# Patient Record
Sex: FEMALE | Race: WHITE | NOT HISPANIC OR LATINO | Employment: FULL TIME | URBAN - METROPOLITAN AREA
[De-identification: names, ages, dates, MRNs, and addresses within clinical notes are randomized per-mention and may not be internally consistent; named-entity substitution may affect disease eponyms.]

---

## 2017-01-06 DIAGNOSIS — E03.9 HYPOTHYROIDISM: ICD-10-CM

## 2017-01-06 RX ORDER — LEVOTHYROXINE SODIUM 0.12 MG/1
TABLET ORAL
Qty: 90 TABLET | Refills: 1 | Status: SHIPPED | OUTPATIENT
Start: 2017-01-06 | End: 2017-07-03 | Stop reason: SDUPTHER

## 2017-01-17 RX ORDER — ALPRAZOLAM 1 MG/1
TABLET ORAL
Qty: 30 TABLET | Refills: 0 | OUTPATIENT
Start: 2017-01-17 | End: 2017-02-10 | Stop reason: SDUPTHER

## 2017-02-15 RX ORDER — ALPRAZOLAM 1 MG/1
TABLET ORAL
Qty: 90 TABLET | Refills: 0
Start: 2017-02-15 | End: 2017-05-03 | Stop reason: SDUPTHER

## 2017-02-17 DIAGNOSIS — Z88.9 MULTIPLE ALLERGIES: Primary | ICD-10-CM

## 2017-02-17 RX ORDER — FLUTICASONE PROPIONATE 50 MCG
SPRAY, SUSPENSION (ML) NASAL
Qty: 16 G | Refills: 3 | Status: SHIPPED | OUTPATIENT
Start: 2017-02-17 | End: 2017-02-17 | Stop reason: SDUPTHER

## 2017-02-17 RX ORDER — FLUTICASONE PROPIONATE 50 MCG
2 SPRAY, SUSPENSION (ML) NASAL DAILY
Qty: 16 G | Refills: 3 | Status: SHIPPED | OUTPATIENT
Start: 2017-02-17 | End: 2017-11-04 | Stop reason: SDUPTHER

## 2017-02-17 NOTE — TELEPHONE ENCOUNTER
----- Message from Pooja Iniguez sent at 2/17/2017  3:13 PM EST -----  Contact: Patient  Patient called, and states she is miserable.  She is requesting refill on     fluticasone (FLONASE) 50 MCG/ACT nasal spray    Please advise.      Patient:  [phone]    Pharmacy:  [facility] [place] [address] [phone] [facility] [phone]

## 2017-03-04 DIAGNOSIS — J30.2 SEASONAL RHINITIS: ICD-10-CM

## 2017-03-06 RX ORDER — MONTELUKAST SODIUM 10 MG/1
TABLET ORAL
Qty: 30 TABLET | Refills: 3 | Status: SHIPPED | OUTPATIENT
Start: 2017-03-06 | End: 2017-07-15 | Stop reason: SDUPTHER

## 2017-04-05 ENCOUNTER — OFFICE VISIT (OUTPATIENT)
Dept: INTERNAL MEDICINE | Facility: CLINIC | Age: 57
End: 2017-04-05

## 2017-04-05 VITALS
TEMPERATURE: 97.8 F | HEIGHT: 65 IN | BODY MASS INDEX: 28.82 KG/M2 | DIASTOLIC BLOOD PRESSURE: 100 MMHG | OXYGEN SATURATION: 98 % | WEIGHT: 173 LBS | SYSTOLIC BLOOD PRESSURE: 140 MMHG | HEART RATE: 88 BPM

## 2017-04-05 DIAGNOSIS — IMO0001 ELEVATED BLOOD PRESSURE: ICD-10-CM

## 2017-04-05 DIAGNOSIS — H81.10 BPV (BENIGN POSITIONAL VERTIGO), UNSPECIFIED LATERALITY: Primary | ICD-10-CM

## 2017-04-05 PROCEDURE — 93000 ELECTROCARDIOGRAM COMPLETE: CPT | Performed by: NURSE PRACTITIONER

## 2017-04-05 PROCEDURE — 99214 OFFICE O/P EST MOD 30 MIN: CPT | Performed by: NURSE PRACTITIONER

## 2017-04-05 RX ORDER — MECLIZINE HYDROCHLORIDE 25 MG/1
25 TABLET ORAL 3 TIMES DAILY PRN
Qty: 30 TABLET | Refills: 1 | Status: SHIPPED | OUTPATIENT
Start: 2017-04-05 | End: 2018-05-16

## 2017-04-10 ENCOUNTER — TELEPHONE (OUTPATIENT)
Dept: INTERNAL MEDICINE | Facility: CLINIC | Age: 57
End: 2017-04-10

## 2017-04-10 NOTE — TELEPHONE ENCOUNTER
Per Radha called to see if pt was better with dizziness, she states she is some better, but still having some issues with the dizziness, she is going to see physical therapist tomorrow, please advise thanks.

## 2017-04-10 NOTE — PROGRESS NOTES
"Cary Robbins is a 56 y.o. female who presents due to feeling lightheaded with nausea.    HPI Comments: She c/o intermittent episodes of feeling lightheaded and off balance which began Sunday. She denies any recent head injury trauma, states sx are worse with turning head and/or change in position. She states it feels \"as though the room is spinning.\" She c/o mild nausea but denies vomiting or diarrhea.    Dizziness   This is a new problem. The current episode started in the past 7 days (sx began Sunday). The problem occurs daily. The problem has been waxing and waning. Associated symptoms include fatigue, headaches and nausea. Pertinent negatives include no abdominal pain, arthralgias, change in bowel habit, chest pain, chills, congestion, coughing, fever, joint swelling, rash, sore throat, visual change, vomiting or weakness. The symptoms are aggravated by standing (turning head, change in position (sx worsened with driving today)). She has tried rest for the symptoms. The treatment provided mild relief.   Nausea   This is a new problem. The current episode started in the past 7 days. The problem occurs intermittently. The problem has been waxing and waning. Associated symptoms include fatigue, headaches and nausea. Pertinent negatives include no abdominal pain, arthralgias, change in bowel habit, chest pain, chills, congestion, coughing, fever, joint swelling, rash, sore throat, visual change, vomiting or weakness.        The following portions of the patient's history were reviewed and updated as appropriate: allergies, current medications, past social history and problem list.    Past Medical History:   Diagnosis Date   • Hard to intubate     TROUBLE WITH INTUBATION... DOESN'T KNOW WHY   • Hypothyroidism    • Restless leg syndrome     TAKES XANAX FOR..         Current Outpatient Prescriptions:   •  ALLEGRA-D ALLERGY & CONGESTION  MG per 12 hr tablet, take 1 tablet by mouth twice a day, Disp: " 60 tablet, Rfl: 2  •  ALPRAZolam (XANAX) 1 MG tablet, take 1 tablet by mouth at bedtime, Disp: 90 tablet, Rfl: 0  •  estradiol (ESTRACE) 2 MG tablet, take 1 tablet by mouth once daily, Disp: 90 tablet, Rfl: 3  •  fluticasone (FLONASE) 50 MCG/ACT nasal spray, 2 sprays into each nostril Daily., Disp: 16 g, Rfl: 3  •  levothyroxine (SYNTHROID, LEVOTHROID) 125 MCG tablet, take 1 tablet by mouth once daily, Disp: 90 tablet, Rfl: 1  •  LYRICA 75 MG capsule, TK 1 C PO BID, Disp: , Rfl: 3  •  meloxicam (MOBIC) 15 MG tablet, take 1 tablet by mouth once daily, Disp: 30 tablet, Rfl: 12  •  montelukast (SINGULAIR) 10 MG tablet, take 1 tablet by mouth once daily, Disp: 30 tablet, Rfl: 3  •  meclizine (ANTIVERT) 25 MG tablet, Take 1 tablet by mouth 3 (Three) Times a Day As Needed for dizziness., Disp: 30 tablet, Rfl: 1    Allergies   Allergen Reactions   • Ampicillin Nausea Only       Review of Systems   Constitutional: Positive for fatigue. Negative for chills, fever and unexpected weight change.   HENT: Negative for congestion, ear pain, postnasal drip, sinus pressure, sore throat and trouble swallowing.    Eyes: Negative for visual disturbance.   Respiratory: Negative for cough, chest tightness and wheezing.    Cardiovascular: Negative for chest pain, palpitations and leg swelling.   Gastrointestinal: Positive for nausea. Negative for abdominal pain, blood in stool, change in bowel habit and vomiting.   Endocrine: Negative for cold intolerance and heat intolerance.   Genitourinary: Negative for dysuria, frequency and urgency.   Musculoskeletal: Negative for arthralgias and joint swelling.   Skin: Negative for color change and rash.   Allergic/Immunologic: Negative for immunocompromised state.   Neurological: Positive for dizziness and headaches. Negative for syncope and weakness.   Hematological: Does not bruise/bleed easily.       Objective   Vitals:    04/05/17 1427   BP: 140/100   BP Location: Left arm   Patient Position:  "Sitting   Cuff Size: Adult   Pulse: 88   Temp: 97.8 °F (36.6 °C)   TempSrc: Oral   SpO2: 98%   Weight: 173 lb (78.5 kg)   Height: 65\" (165.1 cm)     Physical Exam   Constitutional: She is oriented to person, place, and time. She appears well-developed and well-nourished. No distress.   HENT:   Head: Normocephalic and atraumatic.   Right Ear: External ear normal.   Left Ear: External ear normal.   Eyes: Conjunctivae are normal.   Neck: Normal range of motion. Neck supple.   Cardiovascular: Normal rate, regular rhythm, normal heart sounds and intact distal pulses.  Exam reveals no gallop and no friction rub.    No murmur heard.  Pulmonary/Chest: Effort normal and breath sounds normal. No respiratory distress.   Lymphadenopathy:     She has no cervical adenopathy.   Neurological: She is alert and oriented to person, place, and time.   Skin: Skin is warm and dry. No rash noted. No erythema.   Psychiatric: She has a normal mood and affect. Her behavior is normal.   Nursing note and vitals reviewed.      Assessment/Plan   Nancy was seen today for dizziness and nausea.    Diagnoses and all orders for this visit:    BPV (benign positional vertigo), unspecified laterality  Comments:  sx suggestive of BPV, discussed PT referral  Orders:  -     Ambulatory Referral to Physical Therapy Evaluate and treat, Vestibular  -     meclizine (ANTIVERT) 25 MG tablet; Take 1 tablet by mouth 3 (Three) Times a Day As Needed for dizziness.    Elevated blood pressure  Comments:  no acute changes on ECG, elevated due to sx? will monitor at home and bring BP readings to f/u appt    Other orders  -     ECG 12 Lead      ECG 12 Lead  Date/Time: 4/5/2017 2:12 PM  Performed by: NAVID FUENTES  Authorized by: ANTONIETTA PARK   Comparison: compared with previous ECG from 8/8/2011  Similar to previous ECG  Comparison to previous ECG: No acute changes  Rhythm: sinus rhythm  Rate: normal  Rate comments: 74  Conduction: conduction normal  ST Segments: " ST segments normal  T Waves: T waves normal  QRS axis: normal (QRS Duration 86)  Other: no other findings  Comments: QT Interval 392  Corrected QT Interval 418

## 2017-04-10 NOTE — TELEPHONE ENCOUNTER
----- Message from AMBER Hobbs sent at 4/10/2017  6:47 AM EDT -----  Regarding: Update on symptoms  Would you please call pt and see how she is feeling better? Is dizziness better? Thanks.

## 2017-04-19 ENCOUNTER — TELEPHONE (OUTPATIENT)
Dept: INTERNAL MEDICINE | Facility: CLINIC | Age: 57
End: 2017-04-19

## 2017-04-19 NOTE — TELEPHONE ENCOUNTER
Faxed Disability/claim form to Shon @ [facility] [employer] @ [phone], mailed to pt, and scanned into chart. Notified pt that we have faxed forms.

## 2017-04-26 ENCOUNTER — TELEPHONE (OUTPATIENT)
Dept: INTERNAL MEDICINE | Facility: CLINIC | Age: 57
End: 2017-04-26

## 2017-04-26 NOTE — TELEPHONE ENCOUNTER
----- Message from Allison Merino sent at 4/26/2017  9:03 AM EDT -----  Contact: pt  Pt returning your call. For her [facility] papers. She says she was on on April 3rd, 4th, 6th, and 7th. And returned to work on the 8th.

## 2017-04-28 ENCOUNTER — TELEPHONE (OUTPATIENT)
Dept: INTERNAL MEDICINE | Facility: CLINIC | Age: 57
End: 2017-04-28

## 2017-04-28 NOTE — TELEPHONE ENCOUNTER
Faxed [facility] to [phone], scanned into chart,Notified pt and mailed copy as well

## 2017-05-04 RX ORDER — ALPRAZOLAM 1 MG/1
1 TABLET ORAL NIGHTLY PRN
Qty: 90 TABLET | Refills: 1
Start: 2017-05-04 | End: 2017-11-04 | Stop reason: SDUPTHER

## 2017-06-21 DIAGNOSIS — J30.9 ALLERGIC RHINITIS: ICD-10-CM

## 2017-06-22 RX ORDER — FEXOFENADINE HYDROCHLORIDE AND PSEUDOEPHEDRINE HYDROCHLORIDE 60; 120 MG/1; MG/1
TABLET, FILM COATED, EXTENDED RELEASE ORAL
Qty: 60 TABLET | Refills: 2 | Status: SHIPPED | OUTPATIENT
Start: 2017-06-22 | End: 2017-12-06 | Stop reason: SDUPTHER

## 2017-07-03 DIAGNOSIS — E03.9 HYPOTHYROIDISM: ICD-10-CM

## 2017-07-03 RX ORDER — LEVOTHYROXINE SODIUM 0.12 MG/1
TABLET ORAL
Qty: 90 TABLET | Refills: 1 | Status: SHIPPED | OUTPATIENT
Start: 2017-07-03 | End: 2018-01-01 | Stop reason: SDUPTHER

## 2017-07-15 DIAGNOSIS — J30.2 SEASONAL RHINITIS: ICD-10-CM

## 2017-07-17 RX ORDER — MONTELUKAST SODIUM 10 MG/1
TABLET ORAL
Qty: 30 TABLET | Refills: 3 | Status: SHIPPED | OUTPATIENT
Start: 2017-07-17 | End: 2017-11-12 | Stop reason: SDUPTHER

## 2017-10-03 DIAGNOSIS — M54.50 LOW BACK PAIN: ICD-10-CM

## 2017-10-03 RX ORDER — MELOXICAM 15 MG/1
TABLET ORAL
Qty: 30 TABLET | Refills: 3 | Status: SHIPPED | OUTPATIENT
Start: 2017-10-03 | End: 2018-01-31 | Stop reason: SDUPTHER

## 2017-11-04 DIAGNOSIS — Z88.9 MULTIPLE ALLERGIES: ICD-10-CM

## 2017-11-06 RX ORDER — FLUTICASONE PROPIONATE 50 MCG
SPRAY, SUSPENSION (ML) NASAL
Qty: 16 ML | Refills: 3 | Status: SHIPPED | OUTPATIENT
Start: 2017-11-06 | End: 2018-03-27 | Stop reason: SDUPTHER

## 2017-11-08 RX ORDER — ALPRAZOLAM 1 MG/1
TABLET ORAL
Qty: 90 TABLET | Refills: 1
Start: 2017-11-08 | End: 2018-05-03 | Stop reason: SDUPTHER

## 2017-11-12 DIAGNOSIS — J30.2 SEASONAL RHINITIS: ICD-10-CM

## 2017-11-13 RX ORDER — MONTELUKAST SODIUM 10 MG/1
TABLET ORAL
Qty: 30 TABLET | Refills: 3 | Status: SHIPPED | OUTPATIENT
Start: 2017-11-13 | End: 2018-03-07 | Stop reason: SDUPTHER

## 2017-11-23 DIAGNOSIS — N95.1 MENOPAUSAL STATE: ICD-10-CM

## 2017-11-27 RX ORDER — ESTRADIOL 2 MG/1
TABLET ORAL
Qty: 90 TABLET | Refills: 1 | Status: SHIPPED | OUTPATIENT
Start: 2017-11-27 | End: 2018-05-29 | Stop reason: SDUPTHER

## 2017-12-06 DIAGNOSIS — J30.9 ALLERGIC RHINITIS: ICD-10-CM

## 2017-12-07 RX ORDER — FEXOFENADINE HYDROCHLORIDE AND PSEUDOEPHEDRINE HYDROCHLORIDE 60; 120 MG/1; MG/1
TABLET, FILM COATED, EXTENDED RELEASE ORAL
Qty: 60 TABLET | Refills: 2 | Status: SHIPPED | OUTPATIENT
Start: 2017-12-07 | End: 2018-08-06 | Stop reason: SDUPTHER

## 2018-01-01 DIAGNOSIS — E03.9 HYPOTHYROIDISM: ICD-10-CM

## 2018-01-02 RX ORDER — LEVOTHYROXINE SODIUM 0.12 MG/1
TABLET ORAL
Qty: 90 TABLET | Refills: 1 | Status: SHIPPED | OUTPATIENT
Start: 2018-01-02 | End: 2018-05-29 | Stop reason: SDUPTHER

## 2018-01-31 DIAGNOSIS — M54.50 LOW BACK PAIN: ICD-10-CM

## 2018-01-31 RX ORDER — MELOXICAM 15 MG/1
TABLET ORAL
Qty: 30 TABLET | Refills: 3 | Status: SHIPPED | OUTPATIENT
Start: 2018-01-31 | End: 2018-05-16

## 2018-03-07 DIAGNOSIS — J30.2 SEASONAL RHINITIS: ICD-10-CM

## 2018-03-07 RX ORDER — MONTELUKAST SODIUM 10 MG/1
TABLET ORAL
Qty: 30 TABLET | Refills: 3 | Status: SHIPPED | OUTPATIENT
Start: 2018-03-07 | End: 2018-07-08 | Stop reason: SDUPTHER

## 2018-03-27 DIAGNOSIS — Z88.9 MULTIPLE ALLERGIES: ICD-10-CM

## 2018-03-28 RX ORDER — FLUTICASONE PROPIONATE 50 MCG
SPRAY, SUSPENSION (ML) NASAL
Qty: 16 G | Refills: 3 | Status: SHIPPED | OUTPATIENT
Start: 2018-03-28 | End: 2018-08-06 | Stop reason: SDUPTHER

## 2018-04-30 NOTE — TELEPHONE ENCOUNTER
Returned faxed request for alprazolam patient has not been seen in a year, will need office visit

## 2018-05-03 RX ORDER — ALPRAZOLAM 1 MG/1
1 TABLET ORAL NIGHTLY PRN
Qty: 90 TABLET | Refills: 0 | OUTPATIENT
Start: 2018-05-03 | End: 2018-07-31 | Stop reason: SDUPTHER

## 2018-05-03 NOTE — TELEPHONE ENCOUNTER
Please advise med refill   / Pt's  recently passed away    Last OV:  4/5/17    [place] ordered    Thank you,    Taina

## 2018-05-03 NOTE — TELEPHONE ENCOUNTER
----- Message from Ashley Leung sent at 5/3/2018 11:33 AM EDT -----  Contact: Pt  Pt is calling for a refill     FOR  ALPRAZolam (XANAX) 1 MG tablet      [facility]   [facility] [phone] [facility] [phone] FX      Caller# [phone]    Please and thank you.

## 2018-05-10 ENCOUNTER — TRANSCRIBE ORDERS (OUTPATIENT)
Dept: ADMINISTRATIVE | Facility: HOSPITAL | Age: 58
End: 2018-05-10

## 2018-05-10 DIAGNOSIS — M54.5 LOW BACK PAIN, UNSPECIFIED BACK PAIN LATERALITY, UNSPECIFIED CHRONICITY, WITH SCIATICA PRESENCE UNSPECIFIED: Primary | ICD-10-CM

## 2018-05-16 ENCOUNTER — HOSPITAL ENCOUNTER (OUTPATIENT)
Dept: MRI IMAGING | Facility: HOSPITAL | Age: 58
Discharge: HOME | End: 2018-05-16
Attending: ORTHOPAEDIC SURGERY | Admitting: ORTHOPAEDIC SURGERY

## 2018-05-16 ENCOUNTER — OFFICE VISIT (OUTPATIENT)
Dept: INTERNAL MEDICINE | Facility: CLINIC | Age: 58
End: 2018-05-16

## 2018-05-16 VITALS
BODY MASS INDEX: 28.49 KG/M2 | HEIGHT: 65 IN | OXYGEN SATURATION: 97 % | SYSTOLIC BLOOD PRESSURE: 170 MMHG | DIASTOLIC BLOOD PRESSURE: 100 MMHG | HEART RATE: 98 BPM | WEIGHT: 171 LBS

## 2018-05-16 DIAGNOSIS — Z12.39 SCREENING FOR BREAST CANCER: ICD-10-CM

## 2018-05-16 DIAGNOSIS — M54.5 LOW BACK PAIN, UNSPECIFIED BACK PAIN LATERALITY, UNSPECIFIED CHRONICITY, WITH SCIATICA PRESENCE UNSPECIFIED: ICD-10-CM

## 2018-05-16 DIAGNOSIS — G89.29 CHRONIC LOW BACK PAIN, UNSPECIFIED BACK PAIN LATERALITY, WITH SCIATICA PRESENCE UNSPECIFIED: ICD-10-CM

## 2018-05-16 DIAGNOSIS — I10 ESSENTIAL HYPERTENSION: Primary | ICD-10-CM

## 2018-05-16 DIAGNOSIS — E03.9 ACQUIRED HYPOTHYROIDISM: ICD-10-CM

## 2018-05-16 DIAGNOSIS — M54.5 CHRONIC LOW BACK PAIN, UNSPECIFIED BACK PAIN LATERALITY, WITH SCIATICA PRESENCE UNSPECIFIED: ICD-10-CM

## 2018-05-16 DIAGNOSIS — S80.812A ABRASION, LEFT LOWER LEG, INITIAL ENCOUNTER: ICD-10-CM

## 2018-05-16 PROCEDURE — 99214 OFFICE O/P EST MOD 30 MIN: CPT | Performed by: NURSE PRACTITIONER

## 2018-05-16 PROCEDURE — A9577 INJ MULTIHANCE: HCPCS | Performed by: ORTHOPAEDIC SURGERY

## 2018-05-16 PROCEDURE — 0 GADOBENATE DIMEGLUMINE 529 MG/ML SOLUTION: Performed by: ORTHOPAEDIC SURGERY

## 2018-05-16 PROCEDURE — 82565 ASSAY OF CREATININE: CPT

## 2018-05-16 PROCEDURE — 72158 MRI LUMBAR SPINE W/O & W/DYE: CPT

## 2018-05-16 RX ORDER — IRBESARTAN AND HYDROCHLOROTHIAZIDE 150; 12.5 MG/1; MG/1
1 TABLET, FILM COATED ORAL DAILY
Qty: 30 TABLET | Refills: 1 | Status: SHIPPED | OUTPATIENT
Start: 2018-05-16 | End: 2018-07-08 | Stop reason: SDUPTHER

## 2018-05-16 RX ORDER — CELECOXIB 200 MG/1
200 CAPSULE ORAL DAILY
Qty: 90 CAPSULE | Refills: 0 | Status: SHIPPED | OUTPATIENT
Start: 2018-05-16 | End: 2018-11-27

## 2018-05-16 RX ORDER — DOXYCYCLINE 100 MG/1
100 TABLET ORAL 2 TIMES DAILY
Qty: 14 TABLET | Refills: 0 | Status: SHIPPED | OUTPATIENT
Start: 2018-05-16 | End: 2018-07-20 | Stop reason: SDUPTHER

## 2018-05-16 RX ORDER — MUPIROCIN CALCIUM 20 MG/G
CREAM TOPICAL 3 TIMES DAILY
Qty: 15 G | Refills: 0 | Status: SHIPPED | OUTPATIENT
Start: 2018-05-16 | End: 2018-05-22 | Stop reason: SDUPTHER

## 2018-05-16 RX ADMIN — GADOBENATE DIMEGLUMINE 16 ML: 529 INJECTION, SOLUTION INTRAVENOUS at 20:00

## 2018-05-17 PROBLEM — I10 ESSENTIAL HYPERTENSION: Status: ACTIVE | Noted: 2018-05-17

## 2018-05-17 PROBLEM — M54.50 CHRONIC LOW BACK PAIN: Status: ACTIVE | Noted: 2018-05-17

## 2018-05-17 PROBLEM — G89.29 CHRONIC LOW BACK PAIN: Status: ACTIVE | Noted: 2018-05-17

## 2018-05-17 LAB — CREAT BLDA-MCNC: 0.7 MG/DL (ref 0.6–1.3)

## 2018-05-17 NOTE — PROGRESS NOTES
Subjective   Nancy Robbins is a 57 y.o. female who presents due to elevated blood pressure. She also c/o worsening low back pain.    She has noticed increased BP (consistently >140/90) over the past few weeks. She does c/o increased low back pain, has been evaluated by ortho (Dr. Avery) and is scheduled for an MRI today.   She ran into the edge of a  approximately 2 weeks ago, continues to c/o irritation of her left lower leg. Denies fever/chills.      Hypertension   This is a new problem. The current episode started more than 1 month ago. The problem is unchanged. The problem is uncontrolled. Associated symptoms include headaches and malaise/fatigue. Pertinent negatives include no chest pain, palpitations or shortness of breath. Current antihypertension treatment includes nothing.   Hypothyroidism   Associated symptoms include arthralgias, fatigue, headaches and a rash. Pertinent negatives include no abdominal pain, chest pain, chills, congestion, coughing, fever, joint swelling, nausea, sore throat, vomiting or weakness.   Wound Infection   This is a new problem. The current episode started 1 to 4 weeks ago. The problem occurs daily. The problem has been gradually worsening. Associated symptoms include arthralgias, fatigue, headaches and a rash. Pertinent negatives include no abdominal pain, chest pain, chills, congestion, coughing, fever, joint swelling, nausea, sore throat, vomiting or weakness. Treatments tried: topical Neosporin. The treatment provided mild relief.        The following portions of the patient's history were reviewed and updated as appropriate: allergies, current medications, past social history and problem list.    Past Medical History:   Diagnosis Date   • Hard to intubate     TROUBLE WITH INTUBATION... DOESN'T KNOW WHY   • Hypothyroidism    • Restless leg syndrome     TAKES XANAX FOR..         Current Outpatient Prescriptions:   •  Acetaminophen (TYLENOL ARTHRITIS PAIN PO), Take   by mouth., Disp: , Rfl:   •  ALLEGRA-D ALLERGY & CONGESTION  MG per 12 hr tablet, take 1 tablet by mouth twice a day, Disp: 60 tablet, Rfl: 2  •  ALPRAZolam (XANAX) 1 MG tablet, Take 1 tablet by mouth At Night As Needed for Anxiety or Sleep., Disp: 90 tablet, Rfl: 0  •  estradiol (ESTRACE) 2 MG tablet, take 1 tablet by mouth once daily, Disp: 90 tablet, Rfl: 1  •  fluticasone (FLONASE) 50 MCG/ACT nasal spray, instill 2 sprays into each nostril once daily, Disp: 16 g, Rfl: 3  •  levothyroxine (SYNTHROID, LEVOTHROID) 125 MCG tablet, take 1 tablet by mouth once daily, Disp: 90 tablet, Rfl: 1  •  LYRICA 75 MG capsule, TK 1 C PO BID, Disp: , Rfl: 3  •  montelukast (SINGULAIR) 10 MG tablet, take 1 tablet by mouth once daily, Disp: 30 tablet, Rfl: 3  •  celecoxib (CeleBREX) 200 MG capsule, Take 1 capsule by mouth Daily., Disp: 90 capsule, Rfl: 0  •  doxycycline (ADOXA) 100 MG tablet, Take 1 tablet by mouth 2 (Two) Times a Day for 7 days., Disp: 14 tablet, Rfl: 0  •  irbesartan-hydrochlorothiazide (AVALIDE) 150-12.5 MG tablet, Take 1 tablet by mouth Daily., Disp: 30 tablet, Rfl: 1  •  mupirocin (BACTROBAN) 2 % cream, Apply  topically 3 (Three) Times a Day., Disp: 15 g, Rfl: 0  No current facility-administered medications for this visit.     Allergies   Allergen Reactions   • Ampicillin Nausea Only       Review of Systems   Constitutional: Positive for fatigue and malaise/fatigue. Negative for chills, fever and unexpected weight change.   HENT: Negative for congestion, ear pain, postnasal drip, sinus pressure, sore throat and trouble swallowing.    Eyes: Negative for visual disturbance.   Respiratory: Negative for cough, chest tightness, shortness of breath and wheezing.    Cardiovascular: Negative for chest pain, palpitations and leg swelling.   Gastrointestinal: Negative for abdominal pain, blood in stool, nausea and vomiting.   Genitourinary: Negative for dysuria, frequency and urgency.   Musculoskeletal: Positive  "for arthralgias and back pain. Negative for joint swelling.   Skin: Positive for rash. Negative for color change.   Neurological: Positive for headaches. Negative for syncope and weakness.   Hematological: Does not bruise/bleed easily.   Psychiatric/Behavioral:          approximately 6 weeks ago       Objective   Vitals:    18 1308   BP: 170/100   BP Location: Left arm   Patient Position: Sitting   Cuff Size: Adult   Pulse: 98   SpO2: 97%   Weight: 77.6 kg (171 lb)   Height: 165.1 cm (65\")     Physical Exam   Constitutional: She appears well-developed and well-nourished. She is cooperative. She does not have a sickly appearance. She does not appear ill.   HENT:   Head: Normocephalic.   Right Ear: Hearing, tympanic membrane and external ear normal.   Left Ear: Hearing, tympanic membrane and external ear normal.   Nose: Nose normal. No mucosal edema, rhinorrhea, sinus tenderness or nasal deformity. Right sinus exhibits no maxillary sinus tenderness and no frontal sinus tenderness. Left sinus exhibits no maxillary sinus tenderness and no frontal sinus tenderness.   Mouth/Throat: Oropharynx is clear and moist and mucous membranes are normal. Normal dentition.   Eyes: Conjunctivae and lids are normal. Right eye exhibits no discharge and no exudate. Left eye exhibits no discharge and no exudate.   Neck: Trachea normal and normal range of motion. Carotid bruit is not present. No edema present. No thyroid mass and no thyromegaly present.   Cardiovascular: Regular rhythm, normal heart sounds and normal pulses.    No murmur heard.  Pulmonary/Chest: Breath sounds normal. No respiratory distress. She has no decreased breath sounds. She has no wheezes. She has no rhonchi. She has no rales.   Lymphadenopathy:        Head (right side): No submental, no submandibular, no tonsillar, no preauricular, no posterior auricular and no occipital adenopathy present.        Head (left side): No submental, no submandibular, " no tonsillar, no preauricular, no posterior auricular and no occipital adenopathy present.   Neurological: She is alert.   Skin: Skin is warm, dry and intact. No cyanosis. Nails show no clubbing.   There is an graeme 3 cm wound on the left lower anterior leg with eschar and surrounding erythema, no active bleeding of drainage from the wound       Assessment/Plan   Nancy was seen today for hypertension, skin lesion, hypothyroidism and rash.    Diagnoses and all orders for this visit:    Essential hypertension  Comments:  start Irbesartand re-evaluate in 4 weeks  Orders:  -     irbesartan-hydrochlorothiazide (AVALIDE) 150-12.5 MG tablet; Take 1 tablet by mouth Daily.  -     CBC Auto Differential; Future  -     Comprehensive Metabolic Panel; Future  -     Lipid Panel; Future    Acquired hypothyroidism  Comments:  recheck TSH  Orders:  -     TSH; Future    Chronic low back pain, unspecified back pain laterality, with sciatica presence unspecified  Comments:  has appt for MRI of lumbar spine today  Orders:  -     celecoxib (CeleBREX) 200 MG capsule; Take 1 capsule by mouth Daily.    Abrasion, left lower leg, initial encounter  Comments:  apply Bactroban and begin Doxycycline due to surrounding erythema  Orders:  -     doxycycline (ADOXA) 100 MG tablet; Take 1 tablet by mouth 2 (Two) Times a Day for 7 days.  -     mupirocin (BACTROBAN) 2 % cream; Apply  topically 3 (Three) Times a Day.    Screening for breast cancer  -     Mammo Screening Bilateral With CAD; Future

## 2018-05-22 ENCOUNTER — TRANSCRIBE ORDERS (OUTPATIENT)
Dept: ADMINISTRATIVE | Facility: HOSPITAL | Age: 58
End: 2018-05-22

## 2018-05-22 DIAGNOSIS — G89.29 CHRONIC RADICULAR LUMBAR PAIN: Primary | ICD-10-CM

## 2018-05-22 DIAGNOSIS — S80.812A ABRASION, LEFT LOWER LEG, INITIAL ENCOUNTER: ICD-10-CM

## 2018-05-22 DIAGNOSIS — M54.16 CHRONIC RADICULAR LUMBAR PAIN: Primary | ICD-10-CM

## 2018-05-22 RX ORDER — MUPIROCIN CALCIUM 20 MG/G
CREAM TOPICAL 3 TIMES DAILY
Qty: 15 G | Refills: 0 | Status: SHIPPED | OUTPATIENT
Start: 2018-05-22 | End: 2018-11-27

## 2018-05-24 ENCOUNTER — OFFICE VISIT (OUTPATIENT)
Dept: INTERNAL MEDICINE | Facility: CLINIC | Age: 58
End: 2018-05-24

## 2018-05-24 ENCOUNTER — APPOINTMENT (OUTPATIENT)
Dept: WOMENS IMAGING | Facility: HOSPITAL | Age: 58
End: 2018-05-24

## 2018-05-24 ENCOUNTER — LAB (OUTPATIENT)
Dept: INTERNAL MEDICINE | Facility: CLINIC | Age: 58
End: 2018-05-24

## 2018-05-24 DIAGNOSIS — Z12.39 SCREENING FOR BREAST CANCER: ICD-10-CM

## 2018-05-24 DIAGNOSIS — E03.9 ACQUIRED HYPOTHYROIDISM: ICD-10-CM

## 2018-05-24 DIAGNOSIS — I10 ESSENTIAL HYPERTENSION: ICD-10-CM

## 2018-05-24 LAB
ALBUMIN SERPL-MCNC: 4.5 G/DL (ref 3.5–5.2)
ALBUMIN/GLOB SERPL: 1.7 G/DL
ALP SERPL-CCNC: 73 U/L (ref 39–117)
ALT SERPL W P-5'-P-CCNC: 31 U/L (ref 1–33)
ANION GAP SERPL CALCULATED.3IONS-SCNC: 13 MMOL/L
AST SERPL-CCNC: 27 U/L (ref 1–32)
BASOPHILS # BLD AUTO: 0.02 10*3/MM3 (ref 0–0.2)
BASOPHILS NFR BLD AUTO: 0.3 % (ref 0–2)
BILIRUB SERPL-MCNC: 0.3 MG/DL (ref 0.1–1.2)
BUN BLD-MCNC: 18 MG/DL (ref 6–20)
BUN/CREAT SERPL: 27.7 (ref 7–25)
CALCIUM SPEC-SCNC: 9.6 MG/DL (ref 8.6–10.5)
CHLORIDE SERPL-SCNC: 101 MMOL/L (ref 98–107)
CHOLEST SERPL-MCNC: 231 MG/DL (ref 0–200)
CO2 SERPL-SCNC: 27 MMOL/L (ref 22–29)
CREAT BLD-MCNC: 0.65 MG/DL (ref 0.57–1)
DEPRECATED RDW RBC AUTO: 42.2 FL (ref 37–54)
EOSINOPHIL # BLD AUTO: 0.15 10*3/MM3 (ref 0–0.7)
EOSINOPHIL NFR BLD AUTO: 2.5 % (ref 0–5)
ERYTHROCYTE [DISTWIDTH] IN BLOOD BY AUTOMATED COUNT: 12.2 % (ref 11.5–15)
GFR SERPL CREATININE-BSD FRML MDRD: 94 ML/MIN/1.73
GLOBULIN UR ELPH-MCNC: 2.6 GM/DL
GLUCOSE BLD-MCNC: 99 MG/DL (ref 65–99)
HCT VFR BLD AUTO: 42.9 % (ref 34.1–44.9)
HDLC SERPL-MCNC: 78 MG/DL (ref 40–60)
HGB BLD-MCNC: 14.2 G/DL (ref 11.2–15.7)
LDLC SERPL CALC-MCNC: 127 MG/DL (ref 0–100)
LDLC/HDLC SERPL: 1.63 {RATIO}
LYMPHOCYTES # BLD AUTO: 1.59 10*3/MM3 (ref 0.8–7)
LYMPHOCYTES NFR BLD AUTO: 26.7 % (ref 10–60)
MCH RBC QN AUTO: 32.1 PG (ref 26–34)
MCHC RBC AUTO-ENTMCNC: 33.1 G/DL (ref 31–37)
MCV RBC AUTO: 96.8 FL (ref 80–100)
MONOCYTES # BLD AUTO: 0.5 10*3/MM3 (ref 0–1)
MONOCYTES NFR BLD AUTO: 8.4 % (ref 0–13)
NEUTROPHILS # BLD AUTO: 3.7 10*3/MM3 (ref 1–11)
NEUTROPHILS NFR BLD AUTO: 62.1 % (ref 30–85)
PLATELET # BLD AUTO: 244 10*3/MM3 (ref 150–450)
PMV BLD AUTO: 11.2 FL (ref 6–12)
POTASSIUM BLD-SCNC: 4.2 MMOL/L (ref 3.5–5.2)
PROT SERPL-MCNC: 7.1 G/DL (ref 6–8.5)
RBC # BLD AUTO: 4.43 10*6/MM3 (ref 3.93–5.22)
SODIUM BLD-SCNC: 141 MMOL/L (ref 136–145)
TRIGL SERPL-MCNC: 128 MG/DL (ref 0–150)
TSH SERPL-ACNC: 5.81 MIU/ML (ref 0.27–4.2)
VLDLC SERPL-MCNC: 25.6 MG/DL (ref 5–40)
WBC NRBC COR # BLD: 5.96 10*3/MM3 (ref 5–10)

## 2018-05-24 PROCEDURE — 85025 COMPLETE CBC W/AUTO DIFF WBC: CPT | Performed by: NURSE PRACTITIONER

## 2018-05-24 PROCEDURE — 80061 LIPID PANEL: CPT | Performed by: NURSE PRACTITIONER

## 2018-05-24 PROCEDURE — 77067 SCR MAMMO BI INCL CAD: CPT | Performed by: NURSE PRACTITIONER

## 2018-05-24 PROCEDURE — 77067 SCR MAMMO BI INCL CAD: CPT | Performed by: RADIOLOGY

## 2018-05-24 PROCEDURE — 80053 COMPREHEN METABOLIC PANEL: CPT | Performed by: NURSE PRACTITIONER

## 2018-05-29 DIAGNOSIS — E03.9 ACQUIRED HYPOTHYROIDISM: Primary | ICD-10-CM

## 2018-05-29 DIAGNOSIS — N95.1 MENOPAUSAL STATE: ICD-10-CM

## 2018-05-29 RX ORDER — ESTRADIOL 2 MG/1
2 TABLET ORAL DAILY
Qty: 90 TABLET | Refills: 1 | Status: SHIPPED | OUTPATIENT
Start: 2018-05-29 | End: 2018-11-15 | Stop reason: SDUPTHER

## 2018-05-29 RX ORDER — LEVOTHYROXINE SODIUM 137 UG/1
125 TABLET ORAL DAILY
Qty: 30 TABLET | Refills: 1 | Status: SHIPPED | OUTPATIENT
Start: 2018-05-29 | End: 2018-07-08 | Stop reason: SDUPTHER

## 2018-06-08 ENCOUNTER — TELEPHONE (OUTPATIENT)
Dept: INTERNAL MEDICINE | Facility: CLINIC | Age: 58
End: 2018-06-08

## 2018-06-08 NOTE — TELEPHONE ENCOUNTER
----- Message from Silvia Bell sent at 6/8/2018  1:56 PM EDT -----  Contact: pt - Dr Lyons' pt - RE: new Rx  Pt calling and would like a Rx called to pt's pharmacy for poison ivy. Pt informs scratching it is spreading it. Could you please call Rx to pharmacy? Please advise. Thanks      [facility] [facility] [phone] [facility] [phone] FX    Pt # [phone]

## 2018-06-13 ENCOUNTER — HOSPITAL ENCOUNTER (OUTPATIENT)
Dept: PAIN MEDICINE | Facility: HOSPITAL | Age: 58
Discharge: HOME | End: 2018-06-13
Attending: ORTHOPAEDIC SURGERY | Admitting: ORTHOPAEDIC SURGERY

## 2018-06-13 ENCOUNTER — ANESTHESIA (OUTPATIENT)
Dept: PAIN MEDICINE | Facility: HOSPITAL | Age: 58
End: 2018-06-13

## 2018-06-13 ENCOUNTER — ANESTHESIA EVENT (OUTPATIENT)
Dept: PAIN MEDICINE | Facility: HOSPITAL | Age: 58
End: 2018-06-13

## 2018-06-13 ENCOUNTER — HOSPITAL ENCOUNTER (OUTPATIENT)
Dept: GENERAL RADIOLOGY | Facility: HOSPITAL | Age: 58
Discharge: HOME | End: 2018-06-13

## 2018-06-13 VITALS
SYSTOLIC BLOOD PRESSURE: 149 MMHG | DIASTOLIC BLOOD PRESSURE: 80 MMHG | RESPIRATION RATE: 16 BRPM | BODY MASS INDEX: 27.32 KG/M2 | WEIGHT: 170 LBS | HEIGHT: 66 IN | OXYGEN SATURATION: 98 % | HEART RATE: 62 BPM

## 2018-06-13 DIAGNOSIS — R52 PAIN: ICD-10-CM

## 2018-06-13 PROCEDURE — 77003 FLUOROGUIDE FOR SPINE INJECT: CPT

## 2018-06-13 PROCEDURE — 25010000002 METHYLPREDNISOLONE PER 80 MG: Performed by: ANESTHESIOLOGY

## 2018-06-13 PROCEDURE — C1755 CATHETER, INTRASPINAL: HCPCS

## 2018-06-13 PROCEDURE — 0 IOPAMIDOL 41 % SOLUTION: Performed by: ANESTHESIOLOGY

## 2018-06-13 RX ORDER — METHYLPREDNISOLONE ACETATE 80 MG/ML
80 INJECTION, SUSPENSION INTRA-ARTICULAR; INTRALESIONAL; INTRAMUSCULAR; SOFT TISSUE ONCE
Status: COMPLETED | OUTPATIENT
Start: 2018-06-13 | End: 2018-06-13

## 2018-06-13 RX ADMIN — METHYLPREDNISOLONE ACETATE 80 MG: 80 INJECTION, SUSPENSION INTRA-ARTICULAR; INTRALESIONAL; INTRAMUSCULAR; SOFT TISSUE at 13:40

## 2018-06-13 RX ADMIN — IOPAMIDOL 10 ML: 408 INJECTION, SOLUTION INTRATHECAL at 13:40

## 2018-06-13 NOTE — ANESTHESIA PROCEDURE NOTES
PAIN Epidural block    Patient location during procedure: pain clinic  Start Time: 6/13/2018 1:27 PM  Stop Time: 6/13/2018 1:42 PM  Indication:procedure for pain  Performed By  Anesthesiologist: BILLY JOYNER  Preanesthetic Checklist  Completed: patient identified, site marked, surgical consent, pre-op evaluation, timeout performed, risks and benefits discussed and monitors and equipment checked  Additional Notes  Interlaminar epidural was performed under fluoroscopic guidance.    Diagnosis     * Degeneration of lumbar intervertebral disc (M51.36)     * Spinal stenosis, lumbar region without neurogenic claudication (M48.061)     * Lumbar radiculopathy (M54.16)     * Previous decompression at L5-S1      Prep:  Pt Position:prone  Sterile Tech:cap, gloves, mask and sterile barrier  Prep:chlorhexidine gluconate and isopropyl alcohol  Monitoring:blood pressure monitoring, continuous pulse oximetry and EKG  Procedure:  Sedation: no   Approach:right paramedian  Guidance: fluoroscopy  Location:lumbar  Level:3-4  Needle Type:Tuohy  Needle Gauge:20 G  Aspiration:negative  Medications:  Depomedrol:80 mg  Preservative Free Saline:3mL  Isovue:2mL  Comments:Epidural spread of contrast.  Post Assessment:  Dressing:occlusive dressing applied  Pt Tolerance:patient tolerated the procedure well with no apparent complications  Complications:no

## 2018-06-13 NOTE — H&P
[facility]    History and Physical    Patient Name: Nancy Robbins  :  1960  MRN:  [id number]  Date of Admission: 2018    Subjective     Patient is a 57 y.o. female presents with chief complaint of chronic, constant, moderate, severe low back and bilateral lower extremity pain.  Onset of symptoms was gradual starting several years ago.  Symptoms are associated/aggravated by activity or sitting. Symptoms improve with rest.  She has undergone a previous decompression at L5-S1.  On a pain scale from 0-10, she rates her pain as a 5 on a good day and a 10 on a bad day.  She describes the pain as sharp and throbbing in nature.  She says her pain is adversely affected her ability to perform activities of daily living, her ability to exercise, her sleep habits and her emotions.    Her lumbar MRI shows degenerative changes at multiple levels with some spinal stenosis at L3 4.    The following portions of the patients history were reviewed and updated as appropriate: current medications, allergies, past medical history, past surgical history, past family history, past social history and problem list                Objective     Past Medical History:   Past Medical History:   Diagnosis Date   • Hard to intubate     TROUBLE WITH INTUBATION... DOESN'T KNOW WHY   • Hypertension    • Hypothyroidism    • Osteoarthritis    • Restless leg syndrome     TAKES XANAX FOR..     Past Surgical History:   Past Surgical History:   Procedure Laterality Date   • BACK SURGERY     • COLONOSCOPY N/A 2016    Procedure: COLONOSCOPY;  Surgeon: Jose G Toro MD;  Location: [facility] ENDOSCOPY;  Service:    • KNEE MENISCAL REPAIR Bilateral    • NECK SURGERY     • PARTIAL HYSTERECTOMY       Family History:   Family History   Problem Relation Age of Onset   • Lung cancer Father      Social History:   Social History   Substance Use Topics   • Smoking status: Former Smoker   • Smokeless tobacco: Never Used   • Alcohol use Yes       "Comment: social       Vital Signs Range for the last 24 hours  Temperature:     Temp Source:     BP: BP: (151)/(103) 151/103   Pulse: Heart Rate:  [70] 70   Respirations: Resp:  [16] 16   SPO2: SpO2:  [98 %] 98 %   O2 Amount (l/min):     O2 Devices Device (Oxygen Therapy): room air   Weight: Weight:  [77.1 kg (170 lb)] 77.1 kg (170 lb)     Flowsheet Rows      First Filed Value   Admission Height  167.6 cm (66\") Documented at 06/13/2018 1249   Admission Weight  77.1 kg (170 lb) Documented at 06/13/2018 1249          --------------------------------------------------------------------------------    Current Outpatient Prescriptions   Medication Sig Dispense Refill   • Acetaminophen (TYLENOL ARTHRITIS PAIN PO) Take  by mouth.     • ALLEGRA-D ALLERGY & CONGESTION  MG per 12 hr tablet take 1 tablet by mouth twice a day 60 tablet 2   • ALPRAZolam (XANAX) 1 MG tablet Take 1 tablet by mouth At Night As Needed for Anxiety or Sleep. 90 tablet 0   • celecoxib (CeleBREX) 200 MG capsule Take 1 capsule by mouth Daily. 90 capsule 0   • estradiol (ESTRACE) 2 MG tablet Take 1 tablet by mouth Daily. 90 tablet 1   • fluticasone (FLONASE) 50 MCG/ACT nasal spray instill 2 sprays into each nostril once daily 16 g 3   • irbesartan-hydrochlorothiazide (AVALIDE) 150-12.5 MG tablet Take 1 tablet by mouth Daily. 30 tablet 1   • levothyroxine (SYNTHROID, LEVOTHROID) 137 MCG tablet Take 1 tablet by mouth Daily. 30 tablet 1   • LYRICA 75 MG capsule TK 1 C PO BID  3   • montelukast (SINGULAIR) 10 MG tablet take 1 tablet by mouth once daily 30 tablet 3   • mupirocin (BACTROBAN) 2 % cream Apply  topically 3 (Three) Times a Day. 15 g 0     No current facility-administered medications for this encounter.        --------------------------------------------------------------------------------  Assessment/Plan      Anesthesia Evaluation     Patient summary reviewed and Nursing notes reviewed   NPO Solid Status: > 8 hours  NPO Liquid Status: > 2 " hours           Airway   Mallampati: II  TM distance: >3 FB  Neck ROM: full  Dental - normal exam     Pulmonary - negative pulmonary ROS and normal exam    breath sounds clear to auscultation  Cardiovascular - normal exam    Rhythm: regular  Rate: normal    (+) hypertension,   (-) angina, orthopnea, PND, VARELA      Neuro/Psych  (+) psychiatric history Anxiety,   left straight leg raise test,   right straight leg raise test,    GI/Hepatic/Renal/Endo    (+)   hypothyroidism,     Musculoskeletal     (+) back pain, chronic pain,   Abdominal  - normal exam    Abdomen: soft.  Bowel sounds: normal.   Substance History - negative use     OB/GYN negative ob/gyn ROS         Other - negative ROS           Phys Exam Other:     NECK:  Supple without adenopathy, JVD or bruits.    EXTREMITIES:  There is no clubbing, cyanosis or edema.  Radial pulses are 1+ and equal bilaterally.  Examination of the lumbar spine shows no marked tenderness or discoloration.    SKIN:  Warm and dry.    NEUROLOGICAL EXAM:  Alert and oriented ×3.  Extraocular muscles intact.  Strength is normal and symmetrical in the lower extremities with respect to dorsal and plantar flexion of the ankles and quadriceps.           Diagnosis and Plan    Treatment Plan  ASA 2      Procedures: Lumbar Epidural Steroid Injection(LESI), With fluoroscopy,       Anesthetic plan and risks discussed with patient.          Diagnosis     * Degeneration of lumbar intervertebral disc [M51.36]     * Spinal stenosis, lumbar region without neurogenic claudication [M48.061]     * Lumbar radiculopathy [M54.16]

## 2018-06-22 ENCOUNTER — OFFICE VISIT (OUTPATIENT)
Dept: INTERNAL MEDICINE | Facility: CLINIC | Age: 58
End: 2018-06-22

## 2018-06-22 VITALS
DIASTOLIC BLOOD PRESSURE: 84 MMHG | SYSTOLIC BLOOD PRESSURE: 142 MMHG | BODY MASS INDEX: 27.44 KG/M2 | WEIGHT: 170 LBS | HEART RATE: 81 BPM | OXYGEN SATURATION: 97 %

## 2018-06-22 DIAGNOSIS — M79.604 PAIN IN BOTH LOWER EXTREMITIES: ICD-10-CM

## 2018-06-22 DIAGNOSIS — M79.605 PAIN IN BOTH LOWER EXTREMITIES: ICD-10-CM

## 2018-06-22 DIAGNOSIS — I10 ESSENTIAL HYPERTENSION: Primary | ICD-10-CM

## 2018-06-22 DIAGNOSIS — E03.9 ACQUIRED HYPOTHYROIDISM: ICD-10-CM

## 2018-06-22 PROCEDURE — 99213 OFFICE O/P EST LOW 20 MIN: CPT | Performed by: NURSE PRACTITIONER

## 2018-06-24 NOTE — PROGRESS NOTES
Subjective   Nancy Robbins is a 57 y.o. female who presents for f/u regarding hypertension and hypothyroidism.    The wound on her left lower leg has improved, reports decreased tenderness in area.  She is tolerating Losartan without myalgias, BP is excellent today.  Synthroid was increased after last labs due to elevated TSH.  She has had an epidural in her lumbar spine which helped somewhat, still with significant bilateral leg pain.      Hypertension   This is a recurrent problem. The current episode started more than 1 month ago. The problem is controlled. Associated symptoms include malaise/fatigue. Pertinent negatives include no chest pain, headaches, palpitations or shortness of breath. Current antihypertension treatment includes angiotensin blockers. The current treatment provides significant improvement. There are no compliance problems.    Hypothyroidism   This is a chronic problem. The current episode started more than 1 year ago. The problem occurs daily. Associated symptoms include arthralgias, fatigue and myalgias. Pertinent negatives include no abdominal pain, chest pain, chills, congestion, coughing, fever, headaches, joint swelling, nausea, rash, sore throat, vomiting or weakness.        The following portions of the patient's history were reviewed and updated as appropriate: allergies, current medications, past social history and problem list.    Past Medical History:   Diagnosis Date   • Hard to intubate     TROUBLE WITH INTUBATION... DOESN'T KNOW WHY   • Hypertension    • Hypothyroidism    • Osteoarthritis    • Restless leg syndrome     TAKES XANAX FOR..         Current Outpatient Prescriptions:   •  Acetaminophen (TYLENOL ARTHRITIS PAIN PO), Take  by mouth., Disp: , Rfl:   •  ALLEGRA-D ALLERGY & CONGESTION  MG per 12 hr tablet, take 1 tablet by mouth twice a day, Disp: 60 tablet, Rfl: 2  •  ALPRAZolam (XANAX) 1 MG tablet, Take 1 tablet by mouth At Night As Needed for Anxiety or Sleep.,  Disp: 90 tablet, Rfl: 0  •  celecoxib (CeleBREX) 200 MG capsule, Take 1 capsule by mouth Daily., Disp: 90 capsule, Rfl: 0  •  estradiol (ESTRACE) 2 MG tablet, Take 1 tablet by mouth Daily., Disp: 90 tablet, Rfl: 1  •  fluticasone (FLONASE) 50 MCG/ACT nasal spray, instill 2 sprays into each nostril once daily, Disp: 16 g, Rfl: 3  •  irbesartan-hydrochlorothiazide (AVALIDE) 150-12.5 MG tablet, Take 1 tablet by mouth Daily., Disp: 30 tablet, Rfl: 1  •  levothyroxine (SYNTHROID, LEVOTHROID) 137 MCG tablet, Take 1 tablet by mouth Daily., Disp: 30 tablet, Rfl: 1  •  LYRICA 75 MG capsule, TK 1 C PO BID, Disp: , Rfl: 3  •  montelukast (SINGULAIR) 10 MG tablet, take 1 tablet by mouth once daily, Disp: 30 tablet, Rfl: 3  •  mupirocin (BACTROBAN) 2 % cream, Apply  topically 3 (Three) Times a Day., Disp: 15 g, Rfl: 0    Allergies   Allergen Reactions   • Ampicillin Nausea Only       Review of Systems   Constitutional: Positive for fatigue and malaise/fatigue. Negative for chills, fever and unexpected weight change.   HENT: Negative for congestion, ear pain, postnasal drip, sinus pressure, sore throat and trouble swallowing.    Eyes: Negative for visual disturbance.   Respiratory: Negative for cough, chest tightness, shortness of breath and wheezing.    Cardiovascular: Negative for chest pain, palpitations and leg swelling.   Gastrointestinal: Negative for abdominal pain, blood in stool, nausea and vomiting.   Genitourinary: Negative for dysuria, frequency and urgency.   Musculoskeletal: Positive for arthralgias, back pain and myalgias. Negative for joint swelling.   Skin: Negative for color change and rash.   Neurological: Negative for syncope, weakness and headaches.   Hematological: Does not bruise/bleed easily.       Objective   Vitals:    06/22/18 1304 06/22/18 1328   BP: 138/86 142/84   BP Location: Left arm Left arm   Patient Position: Sitting    Cuff Size: Adult    Pulse: 81    SpO2: 97%    Weight: 77.1 kg (170 lb)       Physical Exam   Constitutional: She appears well-developed and well-nourished. She is cooperative. She does not have a sickly appearance. She does not appear ill.   HENT:   Head: Normocephalic.   Right Ear: Hearing, tympanic membrane and external ear normal.   Left Ear: Hearing, tympanic membrane and external ear normal.   Nose: Nose normal. No mucosal edema, rhinorrhea, sinus tenderness or nasal deformity. Right sinus exhibits no maxillary sinus tenderness and no frontal sinus tenderness. Left sinus exhibits no maxillary sinus tenderness and no frontal sinus tenderness.   Mouth/Throat: Oropharynx is clear and moist and mucous membranes are normal. Normal dentition.   Eyes: Conjunctivae and lids are normal. Right eye exhibits no discharge and no exudate. Left eye exhibits no discharge and no exudate.   Neck: Trachea normal and normal range of motion. Carotid bruit is not present. No edema present. No thyroid mass and no thyromegaly present.   Cardiovascular: Regular rhythm, normal heart sounds and normal pulses.    No murmur heard.  Pulmonary/Chest: Breath sounds normal. No respiratory distress. She has no decreased breath sounds. She has no wheezes. She has no rhonchi. She has no rales.   Lymphadenopathy:        Head (right side): No submental, no submandibular, no tonsillar, no preauricular, no posterior auricular and no occipital adenopathy present.        Head (left side): No submental, no submandibular, no tonsillar, no preauricular, no posterior auricular and no occipital adenopathy present.   Neurological: She is alert.   Skin: Skin is warm, dry and intact. No cyanosis. Nails show no clubbing.   Area of erythema on anterior left lower leg without an open wound or signs of infection       Assessment/Plan   Nancy was seen today for hypertension.    Diagnoses and all orders for this visit:    Essential hypertension  Comments:  stable on current meds    Acquired hypothyroidism  Comments:  Synthroid dose  recently increased, recheck TSH in 6-8 weeks    Pain in both lower extremities  Comments:  appears more radicular in nature, will continue epidurals & continue to monitor sx

## 2018-06-29 ENCOUNTER — TRANSCRIBE ORDERS (OUTPATIENT)
Dept: PAIN MEDICINE | Facility: HOSPITAL | Age: 58
End: 2018-06-29

## 2018-06-29 DIAGNOSIS — G89.29 CHRONIC RADICULAR LUMBAR PAIN: Primary | ICD-10-CM

## 2018-06-29 DIAGNOSIS — M54.16 CHRONIC RADICULAR LUMBAR PAIN: Primary | ICD-10-CM

## 2018-07-02 ENCOUNTER — HOSPITAL ENCOUNTER (OUTPATIENT)
Dept: GENERAL RADIOLOGY | Facility: HOSPITAL | Age: 58
Discharge: HOME | End: 2018-07-02

## 2018-07-02 ENCOUNTER — ANESTHESIA (OUTPATIENT)
Dept: PAIN MEDICINE | Facility: HOSPITAL | Age: 58
End: 2018-07-02

## 2018-07-02 ENCOUNTER — ANESTHESIA EVENT (OUTPATIENT)
Dept: PAIN MEDICINE | Facility: HOSPITAL | Age: 58
End: 2018-07-02

## 2018-07-02 ENCOUNTER — HOSPITAL ENCOUNTER (OUTPATIENT)
Dept: PAIN MEDICINE | Facility: HOSPITAL | Age: 58
Discharge: HOME | End: 2018-07-02
Admitting: ORTHOPAEDIC SURGERY

## 2018-07-02 VITALS
RESPIRATION RATE: 16 BRPM | SYSTOLIC BLOOD PRESSURE: 134 MMHG | TEMPERATURE: 97.8 F | HEART RATE: 76 BPM | DIASTOLIC BLOOD PRESSURE: 83 MMHG | OXYGEN SATURATION: 97 %

## 2018-07-02 DIAGNOSIS — G89.29 CHRONIC RADICULAR LUMBAR PAIN: ICD-10-CM

## 2018-07-02 DIAGNOSIS — M54.16 CHRONIC RADICULAR LUMBAR PAIN: ICD-10-CM

## 2018-07-02 DIAGNOSIS — R52 PAIN: ICD-10-CM

## 2018-07-02 PROCEDURE — C1755 CATHETER, INTRASPINAL: HCPCS

## 2018-07-02 PROCEDURE — 77003 FLUOROGUIDE FOR SPINE INJECT: CPT

## 2018-07-02 PROCEDURE — 25010000002 METHYLPREDNISOLONE PER 80 MG: Performed by: ANESTHESIOLOGY

## 2018-07-02 RX ORDER — LIDOCAINE HYDROCHLORIDE 10 MG/ML
1 INJECTION, SOLUTION INFILTRATION; PERINEURAL ONCE AS NEEDED
Status: DISCONTINUED | OUTPATIENT
Start: 2018-07-02 | End: 2018-07-03 | Stop reason: HOSPADM

## 2018-07-02 RX ORDER — METHYLPREDNISOLONE ACETATE 80 MG/ML
80 INJECTION, SUSPENSION INTRA-ARTICULAR; INTRALESIONAL; INTRAMUSCULAR; SOFT TISSUE ONCE
Status: COMPLETED | OUTPATIENT
Start: 2018-07-02 | End: 2018-07-02

## 2018-07-02 RX ORDER — SODIUM CHLORIDE 0.9 % (FLUSH) 0.9 %
1-10 SYRINGE (ML) INJECTION AS NEEDED
Status: DISCONTINUED | OUTPATIENT
Start: 2018-07-02 | End: 2018-07-03 | Stop reason: HOSPADM

## 2018-07-02 RX ORDER — FENTANYL CITRATE 50 UG/ML
50 INJECTION, SOLUTION INTRAMUSCULAR; INTRAVENOUS AS NEEDED
Status: DISCONTINUED | OUTPATIENT
Start: 2018-07-02 | End: 2018-07-03 | Stop reason: HOSPADM

## 2018-07-02 RX ORDER — MIDAZOLAM HYDROCHLORIDE 1 MG/ML
1 INJECTION INTRAMUSCULAR; INTRAVENOUS AS NEEDED
Status: DISCONTINUED | OUTPATIENT
Start: 2018-07-02 | End: 2018-07-03 | Stop reason: HOSPADM

## 2018-07-02 RX ADMIN — METHYLPREDNISOLONE ACETATE 80 MG: 80 INJECTION, SUSPENSION INTRA-ARTICULAR; INTRALESIONAL; INTRAMUSCULAR; SOFT TISSUE at 14:34

## 2018-07-02 NOTE — INTERVAL H&P NOTE
[facility]  H&P reviewed. No changes since last visit.  Patient states   50-75% improvement since the last procedure/injection.    Diagnosis     * Lumbar degenerative disc disease [M51.36]     * Lumbar spinal stenosis [M48.061]     * Lumbar radiculopathy [M54.16]      Airway assessed since last visit. Airway class equals: 1.

## 2018-07-02 NOTE — ANESTHESIA PROCEDURE NOTES
PAIN Epidural block    Patient location during procedure: pain clinic  Start Time: 7/2/2018 2:28 PM  Stop Time: 7/2/2018 2:33 PM  Indication:procedure for pain  Performed By  Anesthesiologist: GRETA DOSS  Preanesthetic Checklist  Completed: patient identified, surgical consent, pre-op evaluation, timeout performed, IV checked, risks and benefits discussed and monitors and equipment checked  Additional Notes  Diagnosis:  Post-Op Diagnosis Codes:     * Lumbar degenerative disc disease (M51.36)     * Lumbar spinal stenosis (M48.061)     * Lumbar radiculopathy (M54.16)      Prep:  Pt Position:prone  Sterile Tech:gloves, mask and sterile barrier  Prep:chlorhexidine gluconate and isopropyl alcohol  Monitoring:blood pressure monitoring, continuous pulse oximetry and EKG  Procedure:  Sedation: no   Approach:midline  Guidance: fluoroscopy  Location:lumbar  Level:3-4  Needle Type:Tuohy  Needle Gauge:20  Aspiration:negative  Test Dose:negative  Medications:  Depomedrol:80 mg  Preservative Free Saline:3mL    Post Assessment:  Dressing:occlusive dressing applied  Pt Tolerance:patient tolerated the procedure well with no apparent complications  Complications:no

## 2018-07-02 NOTE — H&P (VIEW-ONLY)
[facility]    History and Physical    Patient Name: Nancy Robbins  :  1960  MRN:  [id number]  Date of Admission: 2018    Subjective     Patient is a 57 y.o. female presents with chief complaint of chronic, constant, moderate, severe low back and bilateral lower extremity pain.  Onset of symptoms was gradual starting several years ago.  Symptoms are associated/aggravated by activity or sitting. Symptoms improve with rest.  She has undergone a previous decompression at L5-S1.  On a pain scale from 0-10, she rates her pain as a 5 on a good day and a 10 on a bad day.  She describes the pain as sharp and throbbing in nature.  She says her pain is adversely affected her ability to perform activities of daily living, her ability to exercise, her sleep habits and her emotions.    Her lumbar MRI shows degenerative changes at multiple levels with some spinal stenosis at L3 4.    The following portions of the patients history were reviewed and updated as appropriate: current medications, allergies, past medical history, past surgical history, past family history, past social history and problem list                Objective     Past Medical History:   Past Medical History:   Diagnosis Date   • Hard to intubate     TROUBLE WITH INTUBATION... DOESN'T KNOW WHY   • Hypertension    • Hypothyroidism    • Osteoarthritis    • Restless leg syndrome     TAKES XANAX FOR..     Past Surgical History:   Past Surgical History:   Procedure Laterality Date   • BACK SURGERY     • COLONOSCOPY N/A 2016    Procedure: COLONOSCOPY;  Surgeon: Jose G Toro MD;  Location: [facility] ENDOSCOPY;  Service:    • KNEE MENISCAL REPAIR Bilateral    • NECK SURGERY     • PARTIAL HYSTERECTOMY       Family History:   Family History   Problem Relation Age of Onset   • Lung cancer Father      Social History:   Social History   Substance Use Topics   • Smoking status: Former Smoker   • Smokeless tobacco: Never Used   • Alcohol use Yes       "Comment: social       Vital Signs Range for the last 24 hours  Temperature:     Temp Source:     BP: BP: (151)/(103) 151/103   Pulse: Heart Rate:  [70] 70   Respirations: Resp:  [16] 16   SPO2: SpO2:  [98 %] 98 %   O2 Amount (l/min):     O2 Devices Device (Oxygen Therapy): room air   Weight: Weight:  [77.1 kg (170 lb)] 77.1 kg (170 lb)     Flowsheet Rows      First Filed Value   Admission Height  167.6 cm (66\") Documented at 06/13/2018 1249   Admission Weight  77.1 kg (170 lb) Documented at 06/13/2018 1249          --------------------------------------------------------------------------------    Current Outpatient Prescriptions   Medication Sig Dispense Refill   • Acetaminophen (TYLENOL ARTHRITIS PAIN PO) Take  by mouth.     • ALLEGRA-D ALLERGY & CONGESTION  MG per 12 hr tablet take 1 tablet by mouth twice a day 60 tablet 2   • ALPRAZolam (XANAX) 1 MG tablet Take 1 tablet by mouth At Night As Needed for Anxiety or Sleep. 90 tablet 0   • celecoxib (CeleBREX) 200 MG capsule Take 1 capsule by mouth Daily. 90 capsule 0   • estradiol (ESTRACE) 2 MG tablet Take 1 tablet by mouth Daily. 90 tablet 1   • fluticasone (FLONASE) 50 MCG/ACT nasal spray instill 2 sprays into each nostril once daily 16 g 3   • irbesartan-hydrochlorothiazide (AVALIDE) 150-12.5 MG tablet Take 1 tablet by mouth Daily. 30 tablet 1   • levothyroxine (SYNTHROID, LEVOTHROID) 137 MCG tablet Take 1 tablet by mouth Daily. 30 tablet 1   • LYRICA 75 MG capsule TK 1 C PO BID  3   • montelukast (SINGULAIR) 10 MG tablet take 1 tablet by mouth once daily 30 tablet 3   • mupirocin (BACTROBAN) 2 % cream Apply  topically 3 (Three) Times a Day. 15 g 0     No current facility-administered medications for this encounter.        --------------------------------------------------------------------------------  Assessment/Plan      Anesthesia Evaluation     Patient summary reviewed and Nursing notes reviewed   NPO Solid Status: > 8 hours  NPO Liquid Status: > 2 " hours           Airway   Mallampati: II  TM distance: >3 FB  Neck ROM: full  Dental - normal exam     Pulmonary - negative pulmonary ROS and normal exam    breath sounds clear to auscultation  Cardiovascular - normal exam    Rhythm: regular  Rate: normal    (+) hypertension,   (-) angina, orthopnea, PND, VARELA      Neuro/Psych  (+) psychiatric history Anxiety,   left straight leg raise test,   right straight leg raise test,    GI/Hepatic/Renal/Endo    (+)   hypothyroidism,     Musculoskeletal     (+) back pain, chronic pain,   Abdominal  - normal exam    Abdomen: soft.  Bowel sounds: normal.   Substance History - negative use     OB/GYN negative ob/gyn ROS         Other - negative ROS           Phys Exam Other:     NECK:  Supple without adenopathy, JVD or bruits.    EXTREMITIES:  There is no clubbing, cyanosis or edema.  Radial pulses are 1+ and equal bilaterally.  Examination of the lumbar spine shows no marked tenderness or discoloration.    SKIN:  Warm and dry.    NEUROLOGICAL EXAM:  Alert and oriented ×3.  Extraocular muscles intact.  Strength is normal and symmetrical in the lower extremities with respect to dorsal and plantar flexion of the ankles and quadriceps.           Diagnosis and Plan    Treatment Plan  ASA 2      Procedures: Lumbar Epidural Steroid Injection(LESI), With fluoroscopy,       Anesthetic plan and risks discussed with patient.          Diagnosis     * Degeneration of lumbar intervertebral disc [M51.36]     * Spinal stenosis, lumbar region without neurogenic claudication [M48.061]     * Lumbar radiculopathy [M54.16]

## 2018-07-08 DIAGNOSIS — E03.9 ACQUIRED HYPOTHYROIDISM: ICD-10-CM

## 2018-07-08 DIAGNOSIS — I10 ESSENTIAL HYPERTENSION: ICD-10-CM

## 2018-07-08 DIAGNOSIS — J30.2 SEASONAL RHINITIS: ICD-10-CM

## 2018-07-09 RX ORDER — IRBESARTAN AND HYDROCHLOROTHIAZIDE 150; 12.5 MG/1; MG/1
TABLET, FILM COATED ORAL
Qty: 30 TABLET | Refills: 1 | Status: SHIPPED | OUTPATIENT
Start: 2018-07-09 | End: 2018-09-02 | Stop reason: SDUPTHER

## 2018-07-09 RX ORDER — LEVOTHYROXINE SODIUM 137 UG/1
TABLET ORAL
Qty: 30 TABLET | Refills: 1 | Status: SHIPPED | OUTPATIENT
Start: 2018-07-09 | End: 2018-09-17 | Stop reason: SDUPTHER

## 2018-07-09 RX ORDER — MONTELUKAST SODIUM 10 MG/1
TABLET ORAL
Qty: 30 TABLET | Refills: 3 | Status: SHIPPED | OUTPATIENT
Start: 2018-07-09 | End: 2018-11-05 | Stop reason: SDUPTHER

## 2018-07-20 ENCOUNTER — OFFICE VISIT (OUTPATIENT)
Dept: INTERNAL MEDICINE | Facility: CLINIC | Age: 58
End: 2018-07-20

## 2018-07-20 ENCOUNTER — TELEPHONE (OUTPATIENT)
Dept: INTERNAL MEDICINE | Facility: CLINIC | Age: 58
End: 2018-07-20

## 2018-07-20 VITALS
HEIGHT: 66 IN | HEART RATE: 80 BPM | TEMPERATURE: 97.7 F | DIASTOLIC BLOOD PRESSURE: 80 MMHG | BODY MASS INDEX: 28.28 KG/M2 | SYSTOLIC BLOOD PRESSURE: 140 MMHG | OXYGEN SATURATION: 98 % | WEIGHT: 176 LBS

## 2018-07-20 DIAGNOSIS — S80.812A ABRASION, LEFT LOWER LEG, INITIAL ENCOUNTER: Primary | ICD-10-CM

## 2018-07-20 PROCEDURE — 99213 OFFICE O/P EST LOW 20 MIN: CPT | Performed by: NURSE PRACTITIONER

## 2018-07-20 PROCEDURE — 87070 CULTURE OTHR SPECIMN AEROBIC: CPT | Performed by: NURSE PRACTITIONER

## 2018-07-20 PROCEDURE — 87205 SMEAR GRAM STAIN: CPT | Performed by: NURSE PRACTITIONER

## 2018-07-20 RX ORDER — DOXYCYCLINE 100 MG/1
100 TABLET ORAL 2 TIMES DAILY
Qty: 20 TABLET | Refills: 0 | Status: SHIPPED | OUTPATIENT
Start: 2018-07-20 | End: 2018-07-30

## 2018-07-20 NOTE — PROGRESS NOTES
Subjective   Nancy Robbins is a 57 y.o. female who presents due to an abrasion on her left lower leg.    She noticed an abrasion on her left lower leg earlier this week (Monday) after working outside, believes she hit it on something while working in the woods. Since then she c/o increased redness and tenderness of wound and surrounding area with mild drainage. Denies fever/chills.      Wound Infection   This is a new problem. The current episode started in the past 7 days. The problem occurs daily. The problem has been rapidly worsening. Associated symptoms include arthralgias, fatigue and headaches. Pertinent negatives include no abdominal pain, chest pain, chills, congestion, coughing, fever or joint swelling. Treatments tried: topical Neosporin. The treatment provided mild relief.        The following portions of the patient's history were reviewed and updated as appropriate: allergies, current medications, past social history and problem list.    Past Medical History:   Diagnosis Date   • Hard to intubate     TROUBLE WITH INTUBATION... DOESN'T KNOW WHY   • Hypertension    • Hypothyroidism    • Low back pain    • Osteoarthritis    • Restless leg syndrome     TAKES XANAX FOR..         Current Outpatient Prescriptions:   •  Acetaminophen (TYLENOL ARTHRITIS PAIN PO), Take  by mouth., Disp: , Rfl:   •  ALLEGRA-D ALLERGY & CONGESTION  MG per 12 hr tablet, take 1 tablet by mouth twice a day, Disp: 60 tablet, Rfl: 2  •  ALPRAZolam (XANAX) 1 MG tablet, Take 1 tablet by mouth At Night As Needed for Anxiety or Sleep., Disp: 90 tablet, Rfl: 0  •  celecoxib (CeleBREX) 200 MG capsule, Take 1 capsule by mouth Daily., Disp: 90 capsule, Rfl: 0  •  estradiol (ESTRACE) 2 MG tablet, Take 1 tablet by mouth Daily., Disp: 90 tablet, Rfl: 1  •  fluticasone (FLONASE) 50 MCG/ACT nasal spray, instill 2 sprays into each nostril once daily, Disp: 16 g, Rfl: 3  •  irbesartan-hydrochlorothiazide (AVALIDE) 150-12.5 MG tablet, take 1  "tablet by mouth once daily, Disp: 30 tablet, Rfl: 1  •  levothyroxine (SYNTHROID, LEVOTHROID) 137 MCG tablet, take 1 tablet by mouth once daily, Disp: 30 tablet, Rfl: 1  •  LYRICA 75 MG capsule, TK 1 C PO BID, Disp: , Rfl: 3  •  montelukast (SINGULAIR) 10 MG tablet, take 1 tablet by mouth once daily, Disp: 30 tablet, Rfl: 3  •  mupirocin (BACTROBAN) 2 % cream, Apply  topically 3 (Three) Times a Day., Disp: 15 g, Rfl: 0  •  doxycycline (ADOXA) 100 MG tablet, Take 1 tablet by mouth 2 (Two) Times a Day for 10 days., Disp: 20 tablet, Rfl: 0    Allergies   Allergen Reactions   • Ampicillin Nausea Only       Review of Systems   Constitutional: Positive for fatigue and malaise/fatigue. Negative for chills, fever and unexpected weight change.   HENT: Negative for congestion.    Eyes: Negative for visual disturbance.   Respiratory: Negative for cough, chest tightness, shortness of breath and wheezing.    Cardiovascular: Negative for chest pain, palpitations and leg swelling.   Gastrointestinal: Negative for abdominal pain.   Genitourinary: Negative for dysuria, frequency and urgency.   Musculoskeletal: Positive for arthralgias. Negative for joint swelling.   Skin: Positive for wound. Negative for color change.   Neurological: Positive for headaches.       Objective   Vitals:    07/20/18 1435   BP: 140/80   BP Location: Left arm   Patient Position: Sitting   Cuff Size: Adult   Pulse: 80   Temp: 97.7 °F (36.5 °C)   TempSrc: Oral   SpO2: 98%   Weight: 79.8 kg (176 lb)   Height: 167.6 cm (66\")     Physical Exam   Constitutional: She appears well-developed and well-nourished. She is cooperative. She does not have a sickly appearance. She does not appear ill.   HENT:   Head: Normocephalic.   Right Ear: Hearing, tympanic membrane and external ear normal.   Left Ear: Hearing, tympanic membrane and external ear normal.   Nose: Nose normal. No mucosal edema, rhinorrhea, sinus tenderness or nasal deformity. Right sinus exhibits no " maxillary sinus tenderness and no frontal sinus tenderness. Left sinus exhibits no maxillary sinus tenderness and no frontal sinus tenderness.   Mouth/Throat: Oropharynx is clear and moist and mucous membranes are normal. Normal dentition.   Eyes: Conjunctivae and lids are normal. Right eye exhibits no discharge and no exudate. Left eye exhibits no discharge and no exudate.   Neck: Trachea normal. Carotid bruit is not present. No edema present. No thyroid mass present.   Cardiovascular: Regular rhythm, normal heart sounds and normal pulses.    No murmur heard.  Pulmonary/Chest: Breath sounds normal. No respiratory distress. She has no decreased breath sounds. She has no wheezes. She has no rhonchi. She has no rales.   Lymphadenopathy:        Head (right side): No submental, no submandibular, no tonsillar, no preauricular, no posterior auricular and no occipital adenopathy present.        Head (left side): No submental, no submandibular, no tonsillar, no preauricular, no posterior auricular and no occipital adenopathy present.   Neurological: She is alert.   Skin: Skin is warm, dry and intact. No cyanosis. Nails show no clubbing.   Kapil 3 cm abrasion left anterior lower leg with thick, purulent matter (culture taken) and surrounding erythema       Assessment/Plan   Nancy was seen today for skin lesion.    Diagnoses and all orders for this visit:    Abrasion, left lower leg, initial encounter  -     doxycycline (ADOXA) 100 MG tablet; Take 1 tablet by mouth 2 (Two) Times a Day for 10 days.  -     Cancel: Culture, Routine - Swab, Leg, Left; Future  -     Wound Culture - Wound, Leg, Left; Future  -     Wound Culture - Wound, Leg, Left    Keep area clean and dry (Triple antibiotic ointment and clean dressing placed on wound today). Elevate leg and start oral antibiotics. Will follow results of culture (had similar wound right below this one several weeks ago after running into the edge of a ).

## 2018-07-20 NOTE — TELEPHONE ENCOUNTER
----- Message from Nga Osman sent at 7/20/2018  1:53 PM EDT -----  Contact: Patient  Patient calling states she has cut her leg on a stick and its infected. She has been using bactrim cream and its not helping. Please advise    Patient:[phone]    Pharmacy:[id number] [facility] [phone] [facility] [phone]

## 2018-07-23 LAB
BACTERIA SPEC AEROBE CULT: NORMAL
GRAM STN SPEC: NORMAL

## 2018-07-31 RX ORDER — ALPRAZOLAM 1 MG/1
1 TABLET ORAL NIGHTLY PRN
Qty: 30 TABLET | Refills: 0 | Status: SHIPPED | OUTPATIENT
Start: 2018-07-31 | End: 2018-09-02 | Stop reason: SDUPTHER

## 2018-08-06 DIAGNOSIS — J30.9 ALLERGIC RHINITIS: ICD-10-CM

## 2018-08-06 DIAGNOSIS — Z88.9 MULTIPLE ALLERGIES: ICD-10-CM

## 2018-08-08 RX ORDER — FEXOFENADINE HYDROCHLORIDE AND PSEUDOEPHEDRINE HYDROCHLORIDE 60; 120 MG/1; MG/1
TABLET, FILM COATED, EXTENDED RELEASE ORAL
Qty: 120 TABLET | Refills: 0 | Status: SHIPPED | OUTPATIENT
Start: 2018-08-08 | End: 2018-11-18 | Stop reason: SDUPTHER

## 2018-08-08 RX ORDER — FLUTICASONE PROPIONATE 50 MCG
SPRAY, SUSPENSION (ML) NASAL
Qty: 16 G | Refills: 3 | Status: SHIPPED | OUTPATIENT
Start: 2018-08-08 | End: 2018-11-27

## 2018-09-02 DIAGNOSIS — I10 ESSENTIAL HYPERTENSION: ICD-10-CM

## 2018-09-04 RX ORDER — ALPRAZOLAM 1 MG/1
TABLET ORAL
Qty: 30 TABLET | Refills: 0 | OUTPATIENT
Start: 2018-09-04 | End: 2018-10-07 | Stop reason: SDUPTHER

## 2018-09-04 RX ORDER — IRBESARTAN AND HYDROCHLOROTHIAZIDE 150; 12.5 MG/1; MG/1
TABLET, FILM COATED ORAL
Qty: 30 TABLET | Refills: 3 | Status: SHIPPED | OUTPATIENT
Start: 2018-09-04 | End: 2018-11-27

## 2018-09-04 NOTE — TELEPHONE ENCOUNTER
ARIANNA query complete. Treatment plan to include limited course of prescribed  controlled substance. Risks including addiction, benefits, and alternatives presented to patient.

## 2018-09-17 DIAGNOSIS — E03.9 ACQUIRED HYPOTHYROIDISM: ICD-10-CM

## 2018-09-17 DIAGNOSIS — M54.50 LOW BACK PAIN: ICD-10-CM

## 2018-09-18 RX ORDER — LEVOTHYROXINE SODIUM 137 UG/1
TABLET ORAL
Qty: 30 TABLET | Refills: 3 | Status: SHIPPED | OUTPATIENT
Start: 2018-09-18 | End: 2018-11-27

## 2018-09-18 RX ORDER — MELOXICAM 15 MG/1
TABLET ORAL
Qty: 30 TABLET | Refills: 3 | Status: SHIPPED | OUTPATIENT
Start: 2018-09-18 | End: 2018-11-27

## 2018-10-08 RX ORDER — ALPRAZOLAM 1 MG/1
1 TABLET ORAL 3 TIMES DAILY PRN
Qty: 30 TABLET | Refills: 0 | Status: SHIPPED | OUTPATIENT
Start: 2018-10-08 | End: 2018-11-05 | Stop reason: SDUPTHER

## 2018-10-08 NOTE — TELEPHONE ENCOUNTER
Pt seen 6/22/18 for HTN, Hypothyroidism.  Seen 7/20/18 for abrasion of leg.  Next 10/23/18.  [place] done 9/4/18.

## 2018-11-05 DIAGNOSIS — J30.2 SEASONAL RHINITIS: ICD-10-CM

## 2018-11-06 RX ORDER — MONTELUKAST SODIUM 10 MG/1
TABLET ORAL
Qty: 30 TABLET | Refills: 3 | Status: SHIPPED | OUTPATIENT
Start: 2018-11-06 | End: 2018-11-27

## 2018-11-06 RX ORDER — ALPRAZOLAM 1 MG/1
TABLET ORAL
Qty: 30 TABLET | Refills: 0 | Status: SHIPPED | OUTPATIENT
Start: 2018-11-06 | End: 2018-11-27

## 2018-11-15 DIAGNOSIS — N95.1 MENOPAUSAL STATE: ICD-10-CM

## 2018-11-16 RX ORDER — ESTRADIOL 2 MG/1
TABLET ORAL
Qty: 90 TABLET | Refills: 1 | Status: SHIPPED | OUTPATIENT
Start: 2018-11-16 | End: 2018-11-27

## 2018-11-18 DIAGNOSIS — J30.9 ALLERGIC RHINITIS: ICD-10-CM

## 2018-11-19 RX ORDER — FEXOFENADINE HYDROCHLORIDE AND PSEUDOEPHEDRINE HYDROCHLORIDE 60; 120 MG/1; MG/1
TABLET, FILM COATED, EXTENDED RELEASE ORAL
Qty: 120 TABLET | Refills: 0 | Status: SHIPPED | OUTPATIENT
Start: 2018-11-19 | End: 2018-11-27

## 2018-11-27 ENCOUNTER — ANESTHESIA (OUTPATIENT)
Dept: PAIN MEDICINE | Facility: HOSPITAL | Age: 58
End: 2018-11-27

## 2018-11-27 ENCOUNTER — HOSPITAL ENCOUNTER (OUTPATIENT)
Dept: GENERAL RADIOLOGY | Facility: HOSPITAL | Age: 58
Discharge: HOME | End: 2018-11-27

## 2018-11-27 ENCOUNTER — HOSPITAL ENCOUNTER (OUTPATIENT)
Dept: PAIN MEDICINE | Facility: HOSPITAL | Age: 58
Discharge: HOME | End: 2018-11-27
Admitting: ANESTHESIOLOGY

## 2018-11-27 ENCOUNTER — ANESTHESIA EVENT (OUTPATIENT)
Dept: PAIN MEDICINE | Facility: HOSPITAL | Age: 58
End: 2018-11-27

## 2018-11-27 ENCOUNTER — APPOINTMENT (OUTPATIENT)
Dept: PREADMISSION TESTING | Facility: HOSPITAL | Age: 58
End: 2018-11-27

## 2018-11-27 VITALS
HEIGHT: 66 IN | DIASTOLIC BLOOD PRESSURE: 90 MMHG | WEIGHT: 175 LBS | BODY MASS INDEX: 28.12 KG/M2 | OXYGEN SATURATION: 99 % | TEMPERATURE: 98.3 F | HEART RATE: 81 BPM | RESPIRATION RATE: 20 BRPM | SYSTOLIC BLOOD PRESSURE: 143 MMHG

## 2018-11-27 VITALS
TEMPERATURE: 97.9 F | SYSTOLIC BLOOD PRESSURE: 156 MMHG | OXYGEN SATURATION: 99 % | HEART RATE: 76 BPM | DIASTOLIC BLOOD PRESSURE: 87 MMHG | RESPIRATION RATE: 16 BRPM

## 2018-11-27 DIAGNOSIS — M54.16 LUMBAR RADICULOPATHY: Primary | ICD-10-CM

## 2018-11-27 DIAGNOSIS — R52 PAIN: ICD-10-CM

## 2018-11-27 LAB
ALBUMIN SERPL-MCNC: 3.9 G/DL (ref 3.5–5.2)
ALBUMIN/GLOB SERPL: 1.3 G/DL
ALP SERPL-CCNC: 83 U/L (ref 39–117)
ALT SERPL W P-5'-P-CCNC: 37 U/L (ref 1–33)
ANION GAP SERPL CALCULATED.3IONS-SCNC: 11.7 MMOL/L
APTT PPP: 27.4 SECONDS (ref 22.7–35.4)
AST SERPL-CCNC: 29 U/L (ref 1–32)
BASOPHILS # BLD AUTO: 0.03 10*3/MM3 (ref 0–0.2)
BASOPHILS NFR BLD AUTO: 0.4 % (ref 0–1.5)
BILIRUB SERPL-MCNC: 0.4 MG/DL (ref 0.1–1.2)
BILIRUB UR QL STRIP: NEGATIVE
BUN BLD-MCNC: 15 MG/DL (ref 6–20)
BUN/CREAT SERPL: 23.1 (ref 7–25)
CALCIUM SPEC-SCNC: 9.3 MG/DL (ref 8.6–10.5)
CHLORIDE SERPL-SCNC: 100 MMOL/L (ref 98–107)
CLARITY UR: CLEAR
CO2 SERPL-SCNC: 29.3 MMOL/L (ref 22–29)
COLOR UR: YELLOW
CREAT BLD-MCNC: 0.65 MG/DL (ref 0.57–1)
DEPRECATED RDW RBC AUTO: 45.2 FL (ref 37–54)
EOSINOPHIL # BLD AUTO: 0.37 10*3/MM3 (ref 0–0.7)
EOSINOPHIL NFR BLD AUTO: 5.1 % (ref 0.3–6.2)
ERYTHROCYTE [DISTWIDTH] IN BLOOD BY AUTOMATED COUNT: 12.4 % (ref 11.7–13)
GFR SERPL CREATININE-BSD FRML MDRD: 94 ML/MIN/1.73
GLOBULIN UR ELPH-MCNC: 3.1 GM/DL
GLUCOSE BLD-MCNC: 116 MG/DL (ref 65–99)
GLUCOSE UR STRIP-MCNC: NEGATIVE MG/DL
HCT VFR BLD AUTO: 42.9 % (ref 35.6–45.5)
HGB BLD-MCNC: 13.3 G/DL (ref 11.9–15.5)
HGB UR QL STRIP.AUTO: NEGATIVE
IMM GRANULOCYTES # BLD: 0 10*3/MM3 (ref 0–0.03)
IMM GRANULOCYTES NFR BLD: 0 % (ref 0–0.5)
INR PPP: 0.96 (ref 0.9–1.1)
KETONES UR QL STRIP: NEGATIVE
LEUKOCYTE ESTERASE UR QL STRIP.AUTO: NEGATIVE
LYMPHOCYTES # BLD AUTO: 1.77 10*3/MM3 (ref 0.9–4.8)
LYMPHOCYTES NFR BLD AUTO: 24.5 % (ref 19.6–45.3)
MCH RBC QN AUTO: 31.1 PG (ref 26.9–32)
MCHC RBC AUTO-ENTMCNC: 31 G/DL (ref 32.4–36.3)
MCV RBC AUTO: 100.5 FL (ref 80.5–98.2)
MONOCYTES # BLD AUTO: 0.75 10*3/MM3 (ref 0.2–1.2)
MONOCYTES NFR BLD AUTO: 10.4 % (ref 5–12)
NEUTROPHILS # BLD AUTO: 4.31 10*3/MM3 (ref 1.9–8.1)
NEUTROPHILS NFR BLD AUTO: 59.6 % (ref 42.7–76)
NITRITE UR QL STRIP: NEGATIVE
PH UR STRIP.AUTO: 6.5 [PH] (ref 5–8)
PLATELET # BLD AUTO: 278 10*3/MM3 (ref 140–500)
PMV BLD AUTO: 10.3 FL (ref 6–12)
POTASSIUM BLD-SCNC: 3.7 MMOL/L (ref 3.5–5.2)
PROT SERPL-MCNC: 7 G/DL (ref 6–8.5)
PROT UR QL STRIP: NEGATIVE
PROTHROMBIN TIME: 12.6 SECONDS (ref 11.7–14.2)
RBC # BLD AUTO: 4.27 10*6/MM3 (ref 3.9–5.2)
SODIUM BLD-SCNC: 141 MMOL/L (ref 136–145)
SP GR UR STRIP: 1.02 (ref 1–1.03)
UROBILINOGEN UR QL STRIP: NORMAL
WBC NRBC COR # BLD: 7.23 10*3/MM3 (ref 4.5–10.7)

## 2018-11-27 PROCEDURE — 85730 THROMBOPLASTIN TIME PARTIAL: CPT | Performed by: ORTHOPAEDIC SURGERY

## 2018-11-27 PROCEDURE — 25010000002 METHYLPREDNISOLONE PER 80 MG: Performed by: ANESTHESIOLOGY

## 2018-11-27 PROCEDURE — 85025 COMPLETE CBC W/AUTO DIFF WBC: CPT | Performed by: ORTHOPAEDIC SURGERY

## 2018-11-27 PROCEDURE — 81003 URINALYSIS AUTO W/O SCOPE: CPT | Performed by: ORTHOPAEDIC SURGERY

## 2018-11-27 PROCEDURE — 93005 ELECTROCARDIOGRAM TRACING: CPT

## 2018-11-27 PROCEDURE — C1755 CATHETER, INTRASPINAL: HCPCS

## 2018-11-27 PROCEDURE — 77003 FLUOROGUIDE FOR SPINE INJECT: CPT

## 2018-11-27 PROCEDURE — 93010 ELECTROCARDIOGRAM REPORT: CPT | Performed by: INTERNAL MEDICINE

## 2018-11-27 PROCEDURE — 85610 PROTHROMBIN TIME: CPT | Performed by: ORTHOPAEDIC SURGERY

## 2018-11-27 PROCEDURE — 71046 X-RAY EXAM CHEST 2 VIEWS: CPT

## 2018-11-27 PROCEDURE — 73560 X-RAY EXAM OF KNEE 1 OR 2: CPT

## 2018-11-27 PROCEDURE — 36415 COLL VENOUS BLD VENIPUNCTURE: CPT

## 2018-11-27 PROCEDURE — 80053 COMPREHEN METABOLIC PANEL: CPT | Performed by: ORTHOPAEDIC SURGERY

## 2018-11-27 RX ORDER — FENTANYL CITRATE 50 UG/ML
50 INJECTION, SOLUTION INTRAMUSCULAR; INTRAVENOUS AS NEEDED
Status: DISCONTINUED | OUTPATIENT
Start: 2018-11-27 | End: 2018-11-28 | Stop reason: HOSPADM

## 2018-11-27 RX ORDER — ALPRAZOLAM 1 MG/1
1 TABLET ORAL NIGHTLY PRN
COMMUNITY
End: 2019-01-31 | Stop reason: SDUPTHER

## 2018-11-27 RX ORDER — METHYLPREDNISOLONE ACETATE 80 MG/ML
80 INJECTION, SUSPENSION INTRA-ARTICULAR; INTRALESIONAL; INTRAMUSCULAR; SOFT TISSUE ONCE
Status: COMPLETED | OUTPATIENT
Start: 2018-11-27 | End: 2018-11-27

## 2018-11-27 RX ORDER — MELOXICAM 15 MG/1
15 TABLET ORAL DAILY
COMMUNITY
End: 2019-01-31 | Stop reason: SDUPTHER

## 2018-11-27 RX ORDER — SODIUM CHLORIDE 0.9 % (FLUSH) 0.9 %
1-10 SYRINGE (ML) INJECTION AS NEEDED
Status: DISCONTINUED | OUTPATIENT
Start: 2018-11-27 | End: 2018-11-28 | Stop reason: HOSPADM

## 2018-11-27 RX ORDER — ESTRADIOL 2 MG/1
2 TABLET ORAL DAILY
COMMUNITY
End: 2019-12-01 | Stop reason: SDUPTHER

## 2018-11-27 RX ORDER — MONTELUKAST SODIUM 10 MG/1
10 TABLET ORAL EVERY MORNING
COMMUNITY
End: 2020-10-26

## 2018-11-27 RX ORDER — CHLORHEXIDINE GLUCONATE 500 MG/1
1 CLOTH TOPICAL TAKE AS DIRECTED
Status: ON HOLD | COMMUNITY
End: 2018-12-10

## 2018-11-27 RX ORDER — MIDAZOLAM HYDROCHLORIDE 1 MG/ML
1 INJECTION INTRAMUSCULAR; INTRAVENOUS AS NEEDED
Status: DISCONTINUED | OUTPATIENT
Start: 2018-11-27 | End: 2018-11-28 | Stop reason: HOSPADM

## 2018-11-27 RX ORDER — LEVOTHYROXINE SODIUM 137 UG/1
137 TABLET ORAL DAILY
COMMUNITY
End: 2019-02-01 | Stop reason: DRUGHIGH

## 2018-11-27 RX ORDER — IRBESARTAN AND HYDROCHLOROTHIAZIDE 150; 12.5 MG/1; MG/1
1 TABLET, FILM COATED ORAL EVERY MORNING
COMMUNITY
End: 2019-10-22 | Stop reason: SDUPTHER

## 2018-11-27 RX ORDER — FEXOFENADINE HCL AND PSEUDOEPHEDRINE HCI 60; 120 MG/1; MG/1
1 TABLET, EXTENDED RELEASE ORAL 2 TIMES DAILY PRN
COMMUNITY
End: 2019-05-06 | Stop reason: SDUPTHER

## 2018-11-27 RX ORDER — PREGABALIN 75 MG/1
75 CAPSULE ORAL NIGHTLY
COMMUNITY
End: 2019-01-31 | Stop reason: SDUPTHER

## 2018-11-27 RX ORDER — LIDOCAINE HYDROCHLORIDE 10 MG/ML
1 INJECTION, SOLUTION INFILTRATION; PERINEURAL ONCE AS NEEDED
Status: DISCONTINUED | OUTPATIENT
Start: 2018-11-27 | End: 2018-11-28 | Stop reason: HOSPADM

## 2018-11-27 RX ORDER — FLUTICASONE PROPIONATE 50 MCG
2 SPRAY, SUSPENSION (ML) NASAL DAILY
COMMUNITY
End: 2019-10-14 | Stop reason: SDUPTHER

## 2018-11-27 RX ADMIN — METHYLPREDNISOLONE ACETATE 80 MG: 80 INJECTION, SUSPENSION INTRA-ARTICULAR; INTRALESIONAL; INTRAMUSCULAR; SOFT TISSUE at 09:10

## 2018-11-27 ASSESSMENT — KOOS JR
KOOS JR SCORE: 18
KOOS JR SCORE: 42.281

## 2018-11-27 NOTE — DISCHARGE INSTRUCTIONS
Take the following medications the morning of surgery with a small sip of water:        General Instructions:  • Do not eat solid food after midnight the night before surgery.  • You may drink clear liquids day of surgery but must stop at least one hour before your hospital arrival time.  • It is beneficial for you to have a clear drink that contains carbohydrates the day of surgery.  We suggest a 12 to 20 ounce bottle of Gatorade or Powerade for non-diabetic patients or a 12 to 20 ounce bottle of G2 or Powerade Zero for diabetic patients. (Pediatric patients, are not advised to drink a 12 to 20 ounce carbohydrate drink)    Clear liquids are liquids you can see through.  Nothing red in color.     Plain water                               Sports drinks  Sodas                                   Gelatin (Jell-O)  Fruit juices without pulp such as white grape juice and apple juice  Popsicles that contain no fruit or yogurt  Tea or coffee (no cream or milk added)  Gatorade / Powerade  G2 / Powerade Zero    • Infants may have breast milk up to four hours before surgery.  • Infants drinking formula may drink formula up to six hours before surgery.   • Patients who avoid smoking, chewing tobacco and alcohol for 4 weeks prior to surgery have a reduced risk of post-operative complications.  Quit smoking as many days before surgery as you can.  • Do not smoke, use chewing tobacco or drink alcohol the day of surgery.   • If applicable bring your C-PAP/ BI-PAP machine.  • Bring any papers given to you in the doctor’s office.  • Wear clean comfortable clothes and socks.  • Do not wear contact lenses or make-up.  Bring a case for your glasses.   • Bring crutches or walker if applicable.  • Remove all piercings.  Leave jewelry and any other valuables at home.  • Hair extensions with metal clips must be removed prior to surgery.  • The Pre-Admission Testing nurse will instruct you to bring medications if unable to obtain an accurate  list in Pre-Admission Testing.        If you were given a blood bank ID arm band remember to bring it with you the day of surgery.    Preventing a Surgical Site Infection:  • For 2 to 3 days before surgery, avoid shaving with a razor because the razor can irritate skin and make it easier to develop an infection.    • Any areas of open skin can increase the risk of a post-operative wound infection by allowing bacteria to enter and travel throughout the body.  Notify your surgeon if you have any skin wounds / rashes even if it is not near the expected surgical site.  The area will need assessed to determine if surgery should be delayed until it is healed.  • The night prior to surgery sleep in a clean bed with clean clothing.  Do not allow pets to sleep with you.  • Shower on the morning of surgery using a fresh bar of anti-bacterial soap (such as Dial) and clean washcloth.  Dry with a clean towel and dress in clean clothing.  • Ask your surgeon if you will be receiving antibiotics prior to surgery.  • Make sure you, your family, and all healthcare providers clean their hands with soap and water or an alcohol based hand  before caring for you or your wound.    Day of surgery:12/10/18   0900  Upon arrival, a Pre-op nurse and Anesthesiologist will review your health history, obtain vital signs, and answer questions you may have.  The only belongings needed at this time will be your home medications and if applicable your C-PAP/BI-PAP machine.  If you are staying overnight your family can leave the rest of your belongings in the car and bring them to your room later.  A Pre-op nurse will start an IV and you may receive medication in preparation for surgery, including something to help you relax.  Your family will be able to see you in the Pre-op area.  While you are in surgery your family should notify the waiting room  if they leave the waiting room area and provide a contact phone number.    Please  be aware that surgery does come with discomfort.  We want to make every effort to control your discomfort so please discuss any uncontrolled symptoms with your nurse.   Your doctor will most likely have prescribed pain medications.      If you are going home after surgery you will receive individualized written care instructions before being discharged.  A responsible adult must drive you to and from the hospital on the day of your surgery and stay with you for 24 hours.    If you are staying overnight following surgery, you will be transported to your hospital room following the recovery period.  [facility] has all private rooms.    You have received a list of surgical assistants for your reference.  If you have any questions please call Pre-Admission Testing at [phone].  Deductibles and co-payments are collected on the day of service. Please be prepared to pay the required co-pay, deductible or deposit on the day of service as defined by your plan.    2% CHLORAHEXIDINE GLUCONATE* CLOTH  Preparing or “prepping” skin before surgery can reduce the risk of infection at the surgical site. To make the process easier, [facility] has chosen disposable cloths moistened with a rinse-free, 2% Chlorhexidine Gluconate (CHG) antiseptic solution. The steps below outline the prepping process and should be carefully followed.        Use the prep cloth on the area that is circled in the diagram             Directions Night before Surgery  1) Shower using a fresh bar of anti-bacterial soap (such as Dial) and clean washcloth.  Use a clean towel to completely dry your skin.  2) Do not use any lotions, oils or creams on your skin.  3) Open the package and remove 1 cloth, wipe your skin for 30 seconds in a circular motion.  Allow to dry for 3 minutes.  4) Repeat #3 with second cloth.  5) Do not touch your eyes, ears, or mouth with the prep cloth.  6) Allow the wet prep solution to air dry.  7) Discard the  prep cloth and wash your hands with soap and water.   8) Dress in clean bed clothes and sleep on fresh clean bed sheets.   9) You may experience some temporary itching after the prep.    Directions Day of Surgery  1) Repeat steps 1,2,3,4,5,6,7, and 9.   2) Dress in clean clothes before coming to the hospital.    BACTROBAN NASAL OINTMENT  There are many germs normally in your nose. Bactroban is an ointment that will help reduce these germs. Please follow these instructions for Bactroban use:      _1___The day before surgery in the morning  Date_12/9/18_______    _2___The day before surgery in the evening              Date_12/9/18_______    __3__The day of surgery in the morning    Date__12/10/18______    **Squirt ½ package of Bactroban Ointment onto a cotton applicator and apply to inside of 1st nostril.  Squirt the remaining Bactroban and apply to the inside of the other nostril.    PERIDEX- ORAL:  Use only if your surgeon has ordered  Use the night before and morning of surgery - Swish, gargle, and spit - do not swallow.

## 2018-11-27 NOTE — ANESTHESIA PROCEDURE NOTES
PAIN Epidural block    Pre-sedation assessment completed: 11/27/2018 8:57 AM    Patient reassessed immediately prior to procedure    Patient location during procedure: pain clinic  Start Time: 11/27/2018 9:06 AM  Stop Time: 11/27/2018 9:13 AM  Indication:procedure for pain  Performed By  Anesthesiologist: Sonja Ho MD  Preanesthetic Checklist  Completed: patient identified, site marked, surgical consent, pre-op evaluation, timeout performed, IV checked, risks and benefits discussed and monitors and equipment checked  Additional Notes  Lumbar radiculopathy, degeneration  fluoro  Prep:  Pt Position:prone  Sterile Tech:cap, gloves, mask and sterile barrier  Prep:chlorhexidine gluconate and isopropyl alcohol  Monitoring:blood pressure monitoring, continuous pulse oximetry and EKG  Procedure:  Sedation: no   Approach:midline  Guidance: fluoroscopy  Location:lumbar  Level:3-4  Needle Type:Tuohy  Needle Gauge:20  Aspiration:negative  Medications:  Depomedrol:80  Preservative Free Saline:3mL    Post Assessment:  Dressing:occlusive dressing applied  Pt Tolerance:patient tolerated the procedure well with no apparent complications  Complications:no

## 2018-11-27 NOTE — H&P
[facility]    History and Physical    Patient Name: Nancy Robbins  :  1960  MRN:  [id number]  Date of Admission: 2018    Subjective     Patient is a 58 y.o. female presents with chief complaint of chronic, constant, moderate, severe low back and leg: bilateral pain.  Onset of symptoms was gradual starting several years ago.  Symptoms are associated/aggravated by activity or sitting. Symptoms improve with injection and rest.      The following portions of the patients history were reviewed and updated as appropriate: current medications, allergies, past medical history, past surgical history, past family history, past social history and problem list                Objective     Past Medical History:   Past Medical History:   Diagnosis Date   • Anxiety    • Graves disease    • Hard to intubate     TROUBLE WITH INTUBATION... has had cervical neck surgery and unable to bend neck too far   • Hypertension    • Hypothyroidism     under went radiation of thyroid and on supplement   • Low back pain     undergoing epidural   • Neuropathy     bilateral lower legs   • Osteoarthritis    • PONV (postoperative nausea and vomiting)    • Restless leg syndrome     TAKES XANAX FOR..   • Vertigo      Past Surgical History:   Past Surgical History:   Procedure Laterality Date   • BACK SURGERY      lumbar discectomy   • EPIDURAL BLOCK      back last one 18   • KNEE MENISCAL REPAIR Bilateral    • NECK SURGERY      fusion w/hardware   • PARTIAL HYSTERECTOMY     • TONSILLECTOMY       Family History:   Family History   Problem Relation Age of Onset   • Lung cancer Father    • Malig Hyperthermia Neg Hx      Social History:   Social History     Tobacco Use   • Smoking status: Former Smoker     Packs/day: 0.50     Years: 25.00     Pack years: 12.50     Types: Cigarettes     Last attempt to quit: 2006     Years since quittin.9   • Smokeless tobacco: Never Used   Substance Use Topics   • Alcohol use: Yes     Comment:  2 monthly   • Drug use: No       Vital Signs Range for the last 24 hours  Temperature: Temp:  [36.6 °C (97.9 °F)-36.8 °C (98.3 °F)] 36.6 °C (97.9 °F)   Temp Source: Temp src: Oral   BP: BP: (143-172)/(90-94) 172/94   Pulse: Heart Rate:  [71-81] 71   Respirations: Resp:  [16-20] 16   SPO2: SpO2:  [99 %] 99 %   O2 Amount (l/min):     O2 Devices Device (Oxygen Therapy): room air   Weight: Weight:  [79.4 kg (175 lb)] 79.4 kg (175 lb)         --------------------------------------------------------------------------------    Current Outpatient Medications   Medication Sig Dispense Refill   • Acetaminophen (TYLENOL ARTHRITIS PAIN PO) Take  by mouth.     • ALPRAZolam (XANAX) 1 MG tablet Take 1 mg by mouth At Night As Needed for Anxiety or Sleep.     • Chlorhexidine Gluconate Cloth 2 % pads Apply 1 application topically. USE AS DIRECTED PREOP     • estradiol (ESTRACE) 2 MG tablet Take 2 mg by mouth Daily.     • fexofenadine-pseudoephedrine (ALLEGRA-D)  MG per 12 hr tablet Take 1 tablet by mouth 2 (Two) Times a Day.     • fluticasone (FLONASE) 50 MCG/ACT nasal spray 2 sprays into the nostril(s) as directed by provider Daily.     • irbesartan-hydrochlorothiazide (AVALIDE) 150-12.5 MG tablet Take 1 tablet by mouth Every Morning.     • levothyroxine (SYNTHROID, LEVOTHROID) 137 MCG tablet Take 137 mcg by mouth Daily.     • meloxicam (MOBIC) 15 MG tablet Take 15 mg by mouth Daily.     • montelukast (SINGULAIR) 10 MG tablet Take 10 mg by mouth Every Morning.     • mupirocin (BACTROBAN) 2 % nasal ointment 1 application into the nostril(s) as directed by provider. USE AS DIRECTED PREOP     • pregabalin (LYRICA) 75 MG capsule Take 75 mg by mouth 2 (Two) Times a Day.       No current facility-administered medications for this encounter.      Facility-Administered Medications Ordered in Other Encounters   Medication Dose Route Frequency Provider Last Rate Last Dose   • mupirocin (BACTROBAN) 2 % nasal ointment   Each Nare BID  Geo Fu MD           --------------------------------------------------------------------------------  Assessment/Plan      Anesthesia Evaluation     Patient summary reviewed and Nursing notes reviewed   history of anesthetic complications: PONV difficult airway  NPO Solid Status: > 8 hours             Airway   Mallampati: II  TM distance: >3 FB  Dental - normal exam     Pulmonary - negative pulmonary ROS and normal exam   Cardiovascular - normal exam  Exercise tolerance: good (4-7 METS)    (+) hypertension,       Neuro/Psych- neuro exam normal  (+) dizziness/light headedness, psychiatric history Anxiety,     GI/Hepatic/Renal/Endo    (+)   hypothyroidism,     Musculoskeletal (-) normal exam    Abdominal  - normal exam   Substance History - negative use     OB/GYN negative ob/gyn ROS         Other   (+) arthritis                Diagnosis and Plan    Treatment Plan  ASA 2      Procedures: Lumbar Epidural Steroid Injection(LESI), With fluoroscopy,       Anesthetic plan and risks discussed with patient.          Diagnosis     * DDD (degenerative disc disease), lumbar [M51.36]     * Lumbar spinal stenosis [M48.061]     * Lumbar radiculopathy [M54.16]

## 2018-11-27 NOTE — DISCHARGE INSTRUCTIONS
Lumbar Epidural Steroid Injection Instructions  Plan includes:  1.  Lumbar epidural steroid injections, up to 3, spaced 4 weeks apart.  If pain control is acceptable after 1 or 2 injections, it was discussed with the patient that they may return for the subsequent injections if and when their pain returns.  The risks were discussed with the patient including failure of relief, worsening pain, Headache (post dural puncture headache), bleeding (epidural hematoma) and infection (epidural abscess or skin infection).  2.  Physical therapy exercises at home as prescribed by physical therapy or from the pain clinic handout (given to the patient).  Continuation of these exercises every day, or multiple times per week, even when the patient has good pain relief, was stressed to the patient as a preventative measure to decrease the frequency and severity of future pain episodes.  3.  Continue pain medicines as already prescribed.  If patient not currently taking any, it is recommended to begin Acetaminophen 1000 mg po q 8 hours.  If other medicines containing Acetaminophen are currently prescribed, maintain daily dose at 3000 mg.    4.  If they can tolerate NSAIDS, it is recommended to take Ibuprofen 600 mg po q 6 hours for 7 days during pain exacerbations.  Alternatively, they may substitute an NSAID of their choice (e.g. Aleve).  This may be taken at the same time as Acetaminophen.  5.  Heat and ice to the affected area as tolerated for pain control.  It was discussed that heating pads can cause burns.  6.  Daily low impact exercise such as walking or water exercise was recommended to maintain overall health and aid in weight control.   7.  Follow up as needed for subsequent injections.  8.  Patient was counseled to abstain from tobacco products.

## 2018-12-01 DIAGNOSIS — Z88.9 MULTIPLE ALLERGIES: ICD-10-CM

## 2018-12-04 RX ORDER — FLUTICASONE PROPIONATE 50 MCG
SPRAY, SUSPENSION (ML) NASAL
Qty: 16 G | Refills: 3 | Status: ON HOLD | OUTPATIENT
Start: 2018-12-04 | End: 2018-12-10

## 2018-12-05 NOTE — PROGRESS NOTES
Pre-Operative Orthopedic Assessment      Patient: Nancy Robbins    YOB: 1960      Age/Gender: 58 y.o. female  Medical Record Number: [id number]  Surgical Procedure Planned: RT TOTAL KNEE ARTHROPLASTY     Surgeon: Geo Fu MD    Date of Surgery Planned: 12/10/2018    Question Value Score    Patient Score   1. What is your age group? 50-65 years  66-75 years  >75 years =2  =1  =0 2   2. Gender? Male  Female =2  =1 1   3. How far on average can you walk? (a block is 200 meters) Two blocks or more (+\- rest)  1-2 blocks (+\- rest)  Housebound (most of time) =2  =1  =0 1   4. Which gait aid to you use? (more often than not) None  Single-Point Stick  Crutches/Frame =2  =1  =0 2   5. Do you use community  supports? (home help, meals on wheels, district nursing) None or one per week  Two or more per week =1  =0 1   6. Will you live with someone who can care for you after your operation? Yes  No =3  =0 3      Your Score (out of 12)  10     Key Destination at Discharge from acute care predicted by score   Scores < 6  -- extended inpatient rehabilitation   Scores 6-9 -- additional intervention to discharge directly home (Rehabilitation in home)   Scores > 9 -- directly home         Discharge Disposition/Planning:     Patient Response   Discussed the Predicted discharge disposition needed based on RAPT Assessment with the patient.    yes   Patient selected discharge disposition:   home   Out Patient Rehabilitation Facility of Choice:    None    Home Health Services Preferred:   [facility]    Post-Operative Care Giver Name:    Sister Marysol  [phone] or sister in law     Subacute Inpatient Rehabilitation:  Complete this section only if planning inpatient services at a Subacute Facility     Patient Response   Subacute Facility Preferred (Please list 2 facilities:      Requires pre-certification for inpatient rehabilitation services?         Planned source of transportation to inpatient  rehabilitation facility?       If choosing inpatient services at an Acute or Subacute Facility please list a subsequent back-up plan (in case patient fails to qualify for inpatient rehabilitation). Back-up plans should include caregiver (family member or friend) for first 24-48 post- -operatively.       Home Equipment Patient Response   Does patient have a walker for home use?    yes   Does patient have a 3 in 1 commode for home use?    yes   Does patient have a shower chair for home use?    yes   Does patient have an elevated commode seat for home use? 3 in 1   Does patient have a cane for home use?    yes   Is there any other medical equipment in the home? If so,  List in comment section below      Pre-Operative Class Attendance Patient Response   Attended or scheduled to attend the pre-operative class within 1 year of total joint replacement? No, was provided on line information and will watch   Patient Education  Completed   Expected time of discharge discussed yes   Encouraged to attend Pre-Operative Class    yes   Education re: Predicted Discharge Disposition based on RAPT score    yes   Patient receptive and voiced understanding of information given    yes                                                                                                            Comments:    Address verified.  Patient will have either sister or sister in law stay with her at [place].                                       Signature: Radha Galvan RN    Date:  12/5/2018

## 2018-12-10 ENCOUNTER — ANESTHESIA (OUTPATIENT)
Dept: PERIOP | Facility: HOSPITAL | Age: 58
End: 2018-12-10

## 2018-12-10 ENCOUNTER — APPOINTMENT (OUTPATIENT)
Dept: GENERAL RADIOLOGY | Facility: HOSPITAL | Age: 58
End: 2018-12-10

## 2018-12-10 ENCOUNTER — HOSPITAL ENCOUNTER (INPATIENT)
Facility: HOSPITAL | Age: 58
LOS: 3 days | Discharge: HOME HEALTH | End: 2018-12-13
Attending: ORTHOPAEDIC SURGERY | Admitting: ORTHOPAEDIC SURGERY

## 2018-12-10 ENCOUNTER — ANESTHESIA EVENT (OUTPATIENT)
Dept: PERIOP | Facility: HOSPITAL | Age: 58
End: 2018-12-10

## 2018-12-10 DIAGNOSIS — M17.11 PRIMARY OSTEOARTHRITIS OF RIGHT KNEE: ICD-10-CM

## 2018-12-10 DIAGNOSIS — R26.2 DIFFICULTY WALKING: Primary | ICD-10-CM

## 2018-12-10 PROBLEM — M17.9 DJD (DEGENERATIVE JOINT DISEASE) OF KNEE: Status: ACTIVE | Noted: 2018-12-10

## 2018-12-10 PROCEDURE — 25010000002 VANCOMYCIN: Performed by: ORTHOPAEDIC SURGERY

## 2018-12-10 PROCEDURE — C1713 ANCHOR/SCREW BN/BN,TIS/BN: HCPCS | Performed by: ORTHOPAEDIC SURGERY

## 2018-12-10 PROCEDURE — 25010000002 HYDROMORPHONE PER 4 MG: Performed by: ANESTHESIOLOGY

## 2018-12-10 PROCEDURE — 25010000002 MIDAZOLAM PER 1 MG: Performed by: ANESTHESIOLOGY

## 2018-12-10 PROCEDURE — 25010000002 PROPOFOL 10 MG/ML EMULSION: Performed by: ANESTHESIOLOGY

## 2018-12-10 PROCEDURE — 25010000002 DEXAMETHASONE PER 1 MG: Performed by: ANESTHESIOLOGY

## 2018-12-10 PROCEDURE — 25010000002 FENTANYL CITRATE (PF) 100 MCG/2ML SOLUTION: Performed by: ANESTHESIOLOGY

## 2018-12-10 PROCEDURE — C1776 JOINT DEVICE (IMPLANTABLE): HCPCS | Performed by: ORTHOPAEDIC SURGERY

## 2018-12-10 PROCEDURE — 73560 X-RAY EXAM OF KNEE 1 OR 2: CPT

## 2018-12-10 PROCEDURE — 25010000003 CEFAZOLIN IN DEXTROSE 2-4 GM/100ML-% SOLUTION: Performed by: ORTHOPAEDIC SURGERY

## 2018-12-10 PROCEDURE — 25010000002 KETOROLAC TROMETHAMINE PER 15 MG: Performed by: ANESTHESIOLOGY

## 2018-12-10 PROCEDURE — 0SRC0J9 REPLACEMENT OF RIGHT KNEE JOINT WITH SYNTHETIC SUBSTITUTE, CEMENTED, OPEN APPROACH: ICD-10-PCS | Performed by: ORTHOPAEDIC SURGERY

## 2018-12-10 PROCEDURE — 25010000003 CEFAZOLIN IN DEXTROSE 2-4 GM/100ML-% SOLUTION: Performed by: ANESTHESIOLOGY

## 2018-12-10 PROCEDURE — 97161 PT EVAL LOW COMPLEX 20 MIN: CPT

## 2018-12-10 PROCEDURE — 97110 THERAPEUTIC EXERCISES: CPT

## 2018-12-10 PROCEDURE — 25010000002 VANCOMYCIN 10 G RECONSTITUTED SOLUTION: Performed by: ORTHOPAEDIC SURGERY

## 2018-12-10 PROCEDURE — 25010000002 ONDANSETRON PER 1 MG: Performed by: ANESTHESIOLOGY

## 2018-12-10 PROCEDURE — 25010000002 KETOROLAC TROMETHAMINE PER 15 MG: Performed by: ORTHOPAEDIC SURGERY

## 2018-12-10 DEVICE — CMT BONE PALACOS R HI/VISC 1X40: Type: IMPLANTABLE DEVICE | Site: KNEE | Status: FUNCTIONAL

## 2018-12-10 DEVICE — JOURNEY II CR INSERT XLPE RIGHT SIZE 3-4 10MM
Type: IMPLANTABLE DEVICE | Site: KNEE | Status: FUNCTIONAL
Brand: JOURNEY

## 2018-12-10 DEVICE — IMPLANTABLE DEVICE: Type: IMPLANTABLE DEVICE | Site: KNEE | Status: FUNCTIONAL

## 2018-12-10 DEVICE — JOURNEY II CR FEMORAL OXINIUM                                    NONPOROUS RIGHT SIZE 5
Type: IMPLANTABLE DEVICE | Site: KNEE | Status: FUNCTIONAL
Brand: JOURNEY

## 2018-12-10 DEVICE — JOURNEY 7.5 ROUND RESURF PAT 32MM STANDARD
Type: IMPLANTABLE DEVICE | Site: KNEE | Status: FUNCTIONAL
Brand: JOURNEY

## 2018-12-10 DEVICE — JOURNEY TIBIAL BASEPLATE NONPOROUS                                    RIGHT SIZE 4
Type: IMPLANTABLE DEVICE | Site: KNEE | Status: FUNCTIONAL
Brand: JOURNEY

## 2018-12-10 RX ORDER — ROCURONIUM BROMIDE 10 MG/ML
INJECTION, SOLUTION INTRAVENOUS AS NEEDED
Status: DISCONTINUED | OUTPATIENT
Start: 2018-12-10 | End: 2018-12-10 | Stop reason: SURG

## 2018-12-10 RX ORDER — KETOROLAC TROMETHAMINE 30 MG/ML
INJECTION, SOLUTION INTRAMUSCULAR; INTRAVENOUS AS NEEDED
Status: DISCONTINUED | OUTPATIENT
Start: 2018-12-10 | End: 2018-12-10 | Stop reason: SURG

## 2018-12-10 RX ORDER — CEFAZOLIN SODIUM 2 G/100ML
INJECTION, SOLUTION INTRAVENOUS AS NEEDED
Status: DISCONTINUED | OUTPATIENT
Start: 2018-12-10 | End: 2018-12-10 | Stop reason: SURG

## 2018-12-10 RX ORDER — ONDANSETRON 4 MG/1
4 TABLET, ORALLY DISINTEGRATING ORAL EVERY 6 HOURS PRN
Status: DISCONTINUED | OUTPATIENT
Start: 2018-12-10 | End: 2018-12-11

## 2018-12-10 RX ORDER — ASPIRIN 325 MG
325 TABLET, DELAYED RELEASE (ENTERIC COATED) ORAL DAILY
Status: DISCONTINUED | OUTPATIENT
Start: 2018-12-10 | End: 2018-12-13 | Stop reason: HOSPADM

## 2018-12-10 RX ORDER — SODIUM CHLORIDE 0.9 % (FLUSH) 0.9 %
1-10 SYRINGE (ML) INJECTION AS NEEDED
Status: DISCONTINUED | OUTPATIENT
Start: 2018-12-10 | End: 2018-12-13 | Stop reason: HOSPADM

## 2018-12-10 RX ORDER — ONDANSETRON 4 MG/1
4 TABLET, FILM COATED ORAL EVERY 6 HOURS PRN
Status: DISCONTINUED | OUTPATIENT
Start: 2018-12-10 | End: 2018-12-11

## 2018-12-10 RX ORDER — KETOROLAC TROMETHAMINE 30 MG/ML
30 INJECTION, SOLUTION INTRAMUSCULAR; INTRAVENOUS EVERY 6 HOURS
Status: COMPLETED | OUTPATIENT
Start: 2018-12-10 | End: 2018-12-11

## 2018-12-10 RX ORDER — MIDAZOLAM HYDROCHLORIDE 1 MG/ML
1 INJECTION INTRAMUSCULAR; INTRAVENOUS
Status: DISCONTINUED | OUTPATIENT
Start: 2018-12-10 | End: 2018-12-10 | Stop reason: HOSPADM

## 2018-12-10 RX ORDER — LABETALOL HYDROCHLORIDE 5 MG/ML
INJECTION, SOLUTION INTRAVENOUS AS NEEDED
Status: DISCONTINUED | OUTPATIENT
Start: 2018-12-10 | End: 2018-12-10 | Stop reason: SURG

## 2018-12-10 RX ORDER — HYDROMORPHONE HYDROCHLORIDE 1 MG/ML
0.5 INJECTION, SOLUTION INTRAMUSCULAR; INTRAVENOUS; SUBCUTANEOUS
Status: DISCONTINUED | OUTPATIENT
Start: 2018-12-10 | End: 2018-12-10 | Stop reason: HOSPADM

## 2018-12-10 RX ORDER — FAMOTIDINE 10 MG/ML
20 INJECTION, SOLUTION INTRAVENOUS ONCE
Status: COMPLETED | OUTPATIENT
Start: 2018-12-10 | End: 2018-12-10

## 2018-12-10 RX ORDER — BISACODYL 5 MG/1
10 TABLET, DELAYED RELEASE ORAL DAILY PRN
Status: DISCONTINUED | OUTPATIENT
Start: 2018-12-10 | End: 2018-12-13 | Stop reason: HOSPADM

## 2018-12-10 RX ORDER — ACETAMINOPHEN 650 MG/1
650 SUPPOSITORY RECTAL EVERY 4 HOURS PRN
Status: DISCONTINUED | OUTPATIENT
Start: 2018-12-10 | End: 2018-12-13 | Stop reason: HOSPADM

## 2018-12-10 RX ORDER — ACETAMINOPHEN 500 MG
1000 TABLET ORAL ONCE
Status: COMPLETED | OUTPATIENT
Start: 2018-12-10 | End: 2018-12-10

## 2018-12-10 RX ORDER — PROMETHAZINE HYDROCHLORIDE 25 MG/1
25 TABLET ORAL ONCE AS NEEDED
Status: DISCONTINUED | OUTPATIENT
Start: 2018-12-10 | End: 2018-12-10 | Stop reason: HOSPADM

## 2018-12-10 RX ORDER — HYDROCODONE BITARTRATE AND ACETAMINOPHEN 7.5; 325 MG/1; MG/1
1 TABLET ORAL ONCE AS NEEDED
Status: DISCONTINUED | OUTPATIENT
Start: 2018-12-10 | End: 2018-12-10 | Stop reason: HOSPADM

## 2018-12-10 RX ORDER — BISACODYL 10 MG
10 SUPPOSITORY, RECTAL RECTAL DAILY PRN
Status: DISCONTINUED | OUTPATIENT
Start: 2018-12-10 | End: 2018-12-13 | Stop reason: HOSPADM

## 2018-12-10 RX ORDER — ONDANSETRON 2 MG/ML
INJECTION INTRAMUSCULAR; INTRAVENOUS AS NEEDED
Status: DISCONTINUED | OUTPATIENT
Start: 2018-12-10 | End: 2018-12-10 | Stop reason: SURG

## 2018-12-10 RX ORDER — ACETAMINOPHEN 160 MG/5ML
650 SOLUTION ORAL EVERY 4 HOURS PRN
Status: DISCONTINUED | OUTPATIENT
Start: 2018-12-10 | End: 2018-12-13 | Stop reason: HOSPADM

## 2018-12-10 RX ORDER — ONDANSETRON 2 MG/ML
4 INJECTION INTRAMUSCULAR; INTRAVENOUS EVERY 6 HOURS PRN
Status: DISCONTINUED | OUTPATIENT
Start: 2018-12-10 | End: 2018-12-11

## 2018-12-10 RX ORDER — DEXAMETHASONE SODIUM PHOSPHATE 10 MG/ML
INJECTION INTRAMUSCULAR; INTRAVENOUS AS NEEDED
Status: DISCONTINUED | OUTPATIENT
Start: 2018-12-10 | End: 2018-12-10 | Stop reason: SURG

## 2018-12-10 RX ORDER — ESTRADIOL 2 MG/1
2 TABLET ORAL DAILY
Status: DISCONTINUED | OUTPATIENT
Start: 2018-12-10 | End: 2018-12-13 | Stop reason: HOSPADM

## 2018-12-10 RX ORDER — SODIUM CHLORIDE, SODIUM LACTATE, POTASSIUM CHLORIDE, CALCIUM CHLORIDE 600; 310; 30; 20 MG/100ML; MG/100ML; MG/100ML; MG/100ML
9 INJECTION, SOLUTION INTRAVENOUS CONTINUOUS
Status: DISCONTINUED | OUTPATIENT
Start: 2018-12-10 | End: 2018-12-13 | Stop reason: HOSPADM

## 2018-12-10 RX ORDER — PROMETHAZINE HYDROCHLORIDE 25 MG/ML
12.5 INJECTION, SOLUTION INTRAMUSCULAR; INTRAVENOUS ONCE AS NEEDED
Status: DISCONTINUED | OUTPATIENT
Start: 2018-12-10 | End: 2018-12-10 | Stop reason: HOSPADM

## 2018-12-10 RX ORDER — LEVOTHYROXINE SODIUM 137 UG/1
137 TABLET ORAL DAILY
Status: DISCONTINUED | OUTPATIENT
Start: 2018-12-11 | End: 2018-12-13 | Stop reason: HOSPADM

## 2018-12-10 RX ORDER — PROMETHAZINE HYDROCHLORIDE 25 MG/1
25 SUPPOSITORY RECTAL ONCE AS NEEDED
Status: DISCONTINUED | OUTPATIENT
Start: 2018-12-10 | End: 2018-12-10 | Stop reason: HOSPADM

## 2018-12-10 RX ORDER — TRAMADOL HYDROCHLORIDE 50 MG/1
50 TABLET ORAL EVERY 6 HOURS PRN
Status: DISCONTINUED | OUTPATIENT
Start: 2018-12-10 | End: 2018-12-10

## 2018-12-10 RX ORDER — ONDANSETRON 2 MG/ML
4 INJECTION INTRAMUSCULAR; INTRAVENOUS ONCE AS NEEDED
Status: DISCONTINUED | OUTPATIENT
Start: 2018-12-10 | End: 2018-12-10 | Stop reason: HOSPADM

## 2018-12-10 RX ORDER — SODIUM CHLORIDE 0.9 % (FLUSH) 0.9 %
3 SYRINGE (ML) INJECTION EVERY 12 HOURS SCHEDULED
Status: DISCONTINUED | OUTPATIENT
Start: 2018-12-10 | End: 2018-12-13 | Stop reason: HOSPADM

## 2018-12-10 RX ORDER — SENNA AND DOCUSATE SODIUM 50; 8.6 MG/1; MG/1
2 TABLET, FILM COATED ORAL ONCE
Status: DISCONTINUED | OUTPATIENT
Start: 2018-12-10 | End: 2018-12-13 | Stop reason: HOSPADM

## 2018-12-10 RX ORDER — ACETAMINOPHEN 325 MG/1
650 TABLET ORAL EVERY 4 HOURS PRN
Status: DISCONTINUED | OUTPATIENT
Start: 2018-12-10 | End: 2018-12-13 | Stop reason: HOSPADM

## 2018-12-10 RX ORDER — TRAMADOL HYDROCHLORIDE 50 MG/1
50 TABLET ORAL EVERY 4 HOURS PRN
Status: DISCONTINUED | OUTPATIENT
Start: 2018-12-10 | End: 2018-12-13 | Stop reason: HOSPADM

## 2018-12-10 RX ORDER — SODIUM CHLORIDE 0.9 % (FLUSH) 0.9 %
3-10 SYRINGE (ML) INJECTION AS NEEDED
Status: DISCONTINUED | OUTPATIENT
Start: 2018-12-10 | End: 2018-12-10 | Stop reason: HOSPADM

## 2018-12-10 RX ORDER — NALOXONE HCL 0.4 MG/ML
0.2 VIAL (ML) INJECTION AS NEEDED
Status: DISCONTINUED | OUTPATIENT
Start: 2018-12-10 | End: 2018-12-10 | Stop reason: HOSPADM

## 2018-12-10 RX ORDER — LIDOCAINE HYDROCHLORIDE 20 MG/ML
INJECTION, SOLUTION INFILTRATION; PERINEURAL AS NEEDED
Status: DISCONTINUED | OUTPATIENT
Start: 2018-12-10 | End: 2018-12-10 | Stop reason: SURG

## 2018-12-10 RX ORDER — ACETAMINOPHEN 325 MG/1
325 TABLET ORAL EVERY 4 HOURS PRN
Status: DISCONTINUED | OUTPATIENT
Start: 2018-12-10 | End: 2018-12-13 | Stop reason: HOSPADM

## 2018-12-10 RX ORDER — FLUMAZENIL 0.1 MG/ML
0.2 INJECTION INTRAVENOUS AS NEEDED
Status: DISCONTINUED | OUTPATIENT
Start: 2018-12-10 | End: 2018-12-10 | Stop reason: HOSPADM

## 2018-12-10 RX ORDER — DIPHENHYDRAMINE HCL 25 MG
25 CAPSULE ORAL
Status: DISCONTINUED | OUTPATIENT
Start: 2018-12-10 | End: 2018-12-10 | Stop reason: HOSPADM

## 2018-12-10 RX ORDER — FENTANYL CITRATE 50 UG/ML
50 INJECTION, SOLUTION INTRAMUSCULAR; INTRAVENOUS
Status: DISCONTINUED | OUTPATIENT
Start: 2018-12-10 | End: 2018-12-10 | Stop reason: HOSPADM

## 2018-12-10 RX ORDER — PROPOFOL 10 MG/ML
VIAL (ML) INTRAVENOUS AS NEEDED
Status: DISCONTINUED | OUTPATIENT
Start: 2018-12-10 | End: 2018-12-10 | Stop reason: SURG

## 2018-12-10 RX ORDER — FLUTICASONE PROPIONATE 50 MCG
2 SPRAY, SUSPENSION (ML) NASAL DAILY
Status: DISCONTINUED | OUTPATIENT
Start: 2018-12-10 | End: 2018-12-13 | Stop reason: HOSPADM

## 2018-12-10 RX ORDER — MONTELUKAST SODIUM 10 MG/1
10 TABLET ORAL EVERY MORNING
Status: DISCONTINUED | OUTPATIENT
Start: 2018-12-11 | End: 2018-12-13 | Stop reason: HOSPADM

## 2018-12-10 RX ORDER — PROMETHAZINE HYDROCHLORIDE 25 MG/1
12.5 TABLET ORAL ONCE AS NEEDED
Status: DISCONTINUED | OUTPATIENT
Start: 2018-12-10 | End: 2018-12-10 | Stop reason: HOSPADM

## 2018-12-10 RX ORDER — TRAMADOL HYDROCHLORIDE 50 MG/1
100 TABLET ORAL EVERY 4 HOURS PRN
Status: DISCONTINUED | OUTPATIENT
Start: 2018-12-10 | End: 2018-12-13 | Stop reason: HOSPADM

## 2018-12-10 RX ORDER — MAGNESIUM HYDROXIDE 1200 MG/15ML
LIQUID ORAL AS NEEDED
Status: DISCONTINUED | OUTPATIENT
Start: 2018-12-10 | End: 2018-12-10 | Stop reason: HOSPADM

## 2018-12-10 RX ORDER — PREGABALIN 75 MG/1
75 CAPSULE ORAL NIGHTLY
Status: DISCONTINUED | OUTPATIENT
Start: 2018-12-10 | End: 2018-12-13 | Stop reason: HOSPADM

## 2018-12-10 RX ORDER — SCOLOPAMINE TRANSDERMAL SYSTEM 1 MG/1
1 PATCH, EXTENDED RELEASE TRANSDERMAL
Status: DISCONTINUED | OUTPATIENT
Start: 2018-12-10 | End: 2018-12-10

## 2018-12-10 RX ORDER — TRAMADOL HYDROCHLORIDE 50 MG/1
100 TABLET ORAL EVERY 6 HOURS PRN
Status: DISCONTINUED | OUTPATIENT
Start: 2018-12-10 | End: 2018-12-10

## 2018-12-10 RX ORDER — FENTANYL CITRATE 50 UG/ML
INJECTION, SOLUTION INTRAMUSCULAR; INTRAVENOUS AS NEEDED
Status: DISCONTINUED | OUTPATIENT
Start: 2018-12-10 | End: 2018-12-10 | Stop reason: SURG

## 2018-12-10 RX ORDER — FERROUS SULFATE 325(65) MG
325 TABLET ORAL
Status: DISCONTINUED | OUTPATIENT
Start: 2018-12-11 | End: 2018-12-13 | Stop reason: HOSPADM

## 2018-12-10 RX ORDER — MIDAZOLAM HYDROCHLORIDE 1 MG/ML
2 INJECTION INTRAMUSCULAR; INTRAVENOUS
Status: DISCONTINUED | OUTPATIENT
Start: 2018-12-10 | End: 2018-12-10 | Stop reason: HOSPADM

## 2018-12-10 RX ORDER — SODIUM CHLORIDE, SODIUM LACTATE, POTASSIUM CHLORIDE, CALCIUM CHLORIDE 600; 310; 30; 20 MG/100ML; MG/100ML; MG/100ML; MG/100ML
100 INJECTION, SOLUTION INTRAVENOUS CONTINUOUS
Status: DISCONTINUED | OUTPATIENT
Start: 2018-12-10 | End: 2018-12-13 | Stop reason: HOSPADM

## 2018-12-10 RX ORDER — ALPRAZOLAM 0.5 MG/1
1 TABLET ORAL NIGHTLY PRN
Status: DISCONTINUED | OUTPATIENT
Start: 2018-12-10 | End: 2018-12-13 | Stop reason: HOSPADM

## 2018-12-10 RX ORDER — EPHEDRINE SULFATE 50 MG/ML
5 INJECTION, SOLUTION INTRAVENOUS ONCE AS NEEDED
Status: DISCONTINUED | OUTPATIENT
Start: 2018-12-10 | End: 2018-12-10 | Stop reason: HOSPADM

## 2018-12-10 RX ORDER — TRAMADOL HYDROCHLORIDE 50 MG/1
100 TABLET ORAL EVERY 6 HOURS PRN
Status: DISCONTINUED | OUTPATIENT
Start: 2018-12-10 | End: 2018-12-10 | Stop reason: CLARIF

## 2018-12-10 RX ORDER — TRANEXAMIC ACID 100 MG/ML
INJECTION, SOLUTION INTRAVENOUS AS NEEDED
Status: DISCONTINUED | OUTPATIENT
Start: 2018-12-10 | End: 2018-12-10 | Stop reason: SURG

## 2018-12-10 RX ORDER — CEFAZOLIN SODIUM 2 G/100ML
2 INJECTION, SOLUTION INTRAVENOUS EVERY 8 HOURS
Status: COMPLETED | OUTPATIENT
Start: 2018-12-10 | End: 2018-12-11

## 2018-12-10 RX ORDER — TRAMADOL HYDROCHLORIDE 50 MG/1
50 TABLET ORAL EVERY 6 HOURS PRN
Status: DISCONTINUED | OUTPATIENT
Start: 2018-12-10 | End: 2018-12-10 | Stop reason: CLARIF

## 2018-12-10 RX ORDER — SCOLOPAMINE TRANSDERMAL SYSTEM 1 MG/1
1 PATCH, EXTENDED RELEASE TRANSDERMAL
Status: DISCONTINUED | OUTPATIENT
Start: 2018-12-10 | End: 2018-12-10 | Stop reason: HOSPADM

## 2018-12-10 RX ORDER — SODIUM CHLORIDE 0.9 % (FLUSH) 0.9 %
3 SYRINGE (ML) INJECTION EVERY 12 HOURS SCHEDULED
Status: DISCONTINUED | OUTPATIENT
Start: 2018-12-10 | End: 2018-12-10 | Stop reason: HOSPADM

## 2018-12-10 RX ADMIN — HYDROMORPHONE HYDROCHLORIDE 0.5 MG: 1 INJECTION, SOLUTION INTRAMUSCULAR; INTRAVENOUS; SUBCUTANEOUS at 12:53

## 2018-12-10 RX ADMIN — LIDOCAINE HYDROCHLORIDE 80 MG: 20 INJECTION, SOLUTION INFILTRATION; PERINEURAL at 11:08

## 2018-12-10 RX ADMIN — HYDROMORPHONE HYDROCHLORIDE 0.5 MG: 1 INJECTION, SOLUTION INTRAMUSCULAR; INTRAVENOUS; SUBCUTANEOUS at 13:10

## 2018-12-10 RX ADMIN — SODIUM CHLORIDE, PRESERVATIVE FREE 3 ML: 5 INJECTION INTRAVENOUS at 19:47

## 2018-12-10 RX ADMIN — FENTANYL CITRATE 50 MCG: 50 INJECTION, SOLUTION INTRAMUSCULAR; INTRAVENOUS at 12:53

## 2018-12-10 RX ADMIN — TRAMADOL HYDROCHLORIDE 100 MG: 50 TABLET, FILM COATED ORAL at 20:50

## 2018-12-10 RX ADMIN — FENTANYL CITRATE 50 MCG: 50 INJECTION INTRAMUSCULAR; INTRAVENOUS at 11:28

## 2018-12-10 RX ADMIN — SODIUM CHLORIDE, POTASSIUM CHLORIDE, SODIUM LACTATE AND CALCIUM CHLORIDE 100 ML/HR: 600; 310; 30; 20 INJECTION, SOLUTION INTRAVENOUS at 17:30

## 2018-12-10 RX ADMIN — FENTANYL CITRATE 50 MCG: 50 INJECTION, SOLUTION INTRAMUSCULAR; INTRAVENOUS at 13:05

## 2018-12-10 RX ADMIN — KETOROLAC TROMETHAMINE 30 MG: 30 INJECTION, SOLUTION INTRAMUSCULAR; INTRAVENOUS at 12:15

## 2018-12-10 RX ADMIN — ONDANSETRON 4 MG: 2 INJECTION INTRAMUSCULAR; INTRAVENOUS at 12:15

## 2018-12-10 RX ADMIN — FENTANYL CITRATE 100 MCG: 50 INJECTION INTRAMUSCULAR; INTRAVENOUS at 11:08

## 2018-12-10 RX ADMIN — VANCOMYCIN HYDROCHLORIDE 1.5 G: 10 INJECTION, POWDER, LYOPHILIZED, FOR SOLUTION INTRAVENOUS at 11:05

## 2018-12-10 RX ADMIN — FAMOTIDINE 20 MG: 10 INJECTION, SOLUTION INTRAVENOUS at 09:55

## 2018-12-10 RX ADMIN — KETOROLAC TROMETHAMINE 30 MG: 30 INJECTION, SOLUTION INTRAMUSCULAR at 15:25

## 2018-12-10 RX ADMIN — TRANEXAMIC ACID 1000 MG: 100 INJECTION, SOLUTION INTRAVENOUS at 12:11

## 2018-12-10 RX ADMIN — FENTANYL CITRATE 50 MCG: 50 INJECTION INTRAMUSCULAR; INTRAVENOUS at 11:35

## 2018-12-10 RX ADMIN — CEFAZOLIN SODIUM 2 G: 2 INJECTION, SOLUTION INTRAVENOUS at 12:09

## 2018-12-10 RX ADMIN — MIDAZOLAM HYDROCHLORIDE 2 MG: 2 INJECTION, SOLUTION INTRAMUSCULAR; INTRAVENOUS at 09:55

## 2018-12-10 RX ADMIN — SCOPALAMINE 1 PATCH: 1 PATCH, EXTENDED RELEASE TRANSDERMAL at 10:08

## 2018-12-10 RX ADMIN — PROPOFOL 200 MG: 10 INJECTION, EMULSION INTRAVENOUS at 11:08

## 2018-12-10 RX ADMIN — Medication 3 ML: at 15:20

## 2018-12-10 RX ADMIN — LABETALOL HYDROCHLORIDE 5 MG: 5 INJECTION, SOLUTION INTRAVENOUS at 11:37

## 2018-12-10 RX ADMIN — DEXAMETHASONE SODIUM PHOSPHATE 8 MG: 10 INJECTION INTRAMUSCULAR; INTRAVENOUS at 11:19

## 2018-12-10 RX ADMIN — ACETAMINOPHEN 1000 MG: 500 TABLET, FILM COATED ORAL at 09:47

## 2018-12-10 RX ADMIN — PREGABALIN 75 MG: 75 CAPSULE ORAL at 19:47

## 2018-12-10 RX ADMIN — CEFAZOLIN SODIUM 2 G: 2 INJECTION, SOLUTION INTRAVENOUS at 19:46

## 2018-12-10 RX ADMIN — KETOROLAC TROMETHAMINE 30 MG: 30 INJECTION, SOLUTION INTRAMUSCULAR at 19:45

## 2018-12-10 RX ADMIN — ROCURONIUM BROMIDE 50 MG: 10 INJECTION INTRAVENOUS at 11:08

## 2018-12-10 RX ADMIN — TRAMADOL HYDROCHLORIDE 50 MG: 50 TABLET, FILM COATED ORAL at 15:18

## 2018-12-10 RX ADMIN — SODIUM CHLORIDE, POTASSIUM CHLORIDE, SODIUM LACTATE AND CALCIUM CHLORIDE 9 ML/HR: 600; 310; 30; 20 INJECTION, SOLUTION INTRAVENOUS at 09:47

## 2018-12-10 RX ADMIN — FENTANYL CITRATE 50 MCG: 50 INJECTION INTRAMUSCULAR; INTRAVENOUS at 12:35

## 2018-12-10 RX ADMIN — ONDANSETRON 4 MG: 4 TABLET, FILM COATED ORAL at 15:18

## 2018-12-10 RX ADMIN — SUGAMMADEX 161 MG: 100 INJECTION, SOLUTION INTRAVENOUS at 12:25

## 2018-12-10 RX ADMIN — VANCOMYCIN HYDROCHLORIDE 1500 MG: 10 INJECTION, POWDER, LYOPHILIZED, FOR SOLUTION INTRAVENOUS at 09:48

## 2018-12-10 RX ADMIN — ASPIRIN 325 MG: 325 TABLET, DELAYED RELEASE ORAL at 17:29

## 2018-12-10 RX ADMIN — ESTRADIOL 2 MG: 2 TABLET ORAL at 17:30

## 2018-12-10 NOTE — ANESTHESIA PROCEDURE NOTES
ANESTHESIA INTUBATION  Urgency: elective    Difficult airway    General Information and Staff    Patient location during procedure: OR  Anesthesiologist: Adina Hood MD    Indications and Patient Condition  Indications for airway management: airway protection    Preoxygenated: yes  Mask difficulty assessment: 1 - vent by mask    Final Airway Details  Final airway type: endotracheal airway      Successful airway: ETT  Cuffed: yes   Successful intubation technique: video laryngoscopy  Facilitating devices/methods: intubating stylet and cricoid pressure  Endotracheal tube insertion site: oral  Blade: Adalgisa  ETT size (mm): 7.0  Cormack-Lehane Classification: grade IIb - view of arytenoids or posterior of glottis only  Placement verified by: chest auscultation   Inital cuff pressure (cm H2O): 23  Cuff volume (mL): 5  Measured from: teeth  ETT to teeth (cm): 20  Number of attempts at approach: 2    Additional Comments  Patient extended her neck prior to induction. No further neck extension performed during intubation. Small laceration of upper lip following procedure. Dentition intact and in preop condition after intubation. Grade III - IV view with CMAC D blade, with cricoid pressure, grade IIb view. Easy mask throughout.

## 2018-12-10 NOTE — THERAPY EVALUATION
Acute Care - Physical Therapy Initial Evaluation  [facility]     Patient Name: Nancy Robbins  : 1960  MRN: [id number]  Today's Date: 12/10/2018   Onset of Illness/Injury or Date of Surgery: 12/10/18  Date of Referral to PT: 12/10/18  Referring Physician: Geo Boothe      Admit Date: 12/10/2018    Visit Dx:     ICD-10-CM ICD-9-CM   1. Difficulty walking R26.2 719.7     Patient Active Problem List   Diagnosis   • Hypothyroidism   • Allergic rhinitis   • Essential hypertension   • Chronic low back pain   • DJD (degenerative joint disease) of knee     Past Medical History:   Diagnosis Date   • Anxiety    • Graves disease    • Hard to intubate     TROUBLE WITH INTUBATION... has had cervical neck surgery and unable to bend neck too far   • Hypertension    • Hypothyroidism     under went radiation of thyroid and on supplement   • Low back pain     undergoing epidural   • Neuropathy     bilateral lower legs   • Osteoarthritis    • PONV (postoperative nausea and vomiting)    • Restless leg syndrome     TAKES XANAX FOR..   • Vertigo      Past Surgical History:   Procedure Laterality Date   • BACK SURGERY      lumbar discectomy   • EPIDURAL BLOCK      back last one 18   • KNEE MENISCAL REPAIR Bilateral    • NECK SURGERY      fusion w/hardware   • PARTIAL HYSTERECTOMY     • TONSILLECTOMY          PT ASSESSMENT (last 12 hours)      Physical Therapy Evaluation     Row Name 12/10/18 1616          PT Evaluation Time/Intention    Subjective Information  complains of;fatigue;pain;nausea/vomiting  -MS     Document Type  evaluation  -MS     Mode of Treatment  physical therapy;individual therapy  -MS     Patient Effort  good  -MS     Comment  Pt. reports pain/soreness in her Right knee this PM as well as nausea with upright mobility.  -MS     Row Name 12/10/18 1616          General Information    Onset of Illness/Injury or Date of Surgery  12/10/18  -MS     Referring Physician  Geo Boothe  -MS     Patient  Observations  agree to therapy;cooperative  -MS     Prior Level of Function  independent:  -MS     Equipment Currently Used at Home  none  -MS     Pertinent History of Current Functional Problem  Pt. is s/p Right TKR  -MS     Existing Precautions/Restrictions  fall  (Significant)   -MS     Equipment Ordered for Patient  -- Pt. reports she already owns a Walker for home use.  -MS     Risks Reviewed  patient and family:  -MS     Benefits Reviewed  patient and family:  -MS     Barriers to Rehab  none identified  -MS     Row Name 12/10/18 1616          Cognitive Assessment/Intervention- PT/OT    Orientation Status (Cognition)  oriented x 3  -MS     Follows Commands (Cognition)  WNL  -MS     Personal Safety Interventions  fall prevention program maintained;gait belt;nonskid shoes/slippers when out of bed;supervised activity  -MS     Row Name 12/10/18 1616          Mobility Assessment/Treatment    Extremity Weight-bearing Status  right lower extremity  -MS     Right Lower Extremity (Weight-bearing Status)  weight-bearing as tolerated (WBAT)  -MS     Row Name 12/10/18 1616          Bed Mobility Assessment/Treatment    Bed Mobility Assessment/Treatment  supine-sit;sit-supine  -MS     Supine-Sit [place] (Bed Mobility)  independent  -MS     Row Name 12/10/18 1616          Transfer Assessment/Treatment    Transfer Assessment/Treatment  stand-sit transfer;sit-stand transfer  -MS     Sit-Stand [place] (Transfers)  contact guard  -MS     Stand-Sit [place] (Transfers)  contact guard  -MS     Row Name 12/10/18 1616          Sit-Stand Transfer    Assistive Device (Sit-Stand Transfers)  walker, front-wheeled  -MS     Row Name 12/10/18 1616          Stand-Sit Transfer    Assistive Device (Stand-Sit Transfers)  walker, front-wheeled  -MS     Row Name 12/10/18 1616          Gait/Stairs Assessment/Training    [place] Level (Gait)  contact guard  -MS     Assistive Device (Gait)  walker, front-wheeled  -MS     Distance  in Feet (Gait)  20 feet  -MS     Pattern (Gait)  step-to  -MS     Deviations/Abnormal Patterns (Gait)  antalgic;deb decreased  -MS     Comment (Gait/Stairs)  Limited in gait distance due to fatigue and nausea.  -MS     Row Name 12/10/18 1616          General ROM    GENERAL ROM COMMENTS  BUE/LLE (WFL's)  -MS     Row Name 12/10/18 1616          MMT (Manual Muscle Testing)    General MMT Comments  BUE/LLE (4-/5)  -MS     Row Name 12/10/18 1616          Therapeutic Exercise    Comment (Therapeutic Exercise)  Right TKR protocol x 10 reps completed  -MS     Row Name 12/10/18 1616          Pain Assessment    Additional Documentation  Pain Scale: Numbers Pre/Post-Treatment (Group)  -MS     Row Name 12/10/18 1616          Pain Scale: Numbers Pre/Post-Treatment    Pain Scale: Numbers, Pretreatment  8/10  -MS     Pain Scale: Numbers, Post-Treatment  8/10  -MS     Pain Location - Side  Right  -MS     Pain Location  knee  -MS     Pain Intervention(s)  Medication (See MAR);Cold applied;Repositioned;Elevated;Rest  -MS     Row Name             Wound 12/10/18 1150 Right knee incision    Wound - Properties Group Date first assessed: 12/10/18  -AH Time first assessed: 1150  -AH Side: Right  -AH Location: knee  -AH Type: incision  -AH    Row Name 12/10/18 1616          Physical Therapy Clinical Impression    Date of Referral to PT  12/10/18  -MS     Criteria for Skilled Interventions Met (PT Clinical Impression)  treatment indicated  -MS     Rehab Potential (PT Clinical Summary)  good, to achieve stated therapy goals  -MS     Row Name 12/10/18 1616          Physical Therapy Goals    Transfer Goal Selection (PT)  transfer, PT goal 1  -MS     Gait Training Goal Selection (PT)  gait training, PT goal 1  -MS     ROM Goal Selection (PT)  ROM, PT goal 1  -MS     Stairs Goal Selection (PT)  stairs, PT goal 1  -MS     Additional Documentation  ROM Goal Selection (PT) (Row);Stairs Goal Selection (PT) (Row)  -MS     Row Name 12/10/18 1616           Transfer Goal 1 (PT)    Activity/Assistive Device (Transfer Goal 1, PT)  transfers, all;walker, rolling  -MS     [place] Level/Cues Needed (Transfer Goal 1, PT)  independent  -MS     Time Frame (Transfer Goal 1, PT)  long term goal (LTG);2 - 3 days  -MS     Row Name 12/10/18 1616          Gait Training Goal 1 (PT)    Activity/Assistive Device (Gait Training Goal 1, PT)  gait (walking locomotion);walker, rolling  -MS     [place] Level (Gait Training Goal 1, PT)  independent  -MS     Distance (Gait Goal 1, PT)  100 feet  -MS     Time Frame (Gait Training Goal 1, PT)  long term goal (LTG);2 - 3 days  -MS     Row Name 12/10/18 1616          ROM Goal 1 (PT)    ROM Goal 1 (PT)  Right TKR protocol x 10 reps completed  -MS     Time Frame (ROM Goal 1, PT)  long term goal (LTG);2 - 3 days  -MS     Row Name 12/10/18 1616          Stairs Goal 1 (PT)    Activity/Assistive Device (Stairs Goal 1, PT)  stairs, all skills  -MS     [place] Level/Cues Needed (Stairs Goal 1, PT)  contact guard assist  -MS     Number of Stairs (Stairs Goal 1, PT)  3 No Handrails  -MS     Time Frame (Stairs Goal 1, PT)  long term goal (LTG);3 days  -MS     Row Name 12/10/18 1616          Positioning and Restraints    Pre-Treatment Position  in bed  -MS     Post Treatment Position  chair  -MS     In Chair  notified nsg;reclined;sitting;call light within reach;encouraged to call for assist;with family/caregiver All lines intact. Ice pack to Right knee.  -MS       User Key  (r) = Recorded By, (t) = Taken By, (c) = Cosigned By    Initials Name Provider Type    Laurie Garcia, RN Registered Nurse    Sean Herring, PT Physical Therapist        Physical Therapy Education     Title: PT OT SLP Therapies (Done)     Topic: Physical Therapy (Done)     Point: Mobility training (Done)     Learning Progress Summary           Patient Acceptance, E,D, VU,NR by MS at 12/10/2018  4:22 PM                   Point: Home exercise program (Done)      Learning Progress Summary           Patient Acceptance, E,D, VU,NR by MS at 12/10/2018  4:22 PM                   Point: Body mechanics (Done)     Learning Progress Summary           Patient Acceptance, E,D, VU,NR by MS at 12/10/2018  4:22 PM                   Point: Precautions (Done)     Learning Progress Summary           Patient Acceptance, E,D, VU,NR by MS at 12/10/2018  4:22 PM                               User Key     Initials Effective Dates Name Provider Type Discipline    MS 04/03/18 -  Sean Hernandez, PT Physical Therapist PT              PT Recommendation and Plan  Anticipated Discharge Disposition (PT): home with assist, home with home health  Planned Therapy Interventions (PT Eval): balance training, bed mobility training, gait training, home exercise program, patient/family education, postural re-education, ROM (range of motion), stair training, strengthening, transfer training  Therapy Frequency (PT Clinical Impression): 2 times/day  Outcome Summary/Treatment Plan (PT)  Anticipated Discharge Disposition (PT): home with assist, home with home health  Plan of Care Reviewed With: patient, family  Outcome Summary: Pt. will benefit from skilled inpt. P.T. to address her functional deficits and to assist pt. in regaining her maximum level of independence with functional mobility.  Outcome Measures     Row Name 12/10/18 1600             How much help from another person do you currently need...    Turning from your back to your side while in flat bed without using bedrails?  4  -MS      Moving from lying on back to sitting on the side of a flat bed without bedrails?  4  -MS      Moving to and from a bed to a chair (including a wheelchair)?  3  -MS      Standing up from a chair using your arms (e.g., wheelchair, bedside chair)?  3  -MS      Climbing 3-5 steps with a railing?  2  -MS      To walk in hospital room?  3  -MS      AM-PAC 6 Clicks Score  19  -MS         Functional Assessment    Outcome Measure  Options  AM-PAC 6 Clicks Basic Mobility (PT)  -MS        User Key  (r) = Recorded By, (t) = Taken By, (c) = Cosigned By    Initials Name Provider Type    Sean Herring, PT Physical Therapist         Time Calculation:   PT Charges     Row Name 12/10/18 1623             Time Calculation    Start Time  1545  -MS      Stop Time  1610  -MS      Time Calculation (min)  25 min  -MS      PT Received On  12/10/18  -MS      PT - Next Appointment  12/11/18  -MS      PT Goal Re-Cert Due Date  12/12/18  -MS         Time Calculation- PT    Total Timed Code Minutes- PT  20 minute(s)  -MS        User Key  (r) = Recorded By, (t) = Taken By, (c) = Cosigned By    Initials Name Provider Type    Sean Herring, PT Physical Therapist        Therapy Suggested Charges     Code   Minutes Charges    None           Therapy Charges for Today     Code Description Service Date Service Provider Modifiers Qty    78724661681 HC PT EVAL LOW COMPLEXITY 1 12/10/2018 Sean Hernandez, PT GP 1    65510496947 HC PT THER PROC EA 15 MIN 12/10/2018 Sean Hernandez, PT GP 1          PT G-Codes  Outcome Measure Options: AM-PAC 6 Clicks Basic Mobility (PT)  AM-PAC 6 Clicks Score: 19      Sean Hernandez, PT  12/10/2018

## 2018-12-10 NOTE — ANESTHESIA PREPROCEDURE EVALUATION
Anesthesia Evaluation     history of anesthetic complications: difficult airway               Airway   Mallampati: III  TM distance: >3 FB  Neck ROM: limited  Small opening  Dental - normal exam     Pulmonary    (+) a smoker Former,   Cardiovascular     (+) hypertension,   (-) dysrhythmias, angina      Neuro/Psych  (+) dizziness/light headedness (vertigo),     GI/Hepatic/Renal/Endo      Musculoskeletal     (+) neck stiffness (cervical fusion),   Abdominal    Substance History      OB/GYN          Other                        Anesthesia Plan    ASA 2     general     intravenous induction   Anesthetic plan, all risks, benefits, and alternatives have been provided, discussed and informed consent has been obtained with: patient.

## 2018-12-10 NOTE — OP NOTE
Geo Fu M.D. - [facility]    Patient Name:  Nancy Robbins  YOB: 1960  Medical Records Number:  [id number]    Date of Procedure:  12/10/2018    Pre-operative Diagnosis:  DJD Right Knee    Post-operative Diagnosis:  DJD Right Knee    Procedure Performed:  Right Total Knee Replacement     Anesthesia: General, supplemented by a periarticular Exparel/bupivacaine injection.      Surgeon: Geo Fu MD     Assistant: Nas Lewis MD; note that as part of the surgical procedure, I utilized service of an assistant surgeon, specifically Nas Lewis MD. Assistant surgeon participated in crucial portion of the operation. Use of the assistant surgeon greatly reduced overall operative time, thus significantly reducing overall morbidity for the patient.      Implants:    Implant Name Type Inv. Item Serial No.  Lot No. LRB No. Used Action   CMT BONE PALACOS R HI/VISC 1X40 - [id number] Implant CMT BONE PALACOS R HI/VISC 1X40  [facility] [id number] Right 2 Implanted   COMP FEM JOURNEY2 OXINIUM CR RT SZ5 - [id number] Implant COMP FEM JOURNEY2 OXINIUM CR RT SZ5  [employer] [id number] Right 1 Implanted   BASEPLT TIB JOURNEY NONPOR SZ4 RT - [id number] Implant BASEPLT TIB JOURNEY NONPOR SZ4 RT  [employer] [id number] Right 1 Implanted   PATELLA RESRF JOURNEY 7.5X32MM RND - [id number] Implant PATELLA RESRF JOURNEY 7.5X32MM RND  [employer] [id number] Right 1 Implanted   INSRT ART/KN JOURNEY2 CR SZ3TO4 10MM RT - [id number] Implant INSRT ART/KN JOURNEY2 CR SZ3TO4 10MM RT  [employer] [id number] Right 1 Implanted       Implants utilized: I used the SN Jounrey 2 system.  I used a size 4 tibial baseplate.  Size 5  femur.  10 CR mm  polyethylene insert.  32 patella.    Tourniquet Time: Was 49 minutes.      Medications: Note that we gave 1 g IV tranexamic acid when the cement was mixed.      Estimated Blood Loss:   minimal    Specimens: * No orders in the log  *     Complications: None.      Quality Measures: I have documented the patient's current medications including dosage, frequency, and route of administration in medical record today. Conservative alternatives were discussed with the patient as part of the decision-making process prior to the procedure. The patient was evaluated immediately preoperatively for cardiovascular and thromboembolic risk factors. There were no acute risk factors.   Prophylactic IV antibiotics were administered before the tourniquet went up.      Description of Procedure: After prep and drape, Right  knee was approached via midline longitudinal anterior incision. Medial parapatellar arthrotomy was extended to the vastus medialis obliquus which was split in line with the fibers near the medial border, in keeping with minimally invasive technique. Patella was osteotomized proper depth for the patella implant. [place] holes are created. Patella was subluxed and protected. Intramedullary instrumentation was used on each side of the joint; careful and thorough canal content irrigation. I cut the femur 10 mm depth, 5° valgus controlling rotation. The femur  was sized appropriatelyt. Anterior, posterior, and chamfer cuts were made. Tibia is cut 10 mm depth, 5° of posterior slope. Meniscectomies are completed. Trial reduction is performed. Rotation is marked and keel slot was created.      Bony surface was thoroughly cleaned and dried. I injected the posterior capsule and medial lateral gutter with Exparel solution containing 20 mL Exparel, 25 mL 0.25% bupivacaine with epinephrine, 20 mL saline. Care was taken to protect popliteal neurovascular structures and the peroneal nerve. I cemented the tibial baseplate,  Femur, and patella.  Trial reduction revealed a 10 mm CR polyethylene insert best balanced stability and range of motion, and is locked in the tibial tray.      Tourniquet was released; hemostasis obtained. Wound was closed in layers with #1  Vicryl on the capsule, 2-0 Vicryl in subcutaneous tissue, and zipline in the skin over a 1/8-inch drain. Note that I injected my capsule with my Exparel solution during closure.      Geo Fu MD  12/10/2018  12:34 PM

## 2018-12-10 NOTE — PLAN OF CARE
Problem: Patient Care Overview  Goal: Plan of Care Review  Outcome: Outcome(s) achieved Date Met: 12/10/18   12/10/18 5570   OTHER   Outcome Summary Doing well day of OR. One episode of PONV zofran given. Voided Up ambulatory with assist of 1 and walker. Voided. tolerated reg diet. Expecting discharge in am. Instructed on antihypertensive meds. and need for freq BP checks   Plan of Care Review   Progress improving       Problem: Knee Arthroplasty (Total, Partial) (Adult)  Goal: Signs and Symptoms of Listed Potential Problems Will be Absent, Minimized or Managed (Knee Arthroplasty)  Outcome: Ongoing (interventions implemented as appropriate)      Problem: Fall Risk (Adult)  Goal: Identify Related Risk Factors and Signs and Symptoms  Outcome: Outcome(s) achieved Date Met: 12/10/18    Goal: Absence of Fall  Outcome: Ongoing (interventions implemented as appropriate)

## 2018-12-10 NOTE — PLAN OF CARE
Problem: Patient Care Overview  Goal: Plan of Care Review   12/10/18 1621   Coping/Psychosocial   Plan of Care Reviewed With patient;family   OTHER   Outcome Summary Pt. will benefit from skilled inpt. P.T. to address her functional deficits and to assist pt. in regaining her maximum level of independence with functional mobility.

## 2018-12-10 NOTE — ANESTHESIA POSTPROCEDURE EVALUATION
"Patient: Nancy Robbins    Procedure Summary     Date:  12/10/18 Room / Location:  [facility] OR  / [facility] MAIN OR    Anesthesia Start:  1105 Anesthesia Stop:  1245    Procedure:  RT TOTAL KNEE ARTHROPLASTY (Right Knee) Diagnosis:      Surgeon:  Geo Fu MD Provider:  Adina Hood MD    Anesthesia Type:  general ASA Status:  2          Anesthesia Type: general  Last vitals  BP   144/82 (12/10/18 1300)   Temp   36.5 °C (97.7 °F) (12/10/18 1241)   Pulse   84 (12/10/18 1300)   Resp   16 (12/10/18 1300)     SpO2   99 % (12/10/18 1300)     Post Anesthesia Care and Evaluation    Patient location during evaluation: bedside  Patient participation: complete - patient participated  Level of consciousness: awake  Pain management: adequate  Airway patency: patent  Anesthetic complications: No anesthetic complications    Cardiovascular status: acceptable  Respiratory status: acceptable  Hydration status: acceptable    Comments: /82 (BP Location: Right arm, Patient Position: Lying)   Pulse 84   Temp 36.5 °C (97.7 °F) (Oral)   Resp 16   Ht 165.1 cm (65\")   Wt 80.6 kg (177 lb 11.2 oz)   SpO2 99%   BMI 29.57 kg/m²         "

## 2018-12-11 LAB
ANION GAP SERPL CALCULATED.3IONS-SCNC: 9.7 MMOL/L
BUN BLD-MCNC: 9 MG/DL (ref 6–20)
BUN/CREAT SERPL: 14.1 (ref 7–25)
CALCIUM SPEC-SCNC: 8.6 MG/DL (ref 8.6–10.5)
CHLORIDE SERPL-SCNC: 101 MMOL/L (ref 98–107)
CO2 SERPL-SCNC: 28.3 MMOL/L (ref 22–29)
CREAT BLD-MCNC: 0.64 MG/DL (ref 0.57–1)
GFR SERPL CREATININE-BSD FRML MDRD: 95 ML/MIN/1.73
GLUCOSE BLD-MCNC: 132 MG/DL (ref 65–99)
HCT VFR BLD AUTO: 35.7 % (ref 35.6–45.5)
HGB BLD-MCNC: 11.1 G/DL (ref 11.9–15.5)
POTASSIUM BLD-SCNC: 3.5 MMOL/L (ref 3.5–5.2)
SODIUM BLD-SCNC: 139 MMOL/L (ref 136–145)

## 2018-12-11 PROCEDURE — 25010000002 KETOROLAC TROMETHAMINE PER 15 MG: Performed by: ORTHOPAEDIC SURGERY

## 2018-12-11 PROCEDURE — 80048 BASIC METABOLIC PNL TOTAL CA: CPT | Performed by: ORTHOPAEDIC SURGERY

## 2018-12-11 PROCEDURE — 25010000003 CEFAZOLIN IN DEXTROSE 2-4 GM/100ML-% SOLUTION: Performed by: ORTHOPAEDIC SURGERY

## 2018-12-11 PROCEDURE — 85014 HEMATOCRIT: CPT | Performed by: ORTHOPAEDIC SURGERY

## 2018-12-11 PROCEDURE — 97150 GROUP THERAPEUTIC PROCEDURES: CPT

## 2018-12-11 PROCEDURE — 97110 THERAPEUTIC EXERCISES: CPT

## 2018-12-11 PROCEDURE — 85018 HEMOGLOBIN: CPT | Performed by: ORTHOPAEDIC SURGERY

## 2018-12-11 PROCEDURE — 25010000002 FONDAPARINUX PER 0.5 MG: Performed by: ORTHOPAEDIC SURGERY

## 2018-12-11 RX ORDER — ONDANSETRON 4 MG/1
4 TABLET, FILM COATED ORAL EVERY 4 HOURS PRN
Status: DISCONTINUED | OUTPATIENT
Start: 2018-12-11 | End: 2018-12-13 | Stop reason: HOSPADM

## 2018-12-11 RX ORDER — ONDANSETRON 2 MG/ML
4 INJECTION INTRAMUSCULAR; INTRAVENOUS EVERY 6 HOURS PRN
Status: DISCONTINUED | OUTPATIENT
Start: 2018-12-11 | End: 2018-12-13 | Stop reason: HOSPADM

## 2018-12-11 RX ORDER — ONDANSETRON 4 MG/1
4 TABLET, ORALLY DISINTEGRATING ORAL EVERY 6 HOURS PRN
Status: DISCONTINUED | OUTPATIENT
Start: 2018-12-11 | End: 2018-12-13 | Stop reason: HOSPADM

## 2018-12-11 RX ORDER — HYDROCODONE BITARTRATE AND ACETAMINOPHEN 5; 325 MG/1; MG/1
1 TABLET ORAL EVERY 4 HOURS PRN
Status: DISCONTINUED | OUTPATIENT
Start: 2018-12-11 | End: 2018-12-12

## 2018-12-11 RX ORDER — HYDROCODONE BITARTRATE AND ACETAMINOPHEN 5; 325 MG/1; MG/1
2 TABLET ORAL EVERY 4 HOURS PRN
Status: DISCONTINUED | OUTPATIENT
Start: 2018-12-11 | End: 2018-12-12

## 2018-12-11 RX ORDER — FONDAPARINUX SODIUM 2.5 MG/.5ML
2.5 INJECTION SUBCUTANEOUS ONCE
Status: COMPLETED | OUTPATIENT
Start: 2018-12-11 | End: 2018-12-11

## 2018-12-11 RX ADMIN — ONDANSETRON 4 MG: 4 TABLET, FILM COATED ORAL at 17:10

## 2018-12-11 RX ADMIN — ALPRAZOLAM 1 MG: 0.5 TABLET ORAL at 00:48

## 2018-12-11 RX ADMIN — LEVOTHYROXINE SODIUM 137 MCG: 137 TABLET ORAL at 07:28

## 2018-12-11 RX ADMIN — TRAMADOL HYDROCHLORIDE 100 MG: 50 TABLET, FILM COATED ORAL at 12:43

## 2018-12-11 RX ADMIN — ALPRAZOLAM 1 MG: 0.5 TABLET ORAL at 22:35

## 2018-12-11 RX ADMIN — TRAMADOL HYDROCHLORIDE 100 MG: 50 TABLET, FILM COATED ORAL at 16:16

## 2018-12-11 RX ADMIN — ASPIRIN 325 MG: 325 TABLET, DELAYED RELEASE ORAL at 08:31

## 2018-12-11 RX ADMIN — SODIUM CHLORIDE, PRESERVATIVE FREE 3 ML: 5 INJECTION INTRAVENOUS at 08:32

## 2018-12-11 RX ADMIN — HYDROCODONE BITARTRATE AND ACETAMINOPHEN 2 TABLET: 5; 325 TABLET ORAL at 22:36

## 2018-12-11 RX ADMIN — TRAMADOL HYDROCHLORIDE 100 MG: 50 TABLET, FILM COATED ORAL at 05:06

## 2018-12-11 RX ADMIN — FERROUS SULFATE TAB 325 MG (65 MG ELEMENTAL FE) 325 MG: 325 (65 FE) TAB at 08:31

## 2018-12-11 RX ADMIN — TRAMADOL HYDROCHLORIDE 100 MG: 50 TABLET, FILM COATED ORAL at 08:49

## 2018-12-11 RX ADMIN — PREGABALIN 75 MG: 75 CAPSULE ORAL at 20:41

## 2018-12-11 RX ADMIN — CEFAZOLIN SODIUM 2 G: 2 INJECTION, SOLUTION INTRAVENOUS at 05:00

## 2018-12-11 RX ADMIN — ESTRADIOL 2 MG: 2 TABLET ORAL at 08:29

## 2018-12-11 RX ADMIN — MONTELUKAST SODIUM 10 MG: 10 TABLET, FILM COATED ORAL at 08:29

## 2018-12-11 RX ADMIN — ONDANSETRON 4 MG: 4 TABLET, FILM COATED ORAL at 22:40

## 2018-12-11 RX ADMIN — HYDROCODONE BITARTRATE AND ACETAMINOPHEN 1 TABLET: 5; 325 TABLET ORAL at 18:34

## 2018-12-11 RX ADMIN — SODIUM CHLORIDE, PRESERVATIVE FREE 3 ML: 5 INJECTION INTRAVENOUS at 20:41

## 2018-12-11 RX ADMIN — HYDROCODONE BITARTRATE AND ACETAMINOPHEN 1 TABLET: 5; 325 TABLET ORAL at 17:11

## 2018-12-11 RX ADMIN — KETOROLAC TROMETHAMINE 30 MG: 30 INJECTION, SOLUTION INTRAMUSCULAR at 02:55

## 2018-12-11 RX ADMIN — FONDAPARINUX SODIUM 2.5 MG: 2.5 INJECTION SUBCUTANEOUS at 08:33

## 2018-12-11 RX ADMIN — TRAMADOL HYDROCHLORIDE 100 MG: 50 TABLET, FILM COATED ORAL at 00:48

## 2018-12-11 RX ADMIN — KETOROLAC TROMETHAMINE 30 MG: 30 INJECTION, SOLUTION INTRAMUSCULAR at 08:29

## 2018-12-11 RX ADMIN — FLUTICASONE PROPIONATE 2 SPRAY: 50 SPRAY, METERED NASAL at 08:29

## 2018-12-11 NOTE — THERAPY TREATMENT NOTE
Acute Care - Physical Therapy Treatment Note  [facility]     Patient Name: Nancy Robbins  : 1960  MRN: [id number]  Today's Date: 2018  Onset of Illness/Injury or Date of Surgery: 12/10/18  Date of Referral to PT: 12/10/18  Referring Physician: Geo Boothe    Admit Date: 12/10/2018    Visit Dx:    ICD-10-CM ICD-9-CM   1. Difficulty walking R26.2 719.7     Patient Active Problem List   Diagnosis   • Hypothyroidism   • Allergic rhinitis   • Essential hypertension   • Chronic low back pain   • DJD (degenerative joint disease) of knee       Therapy Treatment    Rehabilitation Treatment Summary     Row Name 18 1400 18 0930          Treatment Time/Intention    Discipline  physical therapy assistant  -SM  physical therapy assistant  -SM     Document Type  therapy note (daily note)  -SM  therapy note (daily note)  -SM     Subjective Information  complains of;pain  -SM  complains of;pain  -SM     Mode of Treatment  physical therapy  -SM  physical therapy  -SM     Patient Effort  good  -SM  good  -SM     Existing Precautions/Restrictions  fall  -SM  fall  -SM     Recorded by [SM] Hannah Quigley, PTA 18 1532 [SM] Hannah Quigley, PTA 18 1111     Row Name 18 1400 18 0930          Cognitive Assessment/Intervention- PT/OT    Orientation Status (Cognition)  oriented x 4  -SM  oriented x 4  -SM     Follows Commands (Cognition)  WNL  -SM  WNL  -SM     Personal Safety Interventions  fall prevention program maintained;gait belt;nonskid shoes/slippers when out of bed  -SM  fall prevention program maintained;gait belt;nonskid shoes/slippers when out of bed  -SM     Recorded by [SM] Hannah Quigley, PTA 18 1532 [SM] Hananh Quigley, PTA 18 1111     Row Name 18 1400 18 0930          Bed Mobility Assessment/Treatment    Bed Mobility Assessment/Treatment  sit-supine  -SM  sit-supine  -SM     Supine-Sit [place] (Bed Mobility)  supervision   -  supervision  -     Recorded by [SM] Hannah Quigley, PTA 12/11/18 1532 [SM] Jalen Quigleyah Jane, PTA 12/11/18 1111     Row Name 12/11/18 1400 12/11/18 0930          Transfer Assessment/Treatment    Transfer Assessment/Treatment  sit-stand transfer;stand-sit transfer  -  sit-stand transfer;stand-sit transfer  -     Recorded by [SM] Hannah Quigley, PTA 12/11/18 1532 [SM] Hannah Quigley, PTA 12/11/18 1111     Row Name 12/11/18 1400 12/11/18 0930          Sit-Stand Transfer    Sit-Stand [place] (Transfers)  stand by assist  -  contact guard  -     Assistive Device (Sit-Stand Transfers)  walker, front-wheeled  -  walker, front-wheeled  -     Recorded by [SM] Hannah Quigley, PTA 12/11/18 1532 [SM] Jalen Quigleyah Jane, PTA 12/11/18 1111     Row Name 12/11/18 1400 12/11/18 0930          Stand-Sit Transfer    Stand-Sit [place] (Transfers)  stand by assist  -  stand by assist  -     Assistive Device (Stand-Sit Transfers)  walker, front-wheeled  -  walker, front-wheeled  -     Recorded by [SM] Hannah Quigley, PTA 12/11/18 1532 [SM] Jalen Quigleyah Jane, PTA 12/11/18 1111     Row Name 12/11/18 1400 12/11/18 0930          Gait/Stairs Assessment/Training    [place] Level (Gait)  stand by assist  -  stand by assist  -     Assistive Device (Gait)  walker, front-wheeled  -  walker, front-wheeled  -     Distance in Feet (Gait)  80  -SM  150  -     Pattern (Gait)  step-through  -  step-through  -SM     Deviations/Abnormal Patterns (Gait)  antalgic;deb decreased;stride length decreased  -  antalgic;deb decreased;stride length decreased  -     Right Sided Gait Deviations  --  weight shift ability decreased  -     [place] Level (Stairs)  --  contact guard  -     Assistive Device (Stairs)  --  walker, front-wheeled  -SM     Handrail Location (Stairs)  --  none  -     Number of Steps (Stairs)  --  4  -SM     Ascending Technique (Stairs)  --   step-to-step  -SM     Descending Technique (Stairs)  --  step-to-step  -SM     Comment (Gait/Stairs)  increased pain limiting distance  -SM  ascending backwards on stairs  -SM     Recorded by [SM] Hannah Quigley, PTA 12/11/18 1532 [SM] Hannah Quigley, PTA 12/11/18 1111     Row Name 12/11/18 0930             General ROM    GENERAL ROM COMMENTS  R knee 9-88  -SM      Recorded by [SM] Hannah Quigley, PTA 12/11/18 1111      Row Name 12/11/18 1400 12/11/18 0930          Therapeutic Exercise    Comment (Therapeutic Exercise)  R TKR protocol x 20 reps  -SM  R TKR protocol x 15 reps  -SM     Recorded by [SM] Hannah Quigley, PTA 12/11/18 1532 [SM] Hannah Quigley, PTA 12/11/18 1111     Row Name 12/11/18 1400 12/11/18 0930          Positioning and Restraints    Pre-Treatment Position  sitting in chair/recliner  -SM  sitting in chair/recliner  -SM     Post Treatment Position  bed  -SM  bed  -SM     In Bed  supine;call light within reach;encouraged to call for assist  -SM  supine;call light within reach;encouraged to call for assist;with nsg  -SM     Recorded by [SM] Hannah Quigley, PTA 12/11/18 1532 [SM] Hannah Quigley, PTA 12/11/18 1111     Row Name 12/11/18 1400 12/11/18 0930          Pain Scale: Numbers Pre/Post-Treatment    Pain Scale: Numbers, Pretreatment  6/10  -SM  3/10  -SM     Pain Scale: Numbers, Post-Treatment  8/10  -SM  3/10  -SM     Pain Location - Side  Right  -SM  Right  -SM     Pain Location  knee  -SM  knee  -SM     Pain Intervention(s)  Repositioned;Ambulation/increased activity;Rest  -SM  Repositioned;Ambulation/increased activity;Rest  -SM     Recorded by [SM] Hannah Quigley, PTA 12/11/18 1532 [SM] Hannah Quigley, PTA 12/11/18 1111     Row Name                Wound 12/10/18 1150 Right knee incision    Wound - Properties Group Date first assessed: 12/10/18 [AH] Time first assessed: 1150 [AH] Side: Right [AH] Location: knee [AH] Type: incision [AH] Recorded by:   [] Chandu, Laurie, RN 12/10/18 1150      User Key  (r) = Recorded By, (t) = Taken By, (c) = Cosigned By    Initials Name Effective Dates Discipline     Laurie Schofield RN 12/07/17 -  Nurse     Hannah Quigley PTA 03/07/18 -  PT          Wound 12/10/18 1150 Right knee incision (Active)   Dressing Appearance dry;intact;no drainage 12/11/2018 12:05 PM   Closure PAMELLA 12/11/2018 12:05 PM   Base dressing in place, unable to visualize 12/11/2018  7:30 AM   Drainage Amount none 12/11/2018  7:30 AM           Physical Therapy Education     Title: PT OT SLP Therapies (Done)     Topic: Physical Therapy (Done)     Point: Mobility training (Done)     Learning Progress Summary           Patient Acceptance, E,TB,D, VU by  at 12/11/2018 11:11 AM    Acceptance, E,D, VU,NR by MS at 12/10/2018  4:22 PM                   Point: Home exercise program (Done)     Learning Progress Summary           Patient Acceptance, E,TB,D, VU by  at 12/11/2018 11:11 AM    Acceptance, E,D, VU,NR by MS at 12/10/2018  4:22 PM                   Point: Body mechanics (Done)     Learning Progress Summary           Patient Acceptance, E,TB,D, VU by  at 12/11/2018 11:11 AM    Acceptance, E,D, VU,NR by MS at 12/10/2018  4:22 PM                   Point: Precautions (Done)     Learning Progress Summary           Patient Acceptance, E,TB,D, VU by  at 12/11/2018 11:11 AM    Acceptance, E,D, VU,NR by MS at 12/10/2018  4:22 PM                               User Key     Initials Effective Dates Name Provider Type Discipline    MS 04/03/18 -  Sean Hernandez, PT Physical Therapist PT     03/07/18 -  Hannah Quigley PTA Physical Therapy Assistant PT                PT Recommendation and Plan     Plan of Care Reviewed With: patient  Progress: improving  Outcome Measures     Row Name 12/11/18 1100 12/10/18 1600          How much help from another person do you currently need...    Turning from your back to your side while in flat bed without using  bedrails?  4  -SM  4  -MS     Moving from lying on back to sitting on the side of a flat bed without bedrails?  4  -SM  4  -MS     Moving to and from a bed to a chair (including a wheelchair)?  3  -SM  3  -MS     Standing up from a chair using your arms (e.g., wheelchair, bedside chair)?  4  -SM  3  -MS     Climbing 3-5 steps with a railing?  3  -SM  2  -MS     To walk in hospital room?  3  -SM  3  -MS     AM-PAC 6 Clicks Score  21  -SM  19  -MS        Functional Assessment    Outcome Measure Options  AM-PAC 6 Clicks Basic Mobility (PT)  -SM  AM-PAC 6 Clicks Basic Mobility (PT)  -MS       User Key  (r) = Recorded By, (t) = Taken By, (c) = Cosigned By    Initials Name Provider Type    MS Mary Sean RENNY, PT Physical Therapist     Hannah Quigley PTA Physical Therapy Assistant         Time Calculation:   PT Charges     Row Name 12/11/18 1532 12/11/18 1112          Time Calculation    Start Time  1400  -  0930  -     Stop Time  1450  -SM  1020  -SM     Time Calculation (min)  50 min  -SM  50 min  -SM     PT Received On  12/11/18  -  12/11/18  -     PT - Next Appointment  12/12/18  -  12/11/18  -       User Key  (r) = Recorded By, (t) = Taken By, (c) = Cosigned By    Initials Name Provider Type     Hannah Quigley PTA Physical Therapy Assistant        Therapy Suggested Charges     Code   Minutes Charges    None           Therapy Charges for Today     Code Description Service Date Service Provider Modifiers Qty    78388707751 HC PT THER PROC EA 15 MIN 12/11/2018 Hannah Quigley, PTA GP 1    93540614483 HC PT THER PROC GROUP 12/11/2018 Hannah Quigley PTA GP 1    70934503131 HC PT THER PROC EA 15 MIN 12/11/2018 Hannah Quigley PTA GP 1    93586223523 HC PT THER PROC GROUP 12/11/2018 Hannah Quigley, GLADYS GP 1          PT G-Codes  Outcome Measure Options: AM-PAC 6 Clicks Basic Mobility (PT)  AM-PAC 6 Clicks Score: 21    Hannah Quigley PTA  12/11/2018

## 2018-12-11 NOTE — PROGRESS NOTES
"/59 (BP Location: Left arm, Patient Position: Lying)   Pulse 70   Temp 96.9 °F (36.1 °C) (Oral)   Resp 18   Ht 165.1 cm (65\")   Wt 80.6 kg (177 lb 11.2 oz)   SpO2 97%   BMI 29.57 kg/m²     Lab Results (last 24 hours)     Procedure Component Value Units Date/Time    Basic Metabolic Panel [[id number]]  (Abnormal) Collected:  12/11/18 0406    Specimen:  Blood Updated:  12/11/18 0452     Glucose 132 mg/dL      BUN 9 mg/dL      Creatinine 0.64 mg/dL      Sodium 139 mmol/L      Potassium 3.5 mmol/L      Chloride 101 mmol/L      CO2 28.3 mmol/L      Calcium 8.6 mg/dL      eGFR Non African Amer 95 mL/min/1.73      BUN/Creatinine Ratio 14.1     Anion Gap 9.7 mmol/L     Narrative:       GFR Normal >60  Chronic Kidney Disease <60  Kidney Failure <15    Hemoglobin & Hematocrit, Blood [[id number]]  (Abnormal) Collected:  12/11/18 0406    Specimen:  Blood Updated:  12/11/18 0428     Hemoglobin 11.1 g/dL      Hematocrit 35.7 %           Imaging Results (last 24 hours)     Procedure Component Value Units Date/Time    XR Knee 1 or 2 View Right [[id number]] Collected:  12/10/18 1350     Updated:  12/10/18 1353    Narrative:       XR KNEE 1 OR 2 VW RIGHT-     POSTOP PORTABLE 2 VIEWS RIGHT KNEE     CLINICAL INFORMATION: Post arthroplasty     FINDINGS: Prosthesis is satisfactory in position. A complicating process  is not identified.     This report was finalized on 12/10/2018 1:50 PM by Dr. Justin Garduno M.D.             Patient Care Team:  Radha Sy APRN as PCP - General (Internal Medicine)    SUBJECTIVE  comfortable  PHYSICAL EXAM   NV intact    DJD (degenerative joint disease) of knee      PLAN / DISPOSITION:  Arixtra one time dose  :Lives alone.  Will have family to help her when she is discharged, and she is planning on going home.  Delaying discharge till tomorrow though.  Geo Fu MD  12/11/18  6:57 AM      "

## 2018-12-11 NOTE — PLAN OF CARE
Problem: Patient Care Overview  Goal: Plan of Care Review  Outcome: Ongoing (interventions implemented as appropriate)   12/11/18 1111   Coping/Psychosocial   Plan of Care Reviewed With patient   Plan of Care Review   Progress improving

## 2018-12-11 NOTE — PROGRESS NOTES
Discharge Planning Assessment  [facility]     Patient Name: Nancy Robbins  MRN: [id number]  Today's Date: 12/11/2018    Admit Date: 12/10/2018    Discharge Needs Assessment     Row Name 12/11/18 1534       Living Environment    Lives With  alone    Current Living Arrangements  home/apartment/condo       Discharge Needs Assessment    Readmission Within the Last 30 Days  no previous admission in last 30 days    Concerns to be Addressed  basic needs    Discharge Facility/Level of Care Needs  home with home health        Discharge Plan     Row Name 12/11/18 1534       Plan    Plan  [facility]    Patient/Family in Agreement with Plan  yes    Plan Comments  Spoke with pt, verified correct information on facesheet and explained the role of CCP. Pt would like to d/c home with [facility], referral left on the voicemail ([phone]). Plan will be to d/c home with [facility] and family support.        Destination      No service coordination in this encounter.      Durable Medical Equipment      No service coordination in this encounter.      Dialysis/Infusion      No service coordination in this encounter.      Home Medical Care - Selection Complete      Service Provider Request Status Selected Services Address Phone Number Fax Number    [facility] [address] [address] [phone]      Community Resources      No service coordination in this encounter.          Demographic Summary    No documentation.       Functional Status    No documentation.       Psychosocial    No documentation.       Abuse/Neglect    No documentation.       Legal    No documentation.       Substance Abuse    No documentation.       Patient Forms    No documentation.           Sofiya Dale RN

## 2018-12-11 NOTE — PLAN OF CARE
Problem: Patient Care Overview  Goal: Plan of Care Review  Outcome: Ongoing (interventions implemented as appropriate)   12/11/18 1641   Coping/Psychosocial   Plan of Care Reviewed With patient   OTHER   Outcome Summary Pain controlled with po bebeto med VSS BP meds held this am due to hypotension. Instructed on importance of monitoring BP prior to antihypetensive meds. s/s of hypotension. Expecting discharge tomorrow   Plan of Care Review   Progress improving       Problem: Knee Arthroplasty (Total, Partial) (Adult)  Goal: Signs and Symptoms of Listed Potential Problems Will be Absent, Minimized or Managed (Knee Arthroplasty)  Outcome: Ongoing (interventions implemented as appropriate)      Problem: Fall Risk (Adult)  Goal: Absence of Fall  Outcome: Ongoing (interventions implemented as appropriate)

## 2018-12-11 NOTE — PLAN OF CARE
Problem: Patient Care Overview  Goal: Plan of Care Review  Outcome: Ongoing (interventions implemented as appropriate)   12/11/18 1672   Coping/Psychosocial   Plan of Care Reviewed With patient   OTHER   Outcome Summary VSS, pain better controlled since changing meds around, up with assist of 1, voiding per BRP, educated on BP monitoring, home with HH today   Plan of Care Review   Progress improving     Goal: Individualization and Mutuality  Outcome: Ongoing (interventions implemented as appropriate)    Goal: Discharge Needs Assessment  Outcome: Ongoing (interventions implemented as appropriate)      Problem: Knee Arthroplasty (Total, Partial) (Adult)  Goal: Signs and Symptoms of Listed Potential Problems Will be Absent, Minimized or Managed (Knee Arthroplasty)  Outcome: Ongoing (interventions implemented as appropriate)    Goal: Anesthesia/Sedation Recovery  Outcome: Ongoing (interventions implemented as appropriate)      Problem: Fall Risk (Adult)  Goal: Absence of Fall  Outcome: Ongoing (interventions implemented as appropriate)

## 2018-12-11 NOTE — THERAPY TREATMENT NOTE
Acute Care - Physical Therapy Treatment Note  [facility]     Patient Name: Nancy Robbins  : 1960  MRN: [id number]  Today's Date: 2018  Onset of Illness/Injury or Date of Surgery: 12/10/18  Date of Referral to PT: 12/10/18  Referring Physician: Geo Boothe    Admit Date: 12/10/2018    Visit Dx:    ICD-10-CM ICD-9-CM   1. Difficulty walking R26.2 719.7     Patient Active Problem List   Diagnosis   • Hypothyroidism   • Allergic rhinitis   • Essential hypertension   • Chronic low back pain   • DJD (degenerative joint disease) of knee       Therapy Treatment    Rehabilitation Treatment Summary     Row Name 18             Treatment Time/Intention    Discipline  physical therapy assistant  -SM      Document Type  therapy note (daily note)  -SM      Subjective Information  complains of;pain  -SM      Mode of Treatment  physical therapy  -SM      Patient Effort  good  -SM      Existing Precautions/Restrictions  fall  -SM      Recorded by [SM] Hannah Quigley, GLADYS 18 1111      Row Name 1830             Cognitive Assessment/Intervention- PT/OT    Orientation Status (Cognition)  oriented x 4  -SM      Follows Commands (Cognition)  WNL  -SM      Personal Safety Interventions  fall prevention program maintained;gait belt;nonskid shoes/slippers when out of bed  -SM      Recorded by [SM] Hannah Quigley PTA 18 1111      Row Name 18 0930             Bed Mobility Assessment/Treatment    Bed Mobility Assessment/Treatment  sit-supine  -SM      Supine-Sit [place] (Bed Mobility)  supervision  -SM      Recorded by [SM] Hannah Quigley PTA 18 1111      Row Name 18 0930             Transfer Assessment/Treatment    Transfer Assessment/Treatment  sit-stand transfer;stand-sit transfer  -SM      Recorded by [SM] Hannah Quigley PTA 18 1111      Row Name 18 0930             Sit-Stand Transfer    Sit-Stand [place] (Transfers)  contact  guard  -      Assistive Device (Sit-Stand Transfers)  walker, front-wheeled  -SM      Recorded by [SM] Hannah Quigley, PTA 12/11/18 1111      Row Name 12/11/18 0930             Stand-Sit Transfer    Stand-Sit [place] (Transfers)  stand by assist  -      Assistive Device (Stand-Sit Transfers)  walker, front-wheeled  -SM      Recorded by [SM] Hannah Quigley, PTA 12/11/18 1111      Row Name 12/11/18 0930             Gait/Stairs Assessment/Training    [place] Level (Gait)  stand by assist  -      Assistive Device (Gait)  walker, front-wheeled  -      Distance in Feet (Gait)  150  -SM      Pattern (Gait)  step-through  -SM      Deviations/Abnormal Patterns (Gait)  antalgic;deb decreased;stride length decreased  -SM      Right Sided Gait Deviations  weight shift ability decreased  -SM      [place] Level (Stairs)  contact guard  -      Assistive Device (Stairs)  walker, front-wheeled  -      Handrail Location (Stairs)  none  -SM      Number of Steps (Stairs)  4  -SM      Ascending Technique (Stairs)  step-to-step  -SM      Descending Technique (Stairs)  step-to-step  -SM      Comment (Gait/Stairs)  ascending backwards on stairs  -SM      Recorded by [SM] Hannah Quigley, [facility] [place] 12/11/18 1111      Row Name 12/11/18 0930             General ROM    GENERAL ROM COMMENTS  R knee 9-88  -SM      Recorded by [SM] Hannah Quigley, PTA 12/11/18 1111      Row Name 12/11/18 0930             Therapeutic Exercise    Comment (Therapeutic Exercise)  R TKR protocol x 15 reps  -SM      Recorded by [SM] Hannah Quigley, PTA 12/11/18 1111      Row Name 12/11/18 0930             Positioning and Restraints    Pre-Treatment Position  sitting in chair/recliner  -SM      Post Treatment Position  bed  -SM      In Bed  supine;call light within reach;encouraged to call for assist;with nsg  -SM      Recorded by [SM] Hannah Quigley, PTA 12/11/18 1111      Row Name 12/11/18 0930             Pain  Scale: Numbers Pre/Post-Treatment    Pain Scale: Numbers, Pretreatment  3/10  -SM      Pain Scale: Numbers, Post-Treatment  3/10  -SM      Pain Location - Side  Right  -SM      Pain Location  knee  -SM      Pain Intervention(s)  Repositioned;Ambulation/increased activity;Rest  -SM      Recorded by [] Hannah Quigley, PTA 12/11/18 1111      Row Name                Wound 12/10/18 1150 Right knee incision    Wound - Properties Group Date first assessed: 12/10/18 [AH] Time first assessed: 1150 [AH] Side: Right [AH] Location: knee [AH] Type: incision [AH] Recorded by:  [] Laurie Schofield RN 12/10/18 1150      User Key  (r) = Recorded By, (t) = Taken By, (c) = Cosigned By    Initials Name Effective Dates Discipline     Laurie Schofield RN 12/07/17 -  Nurse     Hannah Quigley, PTA 03/07/18 -  PT          Wound 12/10/18 1150 Right knee incision (Active)   Dressing Appearance no drainage;dry;intact 12/11/2018  7:30 AM   Closure PAMELLA 12/11/2018  7:30 AM   Base dressing in place, unable to visualize 12/11/2018  7:30 AM   Drainage Amount none 12/11/2018  7:30 AM   Dressing Care, Wound gauze;elastic bandage 12/10/2018 12:41 PM           Physical Therapy Education     Title: PT OT SLP Therapies (Done)     Topic: Physical Therapy (Done)     Point: Mobility training (Done)     Learning Progress Summary           Patient Acceptance, E,TB,D, VU by  at 12/11/2018 11:11 AM    Acceptance, E,D, VU,NR by MS at 12/10/2018  4:22 PM                   Point: Home exercise program (Done)     Learning Progress Summary           Patient Acceptance, E,TB,D, VU by  at 12/11/2018 11:11 AM    Acceptance, E,D, VU,NR by MS at 12/10/2018  4:22 PM                   Point: Body mechanics (Done)     Learning Progress Summary           Patient Acceptance, E,TB,D, VU by  at 12/11/2018 11:11 AM    Acceptance, E,D, VU,NR by MS at 12/10/2018  4:22 PM                   Point: Precautions (Done)     Learning Progress Summary           Patient  Acceptance, E,TB,D, VU by  at 12/11/2018 11:11 AM    Acceptance, E,D, VU,NR by MS at 12/10/2018  4:22 PM                               User Key     Initials Effective Dates Name Provider Type Discipline    MS 04/03/18 -  Sean Hernandez RENNY, PT Physical Therapist PT     03/07/18 -  Hannah Quigley PTA Physical Therapy Assistant PT                PT Recommendation and Plan     Plan of Care Reviewed With: patient  Progress: improving  Outcome Measures     Row Name 12/11/18 1100 12/10/18 1600          How much help from another person do you currently need...    Turning from your back to your side while in flat bed without using bedrails?  4  -SM  4  -MS     Moving from lying on back to sitting on the side of a flat bed without bedrails?  4  -SM  4  -MS     Moving to and from a bed to a chair (including a wheelchair)?  3  -SM  3  -MS     Standing up from a chair using your arms (e.g., wheelchair, bedside chair)?  4  -SM  3  -MS     Climbing 3-5 steps with a railing?  3  -SM  2  -MS     To walk in hospital room?  3  -SM  3  -MS     AM-PAC 6 Clicks Score  21  -SM  19  -MS        Functional Assessment    Outcome Measure Options  AM-PAC 6 Clicks Basic Mobility (PT)  -  AM-PAC 6 Clicks Basic Mobility (PT)  -MS       User Key  (r) = Recorded By, (t) = Taken By, (c) = Cosigned By    Initials Name Provider Type    Sean Herring RENNY, PT Physical Therapist     Hannah Quigley PTA Physical Therapy Assistant         Time Calculation:   PT Charges     Row Name 12/11/18 1112             Time Calculation    Start Time  0930  -      Stop Time  1020  -      Time Calculation (min)  50 min  -      PT Received On  12/11/18  -      PT - Next Appointment  12/11/18  -        User Key  (r) = Recorded By, (t) = Taken By, (c) = Cosigned By    Initials Name Provider Type    Hannah Zeng PTA Physical Therapy Assistant        Therapy Suggested Charges     Code   Minutes Charges    None           Therapy  Charges for Today     Code Description Service Date Service Provider Modifiers Qty    06287233647 HC PT THER PROC EA 15 MIN 12/11/2018 Hannah Quigley, PTA GP 1    36539181810 HC PT THER PROC GROUP 12/11/2018 Hannah Quigley, PTA GP 1          PT G-Codes  Outcome Measure Options: AM-PAC 6 Clicks Basic Mobility (PT)  AM-PAC 6 Clicks Score: 21    Hannah Quigley PTA  12/11/2018

## 2018-12-12 PROCEDURE — 97150 GROUP THERAPEUTIC PROCEDURES: CPT

## 2018-12-12 PROCEDURE — 97110 THERAPEUTIC EXERCISES: CPT

## 2018-12-12 RX ORDER — HYDROCODONE BITARTRATE AND ACETAMINOPHEN 7.5; 325 MG/1; MG/1
1 TABLET ORAL EVERY 6 HOURS PRN
Status: DISCONTINUED | OUTPATIENT
Start: 2018-12-12 | End: 2018-12-13 | Stop reason: HOSPADM

## 2018-12-12 RX ORDER — HYDROCODONE BITARTRATE AND ACETAMINOPHEN 7.5; 325 MG/1; MG/1
2 TABLET ORAL EVERY 4 HOURS PRN
Status: DISCONTINUED | OUTPATIENT
Start: 2018-12-12 | End: 2018-12-13 | Stop reason: HOSPADM

## 2018-12-12 RX ORDER — ONDANSETRON 4 MG/1
4 TABLET, FILM COATED ORAL EVERY 4 HOURS PRN
Qty: 30 TABLET | Refills: 1 | Status: SHIPPED | OUTPATIENT
Start: 2018-12-12 | End: 2019-09-09

## 2018-12-12 RX ADMIN — ONDANSETRON 4 MG: 4 TABLET, FILM COATED ORAL at 02:54

## 2018-12-12 RX ADMIN — HYDROCODONE BITARTRATE AND ACETAMINOPHEN 2 TABLET: 7.5; 325 TABLET ORAL at 15:09

## 2018-12-12 RX ADMIN — HYDROCODONE BITARTRATE AND ACETAMINOPHEN 2 TABLET: 5; 325 TABLET ORAL at 02:54

## 2018-12-12 RX ADMIN — HYDROCODONE BITARTRATE AND ACETAMINOPHEN 2 TABLET: 7.5; 325 TABLET ORAL at 10:32

## 2018-12-12 RX ADMIN — HYDROCODONE BITARTRATE AND ACETAMINOPHEN 2 TABLET: 5; 325 TABLET ORAL at 07:01

## 2018-12-12 RX ADMIN — ONDANSETRON 4 MG: 4 TABLET, FILM COATED ORAL at 18:40

## 2018-12-12 RX ADMIN — ESTRADIOL 2 MG: 2 TABLET ORAL at 08:09

## 2018-12-12 RX ADMIN — ASPIRIN 325 MG: 325 TABLET, DELAYED RELEASE ORAL at 08:09

## 2018-12-12 RX ADMIN — ALPRAZOLAM 1 MG: 0.5 TABLET ORAL at 22:11

## 2018-12-12 RX ADMIN — SODIUM CHLORIDE, PRESERVATIVE FREE 3 ML: 5 INJECTION INTRAVENOUS at 08:09

## 2018-12-12 RX ADMIN — LOSARTAN POTASSIUM: 50 TABLET, FILM COATED ORAL at 08:10

## 2018-12-12 RX ADMIN — MONTELUKAST SODIUM 10 MG: 10 TABLET, FILM COATED ORAL at 08:09

## 2018-12-12 RX ADMIN — ONDANSETRON 4 MG: 4 TABLET, FILM COATED ORAL at 07:00

## 2018-12-12 RX ADMIN — ONDANSETRON 4 MG: 4 TABLET, FILM COATED ORAL at 10:32

## 2018-12-12 RX ADMIN — HYDROCODONE BITARTRATE AND ACETAMINOPHEN 2 TABLET: 7.5; 325 TABLET ORAL at 18:40

## 2018-12-12 RX ADMIN — PREGABALIN 75 MG: 75 CAPSULE ORAL at 20:35

## 2018-12-12 RX ADMIN — FERROUS SULFATE TAB 325 MG (65 MG ELEMENTAL FE) 325 MG: 325 (65 FE) TAB at 08:10

## 2018-12-12 RX ADMIN — HYDROCODONE BITARTRATE AND ACETAMINOPHEN 2 TABLET: 7.5; 325 TABLET ORAL at 22:11

## 2018-12-12 RX ADMIN — ONDANSETRON 4 MG: 4 TABLET, FILM COATED ORAL at 15:09

## 2018-12-12 RX ADMIN — LEVOTHYROXINE SODIUM 137 MCG: 137 TABLET ORAL at 08:09

## 2018-12-12 RX ADMIN — FLUTICASONE PROPIONATE 2 SPRAY: 50 SPRAY, METERED NASAL at 08:10

## 2018-12-12 NOTE — THERAPY TREATMENT NOTE
Acute Care - Physical Therapy Treatment Note  [facility]     Patient Name: Nancy Robbins  : 1960  MRN: [id number]  Today's Date: 2018  Onset of Illness/Injury or Date of Surgery: 12/10/18  Date of Referral to PT: 12/10/18  Referring Physician: eGo Boothe    Admit Date: 12/10/2018    Visit Dx:    ICD-10-CM ICD-9-CM   1. Difficulty walking R26.2 719.7   2. Primary osteoarthritis of right knee M17.11 715.16     Patient Active Problem List   Diagnosis   • Hypothyroidism   • Allergic rhinitis   • Essential hypertension   • Chronic low back pain   • DJD (degenerative joint disease) of knee       Therapy Treatment    Rehabilitation Treatment Summary     Row Name 18 1400 18 1030          Treatment Time/Intention    Discipline  physical therapy assistant  -SM  physical therapy assistant  -SM     Document Type  therapy note (daily note)  -SM  therapy note (daily note)  -     Subjective Information  complains of;pain  -SM  complains of;pain  -SM     Mode of Treatment  physical therapy  -  physical therapy  -SM     Patient Effort  good  -SM  good  -SM     Existing Precautions/Restrictions  fall  -SM  fall  -SM     Recorded by [SM] Hannah Quigley, PTA 18 1701 [SM] Hannah Quigley, PTA 18 1611     Row Name 18 1400 18 1030          Cognitive Assessment/Intervention- PT/OT    Orientation Status (Cognition)  oriented x 4  -SM  oriented x 4  -SM     Follows Commands (Cognition)  WNL  -SM  WNL  -SM     Personal Safety Interventions  fall prevention program maintained;gait belt;nonskid shoes/slippers when out of bed  -SM  fall prevention program maintained;gait belt;nonskid shoes/slippers when out of bed  -SM     Recorded by [SM] Hannah Quigley, PTA 18 1701 [SM] Hannah Quigley, PTA 18 1611     Row Name 18 1400 18 1030          Bed Mobility Assessment/Treatment    Bed Mobility Assessment/Treatment  --  sit-supine  -SM      Supine-Sit [place] (Bed Mobility)  --  supervision  -     Comment (Bed Mobility)  up in chair  -  --     Recorded by [SM] Hannah Quigley, [facility] 12/12/18 1701 [SM] Hnanah Quigley, [facility] 12/12/18 1611     Row Name 12/12/18 1400 12/12/18 1030          Transfer Assessment/Treatment    Transfer Assessment/Treatment  sit-stand transfer;stand-sit transfer  -  sit-stand transfer;stand-sit transfer  -     Recorded by [SM] Hannah Quigley, [facility] 12/12/18 1701 [SM] Hannah Quigley, PTA 12/12/18 1611     Row Name 12/12/18 1400 12/12/18 1030          Sit-Stand Transfer    Sit-Stand [place] (Transfers)  stand by assist  -  stand by assist  -     Assistive Device (Sit-Stand Transfers)  walker, front-wheeled  -  walker, front-wheeled  -     Recorded by [SM] Hannah Quigley, [facility] 12/12/18 1701 [SM] Hannah Quigley, [facility] 12/12/18 1611     Row Name 12/12/18 1400 12/12/18 1030          Stand-Sit Transfer    Stand-Sit [place] (Transfers)  stand by assist  -  stand by assist  -     Assistive Device (Stand-Sit Transfers)  walker, front-wheeled  -  walker, front-wheeled  -     Recorded by [SM] Hannah Quigley, PTA 12/12/18 1701 [SM] Hannah Quigley, PTA 12/12/18 1611     Row Name 12/12/18 1400 12/12/18 1030          Gait/Stairs Assessment/Training    [place] Level (Gait)  stand by assist  -  stand by assist  -     Assistive Device (Gait)  walker, front-wheeled  -  walker, front-wheeled  -     Distance in Feet (Gait)  150  -SM  100  -SM     Pattern (Gait)  step-through  -SM  step-through  -SM     Deviations/Abnormal Patterns (Gait)  antalgic;deb decreased;stride length decreased  -  antalgic;deb decreased;stride length decreased  -SM     Right Sided Gait Deviations  weight shift ability decreased  -SM  weight shift ability decreased  -     Comment (Gait/Stairs)  --  increased pain and swelling in R LE  -SM     Recorded by [SM] Hannah Quigley, [facility]  12/12/18 1701 [SM] Hannah Quigley Jane, PTA 12/12/18 1611     Row Name 12/12/18 1030             General ROM    GENERAL ROM COMMENTS  R knee 9-85  -SM      Recorded by [SM] QuigleyHannah Jane, PTA 12/12/18 1611      Row Name 12/12/18 1400 12/12/18 1030          Therapeutic Exercise    Comment (Therapeutic Exercise)  R TKR protocol x 30 reps  -SM  R TKR protocol x 25 reps  -SM     Recorded by [SM] Jalen Quigleyah Jane, PTA 12/12/18 1701 [SM] Dann Hannah Jane, PTA 12/12/18 1611     Row Name 12/12/18 1400 12/12/18 1030          Positioning and Restraints    Pre-Treatment Position  sitting in chair/recliner  -SM  sitting in chair/recliner  -SM     Post Treatment Position  bathroom  -SM  bed  -SM     In Bed  --  supine;call light within reach;encouraged to call for assist  -SM     Bathroom  sitting;call light within reach;encouraged to call for assist  -SM  --     Recorded by [SM] Jalen Quigleyah Jane, PTA 12/12/18 1701 [SM] Dann Hannah Jane, PTA 12/12/18 1611     Row Name 12/12/18 1400 12/12/18 1030          Pain Scale: Numbers Pre/Post-Treatment    Pain Scale: Numbers, Pretreatment  8/10  -SM  7/10  -SM     Pain Scale: Numbers, Post-Treatment  10/10  -SM  8/10  -SM     Pain Location - Side  Right  -SM  Right  -SM     Pain Location  knee  -SM  knee  -SM     Pain Intervention(s)  Repositioned;Ambulation/increased activity;Rest  -SM  Repositioned;Ambulation/increased activity;Rest  -SM     Recorded by [SM] Jalen Quigleyah Jane, PTA 12/12/18 1701 [SM] QuigleyJalenah Jane, PTA 12/12/18 1611     Row Name                Wound 12/10/18 1150 Right knee incision    Wound - Properties Group Date first assessed: 12/10/18 [AH] Time first assessed: 1150 [AH] Side: Right [AH] Location: knee [AH] Type: incision [AH] Recorded by:  [] Laurie Schofield RN 12/10/18 1150      User Key  (r) = Recorded By, (t) = Taken By, (c) = Cosigned By    Initials Name Effective Dates Discipline    Laurie Garcia, RN 12/07/17 -  Nurse    Hannah Zeng  GLADYS Lara 03/07/18 -  PT          Wound 12/10/18 1150 Right knee incision (Active)   Dressing Appearance no drainage;dry;intact 12/12/2018  8:10 AM   Closure Other (Comment) 12/12/2018  8:10 AM   Base dressing in place, unable to visualize 12/12/2018  4:23 AM   Drainage Amount none 12/12/2018  8:10 AM   Dressing Care, Wound dressing changed 12/12/2018  8:10 AM           Physical Therapy Education     Title: PT OT SLP Therapies (Done)     Topic: Physical Therapy (Done)     Point: Mobility training (Done)     Learning Progress Summary           Patient Acceptance, E,TB,D, VU by  at 12/12/2018  4:11 PM    Acceptance, E,TB,D, VU by  at 12/11/2018 11:11 AM    Acceptance, E,D, VU,NR by MS at 12/10/2018  4:22 PM                   Point: Home exercise program (Done)     Learning Progress Summary           Patient Acceptance, E,TB,D, VU by  at 12/12/2018  4:11 PM    Acceptance, E,TB,D, VU by  at 12/11/2018 11:11 AM    Acceptance, E,D, VU,NR by MS at 12/10/2018  4:22 PM                   Point: Body mechanics (Done)     Learning Progress Summary           Patient Acceptance, E,TB,D, VU by  at 12/12/2018  4:11 PM    Acceptance, E,TB,D, VU by  at 12/11/2018 11:11 AM    Acceptance, E,D, VU,NR by MS at 12/10/2018  4:22 PM                   Point: Precautions (Done)     Learning Progress Summary           Patient Acceptance, E,TB,D, VU by  at 12/12/2018  4:11 PM    Acceptance, E,TB,D, VU by  at 12/11/2018 11:11 AM    Acceptance, E,D, VU,NR by MS at 12/10/2018  4:22 PM                               User Key     Initials Effective Dates Name Provider Type Discipline    MS 04/03/18 -  Sean Hernandez, PT Physical Therapist PT     03/07/18 -  Hannah Quigley, PTA Physical Therapy Assistant PT                PT Recommendation and Plan     Plan of Care Reviewed With: patient  Progress: improving  Outcome Measures     Row Name 12/12/18 1600 12/11/18 1100 12/10/18 1600       How much help from another person do  you currently need...    Turning from your back to your side while in flat bed without using bedrails?  4  -SM  4  -SM  4  -MS    Moving from lying on back to sitting on the side of a flat bed without bedrails?  4  -SM  4  -SM  4  -MS    Moving to and from a bed to a chair (including a wheelchair)?  3  -SM  3  -SM  3  -MS    Standing up from a chair using your arms (e.g., wheelchair, bedside chair)?  4  -SM  4  -SM  3  -MS    Climbing 3-5 steps with a railing?  3  -SM  3  -SM  2  -MS    To walk in hospital room?  3  -SM  3  -SM  3  -MS    AM-PAC 6 Clicks Score  21  -SM  21  -SM  19  -MS       Functional Assessment    Outcome Measure Options  AM-PAC 6 Clicks Basic Mobility (PT)  -SM  AM-PAC 6 Clicks Basic Mobility (PT)  -SM  AM-PAC 6 Clicks Basic Mobility (PT)  -MS      User Key  (r) = Recorded By, (t) = Taken By, (c) = Cosigned By    Initials Name Provider Type    Sean Herring, PT Physical Therapist     Hannah Quigley PTA Physical Therapy Assistant         Time Calculation:   PT Charges     Row Name 12/12/18 1701 12/12/18 1611          Time Calculation    Start Time  1400  -  1030  -     Stop Time  1445  -  1120  -     Time Calculation (min)  45 min  -  50 min  -     PT Received On  12/12/18  -  12/12/18  -     PT - Next Appointment  12/13/18  -  12/12/18  -       User Key  (r) = Recorded By, (t) = Taken By, (c) = Cosigned By    Initials Name Provider Type     Hannah Quigley PTA Physical Therapy Assistant        Therapy Suggested Charges     Code   Minutes Charges    None           Therapy Charges for Today     Code Description Service Date Service Provider Modifiers Qty    57967286542 HC PT THER PROC EA 15 MIN 12/11/2018 Hannah Quigley PTA GP 1    13566484129 HC PT THER PROC GROUP 12/11/2018 Hannah Quigley PTA GP 1    66603156168 HC PT THER PROC EA 15 MIN 12/11/2018 Hannah Quigley PTA GP 1    11460634076 HC PT THER PROC GROUP 12/11/2018 Hannah Quigley  Jane, PTA GP 1    46612964918 HC PT THER PROC EA 15 MIN 12/12/2018 Hannah Quigley Jane, PTA GP 1    84513737434 HC PT THER PROC GROUP 12/12/2018 Hannah Quigley Jane, PTA GP 1    97861479091 HC PT THER PROC EA 15 MIN 12/12/2018 Hannah Quigley Jane, PTA GP 1    76454438584 HC PT THER PROC GROUP 12/12/2018 Hannah Quigley Jane, PTA GP 1          PT G-Codes  Outcome Measure Options: AM-PAC 6 Clicks Basic Mobility (PT)  AM-PAC 6 Clicks Score: 21    Hannah Fitzpatrickisaac Quigley PTA  12/12/2018

## 2018-12-12 NOTE — PLAN OF CARE
Problem: Patient Care Overview  Goal: Plan of Care Review  Outcome: Ongoing (interventions implemented as appropriate)   12/12/18 0530   Coping/Psychosocial   Plan of Care Reviewed With patient   OTHER   Outcome Summary VSS, pain better controlled with change of pain meds, RA, SL, up with minimal assist of 1, voiding per BRP, educated on BP monitoring, home today with    Plan of Care Review   Progress improving     Goal: Individualization and Mutuality  Outcome: Ongoing (interventions implemented as appropriate)    Goal: Discharge Needs Assessment  Outcome: Ongoing (interventions implemented as appropriate)      Problem: Knee Arthroplasty (Total, Partial) (Adult)  Goal: Signs and Symptoms of Listed Potential Problems Will be Absent, Minimized or Managed (Knee Arthroplasty)  Outcome: Ongoing (interventions implemented as appropriate)    Goal: Anesthesia/Sedation Recovery  Outcome: Outcome(s) achieved Date Met: 12/12/18      Problem: Fall Risk (Adult)  Goal: Absence of Fall  Outcome: Ongoing (interventions implemented as appropriate)

## 2018-12-12 NOTE — PLAN OF CARE
Problem: Patient Care Overview  Goal: Plan of Care Review  Outcome: Ongoing (interventions implemented as appropriate)   12/12/18 1611   Coping/Psychosocial   Plan of Care Reviewed With patient   Plan of Care Review   Progress improving

## 2018-12-12 NOTE — PROGRESS NOTES
"/75 (BP Location: Left arm, Patient Position: Lying)   Pulse 84   Temp 97.6 °F (36.4 °C) (Oral)   Resp 16   Ht 165.1 cm (65\")   Wt 80.6 kg (177 lb 11.2 oz)   SpO2 97%   BMI 29.57 kg/m²     Results from last 7 days   Lab Units  12/11/18   0406   HEMOGLOBIN g/dL  11.1*   HEMATOCRIT %  35.7       Results from last 7 days   Lab Units  12/11/18   0406   SODIUM mmol/L  139   POTASSIUM mmol/L  3.5   CHLORIDE mmol/L  101   CO2 mmol/L  28.3   BUN mg/dL  9   CREATININE mg/dL  0.64   GLUCOSE mg/dL  132*   CALCIUM mg/dL  8.6       Imaging Results (last 24 hours)     ** No results found for the last 24 hours. **          Patient Care Team:  Radha Sy APRN as PCP - General (Internal Medicine)    SUBJECTIVE  C/o pain  PHYSICAL EXAM  Wound looks good     DJD (degenerative joint disease) of knee      PLAN / DISPOSITION:  Increase hydrocodone to 7.5  Not independent yet.  Will monitor today to see if she can be d/c\"ed if she becomes more independent  DONALD Burden  12/12/18  8:18 AM    "

## 2018-12-12 NOTE — DISCHARGE SUMMARY
Orthopedic Discharge Summary      Patient: Nancy Robbins      YOB: 1960    Medical Record Number: [id number]    Attending Physician: Geo Fu MD  Consulting Physician(s):   Date of Admission: 12/10/2018  8:55 AM  Date of Discharge:       Patient Active Problem List   Diagnosis   • Hypothyroidism   • Allergic rhinitis   • Essential hypertension   • Chronic low back pain   • DJD (degenerative joint disease) of knee     [unfilled]    Allergies:   Allergies   Allergen Reactions   • Ampicillin Nausea Only   • Oxycodone Nausea And Vomiting       Current Medications:     Discharge Medications      ASK your doctor about these medications      Instructions Start Date   ALPRAZolam 1 MG tablet  Commonly known as:  XANAX   1 mg, Oral, Nightly PRN      estradiol 2 MG tablet  Commonly known as:  ESTRACE   2 mg, Oral, Daily      fexofenadine-pseudoephedrine  MG per 12 hr tablet  Commonly known as:  ALLEGRA-D   1 tablet, Oral, 2 Times Daily PRN      fluticasone 50 MCG/ACT nasal spray  Commonly known as:  FLONASE   2 sprays, Nasal, Daily      irbesartan-hydrochlorothiazide 150-12.5 MG tablet  Commonly known as:  AVALIDE   1 tablet, Oral, Every Morning      levothyroxine 137 MCG tablet  Commonly known as:  SYNTHROID, LEVOTHROID   137 mcg, Oral, Daily      meloxicam 15 MG tablet  Commonly known as:  MOBIC   15 mg, Oral, Daily      montelukast 10 MG tablet  Commonly known as:  SINGULAIR   10 mg, Oral, Every Morning      mupirocin 2 % nasal ointment  Commonly known as:  BACTROBAN   1 application, Nasal, Take As Directed, USE AS DIRECTED PREOP      pregabalin 75 MG capsule  Commonly known as:  LYRICA   75 mg, Oral, Nightly      TYLENOL ARTHRITIS PAIN PO   1,300 mg, Oral, 2 Times Daily PRN                 Past Medical History:   Diagnosis Date   • Anxiety    • Graves disease    • Hard to intubate     TROUBLE WITH INTUBATION... has had cervical neck surgery and unable to bend neck too far   • Hypertension    •  Hypothyroidism     under went radiation of thyroid and on supplement   • Low back pain     undergoing epidural   • Neuropathy     bilateral lower legs   • Osteoarthritis    • PONV (postoperative nausea and vomiting)    • Restless leg syndrome     TAKES XANAX FOR..   • Vertigo         Past Surgical History:   Procedure Laterality Date   • BACK SURGERY      lumbar discectomy   • EPIDURAL BLOCK      back last one 18   • KNEE MENISCAL REPAIR Bilateral    • NECK SURGERY      fusion w/hardware   • PARTIAL HYSTERECTOMY     • TONSILLECTOMY          Social History     Occupational History   • Not on file   Tobacco Use   • Smoking status: Former Smoker     Packs/day: 0.50     Years: 25.00     Pack years: 12.50     Types: Cigarettes     Last attempt to quit:      Years since quittin.9   • Smokeless tobacco: Never Used   Substance and Sexual Activity   • Alcohol use: Yes     Comment: 2 monthly   • Drug use: No   • Sexual activity: Not on file      Social History     Social History Narrative   • Not on file        Family History   Problem Relation Age of Onset   • Lung cancer Father    • Malig Hyperthermia Neg Hx          Physical Exam: 58 y.o. female  General Appearance:    Alert, cooperative, in no acute distress                      Vitals:    18 1831 18 0008 18 0300 18 0700   BP: 132/75 115/63 111/62 126/75   BP Location: Left arm Left arm Left arm Left arm   Patient Position: Lying Lying Lying Lying   Pulse: 83 81 88 84   Resp: 16 16 16 16   Temp: 97.8 °F (36.6 °C) 96.8 °F (36 °C) 96.7 °F (35.9 °C) 97.6 °F (36.4 °C)   TempSrc: Oral Oral Oral Oral   SpO2: 98% 97% 98% 97%   Weight:       Height:            Head:    Normocephalic, without obvious abnormality, atraumatic   Eyes:            Lids and lashes normal, conjunctivae and sclerae normal, no   icterus, no pallor, corneas clear, PERRLA   Ears:    Ears appear intact with no abnormalities noted   Throat:   No oral lesions, no thrush,  oral mucosa moist   Neck:   No adenopathy, supple, trachea midline, no thyromegaly, no    carotid bruit, no JVD   Back:     No kyphosis present, no scoliosis present, no skin lesions,       erythema or scars, no tenderness to percussion or                   palpation,   range of motion normal   Lungs:     Clear to auscultation,respirations regular, even and                   unlabored    Heart:    Regular rhythm and normal rate, normal S1 and S2, no            murmur, no gallop, no rub, no click   Chest Wall:    No abnormalities observed   Abdomen:     Normal bowel sounds, no masses, no organomegaly, soft        non-tender, non-distended, no guarding, no rebound                 tenderness   Rectal:     Deferred   Extremities:   Incision intact without signs or symptoms of infection.               Neurovascular status remains intact to operative extremity.      Moves all extremities well, no edema, no cyanosis, no              redness   Pulses:   Pulses palpable and equal bilaterally   Skin:   No bleeding, bruising or rash   Lymph nodes:   No palpable adenopathy   Neurologic:   Cranial nerves 2 - 12 grossly intact, sensation intact, DTR        present and equal bilaterally           Hospital Course:  58 y.o. female admitted to [facility] to services of Geo Fu MD with DJD (degenerative joint disease) of knee [M17.10] on 12/10/2018 and underwent [unfilled]  Per [unfilled]. Antibiotic and VTE prophylaxis were per SCIP protocols. Post-operatively the patient transferred to the post-operative floor where the patient underwent mobilization therapy that included active as well as passive ROM exercises. Opioids were titrated to achieve appropriate pain management to allow for participation in mobilization exercises. Vital signs are now stable. The incision is intact without signs or symptoms of infection. Operative extremity neurovascular status remains intact.   Appropriate education re: incision care,  activity levels, medications, and follow up visits was completed and all questions were answered. The patient is now deemed stable for discharge to Home.      DIAGNOSTIC TESTS:     Admission on 12/10/2018   Component Date Value Ref Range Status   • Glucose 12/11/2018 132* 65 - 99 mg/dL Final   • BUN 12/11/2018 9  6 - 20 mg/dL Final   • Creatinine 12/11/2018 0.64  0.57 - 1.00 mg/dL Final   • Sodium 12/11/2018 139  136 - 145 mmol/L Final   • Potassium 12/11/2018 3.5  3.5 - 5.2 mmol/L Final   • Chloride 12/11/2018 101  98 - 107 mmol/L Final   • CO2 12/11/2018 28.3  22.0 - 29.0 mmol/L Final   • Calcium 12/11/2018 8.6  8.6 - 10.5 mg/dL Final   • eGFR Non African Amer 12/11/2018 95  >60 mL/min/1.73 Final   • BUN/Creatinine Ratio 12/11/2018 14.1  7.0 - 25.0 Final   • Anion Gap 12/11/2018 9.7  mmol/L Final   • Hemoglobin 12/11/2018 11.1* 11.9 - 15.5 g/dL Final   • Hematocrit 12/11/2018 35.7  35.6 - 45.5 % Final       No results found.    Discharge and Follow up Instructions:    mg once daily x's 6 weeks  Hydrocodone 7.5 mg for pain      Reasons for inpatient:  Pain control issues  Socially she lives alone  Monitor HB post operatively      Date: 12/12/2018    DONALD Burden

## 2018-12-12 NOTE — PROGRESS NOTES
Continued Stay Note  [facility]     Patient Name: Nancy Robbins  MRN: [id number]  Today's Date: 12/12/2018    Admit Date: 12/10/2018    Discharge Plan     Row Name 12/12/18 1309       Plan    Final Discharge Disposition Code  06 - home with home health care    Final Note  Pt being d/c'ed home with [facility] and family support.        Discharge Codes    No documentation.       Expected Discharge Date and Time     Expected Discharge Date Expected Discharge Time    Dec 12, 2018             Sofiya Dale RN

## 2018-12-12 NOTE — THERAPY TREATMENT NOTE
Acute Care - Physical Therapy Treatment Note  [facility]     Patient Name: Nancy Robbins  : 1960  MRN: [id number]  Today's Date: 2018  Onset of Illness/Injury or Date of Surgery: 12/10/18  Date of Referral to PT: 12/10/18  Referring Physician: Geo Boothe    Admit Date: 12/10/2018    Visit Dx:    ICD-10-CM ICD-9-CM   1. Difficulty walking R26.2 719.7   2. Primary osteoarthritis of right knee M17.11 715.16     Patient Active Problem List   Diagnosis   • Hypothyroidism   • Allergic rhinitis   • Essential hypertension   • Chronic low back pain   • DJD (degenerative joint disease) of knee       Therapy Treatment    Rehabilitation Treatment Summary     Row Name 18 1030             Treatment Time/Intention    Discipline  physical therapy assistant  -SM      Document Type  therapy note (daily note)  -SM      Subjective Information  complains of;pain  -SM      Mode of Treatment  physical therapy  -SM      Patient Effort  good  -SM      Existing Precautions/Restrictions  fall  -SM      Recorded by [SM] Hannah Quigley PTA 18 161      Row Name 18 1030             Cognitive Assessment/Intervention- PT/OT    Orientation Status (Cognition)  oriented x 4  -SM      Follows Commands (Cognition)  WNL  -SM      Personal Safety Interventions  fall prevention program maintained;gait belt;nonskid shoes/slippers when out of bed  -SM      Recorded by [SM] Hannah Quigley, GLADYS 18 161      Row Name 18 1030             Bed Mobility Assessment/Treatment    Bed Mobility Assessment/Treatment  sit-supine  -SM      Supine-Sit [place] (Bed Mobility)  supervision  -SM      Recorded by [SM] Hannah Quigley PTA 18 161      Row Name 18 1030             Transfer Assessment/Treatment    Transfer Assessment/Treatment  sit-stand transfer;stand-sit transfer  -SM      Recorded by [SM] Hannah Quigley PTA 18 161      Row Name 18 1030             Sit-Stand  Transfer    Sit-Stand [place] (Transfers)  stand by assist  -      Assistive Device (Sit-Stand Transfers)  walker, front-wheeled  -      Recorded by [SM] Hannah Quigley, [facility] 12/12/18 1611      Row Name 12/12/18 1030             Stand-Sit Transfer    Stand-Sit [place] (Transfers)  stand by assist  -      Assistive Device (Stand-Sit Transfers)  walker, front-wheeled  -      Recorded by [SM] Hannah Quigley, [facility] 12/12/18 1611      Row Name 12/12/18 1030             Gait/Stairs Assessment/Training    [place] Level (Gait)  stand by assist  -      Assistive Device (Gait)  walker, front-wheeled  -      Distance in Feet (Gait)  100  -SM      Pattern (Gait)  step-through  -      Deviations/Abnormal Patterns (Gait)  antalgic;deb decreased;stride length decreased  -SM      Right Sided Gait Deviations  weight shift ability decreased  -SM      Comment (Gait/Stairs)  increased pain and swelling in R LE  -SM      Recorded by [SM] Hannah Quigley, [facility] 12/12/18 1611      Row Name 12/12/18 1030             General ROM    GENERAL ROM COMMENTS  R knee 9-85  -      Recorded by [SM] Hannah Quigley, [facility] 12/12/18 1611      Row Name 12/12/18 1030             Therapeutic Exercise    Comment (Therapeutic Exercise)  R TKR protocol x 25 reps  -SM      Recorded by [SM] Hannah Quigley, [facility] 12/12/18 1611      Row Name 12/12/18 1030             Positioning and Restraints    Pre-Treatment Position  sitting in chair/recliner  -      Post Treatment Position  bed  -SM      In Bed  supine;call light within reach;encouraged to call for assist  -SM      Recorded by [SM] Hannah Quigley, [facility] 12/12/18 1611      Row Name 12/12/18 1030             Pain Scale: Numbers Pre/Post-Treatment    Pain Scale: Numbers, Pretreatment  7/10  -SM      Pain Scale: Numbers, Post-Treatment  8/10  -SM      Pain Location - Side  Right  -SM      Pain Location  knee  -      Pain Intervention(s)   Repositioned;Ambulation/increased activity;Rest  -SM      Recorded by [] Hannah Quigley, PTA 12/12/18 1611      Row Name                Wound 12/10/18 1150 Right knee incision    Wound - Properties Group Date first assessed: 12/10/18 [AH] Time first assessed: 1150 [AH] Side: Right [AH] Location: knee [AH] Type: incision [AH] Recorded by:  [] Laurie Schofeild RN 12/10/18 1150      User Key  (r) = Recorded By, (t) = Taken By, (c) = Cosigned By    Initials Name Effective Dates Discipline     Laurie Schofield RN 12/07/17 -  Nurse     Hannah Quigley PTA 03/07/18 -  PT          Wound 12/10/18 1150 Right knee incision (Active)   Dressing Appearance no drainage;dry;intact 12/12/2018  8:10 AM   Closure Other (Comment) 12/12/2018  8:10 AM   Base dressing in place, unable to visualize 12/12/2018  4:23 AM   Drainage Amount none 12/12/2018  8:10 AM   Dressing Care, Wound dressing changed 12/12/2018  8:10 AM           Physical Therapy Education     Title: PT OT SLP Therapies (Done)     Topic: Physical Therapy (Done)     Point: Mobility training (Done)     Learning Progress Summary           Patient Acceptance, E,TB,D, VU by  at 12/12/2018  4:11 PM    Acceptance, E,TB,D, VU by  at 12/11/2018 11:11 AM    Acceptance, E,D, VU,NR by MS at 12/10/2018  4:22 PM                   Point: Home exercise program (Done)     Learning Progress Summary           Patient Acceptance, E,TB,D, VU by  at 12/12/2018  4:11 PM    Acceptance, E,TB,D, VU by  at 12/11/2018 11:11 AM    Acceptance, E,D, VU,NR by MS at 12/10/2018  4:22 PM                   Point: Body mechanics (Done)     Learning Progress Summary           Patient Acceptance, E,TB,D, VU by  at 12/12/2018  4:11 PM    Acceptance, E,TB,D, VU by  at 12/11/2018 11:11 AM    Acceptance, E,D, VU,NR by MS at 12/10/2018  4:22 PM                   Point: Precautions (Done)     Learning Progress Summary           Patient Acceptance, E,TB,D, VU by SM at 12/12/2018  4:11 PM     Acceptance, E,TB,D, VU by  at 12/11/2018 11:11 AM    Acceptance, E,D, VU,NR by MS at 12/10/2018  4:22 PM                               User Key     Initials Effective Dates Name Provider Type Discipline    MS 04/03/18 -  Tayler Hernandezrandy LAWSON, PT Physical Therapist PT     03/07/18 -  Hannah Quigley PTA Physical Therapy Assistant PT                PT Recommendation and Plan     Plan of Care Reviewed With: patient  Progress: improving  Outcome Measures     Row Name 12/12/18 1600 12/11/18 1100 12/10/18 1600       How much help from another person do you currently need...    Turning from your back to your side while in flat bed without using bedrails?  4  -SM  4  -SM  4  -MS    Moving from lying on back to sitting on the side of a flat bed without bedrails?  4  -SM  4  -SM  4  -MS    Moving to and from a bed to a chair (including a wheelchair)?  3  -SM  3  -SM  3  -MS    Standing up from a chair using your arms (e.g., wheelchair, bedside chair)?  4  -SM  4  -SM  3  -MS    Climbing 3-5 steps with a railing?  3  -SM  3  -SM  2  -MS    To walk in hospital room?  3  -SM  3  -SM  3  -MS    AM-PAC 6 Clicks Score  21  -SM  21  -SM  19  -MS       Functional Assessment    Outcome Measure Options  AM-PAC 6 Clicks Basic Mobility (PT)  -SM  AM-PAC 6 Clicks Basic Mobility (PT)  -  AM-PAC 6 Clicks Basic Mobility (PT)  -MS      User Key  (r) = Recorded By, (t) = Taken By, (c) = Cosigned By    Initials Name Provider Type    Sean Herring RENNY, PT Physical Therapist     Hannah Quigley, GLADYS Physical Therapy Assistant         Time Calculation:   PT Charges     Row Name 12/12/18 1611             Time Calculation    Start Time  1030  -      Stop Time  1120  -      Time Calculation (min)  50 min  -      PT Received On  12/12/18  -      PT - Next Appointment  12/12/18  -        User Key  (r) = Recorded By, (t) = Taken By, (c) = Cosigned By    Initials Name Provider Type     Hannah Quigley PTA Physical  Therapy Assistant        Therapy Suggested Charges     Code   Minutes Charges    None           Therapy Charges for Today     Code Description Service Date Service Provider Modifiers Qty    21936456041 HC PT THER PROC EA 15 MIN 12/11/2018 Hannah Quigley Jane, PTA GP 1    98807824396 HC PT THER PROC GROUP 12/11/2018 Hannah Quigley Jane, PTA GP 1    29134910739 HC PT THER PROC EA 15 MIN 12/11/2018 Hannah Quigley Jane, PTA GP 1    55138076175 HC PT THER PROC GROUP 12/11/2018 Hannah Quigley Jane, PTA GP 1    46349438753 HC PT THER PROC EA 15 MIN 12/12/2018 Hannah Quigley, PTA GP 1    57153861989 HC PT THER PROC GROUP 12/12/2018 Hannah Quigley Jane, PTA GP 1          PT G-Codes  Outcome Measure Options: AM-PAC 6 Clicks Basic Mobility (PT)  AM-PAC 6 Clicks Score: 21    Hannah Fitzpatrickisaac Quigley PTA  12/12/2018

## 2018-12-13 VITALS
HEART RATE: 101 BPM | WEIGHT: 177.7 LBS | OXYGEN SATURATION: 96 % | HEIGHT: 65 IN | RESPIRATION RATE: 16 BRPM | DIASTOLIC BLOOD PRESSURE: 77 MMHG | TEMPERATURE: 97.8 F | SYSTOLIC BLOOD PRESSURE: 133 MMHG | BODY MASS INDEX: 29.61 KG/M2

## 2018-12-13 PROCEDURE — 97110 THERAPEUTIC EXERCISES: CPT

## 2018-12-13 PROCEDURE — 97150 GROUP THERAPEUTIC PROCEDURES: CPT

## 2018-12-13 RX ADMIN — ASPIRIN 325 MG: 325 TABLET, DELAYED RELEASE ORAL at 08:48

## 2018-12-13 RX ADMIN — LOSARTAN POTASSIUM: 50 TABLET, FILM COATED ORAL at 08:48

## 2018-12-13 RX ADMIN — HYDROCODONE BITARTRATE AND ACETAMINOPHEN 2 TABLET: 7.5; 325 TABLET ORAL at 06:30

## 2018-12-13 RX ADMIN — HYDROCODONE BITARTRATE AND ACETAMINOPHEN 2 TABLET: 7.5; 325 TABLET ORAL at 10:09

## 2018-12-13 RX ADMIN — ESTRADIOL 2 MG: 2 TABLET ORAL at 08:48

## 2018-12-13 RX ADMIN — LEVOTHYROXINE SODIUM 137 MCG: 137 TABLET ORAL at 06:29

## 2018-12-13 RX ADMIN — FERROUS SULFATE TAB 325 MG (65 MG ELEMENTAL FE) 325 MG: 325 (65 FE) TAB at 08:48

## 2018-12-13 RX ADMIN — MONTELUKAST SODIUM 10 MG: 10 TABLET, FILM COATED ORAL at 08:48

## 2018-12-13 RX ADMIN — HYDROCODONE BITARTRATE AND ACETAMINOPHEN 2 TABLET: 7.5; 325 TABLET ORAL at 02:59

## 2018-12-13 RX ADMIN — SODIUM CHLORIDE, PRESERVATIVE FREE 3 ML: 5 INJECTION INTRAVENOUS at 08:48

## 2018-12-13 NOTE — PLAN OF CARE
Problem: Patient Care Overview  Goal: Plan of Care Review  Outcome: Ongoing (interventions implemented as appropriate)   12/12/18 1933   Coping/Psychosocial   Plan of Care Reviewed With patient   OTHER   Outcome Summary POD2 RTKA. Vitals stable. Ambulating with walker and assist. Increased pain today, pain medication adjusted with some relief. Patient refusing ice to knee. Hx htn, BP stable, continue to monitor. Plan to DC home tomorrow.   Plan of Care Review   Progress improving     Goal: Individualization and Mutuality  Outcome: Ongoing (interventions implemented as appropriate)    Goal: Discharge Needs Assessment  Outcome: Ongoing (interventions implemented as appropriate)    Goal: Interprofessional Rounds/Family Conf  Outcome: Ongoing (interventions implemented as appropriate)      Problem: Knee Arthroplasty (Total, Partial) (Adult)  Goal: Signs and Symptoms of Listed Potential Problems Will be Absent, Minimized or Managed (Knee Arthroplasty)  Outcome: Ongoing (interventions implemented as appropriate)      Problem: Fall Risk (Adult)  Goal: Absence of Fall  Outcome: Ongoing (interventions implemented as appropriate)   12/12/18 1933   Fall Risk (Adult)   Absence of Fall achieves outcome

## 2018-12-13 NOTE — THERAPY TREATMENT NOTE
Acute Care - Physical Therapy Treatment Note  [facility]     Patient Name: Nancy Robbins  : 1960  MRN: [id number]  Today's Date: 2018  Onset of Illness/Injury or Date of Surgery: 12/10/18  Date of Referral to PT: 12/10/18  Referring Physician: Geo Boothe    Admit Date: 12/10/2018    Visit Dx:    ICD-10-CM ICD-9-CM   1. Difficulty walking R26.2 719.7   2. Primary osteoarthritis of right knee M17.11 715.16     Patient Active Problem List   Diagnosis   • Hypothyroidism   • Allergic rhinitis   • Essential hypertension   • Chronic low back pain   • DJD (degenerative joint disease) of knee       Therapy Treatment    Rehabilitation Treatment Summary     Row Name 18 1030             Treatment Time/Intention    Discipline  physical therapy assistant  -      Document Type  therapy note (daily note)  -      Subjective Information  complains of;pain  -SM      Mode of Treatment  physical therapy  -SM      Patient Effort  good  -SM      Existing Precautions/Restrictions  fall  -SM      Recorded by [] Hannah Quigley [facility] 18 1308      Row Name 18 1030             Cognitive Assessment/Intervention- PT/OT    Orientation Status (Cognition)  oriented x 4  -SM      Follows Commands (Cognition)  WNL  -      Personal Safety Interventions  fall prevention program maintained;gait belt;nonskid shoes/slippers when out of bed  -      Recorded by [SM] Hannah Quigley, [facility] 18 1308      Row Name 18 1030             Bed Mobility Assessment/Treatment    Comment (Bed Mobility)  up in chair  -      Recorded by [] Hannah Quigley PTA 18 1308      Row Name 18 1030             Transfer Assessment/Treatment    Transfer Assessment/Treatment  sit-stand transfer;stand-sit transfer  -      Recorded by [] Hannah Quigley [facility] 18 1308      Row Name 18 1030             Sit-Stand Transfer    Sit-Stand [place] (Transfers)  supervision  -       Assistive Device (Sit-Stand Transfers)  walker, front-wheeled  -SM      Recorded by [SM] Hannah Quigley, [facility] 12/13/18 1308      Row Name 12/13/18 1030             Stand-Sit Transfer    Stand-Sit [place] (Transfers)  supervision  -      Assistive Device (Stand-Sit Transfers)  walker, front-wheeled  -SM      Recorded by [SM] Hannah Quigley, [facility] 12/13/18 1308      Row Name 12/13/18 1030             Gait/Stairs Assessment/Training    [place] Level (Gait)  supervision  -      Assistive Device (Gait)  walker, front-wheeled  -      Distance in Feet (Gait)  150  -SM      Pattern (Gait)  step-through  -SM      Deviations/Abnormal Patterns (Gait)  antalgic;deb decreased;stride length decreased  -SM      Right Sided Gait Deviations  weight shift ability decreased  -SM      [place] Level (Stairs)  stand by assist  -SM      Assistive Device (Stairs)  walker, front-wheeled  -      Handrail Location (Stairs)  none  -SM      Number of Steps (Stairs)  4  -SM      Ascending Technique (Stairs)  step-to-step  -SM      Descending Technique (Stairs)  step-to-step  -SM      Recorded by [SM] Hannah Quigley, [facility] 12/13/18 1308      Row Name 12/13/18 1030             General ROM    GENERAL ROM COMMENTS  R knee 7-90  -SM      Recorded by [] Hannah Quigley, [facility] 12/13/18 1308      Row Name 12/13/18 1030             Therapeutic Exercise    Comment (Therapeutic Exercise)  R TKR protocol x 30 reps  -SM      Recorded by [] Hannah Quigley, [facility] 12/13/18 1308      Row Name 12/13/18 1030             Positioning and Restraints    Pre-Treatment Position  sitting in chair/recliner  -SM      Post Treatment Position  chair  -SM      In Chair  reclined;call light within reach;encouraged to call for assist;with family/caregiver  -SM      Recorded by [SM] Hannah Quigley, [facility] 12/13/18 1308      Row Name 12/13/18 1030             Pain Scale: Numbers Pre/Post-Treatment    Pain Scale: Numbers,  Pretreatment  3/10  -SM      Pain Scale: Numbers, Post-Treatment  3/10  -SM      Pain Location - Side  Right  -SM      Pain Location  knee  -SM      Pain Intervention(s)  Repositioned;Ambulation/increased activity;Rest  -SM      Recorded by [] Hannah Quigley, GLADSY 12/13/18 1308      Row Name                Wound 12/10/18 1150 Right knee incision    Wound - Properties Group Date first assessed: 12/10/18 [AH] Time first assessed: 1150 [AH] Side: Right [AH] Location: knee [AH] Type: incision [AH] Recorded by:  [] Laurie Schofield RN 12/10/18 1150      User Key  (r) = Recorded By, (t) = Taken By, (c) = Cosigned By    Initials Name Effective Dates Discipline     Laurie Schofield RN 12/07/17 -  Nurse     Hannah Quigley, GLADYS 03/07/18 -  PT          Wound 12/10/18 1150 Right knee incision (Active)   Dressing Appearance dry;intact;no drainage 12/13/2018  8:48 AM   Closure PAMELLA 12/13/2018  8:48 AM   Base dressing in place, unable to visualize 12/13/2018  8:48 AM           Physical Therapy Education     Title: PT OT SLP Therapies (Resolved)     Topic: Physical Therapy (Resolved)     Point: Mobility training (Resolved)     Learning Progress Summary           Patient Acceptance, E,TB,D, VU by  at 12/13/2018  1:08 PM    Acceptance, E,TB,D, VU by  at 12/12/2018  4:11 PM    Acceptance, E,TB,D, VU by  at 12/11/2018 11:11 AM    Acceptance, E,D, VU,NR by MS at 12/10/2018  4:22 PM                   Point: Home exercise program (Resolved)     Learning Progress Summary           Patient Acceptance, E,TB,D, VU by  at 12/13/2018  1:08 PM    Acceptance, E,TB,D, VU by  at 12/12/2018  4:11 PM    Acceptance, E,TB,D, VU by  at 12/11/2018 11:11 AM    Acceptance, E,D, VU,NR by MS at 12/10/2018  4:22 PM                   Point: Body mechanics (Resolved)     Learning Progress Summary           Patient Acceptance, E,TB,D, VU by  at 12/13/2018  1:08 PM    Acceptance, E,TB,D, VU by KISHORE at 12/12/2018  4:11 PM    Acceptance, EDE BURRIS D,  VU by  at 12/11/2018 11:11 AM    Acceptance, E,D, VU,NR by MS at 12/10/2018  4:22 PM                   Point: Precautions (Resolved)     Learning Progress Summary           Patient Acceptance, E,TB,D, VU by  at 12/13/2018  1:08 PM    Acceptance, E,TB,D, VU by  at 12/12/2018  4:11 PM    Acceptance, E,TB,D, VU by  at 12/11/2018 11:11 AM    Acceptance, E,D, VU,NR by MS at 12/10/2018  4:22 PM                               User Key     Initials Effective Dates Name Provider Type Discipline    MS 04/03/18 -  Sean Hernandez, PT Physical Therapist PT     03/07/18 -  Hannah Quigley, PTA Physical Therapy Assistant PT                PT Recommendation and Plan     Plan of Care Reviewed With: patient  Progress: improving  Outcome Measures     Row Name 12/13/18 1300 12/12/18 1600 12/11/18 1100       How much help from another person do you currently need...    Turning from your back to your side while in flat bed without using bedrails?  4  -SM  4  -SM  4  -SM    Moving from lying on back to sitting on the side of a flat bed without bedrails?  4  -SM  4  -SM  4  -SM    Moving to and from a bed to a chair (including a wheelchair)?  4  -SM  3  -SM  3  -SM    Standing up from a chair using your arms (e.g., wheelchair, bedside chair)?  4  -SM  4  -SM  4  -SM    Climbing 3-5 steps with a railing?  3  -SM  3  -SM  3  -SM    To walk in hospital room?  3  -SM  3  -SM  3  -SM    AM-PAC 6 Clicks Score  22  -SM  21  -SM  21  -SM       Functional Assessment    Outcome Measure Options  AM-PAC 6 Clicks Basic Mobility (PT)  -  AM-PAC 6 Clicks Basic Mobility (PT)  -  AM-PAC 6 Clicks Basic Mobility (PT)  -    Row Name 12/10/18 1600             How much help from another person do you currently need...    Turning from your back to your side while in flat bed without using bedrails?  4  -MS      Moving from lying on back to sitting on the side of a flat bed without bedrails?  4  -MS      Moving to and from a bed to a chair  (including a wheelchair)?  3  -MS      Standing up from a chair using your arms (e.g., wheelchair, bedside chair)?  3  -MS      Climbing 3-5 steps with a railing?  2  -MS      To walk in hospital room?  3  -MS      AM-PAC 6 Clicks Score  19  -MS         Functional Assessment    Outcome Measure Options  AM-PAC 6 Clicks Basic Mobility (PT)  -MS        User Key  (r) = Recorded By, (t) = Taken By, (c) = Cosigned By    Initials Name Provider Type    MS DaigleSean leung, PT Physical Therapist     Hannah Quigley, GLADYS Physical Therapy Assistant         Time Calculation:   PT Charges     Row Name 12/13/18 1308             Time Calculation    Start Time  1030  -SM      Stop Time  1115  -SM      Time Calculation (min)  45 min  -      PT Received On  12/13/18  -        User Key  (r) = Recorded By, (t) = Taken By, (c) = Cosigned By    Initials Name Provider Type    Hannah Zeng PTA Physical Therapy Assistant        Therapy Suggested Charges     Code   Minutes Charges    None           Therapy Charges for Today     Code Description Service Date Service Provider Modifiers Qty    02068196682 HC PT THER PROC EA 15 MIN 12/12/2018 Hannah Quigley, PTA GP 1    55748403254 HC PT THER PROC GROUP 12/12/2018 Hannah Quigley, PTA GP 1    18879533101 HC PT THER PROC EA 15 MIN 12/12/2018 Hannah Quigley, PTA GP 1    75253521895 HC PT THER PROC GROUP 12/12/2018 Hannah Quigley Jane, PTA GP 1    82267322903 HC PT THER PROC EA 15 MIN 12/13/2018 Hannah Quigley, PTA GP 1    61738633431 HC PT THER PROC GROUP 12/13/2018 Hannah Quigley, PTA GP 1          PT G-Codes  Outcome Measure Options: AM-PAC 6 Clicks Basic Mobility (PT)  AM-PAC 6 Clicks Score: 22    Hannahkathy Fitzpatrickisaac Quigley PTA  12/13/2018

## 2018-12-13 NOTE — PLAN OF CARE
Problem: Patient Care Overview  Goal: Plan of Care Review   12/13/18 1202   Coping/Psychosocial   Plan of Care Reviewed With patient   OTHER   Outcome Summary VSS, NVI, dressing CDI. Ambulates with walker and stand by assist. Voiding well. Pain controlled much better today. Encouraged use of ice. Discussed BP monitoring and taking meds as ordered. D/C home with     Plan of Care Review   Progress improving

## 2018-12-13 NOTE — PLAN OF CARE
Problem: Patient Care Overview  Goal: Plan of Care Review  Outcome: Ongoing (interventions implemented as appropriate)   12/12/18 0856   Coping/Psychosocial   Plan of Care Reviewed With patient   OTHER   Outcome Summary patient ambulating with minimal stand by assistance to bathroom and in hallway, pain better controlled at this time, patient educated on b/p monitoring   Plan of Care Review   Progress improving     Goal: Individualization and Mutuality  Outcome: Ongoing (interventions implemented as appropriate)    Goal: Discharge Needs Assessment  Outcome: Ongoing (interventions implemented as appropriate)    Goal: Interprofessional Rounds/Family Conf  Outcome: Ongoing (interventions implemented as appropriate)      Problem: Knee Arthroplasty (Total, Partial) (Adult)  Goal: Signs and Symptoms of Listed Potential Problems Will be Absent, Minimized or Managed (Knee Arthroplasty)  Outcome: Ongoing (interventions implemented as appropriate)      Problem: Fall Risk (Adult)  Goal: Absence of Fall  Outcome: Ongoing (interventions implemented as appropriate)

## 2018-12-13 NOTE — PLAN OF CARE
Problem: Patient Care Overview  Goal: Plan of Care Review  Outcome: Outcome(s) achieved Date Met: 12/13/18 12/13/18 9051   Coping/Psychosocial   Plan of Care Reviewed With patient   Plan of Care Review   Progress improving

## 2018-12-13 NOTE — PROGRESS NOTES
"/56 (BP Location: Left arm, Patient Position: Lying)   Pulse 88   Temp 97.1 °F (36.2 °C) (Oral)   Resp 16   Ht 165.1 cm (65\")   Wt 80.6 kg (177 lb 11.2 oz)   SpO2 98%   BMI 29.57 kg/m²     Results from last 7 days   Lab Units  12/11/18   0406   HEMOGLOBIN g/dL  11.1*   HEMATOCRIT %  35.7       Results from last 7 days   Lab Units  12/11/18   0406   SODIUM mmol/L  139   POTASSIUM mmol/L  3.5   CHLORIDE mmol/L  101   CO2 mmol/L  28.3   BUN mg/dL  9   CREATININE mg/dL  0.64   GLUCOSE mg/dL  132*   CALCIUM mg/dL  8.6       Imaging Results (last 24 hours)     ** No results found for the last 24 hours. **          Patient Care Team:  Radha Sy APRN as PCP - General (Internal Medicine)    SUBJECTIVE  Doing much better  PHYSICAL EXAM  Wound looks good     DJD (degenerative joint disease) of knee      PLAN / DISPOSITION:  D/C with DONALD Jacobo  12/13/18  6:28 AM    "

## 2018-12-30 DIAGNOSIS — I10 ESSENTIAL HYPERTENSION: ICD-10-CM

## 2018-12-31 RX ORDER — IRBESARTAN AND HYDROCHLOROTHIAZIDE 150; 12.5 MG/1; MG/1
TABLET, FILM COATED ORAL
Qty: 30 TABLET | Refills: 3 | Status: SHIPPED | OUTPATIENT
Start: 2018-12-31 | End: 2019-01-31 | Stop reason: SDUPTHER

## 2019-01-19 DIAGNOSIS — E03.9 ACQUIRED HYPOTHYROIDISM: ICD-10-CM

## 2019-01-21 RX ORDER — LEVOTHYROXINE SODIUM 137 UG/1
TABLET ORAL
Qty: 30 TABLET | Refills: 3 | OUTPATIENT
Start: 2019-01-21

## 2019-01-21 RX ORDER — ALPRAZOLAM 1 MG/1
TABLET ORAL
Qty: 30 TABLET | Refills: 3 | OUTPATIENT
Start: 2019-01-21

## 2019-01-28 DIAGNOSIS — M54.50 LOW BACK PAIN: ICD-10-CM

## 2019-01-29 RX ORDER — MELOXICAM 15 MG/1
TABLET ORAL
Qty: 30 TABLET | Refills: 3 | Status: SHIPPED | OUTPATIENT
Start: 2019-01-29 | End: 2019-01-31 | Stop reason: SDUPTHER

## 2019-01-31 ENCOUNTER — OFFICE VISIT (OUTPATIENT)
Dept: INTERNAL MEDICINE | Facility: CLINIC | Age: 59
End: 2019-01-31

## 2019-01-31 VITALS
OXYGEN SATURATION: 99 % | SYSTOLIC BLOOD PRESSURE: 140 MMHG | HEIGHT: 65 IN | TEMPERATURE: 97.5 F | DIASTOLIC BLOOD PRESSURE: 82 MMHG | HEART RATE: 83 BPM | WEIGHT: 180 LBS | BODY MASS INDEX: 29.99 KG/M2

## 2019-01-31 DIAGNOSIS — F41.9 ANXIETY: ICD-10-CM

## 2019-01-31 DIAGNOSIS — I10 ESSENTIAL HYPERTENSION: Primary | ICD-10-CM

## 2019-01-31 DIAGNOSIS — J30.9 ALLERGIC RHINITIS, UNSPECIFIED SEASONALITY, UNSPECIFIED TRIGGER: ICD-10-CM

## 2019-01-31 DIAGNOSIS — E03.9 ACQUIRED HYPOTHYROIDISM: ICD-10-CM

## 2019-01-31 DIAGNOSIS — Z23 NEED FOR INFLUENZA VACCINATION: ICD-10-CM

## 2019-01-31 DIAGNOSIS — M17.11 PRIMARY OSTEOARTHRITIS OF RIGHT KNEE: ICD-10-CM

## 2019-01-31 LAB
ALBUMIN SERPL-MCNC: 3.8 G/DL (ref 3.5–5.2)
ALBUMIN/GLOB SERPL: 1.2 G/DL
ALP SERPL-CCNC: 86 U/L (ref 39–117)
ALT SERPL W P-5'-P-CCNC: 28 U/L (ref 1–33)
ANION GAP SERPL CALCULATED.3IONS-SCNC: 10.8 MMOL/L
AST SERPL-CCNC: 23 U/L (ref 1–32)
BILIRUB SERPL-MCNC: 0.2 MG/DL (ref 0.1–1.2)
BUN BLD-MCNC: 16 MG/DL (ref 6–20)
BUN/CREAT SERPL: 16.7 (ref 7–25)
CALCIUM SPEC-SCNC: 9.5 MG/DL (ref 8.6–10.5)
CHLORIDE SERPL-SCNC: 102 MMOL/L (ref 98–107)
CO2 SERPL-SCNC: 30.2 MMOL/L (ref 22–29)
CREAT BLD-MCNC: 0.96 MG/DL (ref 0.57–1)
GFR SERPL CREATININE-BSD FRML MDRD: 60 ML/MIN/1.73
GLOBULIN UR ELPH-MCNC: 3.1 GM/DL
GLUCOSE BLD-MCNC: 109 MG/DL (ref 65–99)
POTASSIUM BLD-SCNC: 3.9 MMOL/L (ref 3.5–5.2)
PROT SERPL-MCNC: 6.9 G/DL (ref 6–8.5)
SODIUM BLD-SCNC: 143 MMOL/L (ref 136–145)
TSH SERPL DL<=0.05 MIU/L-ACNC: 4.33 MIU/ML (ref 0.27–4.2)

## 2019-01-31 PROCEDURE — 80053 COMPREHEN METABOLIC PANEL: CPT | Performed by: NURSE PRACTITIONER

## 2019-01-31 PROCEDURE — 84443 ASSAY THYROID STIM HORMONE: CPT | Performed by: NURSE PRACTITIONER

## 2019-01-31 PROCEDURE — 90674 CCIIV4 VAC NO PRSV 0.5 ML IM: CPT | Performed by: NURSE PRACTITIONER

## 2019-01-31 PROCEDURE — 90471 IMMUNIZATION ADMIN: CPT | Performed by: NURSE PRACTITIONER

## 2019-01-31 PROCEDURE — 99214 OFFICE O/P EST MOD 30 MIN: CPT | Performed by: NURSE PRACTITIONER

## 2019-01-31 RX ORDER — ALPRAZOLAM 1 MG/1
1 TABLET ORAL NIGHTLY PRN
Qty: 45 TABLET | Refills: 0 | Status: SHIPPED | OUTPATIENT
Start: 2019-01-31 | End: 2019-04-11 | Stop reason: SDUPTHER

## 2019-01-31 RX ORDER — MELOXICAM 15 MG/1
15 TABLET ORAL DAILY
Qty: 90 TABLET | Refills: 1 | Status: SHIPPED | OUTPATIENT
Start: 2019-01-31 | End: 2019-12-01 | Stop reason: SDUPTHER

## 2019-01-31 RX ORDER — ALPRAZOLAM 1 MG/1
1 TABLET ORAL NIGHTLY PRN
Qty: 45 TABLET | Refills: 0 | Status: SHIPPED | OUTPATIENT
Start: 2019-01-31 | End: 2019-01-31 | Stop reason: SDUPTHER

## 2019-02-01 RX ORDER — LEVOTHYROXINE SODIUM 0.15 MG/1
150 TABLET ORAL DAILY
Qty: 30 TABLET | Refills: 5 | Status: SHIPPED | OUTPATIENT
Start: 2019-02-01 | End: 2019-06-16 | Stop reason: SDUPTHER

## 2019-02-01 NOTE — PROGRESS NOTES
Cary Robbins is a 58 y.o. female who presents for f/u regarding HTN, hypothyroidism and anxiety.    She underwent right knee replacement 12/10/18 by Dr. Fu and has overall done well, continues to c/o stiffness and pain in her right leg. She is attending outpatient PT, taking Hydrocodone prior to her session.   Her TSH was elevated with last labs, taking Synthroid daily.  She continues to take Xanax intermittently which has been helpful with increased anxiety over loss of  earlier this year.  She also received an epidural in her lumbar spine which was helpful, does c/o increased pain over the past few weeks.      Hypertension   This is a recurrent problem. The current episode started more than 1 month ago. The problem is controlled. Associated symptoms include malaise/fatigue. Pertinent negatives include no chest pain, headaches, palpitations or shortness of breath. Current antihypertension treatment includes angiotensin blockers. The current treatment provides significant improvement. There are no compliance problems.    Hypothyroidism   This is a chronic problem. The current episode started more than 1 year ago. The problem occurs daily. Associated symptoms include arthralgias, fatigue and myalgias. Pertinent negatives include no abdominal pain, chest pain, chills, congestion, coughing, fever, headaches, joint swelling, nausea, rash, sore throat, vomiting or weakness.        The following portions of the patient's history were reviewed and updated as appropriate: allergies, current medications, past social history and problem list.    Past Medical History:   Diagnosis Date   • Anxiety    • Graves disease    • Hard to intubate     TROUBLE WITH INTUBATION... has had cervical neck surgery and unable to bend neck too far   • Hypertension    • Hypothyroidism     under went radiation of thyroid and on supplement   • Low back pain     undergoing epidural   • Neuropathy     bilateral lower legs   •  Osteoarthritis    • PONV (postoperative nausea and vomiting)    • Restless leg syndrome     TAKES XANAX FOR..   • Vertigo          Current Outpatient Medications:   •  Acetaminophen (TYLENOL ARTHRITIS PAIN PO), Take 1,300 mg by mouth 2 (Two) Times a Day As Needed., Disp: , Rfl:   •  ALPRAZolam (XANAX) 1 MG tablet, Take 1 tablet by mouth At Night As Needed for Anxiety or Sleep., Disp: 45 tablet, Rfl: 0  •  estradiol (ESTRACE) 2 MG tablet, Take 2 mg by mouth Daily., Disp: , Rfl:   •  fexofenadine-pseudoephedrine (ALLEGRA-D)  MG per 12 hr tablet, Take 1 tablet by mouth 2 (Two) Times a Day As Needed., Disp: , Rfl:   •  fluticasone (FLONASE) 50 MCG/ACT nasal spray, 2 sprays into the nostril(s) as directed by provider Daily., Disp: , Rfl:   •  HYDROcodone-acetaminophen (NORCO) 7.5-325 MG per tablet, Take 1-2 tablets by mouth Every 4 (Four) Hours As Needed for pain., Disp: 75 tablet, Rfl: 0  •  irbesartan-hydrochlorothiazide (AVALIDE) 150-12.5 MG tablet, Take 1 tablet by mouth Every Morning., Disp: , Rfl:   •  LYRICA 150 MG capsule, Take 150 mg by mouth every night at bedtime., Disp: , Rfl: 0  •  meloxicam (MOBIC) 15 MG tablet, Take 1 tablet by mouth Daily., Disp: 90 tablet, Rfl: 1  •  montelukast (SINGULAIR) 10 MG tablet, Take 10 mg by mouth Every Morning., Disp: , Rfl:   •  ondansetron (ZOFRAN) 4 MG tablet, Take 1 tablet by mouth Every 4 (Four) Hours As Needed for Nausea or Vomiting., Disp: 30 tablet, Rfl: 1  •  levothyroxine (SYNTHROID, LEVOTHROID) 150 MCG tablet, Take 1 tablet by mouth Daily., Disp: 30 tablet, Rfl: 5    Allergies   Allergen Reactions   • Ampicillin Nausea Only   • Oxycodone Nausea And Vomiting       Review of Systems   Constitutional: Positive for fatigue and malaise/fatigue. Negative for chills, fever and unexpected weight change.   HENT: Negative for congestion, ear pain, postnasal drip, sinus pressure, sore throat and trouble swallowing.    Eyes: Negative for visual disturbance.   Respiratory:  "Negative for cough, chest tightness, shortness of breath and wheezing.    Cardiovascular: Negative for chest pain, palpitations and leg swelling.   Gastrointestinal: Negative for abdominal pain, blood in stool, nausea and vomiting.   Genitourinary: Negative for dysuria, frequency and urgency.   Musculoskeletal: Positive for arthralgias and myalgias. Negative for joint swelling.   Skin: Negative for color change and rash.   Neurological: Negative for syncope, weakness and headaches.   Hematological: Does not bruise/bleed easily.       Objective   Vitals:    01/31/19 1312   BP: 140/82   BP Location: Left arm   Patient Position: Sitting   Cuff Size: Adult   Pulse: 83   Temp: 97.5 °F (36.4 °C)   TempSrc: Oral   SpO2: 99%   Weight: 81.6 kg (180 lb)   Height: 165.1 cm (65\")     Physical Exam   Constitutional: She appears well-developed and well-nourished. She is cooperative. She does not have a sickly appearance. She does not appear ill.   HENT:   Head: Normocephalic.   Right Ear: Hearing, tympanic membrane and external ear normal.   Left Ear: Hearing, tympanic membrane and external ear normal.   Nose: Nose normal. No mucosal edema, rhinorrhea, sinus tenderness or nasal deformity. Right sinus exhibits no maxillary sinus tenderness and no frontal sinus tenderness. Left sinus exhibits no maxillary sinus tenderness and no frontal sinus tenderness.   Mouth/Throat: Oropharynx is clear and moist and mucous membranes are normal. Normal dentition.   Eyes: Conjunctivae and lids are normal. Right eye exhibits no discharge and no exudate. Left eye exhibits no discharge and no exudate.   Neck: Trachea normal. Carotid bruit is not present. No edema present. No thyroid mass present.   Cardiovascular: Regular rhythm, normal heart sounds and normal pulses.   No murmur heard.  Pulmonary/Chest: Breath sounds normal. No respiratory distress. She has no decreased breath sounds. She has no wheezes. She has no rhonchi. She has no rales. "   Musculoskeletal:   Mild swelling right lower extremity, minimal erythema and no drainage from incision on right anterior knee   Lymphadenopathy:        Head (right side): No submental, no submandibular, no tonsillar, no preauricular, no posterior auricular and no occipital adenopathy present.        Head (left side): No submental, no submandibular, no tonsillar, no preauricular, no posterior auricular and no occipital adenopathy present.   Neurological: She is alert.   Skin: Skin is warm, dry and intact. No cyanosis. Nails show no clubbing.       Assessment/Plan   Nancy was seen today for hypertension and hypothyroidism.    Diagnoses and all orders for this visit:    Essential hypertension  Comments:  stable on current meds    Acquired hypothyroidism  Comments:  recheck TSH  Orders:  -     TSH; Future  -     Comprehensive Metabolic Panel; Future  -     TSH  -     Comprehensive Metabolic Panel  -     levothyroxine (SYNTHROID, LEVOTHROID) 150 MCG tablet; Take 1 tablet by mouth Daily.    Allergic rhinitis, unspecified seasonality, unspecified trigger  Comments:  continue Flonase, Allegra-D as needed    Anxiety  Comments:  improved with taking Xanax intermittently  Orders:  -     Discontinue: ALPRAZolam (XANAX) 1 MG tablet; Take 1 tablet by mouth At Night As Needed for Anxiety or Sleep.  -     ALPRAZolam (XANAX) 1 MG tablet; Take 1 tablet by mouth At Night As Needed for Anxiety or Sleep.    Primary osteoarthritis of right knee  Comments:  doing well since knee replacmeent  Orders:  -     meloxicam (MOBIC) 15 MG tablet; Take 1 tablet by mouth Daily.    Need for influenza vaccination  -     Flucelvax Quad=>4Years ([id number])    ARIANNA query complete. Treatment plan to include limited course of prescribed  controlled substance. Risks including addiction, benefits, and alternatives presented to patient.

## 2019-03-26 ENCOUNTER — OFFICE VISIT (OUTPATIENT)
Dept: INTERNAL MEDICINE | Facility: CLINIC | Age: 59
End: 2019-03-26

## 2019-03-26 VITALS — TEMPERATURE: 97.8 F | DIASTOLIC BLOOD PRESSURE: 86 MMHG | SYSTOLIC BLOOD PRESSURE: 130 MMHG | HEART RATE: 99 BPM

## 2019-03-26 DIAGNOSIS — I10 ESSENTIAL HYPERTENSION: ICD-10-CM

## 2019-03-26 DIAGNOSIS — M54.41 ACUTE RIGHT-SIDED LOW BACK PAIN WITH RIGHT-SIDED SCIATICA: Primary | ICD-10-CM

## 2019-03-26 DIAGNOSIS — M17.11 PRIMARY OSTEOARTHRITIS OF RIGHT KNEE: ICD-10-CM

## 2019-03-26 PROCEDURE — 99214 OFFICE O/P EST MOD 30 MIN: CPT | Performed by: NURSE PRACTITIONER

## 2019-03-26 RX ORDER — METHYLPREDNISOLONE 4 MG/1
TABLET ORAL
Qty: 21 TABLET | Refills: 0 | Status: SHIPPED | OUTPATIENT
Start: 2019-03-26 | End: 2019-04-01

## 2019-03-26 RX ORDER — TIZANIDINE 4 MG/1
4 TABLET ORAL EVERY 8 HOURS PRN
Qty: 30 TABLET | Refills: 0 | Status: SHIPPED | OUTPATIENT
Start: 2019-03-26 | End: 2019-11-08 | Stop reason: SDUPTHER

## 2019-03-26 NOTE — PROGRESS NOTES
Subjective   Nancy Robbins is a 58 y.o. female who presents due to low back pain.    She has a hx of chronic low back pain and has done well since completing a series of lumbar epidurals 11/2018. 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 She states pain radiates into her right hip and leg, worse with prolonged sitting and/or standing.      Back Pain   This is a recurrent problem. The current episode started more than 1 year ago. The problem occurs daily. The problem has been rapidly worsening since onset. The pain is present in the lumbar spine. The quality of the pain is described as aching and burning. The pain radiates to the right thigh. The pain is at a severity of 8/10. The pain is severe. The symptoms are aggravated by lying down, position, sitting and standing. Associated symptoms include tingling. Pertinent negatives include no abdominal pain (no change in bowel function), bladder incontinence, bowel incontinence, chest pain, fever, headaches or weakness. She has tried analgesics for the symptoms. The treatment provided mild relief.        The following portions of the patient's history were reviewed and updated as appropriate: allergies, current medications, past social history and problem list.    Past Medical History:   Diagnosis Date   • Anxiety    • Graves disease    • Hard to intubate     TROUBLE WITH INTUBATION... has had cervical neck surgery and unable to bend neck too far   • Hypertension    • Hypothyroidism     under went radiation of thyroid and on supplement   • Low back pain     undergoing epidural   • Neuropathy     bilateral lower legs   • Osteoarthritis    • PONV (postoperative nausea and vomiting)    • Restless leg syndrome     TAKES XANAX FOR..   • Vertigo          Current Outpatient Medications:   •  Acetaminophen (TYLENOL ARTHRITIS PAIN PO), Take 1,300 mg by mouth 2 (Two) Times a Day As Needed., Disp: , Rfl:   •   ALPRAZolam (XANAX) 1 MG tablet, Take 1 tablet by mouth At Night As Needed for Anxiety or Sleep., Disp: 45 tablet, Rfl: 0  •  estradiol (ESTRACE) 2 MG tablet, Take 2 mg by mouth Daily., Disp: , Rfl:   •  fexofenadine-pseudoephedrine (ALLEGRA-D)  MG per 12 hr tablet, Take 1 tablet by mouth 2 (Two) Times a Day As Needed., Disp: , Rfl:   •  fluticasone (FLONASE) 50 MCG/ACT nasal spray, 2 sprays into the nostril(s) as directed by provider Daily., Disp: , Rfl:   •  HYDROcodone-acetaminophen (NORCO) 7.5-325 MG per tablet, Take 1-2 tablets by mouth Every 4 (Four) Hours As Needed for pain., Disp: 75 tablet, Rfl: 0  •  irbesartan-hydrochlorothiazide (AVALIDE) 150-12.5 MG tablet, Take 1 tablet by mouth Every Morning., Disp: , Rfl:   •  levothyroxine (SYNTHROID, LEVOTHROID) 150 MCG tablet, Take 1 tablet by mouth Daily., Disp: 30 tablet, Rfl: 5  •  LYRICA 150 MG capsule, Take 150 mg by mouth every night at bedtime., Disp: , Rfl: 0  •  meloxicam (MOBIC) 15 MG tablet, Take 1 tablet by mouth Daily., Disp: 90 tablet, Rfl: 1  •  montelukast (SINGULAIR) 10 MG tablet, Take 10 mg by mouth Every Morning., Disp: , Rfl:   •  ondansetron (ZOFRAN) 4 MG tablet, Take 1 tablet by mouth Every 4 (Four) Hours As Needed for Nausea or Vomiting., Disp: 30 tablet, Rfl: 1  •  TURMERIC PO, Take  by mouth., Disp: , Rfl:   •  methylPREDNISolone (MEDROL) 4 MG tablet, follow package directions, Disp: 21 tablet, Rfl: 0  •  tiZANidine (ZANAFLEX) 4 MG tablet, Take 1 tablet by mouth Every 8 (Eight) Hours As Needed for Muscle Spasms., Disp: 30 tablet, Rfl: 0    Allergies   Allergen Reactions   • Ampicillin Nausea Only   • Oxycodone Nausea And Vomiting       Review of Systems   Constitutional: Positive for activity change. Negative for appetite change, chills, fever and unexpected weight change.   HENT: Negative for ear pain, sinus pressure and sore throat.    Eyes: Negative for visual disturbance.   Respiratory: Negative for cough, shortness of breath and  wheezing.    Cardiovascular: Negative for chest pain, palpitations and leg swelling.   Gastrointestinal: Negative for abdominal pain (no change in bowel function), blood in stool, bowel incontinence, constipation, diarrhea, nausea and vomiting.   Genitourinary: Negative for bladder incontinence, decreased urine volume, difficulty urinating (no change in bladder function), frequency, genital sores, hematuria and urgency.   Musculoskeletal: Positive for back pain and gait problem.   Skin: Negative for rash.   Neurological: Positive for tingling. Negative for dizziness, syncope, weakness, light-headedness and headaches.   Psychiatric/Behavioral: Negative for dysphoric mood.       Objective   Vitals:    03/26/19 0919   BP: 130/86   BP Location: Left arm   Patient Position: Sitting   Cuff Size: Adult   Pulse: 99   Temp: 97.8 °F (36.6 °C)   TempSrc: Oral     Physical Exam   Constitutional: She is oriented to person, place, and time. She appears well-developed and well-nourished. She appears distressed (appears uncomfortable).   HENT:   Head: Normocephalic.   Eyes: Conjunctivae are normal.   Neck: Normal range of motion.   Cardiovascular: Normal rate, regular rhythm and normal heart sounds.   Pulmonary/Chest: Effort normal and breath sounds normal.   Abdominal: Soft. She exhibits no distension. There is no tenderness.   Musculoskeletal:        Right knee: She exhibits normal range of motion.        Lumbar back: She exhibits decreased range of motion, tenderness, pain and spasm. She exhibits no swelling.   Moderate tenderness to palpation of paravertebral muscles of lumbar area     Neurological: She is alert and oriented to person, place, and time. She has normal strength. No sensory deficit. She exhibits normal muscle tone (5/5 normal muscle strength bilateral lower extremities. Strength and tone 5/5 in lower extremities (right quadriceps, left quadriceps, right tibialis anterior, left tibialis anterior)). Gait (Antalgic.  Is able to do heel and toe walking bilaterally.) abnormal. Coordination normal.   Reflex Scores:       Patellar reflexes are 2+ on the right side and 2+ on the left side.       Achilles reflexes are 2+ on the right side and 2+ on the left side.  Negative straight leg raising test   Skin: Skin is warm and dry. No erythema.   Psychiatric: She has a normal mood and affect. Her behavior is normal.   Nursing note and vitals reviewed.      Assessment/Plan   Nancy was seen today for back pain and leg pain.    Diagnoses and all orders for this visit:    Acute right-sided low back pain with right-sided sciatica  -     methylPREDNISolone (MEDROL) 4 MG tablet; follow package directions  -     tiZANidine (ZANAFLEX) 4 MG tablet; Take 1 tablet by mouth Every 8 (Eight) Hours As Needed for Muscle Spasms.    Primary osteoarthritis of right knee  Comments:  has completed PT since knee replacement, doing well    Essential hypertension  Comments:  stable on current meds    MRI of lumbar spine done 5/2018 showed foraminal stenosis but no acute changes from previous MRI in 2016. She will start Medrol and muscle relaxer, consider further evaluation if sx persist.

## 2019-04-03 DIAGNOSIS — J30.9 ALLERGIC RHINITIS: ICD-10-CM

## 2019-04-03 RX ORDER — FEXOFENADINE HYDROCHLORIDE AND PSEUDOEPHEDRINE HYDROCHLORIDE 60; 120 MG/1; MG/1
TABLET, FILM COATED, EXTENDED RELEASE ORAL
Qty: 120 TABLET | Refills: 5 | Status: SHIPPED | OUTPATIENT
Start: 2019-04-03 | End: 2020-04-28

## 2019-04-11 DIAGNOSIS — F41.9 ANXIETY: ICD-10-CM

## 2019-04-16 RX ORDER — ALPRAZOLAM 1 MG/1
TABLET ORAL
Qty: 45 TABLET | Refills: 0 | Status: SHIPPED | OUTPATIENT
Start: 2019-04-16 | End: 2019-06-13 | Stop reason: SDUPTHER

## 2019-04-26 ENCOUNTER — TRANSCRIBE ORDERS (OUTPATIENT)
Dept: ADMINISTRATIVE | Facility: HOSPITAL | Age: 59
End: 2019-04-26

## 2019-04-26 DIAGNOSIS — G89.29 CHRONIC LOW BACK PAIN WITH SCIATICA, SCIATICA LATERALITY UNSPECIFIED, UNSPECIFIED BACK PAIN LATERALITY: Primary | ICD-10-CM

## 2019-04-26 DIAGNOSIS — M54.40 CHRONIC LOW BACK PAIN WITH SCIATICA, SCIATICA LATERALITY UNSPECIFIED, UNSPECIFIED BACK PAIN LATERALITY: Primary | ICD-10-CM

## 2019-04-26 DIAGNOSIS — M54.16 LUMBAR RADICULOPATHY: ICD-10-CM

## 2019-05-06 ENCOUNTER — OFFICE VISIT (OUTPATIENT)
Dept: INTERNAL MEDICINE | Facility: CLINIC | Age: 59
End: 2019-05-06

## 2019-05-06 VITALS
OXYGEN SATURATION: 99 % | HEIGHT: 65 IN | DIASTOLIC BLOOD PRESSURE: 82 MMHG | WEIGHT: 177 LBS | HEART RATE: 72 BPM | SYSTOLIC BLOOD PRESSURE: 140 MMHG | BODY MASS INDEX: 29.49 KG/M2

## 2019-05-06 DIAGNOSIS — E03.9 ACQUIRED HYPOTHYROIDISM: Primary | ICD-10-CM

## 2019-05-06 DIAGNOSIS — M54.5 CHRONIC LOW BACK PAIN, UNSPECIFIED BACK PAIN LATERALITY, WITH SCIATICA PRESENCE UNSPECIFIED: ICD-10-CM

## 2019-05-06 DIAGNOSIS — G89.29 CHRONIC LOW BACK PAIN, UNSPECIFIED BACK PAIN LATERALITY, WITH SCIATICA PRESENCE UNSPECIFIED: ICD-10-CM

## 2019-05-06 DIAGNOSIS — I10 ESSENTIAL HYPERTENSION: ICD-10-CM

## 2019-05-06 DIAGNOSIS — M17.12 PRIMARY OSTEOARTHRITIS OF LEFT KNEE: ICD-10-CM

## 2019-05-06 LAB
ANION GAP SERPL CALCULATED.3IONS-SCNC: 10.5 MMOL/L
BUN BLD-MCNC: 15 MG/DL (ref 6–20)
BUN/CREAT SERPL: 21.7 (ref 7–25)
CALCIUM SPEC-SCNC: 9.2 MG/DL (ref 8.6–10.5)
CHLORIDE SERPL-SCNC: 95 MMOL/L (ref 98–107)
CO2 SERPL-SCNC: 28.5 MMOL/L (ref 22–29)
CREAT BLD-MCNC: 0.69 MG/DL (ref 0.57–1)
GFR SERPL CREATININE-BSD FRML MDRD: 87 ML/MIN/1.73
GLUCOSE BLD-MCNC: 91 MG/DL (ref 65–99)
POTASSIUM BLD-SCNC: 3.5 MMOL/L (ref 3.5–5.2)
SODIUM BLD-SCNC: 134 MMOL/L (ref 136–145)
TSH SERPL DL<=0.05 MIU/L-ACNC: 3.61 MIU/ML (ref 0.27–4.2)

## 2019-05-06 PROCEDURE — 99214 OFFICE O/P EST MOD 30 MIN: CPT | Performed by: NURSE PRACTITIONER

## 2019-05-06 PROCEDURE — 84443 ASSAY THYROID STIM HORMONE: CPT | Performed by: NURSE PRACTITIONER

## 2019-05-06 PROCEDURE — 80048 BASIC METABOLIC PNL TOTAL CA: CPT | Performed by: NURSE PRACTITIONER

## 2019-05-06 PROCEDURE — 36415 COLL VENOUS BLD VENIPUNCTURE: CPT | Performed by: NURSE PRACTITIONER

## 2019-05-07 NOTE — PROGRESS NOTES
Subjective   Nancy Robbins is a 58 y.o. female who presents for f/u regarding hypothyroidism, chronic low back pain and bilateral knee pain.    She is healing from her right knee replacement (12/2018) but now c/o increased pain in the left knee, has been diagnosed with OA and is followed by ortho.  She is scheduled for a MRI of the lumbar spine 5/13 due to increased back pain radiating into bilateral legs, followed by Dr. Avery.  Her Synthroid was recently increased. She c/o increased fatigue but remains active. She has noticed a heat intolerance which has worsened over the past few years.      Hypothyroidism   This is a chronic problem. The current episode started more than 1 year ago. The problem occurs daily. The problem has been unchanged. Associated symptoms include arthralgias. Pertinent negatives include no abdominal pain, chest pain, chills, congestion, coughing, fatigue, fever, headaches, joint swelling, nausea, sore throat, vomiting or weakness. Treatments tried: Synthroid. The treatment provided significant relief.   Hypertension   This is a chronic problem. The current episode started more than 1 year ago. The problem is unchanged. The problem is controlled. Pertinent negatives include no chest pain, headaches or palpitations. Current antihypertension treatment includes diuretics and angiotensin blockers. The current treatment provides significant improvement. There are no compliance problems.         The following portions of the patient's history were reviewed and updated as appropriate: allergies, current medications, past social history and problem list.    Past Medical History:   Diagnosis Date   • Anxiety    • Graves disease    • Hard to intubate     TROUBLE WITH INTUBATION... has had cervical neck surgery and unable to bend neck too far   • Hypertension    • Hypothyroidism     under went radiation of thyroid and on supplement   • Low back pain     undergoing epidural   • Neuropathy     bilateral  lower legs   • Osteoarthritis    • PONV (postoperative nausea and vomiting)    • Restless leg syndrome     TAKES XANAX FOR..   • Vertigo          Current Outpatient Medications:   •  Acetaminophen (TYLENOL ARTHRITIS PAIN PO), Take 1,300 mg by mouth 2 (Two) Times a Day As Needed., Disp: , Rfl:   •  ALLEGRA-D ALLERGY & CONGESTION  MG per 12 hr tablet, take 1 tablet by mouth twice a day, Disp: 120 tablet, Rfl: 5  •  ALPRAZolam (XANAX) 1 MG tablet, take 1 tablet by mouth at bedtime if needed for anxiety or sleep, Disp: 45 tablet, Rfl: 0  •  estradiol (ESTRACE) 2 MG tablet, Take 2 mg by mouth Daily., Disp: , Rfl:   •  fluticasone (FLONASE) 50 MCG/ACT nasal spray, 2 sprays into the nostril(s) as directed by provider Daily., Disp: , Rfl:   •  HYDROcodone-acetaminophen (NORCO) 7.5-325 MG per tablet, Take 1-2 tablets by mouth Every 4 (Four) Hours As Needed for pain., Disp: 75 tablet, Rfl: 0  •  irbesartan-hydrochlorothiazide (AVALIDE) 150-12.5 MG tablet, Take 1 tablet by mouth Every Morning., Disp: , Rfl:   •  levothyroxine (SYNTHROID, LEVOTHROID) 150 MCG tablet, Take 1 tablet by mouth Daily., Disp: 30 tablet, Rfl: 5  •  LYRICA 150 MG capsule, Take 150 mg by mouth every night at bedtime., Disp: , Rfl: 0  •  meloxicam (MOBIC) 15 MG tablet, Take 1 tablet by mouth Daily., Disp: 90 tablet, Rfl: 1  •  montelukast (SINGULAIR) 10 MG tablet, Take 10 mg by mouth Every Morning., Disp: , Rfl:   •  ondansetron (ZOFRAN) 4 MG tablet, Take 1 tablet by mouth Every 4 (Four) Hours As Needed for Nausea or Vomiting., Disp: 30 tablet, Rfl: 1  •  tiZANidine (ZANAFLEX) 4 MG tablet, Take 1 tablet by mouth Every 8 (Eight) Hours As Needed for Muscle Spasms., Disp: 30 tablet, Rfl: 0  •  TURMERIC PO, Take  by mouth., Disp: , Rfl:     Allergies   Allergen Reactions   • Ampicillin Nausea Only   • Oxycodone Nausea And Vomiting       Review of Systems   Constitutional: Negative for chills, fatigue, fever and unexpected weight change.   HENT: Negative  "for congestion, ear pain, postnasal drip, sinus pressure, sore throat and trouble swallowing.    Eyes: Negative for visual disturbance.   Respiratory: Negative for cough, chest tightness and wheezing.    Cardiovascular: Negative for chest pain, palpitations and leg swelling.   Gastrointestinal: Negative for abdominal pain, blood in stool, nausea and vomiting.   Genitourinary: Negative for dysuria, frequency and urgency.   Musculoskeletal: Positive for arthralgias and back pain. Negative for joint swelling.   Skin: Negative for color change.   Neurological: Negative for syncope, weakness and headaches.   Hematological: Does not bruise/bleed easily.       Objective   Vitals:    05/06/19 1605   BP: 140/82   BP Location: Left arm   Patient Position: Sitting   Cuff Size: Adult   Pulse: 72   SpO2: 99%   Weight: 80.3 kg (177 lb)   Height: 165.1 cm (65\")     Physical Exam   Constitutional: She appears well-developed and well-nourished. She is cooperative. She does not have a sickly appearance. She does not appear ill.   HENT:   Head: Normocephalic.   Right Ear: Hearing, tympanic membrane and external ear normal.   Left Ear: Hearing, tympanic membrane and external ear normal.   Nose: Nose normal. No mucosal edema, rhinorrhea, sinus tenderness or nasal deformity. Right sinus exhibits no maxillary sinus tenderness and no frontal sinus tenderness. Left sinus exhibits no maxillary sinus tenderness and no frontal sinus tenderness.   Mouth/Throat: Oropharynx is clear and moist and mucous membranes are normal. Normal dentition.   Eyes: Conjunctivae and lids are normal. Right eye exhibits no discharge and no exudate. Left eye exhibits no discharge and no exudate.   Neck: Trachea normal. Carotid bruit is not present. No edema present. No thyroid mass present.   Cardiovascular: Regular rhythm, normal heart sounds and normal pulses.   No murmur heard.  Pulmonary/Chest: Breath sounds normal. No respiratory distress. She has no decreased " breath sounds. She has no wheezes. She has no rhonchi. She has no rales.   Musculoskeletal:        Left knee: She exhibits normal range of motion and no swelling.        Lumbar back: She exhibits tenderness.   Lymphadenopathy:        Head (right side): No submental, no submandibular, no tonsillar, no preauricular, no posterior auricular and no occipital adenopathy present.        Head (left side): No submental, no submandibular, no tonsillar, no preauricular, no posterior auricular and no occipital adenopathy present.   Neurological: She is alert.   Skin: Skin is warm, dry and intact. No cyanosis. Nails show no clubbing.       Assessment/Plan   Nancy was seen today for hypothyroidism.    Diagnoses and all orders for this visit:    Acquired hypothyroidism  -     TSH; Future  -     Basic Metabolic Panel; Future  -     TSH  -     Basic Metabolic Panel    Primary osteoarthritis of left knee    Chronic low back pain, unspecified back pain laterality, with sciatica presence unspecified    Essential hypertension    Her BP is well-controlled on current therapy, recheck TSH since change in Synthroid dose. Her biggest complaint relates more to pain in bilateral knees and low back. 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

## 2019-05-13 ENCOUNTER — HOSPITAL ENCOUNTER (OUTPATIENT)
Dept: MRI IMAGING | Facility: HOSPITAL | Age: 59
Discharge: HOME | End: 2019-05-13
Admitting: ORTHOPAEDIC SURGERY

## 2019-05-13 DIAGNOSIS — M54.40 CHRONIC LOW BACK PAIN WITH SCIATICA, SCIATICA LATERALITY UNSPECIFIED, UNSPECIFIED BACK PAIN LATERALITY: ICD-10-CM

## 2019-05-13 DIAGNOSIS — M54.16 LUMBAR RADICULOPATHY: ICD-10-CM

## 2019-05-13 DIAGNOSIS — G89.29 CHRONIC LOW BACK PAIN WITH SCIATICA, SCIATICA LATERALITY UNSPECIFIED, UNSPECIFIED BACK PAIN LATERALITY: ICD-10-CM

## 2019-05-13 LAB — CREAT BLDA-MCNC: 0.8 MG/DL (ref 0.6–1.3)

## 2019-05-13 PROCEDURE — 82565 ASSAY OF CREATININE: CPT

## 2019-05-13 PROCEDURE — A9577 INJ MULTIHANCE: HCPCS | Performed by: ORTHOPAEDIC SURGERY

## 2019-05-13 PROCEDURE — 72158 MRI LUMBAR SPINE W/O & W/DYE: CPT

## 2019-05-13 PROCEDURE — 0 GADOBENATE DIMEGLUMINE 529 MG/ML SOLUTION: Performed by: ORTHOPAEDIC SURGERY

## 2019-05-13 RX ADMIN — GADOBENATE DIMEGLUMINE 16 ML: 529 INJECTION, SOLUTION INTRAVENOUS at 15:23

## 2019-05-18 DIAGNOSIS — I10 ESSENTIAL HYPERTENSION: ICD-10-CM

## 2019-05-18 DIAGNOSIS — Z88.9 MULTIPLE ALLERGIES: ICD-10-CM

## 2019-05-20 RX ORDER — FLUTICASONE PROPIONATE 50 MCG
SPRAY, SUSPENSION (ML) NASAL
Qty: 16 G | Refills: 3 | Status: SHIPPED | OUTPATIENT
Start: 2019-05-20 | End: 2019-09-09 | Stop reason: SDUPTHER

## 2019-05-20 RX ORDER — IRBESARTAN AND HYDROCHLOROTHIAZIDE 150; 12.5 MG/1; MG/1
TABLET, FILM COATED ORAL
Qty: 30 TABLET | Refills: 3 | Status: SHIPPED | OUTPATIENT
Start: 2019-05-20 | End: 2019-09-09 | Stop reason: SDUPTHER

## 2019-05-26 DIAGNOSIS — N95.1 MENOPAUSAL STATE: ICD-10-CM

## 2019-05-28 RX ORDER — ESTRADIOL 2 MG/1
TABLET ORAL
Qty: 90 TABLET | Refills: 1 | Status: SHIPPED | OUTPATIENT
Start: 2019-05-28 | End: 2019-09-09 | Stop reason: SDUPTHER

## 2019-06-06 ENCOUNTER — TRANSCRIBE ORDERS (OUTPATIENT)
Dept: ADMINISTRATIVE | Facility: HOSPITAL | Age: 59
End: 2019-06-06

## 2019-06-06 DIAGNOSIS — M54.50 LUMBAR PAIN: Primary | ICD-10-CM

## 2019-06-13 DIAGNOSIS — F41.9 ANXIETY: ICD-10-CM

## 2019-06-13 NOTE — TELEPHONE ENCOUNTER
----- Message from Silvia Bell sent at 6/13/2019  3:33 PM EDT -----  Contact: pt - [facility] pt - RE: Rx refill  Pt calling and would like a refill on Rx      ALPRAZolam (XANAX) 1 MG tablet 45 tablet    Sig: take 1 tablet by mouth at bedtime if needed for anxiety or sleep     [facility] [employer] [facility] [address] [place] [address] [address] AT [facility] OF [address] - [phone]  - [phone]      Pt # [phone]

## 2019-06-16 DIAGNOSIS — E03.9 ACQUIRED HYPOTHYROIDISM: ICD-10-CM

## 2019-06-17 DIAGNOSIS — F41.9 ANXIETY: ICD-10-CM

## 2019-06-17 RX ORDER — LEVOTHYROXINE SODIUM 0.15 MG/1
TABLET ORAL
Qty: 90 TABLET | Refills: 1 | Status: SHIPPED | OUTPATIENT
Start: 2019-06-17 | End: 2019-08-01 | Stop reason: SDUPTHER

## 2019-06-18 RX ORDER — ALPRAZOLAM 1 MG/1
1 TABLET ORAL 3 TIMES DAILY PRN
Qty: 45 TABLET | Refills: 0 | Status: SHIPPED | OUTPATIENT
Start: 2019-06-18 | End: 2019-08-01 | Stop reason: SDUPTHER

## 2019-06-19 ENCOUNTER — ANESTHESIA EVENT (OUTPATIENT)
Dept: PAIN MEDICINE | Facility: HOSPITAL | Age: 59
End: 2019-06-19

## 2019-06-19 ENCOUNTER — HOSPITAL ENCOUNTER (OUTPATIENT)
Dept: GENERAL RADIOLOGY | Facility: HOSPITAL | Age: 59
Discharge: HOME | End: 2019-06-19

## 2019-06-19 ENCOUNTER — HOSPITAL ENCOUNTER (OUTPATIENT)
Dept: PAIN MEDICINE | Facility: HOSPITAL | Age: 59
Discharge: HOME | End: 2019-06-19
Admitting: ANESTHESIOLOGY

## 2019-06-19 ENCOUNTER — ANESTHESIA (OUTPATIENT)
Dept: PAIN MEDICINE | Facility: HOSPITAL | Age: 59
End: 2019-06-19

## 2019-06-19 VITALS
BODY MASS INDEX: 29.66 KG/M2 | SYSTOLIC BLOOD PRESSURE: 133 MMHG | RESPIRATION RATE: 16 BRPM | TEMPERATURE: 97.9 F | WEIGHT: 178 LBS | HEART RATE: 83 BPM | HEIGHT: 65 IN | OXYGEN SATURATION: 96 % | DIASTOLIC BLOOD PRESSURE: 73 MMHG

## 2019-06-19 DIAGNOSIS — R52 PAIN: ICD-10-CM

## 2019-06-19 DIAGNOSIS — M17.12 PRIMARY OSTEOARTHRITIS OF LEFT KNEE: Primary | ICD-10-CM

## 2019-06-19 PROCEDURE — 25010000002 METHYLPREDNISOLONE PER 80 MG: Performed by: ANESTHESIOLOGY

## 2019-06-19 PROCEDURE — 77003 FLUOROGUIDE FOR SPINE INJECT: CPT

## 2019-06-19 PROCEDURE — C1755 CATHETER, INTRASPINAL: HCPCS

## 2019-06-19 RX ORDER — ALPRAZOLAM 1 MG/1
TABLET ORAL
Qty: 45 TABLET | Refills: 0 | OUTPATIENT
Start: 2019-06-19

## 2019-06-19 RX ORDER — LIDOCAINE HYDROCHLORIDE 10 MG/ML
1 INJECTION, SOLUTION INFILTRATION; PERINEURAL ONCE AS NEEDED
Status: DISCONTINUED | OUTPATIENT
Start: 2019-06-19 | End: 2019-06-20 | Stop reason: HOSPADM

## 2019-06-19 RX ORDER — FENTANYL CITRATE 50 UG/ML
50 INJECTION, SOLUTION INTRAMUSCULAR; INTRAVENOUS AS NEEDED
Status: DISCONTINUED | OUTPATIENT
Start: 2019-06-19 | End: 2019-06-20 | Stop reason: HOSPADM

## 2019-06-19 RX ORDER — METHYLPREDNISOLONE ACETATE 80 MG/ML
80 INJECTION, SUSPENSION INTRA-ARTICULAR; INTRALESIONAL; INTRAMUSCULAR; SOFT TISSUE ONCE
Status: COMPLETED | OUTPATIENT
Start: 2019-06-19 | End: 2019-06-19

## 2019-06-19 RX ORDER — MIDAZOLAM HYDROCHLORIDE 1 MG/ML
1 INJECTION INTRAMUSCULAR; INTRAVENOUS AS NEEDED
Status: DISCONTINUED | OUTPATIENT
Start: 2019-06-19 | End: 2019-06-20 | Stop reason: HOSPADM

## 2019-06-19 RX ORDER — SODIUM CHLORIDE 0.9 % (FLUSH) 0.9 %
1-10 SYRINGE (ML) INJECTION AS NEEDED
Status: DISCONTINUED | OUTPATIENT
Start: 2019-06-19 | End: 2019-06-20 | Stop reason: HOSPADM

## 2019-06-19 RX ADMIN — METHYLPREDNISOLONE ACETATE 80 MG: 80 INJECTION, SUSPENSION INTRA-ARTICULAR; INTRALESIONAL; INTRAMUSCULAR; SOFT TISSUE at 12:28

## 2019-06-19 NOTE — ANESTHESIA PROCEDURE NOTES
PAIN Epidural block    Pre-sedation assessment completed: 6/19/2019 12:23 PM    Patient reassessed immediately prior to procedure    Patient location during procedure: pain clinic  Start Time: 6/19/2019 12:23 PM  Stop Time: 6/19/2019 12:30 PM  Indication:at surgeon's request and procedure for pain  Performed By  Anesthesiologist: Stevie Martinez MD  Preanesthetic Checklist  Completed: patient identified, site marked, surgical consent, pre-op evaluation, timeout performed, IV checked, risks and benefits discussed and monitors and equipment checked  Additional Notes  LSS  Prep:  Pt Position:prone  Sterile Tech:cap, gloves, mask and sterile barrier  Prep:chlorhexidine gluconate and isopropyl alcohol  Monitoring:blood pressure monitoring, continuous pulse oximetry and EKG  Procedure:Sedation: no     Approach:left paramedian  Guidance: fluoroscopy  Location:lumbar  Level:3-4  Needle Type:Nancy  Needle Gauge:17 G  Cath Depth at skin:7 cm  Aspiration:negative  Test Dose:negative  Medications:  Depomedrol:80 mg  Preservative Free Saline:4mL    Post Assessment:  Dressing:occlusive dressing applied  Pt Tolerance:patient tolerated the procedure well with no apparent complications  Complications:no

## 2019-06-19 NOTE — H&P
[facility]    History and Physical    Patient Name: Nancy Robbins  :  1960  MRN:  [id number]  Date of Admission: 2019    Subjective     Patient is a 58 y.o. female presents with chief complaint of chronic low back pain.  Onset of symptoms was gradual starting a few years ago.  Symptoms are associated/aggravated by activity. Symptoms improve with pain medication, injection and nothing    The following portions of the patients history were reviewed and updated as appropriate: current medications, allergies, past medical history, past surgical history, past family history, past social history and problem list                   Objective     Past Medical History:   Past Medical History:   Diagnosis Date   • Anxiety    • Graves disease    • Hard to intubate     TROUBLE WITH INTUBATION... has had cervical neck surgery and unable to bend neck too far   • Hypertension    • Hypothyroidism     under went radiation of thyroid and on supplement   • Low back pain     undergoing epidural   • Neuropathy     bilateral lower legs   • Osteoarthritis    • PONV (postoperative nausea and vomiting)    • Restless leg syndrome     TAKES XANAX FOR..   • Vertigo      Past Surgical History:   Past Surgical History:   Procedure Laterality Date   • BACK SURGERY      lumbar discectomy   • COLONOSCOPY N/A 2016    Procedure: COLONOSCOPY;  Surgeon: Jose G Toro MD;  Location: [facility] ENDOSCOPY;  Service:    • EPIDURAL BLOCK      back last one 18   • KNEE MENISCAL REPAIR Bilateral    • NECK SURGERY      fusion w/hardware   • PARTIAL HYSTERECTOMY     • SPINE SURGERY     • TONSILLECTOMY     • TOTAL KNEE ARTHROPLASTY Right 12/10/2018    Procedure: RT TOTAL KNEE ARTHROPLASTY;  Surgeon: Geo Fu MD;  Location: [facility] OR;  Service: Orthopedics     Family History:   Family History   Problem Relation Age of Onset   • Lung cancer Father    • Malig Hyperthermia Neg Hx      Social History:   Social History      Tobacco Use   • Smoking status: Former Smoker     Packs/day: 0.50     Years: 25.00     Pack years: 12.50     Types: Cigarettes     Last attempt to quit: 2006     Years since quittin.4   • Smokeless tobacco: Never Used   Substance Use Topics   • Alcohol use: Yes     Comment: 2 monthly   • Drug use: No       Vital Signs Range for the last 24 hours  Temperature:     Temp Source:     BP:     Pulse:     Respirations:     SPO2:     O2 Amount (l/min):     O2 Devices     Weight:           --------------------------------------------------------------------------------    Current Outpatient Medications   Medication Sig Dispense Refill   • Acetaminophen (TYLENOL ARTHRITIS PAIN PO) Take 1,300 mg by mouth 2 (Two) Times a Day As Needed.     • ALLEGRA-D ALLERGY & CONGESTION  MG per 12 hr tablet take 1 tablet by mouth twice a day 120 tablet 5   • ALPRAZolam (XANAX) 1 MG tablet Take 1 tablet by mouth 3 (Three) Times a Day As Needed for Anxiety. 45 tablet 0   • estradiol (ESTRACE) 2 MG tablet Take 2 mg by mouth Daily.     • estradiol (ESTRACE) 2 MG tablet take 1 tablet by mouth once daily 90 tablet 1   • fluticasone (FLONASE) 50 MCG/ACT nasal spray 2 sprays into the nostril(s) as directed by provider Daily.     • fluticasone (FLONASE) 50 MCG/ACT nasal spray instill 2 sprays into each nostril once daily 16 g 3   • irbesartan-hydrochlorothiazide (AVALIDE) 150-12.5 MG tablet Take 1 tablet by mouth Every Morning.     • irbesartan-hydrochlorothiazide (AVALIDE) 150-12.5 MG tablet take 1 tablet by mouth once daily 30 tablet 3   • levothyroxine (SYNTHROID, LEVOTHROID) 150 MCG tablet TAKE 1 TABLET BY MOUTH ONCE DAILY 90 tablet 1   • LYRICA 150 MG capsule Take 150 mg by mouth every night at bedtime.  0   • meloxicam (MOBIC) 15 MG tablet Take 1 tablet by mouth Daily. 90 tablet 1   • montelukast (SINGULAIR) 10 MG tablet Take 10 mg by mouth Every Morning.     • HYDROcodone-acetaminophen (NORCO) 7.5-325 MG per tablet Take 1-2  tablets by mouth Every 4 (Four) Hours As Needed for pain. 75 tablet 0   • ondansetron (ZOFRAN) 4 MG tablet Take 1 tablet by mouth Every 4 (Four) Hours As Needed for Nausea or Vomiting. 30 tablet 1   • tiZANidine (ZANAFLEX) 4 MG tablet Take 1 tablet by mouth Every 8 (Eight) Hours As Needed for Muscle Spasms. 30 tablet 0   • TURMERIC PO Take  by mouth.       Current Facility-Administered Medications   Medication Dose Route Frequency Provider Last Rate Last Dose   • fentaNYL citrate (PF) (SUBLIMAZE) injection 50 mcg  50 mcg Intravenous PRN Stevie Martinez MD       • iopamidol (ISOVUE-M 200) injection 41%  12 mL Epidural Once in imaging Stevie Martinez MD       • lidocaine (XYLOCAINE) 1 % injection 1 mL  1 mL Intradermal Once PRN Stevie Martinez MD       • methylPREDNISolone acetate (DEPO-medrol) injection 80 mg  80 mg Intra-articular Once Stevie Martinez MD       • midazolam (VERSED) injection 1 mg  1 mg Intravenous PRN Stevie Martinez MD       • sodium chloride 0.9 % flush 1-10 mL  1-10 mL Intravenous PRN Stevie Martinez MD           --------------------------------------------------------------------------------  Assessment/Plan      Anesthesia Evaluation     Patient summary reviewed and Nursing notes reviewed   history of anesthetic complications: PONV difficult airway  NPO Solid Status: > 8 hours  NPO Liquid Status: > 2 hours           Airway   Mallampati: II  Neck ROM: full  no difficulty expected  Dental - normal exam     Pulmonary     breath sounds clear to auscultation  Cardiovascular     ECG reviewed  Rhythm: regular  Rate: normal    (+) hypertension,       Neuro/Psych  (+) dizziness/light headedness, psychiatric history,     GI/Hepatic/Renal/Endo    (+)   hypothyroidism, hyperthyroidism    Musculoskeletal     Abdominal    Substance History      OB/GYN          Other   (+) arthritis                Diagnosis and Plan    Treatment Plan  ASA 3      Procedures: Lumbar Epidural Steroid Injection(LESI), With  fluoroscopy,               LSS

## 2019-08-01 DIAGNOSIS — E03.9 ACQUIRED HYPOTHYROIDISM: ICD-10-CM

## 2019-08-01 DIAGNOSIS — F41.9 ANXIETY: ICD-10-CM

## 2019-08-01 RX ORDER — LEVOTHYROXINE SODIUM 0.15 MG/1
TABLET ORAL
Qty: 30 TABLET | Refills: 5 | Status: SHIPPED | OUTPATIENT
Start: 2019-08-01 | End: 2020-01-27

## 2019-08-02 RX ORDER — ALPRAZOLAM 1 MG/1
TABLET ORAL
Qty: 45 TABLET | Refills: 0 | Status: SHIPPED | OUTPATIENT
Start: 2019-08-02 | End: 2019-09-30 | Stop reason: SDUPTHER

## 2019-09-09 ENCOUNTER — OFFICE VISIT (OUTPATIENT)
Dept: INTERNAL MEDICINE | Facility: CLINIC | Age: 59
End: 2019-09-09

## 2019-09-09 VITALS
OXYGEN SATURATION: 98 % | HEART RATE: 88 BPM | DIASTOLIC BLOOD PRESSURE: 90 MMHG | HEIGHT: 65 IN | BODY MASS INDEX: 29.62 KG/M2 | SYSTOLIC BLOOD PRESSURE: 140 MMHG

## 2019-09-09 DIAGNOSIS — E03.9 ACQUIRED HYPOTHYROIDISM: Primary | ICD-10-CM

## 2019-09-09 DIAGNOSIS — F41.9 ANXIETY: ICD-10-CM

## 2019-09-09 DIAGNOSIS — I10 ESSENTIAL HYPERTENSION: ICD-10-CM

## 2019-09-09 DIAGNOSIS — M54.5 CHRONIC LOW BACK PAIN, UNSPECIFIED BACK PAIN LATERALITY, WITH SCIATICA PRESENCE UNSPECIFIED: ICD-10-CM

## 2019-09-09 DIAGNOSIS — G62.9 PERIPHERAL POLYNEUROPATHY: ICD-10-CM

## 2019-09-09 DIAGNOSIS — G89.29 CHRONIC LOW BACK PAIN, UNSPECIFIED BACK PAIN LATERALITY, WITH SCIATICA PRESENCE UNSPECIFIED: ICD-10-CM

## 2019-09-09 LAB
ANION GAP SERPL CALCULATED.3IONS-SCNC: 11.3 MMOL/L (ref 5–15)
BUN BLD-MCNC: 15 MG/DL (ref 6–20)
BUN/CREAT SERPL: 20.8 (ref 7–25)
CALCIUM SPEC-SCNC: 9.4 MG/DL (ref 8.6–10.5)
CHLORIDE SERPL-SCNC: 98 MMOL/L (ref 98–107)
CO2 SERPL-SCNC: 27.7 MMOL/L (ref 22–29)
CREAT BLD-MCNC: 0.72 MG/DL (ref 0.57–1)
GFR SERPL CREATININE-BSD FRML MDRD: 83 ML/MIN/1.73
GLUCOSE BLD-MCNC: 88 MG/DL (ref 65–99)
POTASSIUM BLD-SCNC: 3.8 MMOL/L (ref 3.5–5.2)
SODIUM BLD-SCNC: 137 MMOL/L (ref 136–145)
TSH SERPL DL<=0.05 MIU/L-ACNC: 1.07 UIU/ML (ref 0.27–4.2)

## 2019-09-09 PROCEDURE — 84443 ASSAY THYROID STIM HORMONE: CPT | Performed by: NURSE PRACTITIONER

## 2019-09-09 PROCEDURE — 80048 BASIC METABOLIC PNL TOTAL CA: CPT | Performed by: NURSE PRACTITIONER

## 2019-09-09 PROCEDURE — 99214 OFFICE O/P EST MOD 30 MIN: CPT | Performed by: NURSE PRACTITIONER

## 2019-09-09 PROCEDURE — 36415 COLL VENOUS BLD VENIPUNCTURE: CPT | Performed by: NURSE PRACTITIONER

## 2019-09-10 PROBLEM — G62.9 PERIPHERAL POLYNEUROPATHY: Status: ACTIVE | Noted: 2019-09-10

## 2019-09-10 NOTE — PROGRESS NOTES
Subjective   Nancy Robbins is a 58 y.o. female who presents for f/u regarding HTN, hypothyroidism and anxiety.    She has seen Dr. Avery re: chronic low back pain and is taking Lyrica daily for peripheral neuropathy. She continues to c/o burning pain in bilateral feet. She has discussed surgery for her back and neck with Dr. Avery which she is not interested in pursuing at this time. She has received epidurals through pain management with mild improvement in sx.  She c/o increased fatigue throughout the day although she remains very active, has a physically demanding job and is active in caring for her house and yard.        Hypertension   This is a chronic problem. The current episode started more than 1 year ago. The problem is unchanged. The problem is controlled. Associated symptoms include neck pain. Pertinent negatives include no chest pain, headaches or palpitations. Current antihypertension treatment includes angiotensin blockers and diuretics. The current treatment provides significant improvement. There are no compliance problems.    Hypothyroidism   Associated symptoms include arthralgias, fatigue and neck pain. Pertinent negatives include no abdominal pain, chest pain, chills, congestion, coughing, fever, headaches, joint swelling, nausea, sore throat, vomiting or weakness.        The following portions of the patient's history were reviewed and updated as appropriate: allergies, current medications, past social history and problem list.    Past Medical History:   Diagnosis Date   • Anxiety    • Graves disease    • Hard to intubate     TROUBLE WITH INTUBATION... has had cervical neck surgery and unable to bend neck too far   • Hypertension    • Hypothyroidism     under went radiation of thyroid and on supplement   • Low back pain     undergoing epidural   • Neuropathy     bilateral lower legs   • Osteoarthritis    • PONV (postoperative nausea and vomiting)    • Restless leg syndrome     TAKES XANAX  FOR..   • Vertigo          Current Outpatient Medications:   •  Acetaminophen (TYLENOL ARTHRITIS PAIN PO), Take 1,300 mg by mouth 2 (Two) Times a Day As Needed., Disp: , Rfl:   •  ALLEGRA-D ALLERGY & CONGESTION  MG per 12 hr tablet, take 1 tablet by mouth twice a day, Disp: 120 tablet, Rfl: 5  •  ALPRAZolam (XANAX) 1 MG tablet, TAKE 1 TABLET BY MOUTH THREE TIMES DAILY AS NEEDED FOR ANXIETY, Disp: 45 tablet, Rfl: 0  •  CBD (cannabidiol) oral oil, Take  by mouth. 500 mg twice daily, Disp: , Rfl:   •  estradiol (ESTRACE) 2 MG tablet, Take 2 mg by mouth Daily., Disp: , Rfl:   •  fluticasone (FLONASE) 50 MCG/ACT nasal spray, 2 sprays into the nostril(s) as directed by provider Daily., Disp: , Rfl:   •  HYDROcodone-acetaminophen (NORCO) 7.5-325 MG per tablet, Take 1-2 tablets by mouth Every 4 (Four) Hours As Needed for pain., Disp: 75 tablet, Rfl: 0  •  irbesartan-hydrochlorothiazide (AVALIDE) 150-12.5 MG tablet, Take 1 tablet by mouth Every Morning., Disp: , Rfl:   •  levothyroxine (SYNTHROID, LEVOTHROID) 150 MCG tablet, TAKE 1 TABLET BY MOUTH ONCE DAILY, Disp: 30 tablet, Rfl: 5  •  LYRICA 150 MG capsule, Take 150 mg by mouth every night at bedtime., Disp: , Rfl: 0  •  meloxicam (MOBIC) 15 MG tablet, Take 1 tablet by mouth Daily., Disp: 90 tablet, Rfl: 1  •  montelukast (SINGULAIR) 10 MG tablet, Take 10 mg by mouth Every Morning., Disp: , Rfl:   •  tiZANidine (ZANAFLEX) 4 MG tablet, Take 1 tablet by mouth Every 8 (Eight) Hours As Needed for Muscle Spasms., Disp: 30 tablet, Rfl: 0  •  NON FORMULARY, Hemp cream, apply to knees, Disp: , Rfl:     Allergies   Allergen Reactions   • Ampicillin Nausea Only   • Oxycodone Nausea And Vomiting       Review of Systems   Constitutional: Positive for fatigue. Negative for chills, fever and unexpected weight change.   HENT: Negative for congestion, ear pain, postnasal drip, sinus pressure, sore throat and trouble swallowing.    Eyes: Negative for visual disturbance.  "  Respiratory: Negative for cough, chest tightness and wheezing.    Cardiovascular: Negative for chest pain, palpitations and leg swelling.   Gastrointestinal: Negative for abdominal pain, blood in stool, nausea and vomiting.   Genitourinary: Negative for dysuria, frequency and urgency.   Musculoskeletal: Positive for arthralgias, back pain, neck pain and neck stiffness. Negative for joint swelling.   Skin: Negative for color change.   Neurological: Negative for syncope, weakness and headaches.   Hematological: Does not bruise/bleed easily.   Psychiatric/Behavioral: Positive for sleep disturbance. The patient is nervous/anxious.        Objective   Vitals:    09/09/19 1511   BP: 140/90   BP Location: Left arm   Patient Position: Sitting   Cuff Size: Adult   Pulse: 88   SpO2: 98%   Height: 165.1 cm (65\")     Physical Exam   Constitutional: She appears well-developed and well-nourished. She is cooperative. She does not have a sickly appearance. She does not appear ill.   HENT:   Head: Normocephalic.   Right Ear: Hearing, tympanic membrane and external ear normal.   Left Ear: Hearing, tympanic membrane and external ear normal.   Nose: Nose normal. No mucosal edema, rhinorrhea, sinus tenderness or nasal deformity. Right sinus exhibits no maxillary sinus tenderness and no frontal sinus tenderness. Left sinus exhibits no maxillary sinus tenderness and no frontal sinus tenderness.   Mouth/Throat: Oropharynx is clear and moist and mucous membranes are normal. Normal dentition.   Eyes: Conjunctivae and lids are normal. Right eye exhibits no discharge and no exudate. Left eye exhibits no discharge and no exudate.   Neck: Trachea normal. Carotid bruit is not present. No edema present. No thyroid mass present.   Cardiovascular: Regular rhythm, normal heart sounds and normal pulses.   No murmur heard.  Pulmonary/Chest: Breath sounds normal. No respiratory distress. She has no decreased breath sounds. She has no wheezes. She has " no rhonchi. She has no rales.   Abdominal: Soft. There is no tenderness.   Musculoskeletal:        Lumbar back: She exhibits tenderness.   Lymphadenopathy:        Head (right side): No submental, no submandibular, no tonsillar, no preauricular, no posterior auricular and no occipital adenopathy present.        Head (left side): No submental, no submandibular, no tonsillar, no preauricular, no posterior auricular and no occipital adenopathy present.   Neurological: She is alert.   Skin: Skin is warm, dry and intact. No cyanosis. Nails show no clubbing.       Assessment/Plan   Nancy was seen today for hypertension and hypothyroidism.    Diagnoses and all orders for this visit:    Acquired hypothyroidism  -     TSH Rfx On Abnormal To Free T4; Future  -     TSH Rfx On Abnormal To Free T4    Essential hypertension  -     Basic Metabolic Panel; Future  -     Basic Metabolic Panel    Anxiety    Chronic low back pain, unspecified back pain laterality, with sciatica presence unspecified    Peripheral polyneuropathy    She c/o sweating throughout the day but denies hot flashes, requests recheck TSH which we will do. She will continue Lyrica, uses Hydrocodone very rarely (last written 12/2018) for breakthrough pain.

## 2019-09-17 ENCOUNTER — HOSPITAL ENCOUNTER (OUTPATIENT)
Dept: PAIN MEDICINE | Facility: HOSPITAL | Age: 59
Discharge: HOME | End: 2019-09-17
Admitting: ANESTHESIOLOGY

## 2019-09-17 ENCOUNTER — ANESTHESIA (OUTPATIENT)
Dept: PAIN MEDICINE | Facility: HOSPITAL | Age: 59
End: 2019-09-17

## 2019-09-17 ENCOUNTER — ANESTHESIA EVENT (OUTPATIENT)
Dept: PAIN MEDICINE | Facility: HOSPITAL | Age: 59
End: 2019-09-17

## 2019-09-17 ENCOUNTER — HOSPITAL ENCOUNTER (OUTPATIENT)
Dept: GENERAL RADIOLOGY | Facility: HOSPITAL | Age: 59
Discharge: HOME | End: 2019-09-17

## 2019-09-17 VITALS
OXYGEN SATURATION: 97 % | SYSTOLIC BLOOD PRESSURE: 130 MMHG | TEMPERATURE: 97.5 F | HEART RATE: 76 BPM | DIASTOLIC BLOOD PRESSURE: 78 MMHG | RESPIRATION RATE: 16 BRPM

## 2019-09-17 DIAGNOSIS — M48.062 SPINAL STENOSIS OF LUMBAR REGION WITH NEUROGENIC CLAUDICATION: Primary | ICD-10-CM

## 2019-09-17 DIAGNOSIS — R52 PAIN: ICD-10-CM

## 2019-09-17 PROCEDURE — 77003 FLUOROGUIDE FOR SPINE INJECT: CPT

## 2019-09-17 PROCEDURE — C1755 CATHETER, INTRASPINAL: HCPCS

## 2019-09-17 PROCEDURE — 25010000002 METHYLPREDNISOLONE PER 80 MG: Performed by: ANESTHESIOLOGY

## 2019-09-17 RX ORDER — MIDAZOLAM HYDROCHLORIDE 1 MG/ML
1 INJECTION INTRAMUSCULAR; INTRAVENOUS
Status: DISCONTINUED | OUTPATIENT
Start: 2019-09-17 | End: 2019-09-18 | Stop reason: HOSPADM

## 2019-09-17 RX ORDER — LIDOCAINE HYDROCHLORIDE 10 MG/ML
1 INJECTION, SOLUTION INFILTRATION; PERINEURAL ONCE
Status: DISCONTINUED | OUTPATIENT
Start: 2019-09-17 | End: 2019-09-18 | Stop reason: HOSPADM

## 2019-09-17 RX ORDER — METHYLPREDNISOLONE ACETATE 80 MG/ML
80 INJECTION, SUSPENSION INTRA-ARTICULAR; INTRALESIONAL; INTRAMUSCULAR; SOFT TISSUE ONCE
Status: COMPLETED | OUTPATIENT
Start: 2019-09-17 | End: 2019-09-17

## 2019-09-17 RX ORDER — FENTANYL CITRATE 50 UG/ML
50 INJECTION, SOLUTION INTRAMUSCULAR; INTRAVENOUS AS NEEDED
Status: DISCONTINUED | OUTPATIENT
Start: 2019-09-17 | End: 2019-09-18 | Stop reason: HOSPADM

## 2019-09-17 RX ADMIN — METHYLPREDNISOLONE ACETATE 80 MG: 80 INJECTION, SUSPENSION INTRA-ARTICULAR; INTRALESIONAL; INTRAMUSCULAR; SOFT TISSUE at 14:42

## 2019-09-17 NOTE — H&P
INTERVAL HISTORY:    The patient returns for another Lumbar epidural steroid injection 2 today.  They have received 80 % improvement x 8 days since their last injection with a pain level of 7 /10 at its worst recently.    Conservative measures tried in the interim. Daily activities are still affected by the pain.    Radiology reports and/or previous notes have been reviewed and are consistent with their diagnosis of Post-Op Diagnosis Codes:     * Lumbar spinal stenosis [M48.061]    Alert and oriented  MP - 2  Lungs - clear  CV - rrr    Diagnosis:  Post-Op Diagnosis Codes:     * Lumbar spinal stenosis [M48.061]      Plan:  Lumbar epidural steroid injection under fluoroscopic guidance        Target : L4-5    I have encouraged them to continue:  1.  Physical therapy exercises at home as prescribed by physical therapy or from the pain clinic handout (given to the patient).  Continuation of these exercises every day, or multiple times per week, even when the patient has good pain relief, was stressed to the patient as a preventative measure to decrease the frequency and severity of future pain episodes.  2.  Continue pain medicines as already prescribed.  If patient not currently taking any, it is recommended to begin Acetaminophen 1000 mg po q 8 hours.  If other medicines containing Acetaminophen are currently prescribed, maintain daily dose at 3000mg.    3.  If they can tolerate NSAIDS, it is recommended to take Ibuprofen 600 mg po q 6 hours for 7 days during pain exacerbations.   Alternatively, they may substitute an NSAID of their choice (e.g. Aleve)  4.  Heat and ice to the affected area as tolerated for pain control.  It was discussed that heating pads can cause burns.  5.  Low impact exercise such as walking or water exercise was recommended to maintain overall health and aid in weight control.   6.  Follow up as needed for subsequent injections.  7.  Patient was counseled to abstain from tobacco products.

## 2019-09-17 NOTE — ANESTHESIA PROCEDURE NOTES
PAIN Epidural block    Pre-sedation assessment completed: 9/17/2019 2:36 PM    Patient reassessed immediately prior to procedure    Patient location during procedure: pain clinic  Start Time: 9/17/2019 2:36 PM  Stop Time: 9/17/2019 2:44 PM  Indication:at surgeon's request and procedure for pain  Performed By  Anesthesiologist: Mauro Overton MD  Preanesthetic Checklist  Completed: patient identified, site marked, surgical consent, pre-op evaluation, timeout performed, IV checked, risks and benefits discussed and monitors and equipment checked  Additional Notes  Dx:  Post-Op Diagnosis Codes:     * Lumbar spinal stenosis (M48.061)  80 mg depomedrol in epid    Plan : return to clinic as needed  Prep:  Pt Position:prone (prone)  Sterile Tech:cap, gloves, mask and sterile barrier  Prep:chlorhexidine gluconate and isopropyl alcohol  Monitoring:blood pressure monitoring, EKG and continuous pulse oximetry  Procedure:Sedation: no     Approach:right paramedian  Guidance: fluoroscopy and c arm pa and lat and loss of resistance  Location:lumbar  Level:3-4 (interlaminar)  Needle Type:Sharewavebebeto  Needle Gauge:20  Aspiration:negative  Medications:  Depomedrol:80 mg  Preservative Free Saline:3mL    Post Assessment:  Post-procedure: bandaid.  Pt Tolerance:patient tolerated the procedure well with no apparent complications  Complications:no

## 2019-09-30 DIAGNOSIS — F41.9 ANXIETY: ICD-10-CM

## 2019-10-02 RX ORDER — ALPRAZOLAM 1 MG/1
TABLET ORAL
Qty: 45 TABLET | Refills: 0 | Status: SHIPPED | OUTPATIENT
Start: 2019-10-02 | End: 2019-11-15 | Stop reason: SDUPTHER

## 2019-10-14 RX ORDER — FLUTICASONE PROPIONATE 50 MCG
SPRAY, SUSPENSION (ML) NASAL
Qty: 64 ML | Refills: 0 | Status: SHIPPED | OUTPATIENT
Start: 2019-10-14 | End: 2020-01-29

## 2019-10-22 RX ORDER — IRBESARTAN AND HYDROCHLOROTHIAZIDE 150; 12.5 MG/1; MG/1
TABLET, FILM COATED ORAL
Qty: 120 TABLET | Refills: 5 | Status: SHIPPED | OUTPATIENT
Start: 2019-10-22 | End: 2021-01-21 | Stop reason: SDUPTHER

## 2019-11-08 ENCOUNTER — TELEPHONE (OUTPATIENT)
Dept: INTERNAL MEDICINE | Facility: CLINIC | Age: 59
End: 2019-11-08

## 2019-11-08 DIAGNOSIS — M54.41 ACUTE RIGHT-SIDED LOW BACK PAIN WITH RIGHT-SIDED SCIATICA: ICD-10-CM

## 2019-11-08 RX ORDER — TIZANIDINE 4 MG/1
4 TABLET ORAL EVERY 8 HOURS PRN
Qty: 90 TABLET | Refills: 0 | Status: SHIPPED | OUTPATIENT
Start: 2019-11-08 | End: 2021-02-26 | Stop reason: SDUPTHER

## 2019-11-08 NOTE — TELEPHONE ENCOUNTER
PATIENT IS REQUESTING MUSCLE RELAXER. SHE HAS HAD FLEXERIL IN THE PAST. PAIN COMES AND GOES  PHARMACY [facility]   PATIENT CALL BACK NUMBER [phone]

## 2019-11-15 DIAGNOSIS — F41.9 ANXIETY: ICD-10-CM

## 2019-11-15 RX ORDER — ALPRAZOLAM 1 MG/1
TABLET ORAL
Qty: 45 TABLET | Refills: 0 | Status: SHIPPED | OUTPATIENT
Start: 2019-11-15 | End: 2019-12-31

## 2019-12-01 DIAGNOSIS — M17.11 PRIMARY OSTEOARTHRITIS OF RIGHT KNEE: ICD-10-CM

## 2019-12-03 RX ORDER — MELOXICAM 15 MG/1
TABLET ORAL
Qty: 90 TABLET | Refills: 0 | Status: SHIPPED | OUTPATIENT
Start: 2019-12-03 | End: 2020-02-20

## 2019-12-03 RX ORDER — ESTRADIOL 2 MG/1
TABLET ORAL
Qty: 90 TABLET | Refills: 0 | Status: SHIPPED | OUTPATIENT
Start: 2019-12-03 | End: 2019-12-10 | Stop reason: SDUPTHER

## 2019-12-10 ENCOUNTER — OFFICE VISIT (OUTPATIENT)
Dept: INTERNAL MEDICINE | Facility: CLINIC | Age: 59
End: 2019-12-10

## 2019-12-10 VITALS
BODY MASS INDEX: 29.49 KG/M2 | DIASTOLIC BLOOD PRESSURE: 82 MMHG | HEART RATE: 88 BPM | OXYGEN SATURATION: 96 % | SYSTOLIC BLOOD PRESSURE: 140 MMHG | WEIGHT: 177 LBS | HEIGHT: 65 IN

## 2019-12-10 DIAGNOSIS — Z23 NEED FOR VACCINATION: ICD-10-CM

## 2019-12-10 DIAGNOSIS — N95.1 MENOPAUSAL SYMPTOMS: ICD-10-CM

## 2019-12-10 DIAGNOSIS — J06.9 ACUTE URI: ICD-10-CM

## 2019-12-10 DIAGNOSIS — Z00.00 PHYSICAL EXAM: Primary | ICD-10-CM

## 2019-12-10 DIAGNOSIS — Z12.39 SCREENING FOR BREAST CANCER: ICD-10-CM

## 2019-12-10 PROBLEM — Z79.899 CONTROLLED SUBSTANCE AGREEMENT SIGNED: Status: ACTIVE | Noted: 2019-12-10

## 2019-12-10 LAB
ALBUMIN SERPL-MCNC: 4.2 G/DL (ref 3.5–5.2)
ALBUMIN/GLOB SERPL: 1.3 G/DL
ALP SERPL-CCNC: 99 U/L (ref 39–117)
ALT SERPL W P-5'-P-CCNC: 35 U/L (ref 1–33)
ANION GAP SERPL CALCULATED.3IONS-SCNC: 11 MMOL/L (ref 5–15)
AST SERPL-CCNC: 23 U/L (ref 1–32)
BACTERIA UR QL AUTO: ABNORMAL /HPF
BASOPHILS # BLD AUTO: 0.03 10*3/MM3 (ref 0–0.2)
BASOPHILS NFR BLD AUTO: 0.5 % (ref 0–1.5)
BILIRUB SERPL-MCNC: 0.4 MG/DL (ref 0.2–1.2)
BILIRUB UR QL STRIP: NEGATIVE
BUN BLD-MCNC: 13 MG/DL (ref 6–20)
BUN/CREAT SERPL: 20 (ref 7–25)
CALCIUM SPEC-SCNC: 9.6 MG/DL (ref 8.6–10.5)
CHLORIDE SERPL-SCNC: 99 MMOL/L (ref 98–107)
CHOLEST SERPL-MCNC: 199 MG/DL (ref 0–200)
CLARITY UR: CLEAR
CO2 SERPL-SCNC: 28 MMOL/L (ref 22–29)
COLOR UR: YELLOW
CREAT BLD-MCNC: 0.65 MG/DL (ref 0.57–1)
DEPRECATED RDW RBC AUTO: 37.5 FL (ref 37–54)
EOSINOPHIL # BLD AUTO: 0.21 10*3/MM3 (ref 0–0.4)
EOSINOPHIL NFR BLD AUTO: 3.2 % (ref 0.3–6.2)
ERYTHROCYTE [DISTWIDTH] IN BLOOD BY AUTOMATED COUNT: 11.5 % (ref 12.3–15.4)
GFR SERPL CREATININE-BSD FRML MDRD: 93 ML/MIN/1.73
GLOBULIN UR ELPH-MCNC: 3.3 GM/DL
GLUCOSE BLD-MCNC: 96 MG/DL (ref 65–99)
GLUCOSE UR STRIP-MCNC: NEGATIVE MG/DL
HCT VFR BLD AUTO: 39.3 % (ref 34–46.6)
HDLC SERPL-MCNC: 77 MG/DL (ref 40–60)
HGB BLD-MCNC: 13.6 G/DL (ref 12–15.9)
HGB UR QL STRIP.AUTO: ABNORMAL
HYALINE CASTS UR QL AUTO: ABNORMAL /LPF
IMM GRANULOCYTES # BLD AUTO: 0.01 10*3/MM3 (ref 0–0.05)
IMM GRANULOCYTES NFR BLD AUTO: 0.2 % (ref 0–0.5)
KETONES UR QL STRIP: NEGATIVE
LDLC SERPL CALC-MCNC: 94 MG/DL (ref 0–100)
LDLC/HDLC SERPL: 1.22 {RATIO}
LEUKOCYTE ESTERASE UR QL STRIP.AUTO: ABNORMAL
LYMPHOCYTES # BLD AUTO: 1.55 10*3/MM3 (ref 0.7–3.1)
LYMPHOCYTES NFR BLD AUTO: 23.6 % (ref 19.6–45.3)
MCH RBC QN AUTO: 31.4 PG (ref 26.6–33)
MCHC RBC AUTO-ENTMCNC: 34.6 G/DL (ref 31.5–35.7)
MCV RBC AUTO: 90.8 FL (ref 79–97)
MONOCYTES # BLD AUTO: 0.66 10*3/MM3 (ref 0.1–0.9)
MONOCYTES NFR BLD AUTO: 10 % (ref 5–12)
MUCOUS THREADS URNS QL MICRO: ABNORMAL /HPF
NEUTROPHILS # BLD AUTO: 4.11 10*3/MM3 (ref 1.7–7)
NEUTROPHILS NFR BLD AUTO: 62.5 % (ref 42.7–76)
NITRITE UR QL STRIP: NEGATIVE
NRBC BLD AUTO-RTO: 0 /100 WBC (ref 0–0.2)
PH UR STRIP.AUTO: 6 [PH] (ref 5–8)
PLATELET # BLD AUTO: 261 10*3/MM3 (ref 140–450)
PMV BLD AUTO: 11 FL (ref 6–12)
POTASSIUM BLD-SCNC: 3.9 MMOL/L (ref 3.5–5.2)
PROT SERPL-MCNC: 7.5 G/DL (ref 6–8.5)
PROT UR QL STRIP: NEGATIVE
RBC # BLD AUTO: 4.33 10*6/MM3 (ref 3.77–5.28)
RBC # UR: ABNORMAL /HPF
REF LAB TEST METHOD: ABNORMAL
SODIUM BLD-SCNC: 138 MMOL/L (ref 136–145)
SP GR UR STRIP: 1.02 (ref 1–1.03)
SQUAMOUS #/AREA URNS HPF: ABNORMAL /HPF
TRIGL SERPL-MCNC: 139 MG/DL (ref 0–150)
TSH SERPL DL<=0.05 MIU/L-ACNC: 1.41 UIU/ML (ref 0.27–4.2)
UROBILINOGEN UR QL STRIP: ABNORMAL
VLDLC SERPL-MCNC: 27.8 MG/DL (ref 5–40)
WBC NRBC COR # BLD: 6.57 10*3/MM3 (ref 3.4–10.8)
WBC UR QL AUTO: ABNORMAL /HPF

## 2019-12-10 PROCEDURE — 90471 IMMUNIZATION ADMIN: CPT | Performed by: NURSE PRACTITIONER

## 2019-12-10 PROCEDURE — 84443 ASSAY THYROID STIM HORMONE: CPT | Performed by: NURSE PRACTITIONER

## 2019-12-10 PROCEDURE — 81001 URINALYSIS AUTO W/SCOPE: CPT | Performed by: NURSE PRACTITIONER

## 2019-12-10 PROCEDURE — 36415 COLL VENOUS BLD VENIPUNCTURE: CPT | Performed by: NURSE PRACTITIONER

## 2019-12-10 PROCEDURE — 93000 ELECTROCARDIOGRAM COMPLETE: CPT | Performed by: NURSE PRACTITIONER

## 2019-12-10 PROCEDURE — 80053 COMPREHEN METABOLIC PANEL: CPT | Performed by: NURSE PRACTITIONER

## 2019-12-10 PROCEDURE — 99396 PREV VISIT EST AGE 40-64: CPT | Performed by: NURSE PRACTITIONER

## 2019-12-10 PROCEDURE — 85025 COMPLETE CBC W/AUTO DIFF WBC: CPT | Performed by: NURSE PRACTITIONER

## 2019-12-10 PROCEDURE — 80061 LIPID PANEL: CPT | Performed by: NURSE PRACTITIONER

## 2019-12-10 PROCEDURE — 90715 TDAP VACCINE 7 YRS/> IM: CPT | Performed by: NURSE PRACTITIONER

## 2019-12-10 RX ORDER — AZITHROMYCIN 250 MG/1
TABLET, FILM COATED ORAL
Qty: 6 TABLET | Refills: 0 | Status: SHIPPED | OUTPATIENT
Start: 2019-12-10 | End: 2019-12-16

## 2019-12-10 RX ORDER — GUAIFENESIN 600 MG/1
600 TABLET, EXTENDED RELEASE ORAL EVERY 12 HOURS SCHEDULED
Qty: 14 TABLET
Start: 2019-12-10 | End: 2019-12-17

## 2019-12-10 RX ORDER — ESTRADIOL 1 MG/1
1 TABLET ORAL DAILY
Qty: 90 TABLET | Refills: 1
Start: 2019-12-10 | End: 2020-11-06 | Stop reason: SDUPTHER

## 2019-12-10 NOTE — PROGRESS NOTES
Cary Robbins is a 59 y.o. female who presents for a physical exam.    She has started seeing a chiro for low back pain which has been helpful.       The following portions of the patient's history were reviewed and updated as appropriate: allergies, current medications, past social history and problem list.    Past Medical History:   Diagnosis Date   • Anxiety    • Graves disease    • Hard to intubate     TROUBLE WITH INTUBATION... has had cervical neck surgery and unable to bend neck too far   • Hypertension    • Hypothyroidism     under went radiation of thyroid and on supplement   • Low back pain     undergoing epidural   • Neuropathy     bilateral lower legs   • Osteoarthritis    • PONV (postoperative nausea and vomiting)    • Restless leg syndrome     TAKES XANAX FOR..   • Vertigo          Current Outpatient Medications:   •  ALLEGRA-D ALLERGY & CONGESTION  MG per 12 hr tablet, take 1 tablet by mouth twice a day, Disp: 120 tablet, Rfl: 5  •  ALPRAZolam (XANAX) 1 MG tablet, TAKE 1 TABLET BY MOUTH THREE TIMES DAILY AS NEEDED FOR ANXIETY, Disp: 45 tablet, Rfl: 0  •  CBD (cannabidiol) oral oil, Take  by mouth. 500 mg twice daily, Disp: , Rfl:   •  estradiol (ESTRACE) 1 MG tablet, Take 1 tablet by mouth Daily., Disp: 90 tablet, Rfl: 1  •  fluticasone (FLONASE) 50 MCG/ACT nasal spray, INSTILL 2 SPRAYS INTO EACH NOSTRIL ONCE DAILY, Disp: 64 mL, Rfl: 0  •  irbesartan-hydrochlorothiazide (AVALIDE) 150-12.5 MG tablet, TAKE 1 TABLET BY MOUTH ONCE DAILY, Disp: 120 tablet, Rfl: 5  •  levothyroxine (SYNTHROID, LEVOTHROID) 150 MCG tablet, TAKE 1 TABLET BY MOUTH ONCE DAILY, Disp: 30 tablet, Rfl: 5  •  LYRICA 150 MG capsule, Take 150 mg by mouth every night at bedtime., Disp: , Rfl: 0  •  meloxicam (MOBIC) 15 MG tablet, TAKE 1 TABLET BY MOUTH ONCE DAILY, Disp: 90 tablet, Rfl: 0  •  montelukast (SINGULAIR) 10 MG tablet, Take 10 mg by mouth Every Morning., Disp: , Rfl:   •  NON FORMULARY, Hemp cream, apply to  knees, Disp: , Rfl:   •  tiZANidine (ZANAFLEX) 4 MG tablet, Take 1 tablet by mouth Every 8 (Eight) Hours As Needed for Muscle Spasms., Disp: 90 tablet, Rfl: 0    Allergies   Allergen Reactions   • Ampicillin Nausea Only   • Oxycodone Nausea And Vomiting       Review of Systems   Constitutional: Negative for activity change, appetite change, chills, diaphoresis, fatigue, fever and unexpected weight change.   HENT: Positive for congestion, postnasal drip and rhinorrhea. Negative for dental problem, drooling, ear discharge, ear pain, facial swelling, hearing loss, mouth sores, nosebleeds, sinus pressure, sore throat, tinnitus and trouble swallowing.    Eyes: Negative for photophobia, pain, discharge, redness, itching and visual disturbance.   Respiratory: Negative for apnea, cough, choking, chest tightness, shortness of breath and wheezing.    Cardiovascular: Negative for chest pain, palpitations and leg swelling.        No orthopnea, PND, VARELA   Gastrointestinal: Negative for abdominal pain, blood in stool, constipation, diarrhea, nausea and vomiting.   Endocrine: Negative for cold intolerance, heat intolerance, polydipsia and polyuria.   Genitourinary: Negative for decreased urine volume, dysuria, enuresis, flank pain, frequency, hematuria and urgency.   Musculoskeletal: Positive for arthralgias, back pain, neck pain and neck stiffness. Negative for gait problem, joint swelling and myalgias.        C/o knee stiffness/back pain   Skin: Negative for color change and rash.        No hair changes, no nail changes   Allergic/Immunologic: Negative for environmental allergies, food allergies and immunocompromised state.   Neurological: Negative for dizziness, tremors, seizures, syncope, speech difficulty, weakness, light-headedness, numbness and headaches.   Hematological: Negative for adenopathy. Does not bruise/bleed easily.   Psychiatric/Behavioral: Negative for agitation, confusion, decreased concentration, dysphoric  "mood, sleep disturbance and suicidal ideas. The patient is nervous/anxious.        Objective   Vitals:    12/10/19 0851   BP: 140/82   BP Location: Left arm   Patient Position: Sitting   Cuff Size: Adult   Pulse: 88   SpO2: 96%   Weight: 80.3 kg (177 lb)   Height: 165.1 cm (65\")     Body mass index is 29.45 kg/m².    Physical Exam   Constitutional: She is oriented to person, place, and time. She appears well-developed and well-nourished. No distress.   HENT:   Right Ear: Hearing, external ear and ear canal normal. Tympanic membrane is bulging.   Left Ear: Hearing, external ear and ear canal normal. Tympanic membrane is bulging.   Nose: Right sinus exhibits no maxillary sinus tenderness and no frontal sinus tenderness. Left sinus exhibits maxillary sinus tenderness and frontal sinus tenderness.   Mouth/Throat: Posterior oropharyngeal erythema present.   Eyes: Pupils are equal, round, and reactive to light. Conjunctivae, EOM and lids are normal.   Neck: Trachea normal and phonation normal. Neck supple. Normal carotid pulses and no JVD present. Carotid bruit is not present. No thyroid mass and no thyromegaly present.   Cardiovascular: Normal rate, regular rhythm, S1 normal and S2 normal. PMI is not displaced. Exam reveals no gallop and no friction rub.   No murmur heard.  Pulses:       Carotid pulses are 2+ on the right side, and 2+ on the left side.       Radial pulses are 2+ on the right side, and 2+ on the left side.        Dorsalis pedis pulses are 2+ on the right side, and 2+ on the left side.   Pulmonary/Chest: Effort normal and breath sounds normal. She has no wheezes. She has no rhonchi. She has no rales. Right breast exhibits no inverted nipple, no mass, no nipple discharge, no skin change and no tenderness. Left breast exhibits no inverted nipple, no mass, no nipple discharge, no skin change and no tenderness.   Abdominal: Soft. Normal appearance, normal aorta and bowel sounds are normal. She exhibits no " abdominal bruit and no mass. There is no hepatosplenomegaly. There is no tenderness.   Musculoskeletal: Normal range of motion. She exhibits no edema or tenderness.   Lymphadenopathy:     She has no cervical adenopathy.        Right: No supraclavicular adenopathy present.        Left: No supraclavicular adenopathy present.   Neurological: She is alert and oriented to person, place, and time. She has normal strength and normal reflexes. No cranial nerve deficit or sensory deficit. Coordination normal.   Skin: Skin is warm and dry. No rash noted.   Psychiatric: She has a normal mood and affect. Her speech is normal and behavior is normal. Judgment and thought content normal. Cognition and memory are normal. She is attentive.   Nursing note and vitals reviewed.      Assessment/Plan   Nancy was seen today for annual exam.    Diagnoses and all orders for this visit:    Physical exam  -     CBC Auto Differential; Future  -     Comprehensive Metabolic Panel; Future  -     Lipid Panel; Future  -     TSH; Future  -     Urinalysis With Microscopic If Indicated (No Culture) - Urine, Clean Catch; Future  -     CBC Auto Differential  -     Comprehensive Metabolic Panel  -     Lipid Panel  -     TSH  -     Urinalysis With Microscopic If Indicated (No Culture) - Urine, Clean Catch  -     Urinalysis, Microscopic Only - Urine, Clean Catch; Future  -     Urinalysis, Microscopic Only - Urine, Clean Catch  -     ECG 12 Lead    Acute URI  -     guaiFENesin (MUCINEX) 600 MG 12 hr tablet; Take 1 tablet by mouth Every 12 (Twelve) Hours for 7 days.  -     azithromycin (ZITHROMAX Z-ABELARDO) 250 MG tablet; Take 2 tablets the first day, then 1 tablet daily for 4 days.    Menopausal symptoms  -     estradiol (ESTRACE) 1 MG tablet; Take 1 tablet by mouth Daily.    Screening for breast cancer  -     Mammo Screening Bilateral With CAD; Future    Need for vaccination  -     Tdap Vaccine Greater Than or Equal To 6yo IM      ECG 12 Lead  Date/Time:  12/10/2019 9:48 AM  Performed by: Taina Kraus MA  Authorized by: Radha Sy APRN   Comparison: compared with previous ECG from 11/27/2018  Similar to previous ECG  Rhythm: sinus rhythm  Rate: normal  BPM: 70  Conduction: conduction normal  ST Segments: ST segments normal  T Waves: T waves normal  QRS axis: normal    Clinical impression: normal ECG        Risk assessment:  She has a family hx (father) of lung cancer and Crohn's disease.  She is currently taking Estradiol daily and denies menopausal symptoms, will decrease to 1mg daily and continue to monitor (she will cut tablet in half for now). She is overdue for her mammogram which she will schedule (discussed increase risk of breast cancer with estrogen therapy).  She has experienced increased anxiety since the death of her  last year, currently taking Xanax which we will try to taper usage in the future (will decrease estrogen use first).    Prevention:  Screening mammogram is ordered.  She will schedule a pelvic exam with her GYN.  Her colonoscopy is due in 2026.  Tdap is administered today. She has received her annual flu vaccine and has completed the Shingrix series.

## 2019-12-23 ENCOUNTER — TELEPHONE (OUTPATIENT)
Dept: INTERNAL MEDICINE | Facility: CLINIC | Age: 59
End: 2019-12-23

## 2019-12-23 RX ORDER — AZITHROMYCIN 250 MG/1
TABLET, FILM COATED ORAL
Qty: 6 TABLET | Refills: 0 | Status: CANCELLED | OUTPATIENT
Start: 2019-12-23

## 2019-12-23 NOTE — TELEPHONE ENCOUNTER
Please call patient and clarify symptoms, she shouldn't need another antibiotic. What else is she taking for her symptoms?

## 2019-12-23 NOTE — TELEPHONE ENCOUNTER
Pt returning call.  Taina not available at the time.  Pt says she woke up yesterday with symptoms.  She has a really bad headache she can't get rid of.  Ear pain, sore throat, yellow nasal drainage, Cough, Fever unknown but doesn't think so.  She has been taking Allegra D, Singular and flonase.  Please advise.     Pt is still at work please leave her a detailed message if she does not answer.    Pt#[phone]

## 2019-12-23 NOTE — TELEPHONE ENCOUNTER
Pt was seen   12/10   Acute URI     guaiFENesin (MUCINEX) 600 MG 12 hr tablet; Take 1 tablet by mouth Every 12 (Twelve) Hours for 7 days.     azithromycin (ZITHROMAX Z-ABELARDO) 250 MG tablet; Take 2 tablets the first day, then 1 tablet daily for 4 days.

## 2019-12-23 NOTE — TELEPHONE ENCOUNTER
Patient called and stated her sinus infection has came back and would like another antibiotic. Please advise    Pt call back  [phone]  Clau beltrán

## 2019-12-24 NOTE — TELEPHONE ENCOUNTER
I would not start another antibiotic this close to her last, sx are most likely related to virus or allergies. Please ask her to start Mucinex 600mg bid and Claritin 10mg qd. She may also add Flonase NS 2 sprays each nostril daily for increased drainage. Thanks.

## 2019-12-31 DIAGNOSIS — F41.9 ANXIETY: ICD-10-CM

## 2019-12-31 RX ORDER — ALPRAZOLAM 1 MG/1
TABLET ORAL
Qty: 45 TABLET | Refills: 0 | Status: SHIPPED | OUTPATIENT
Start: 2019-12-31 | End: 2020-02-20

## 2019-12-31 NOTE — TELEPHONE ENCOUNTER
Please advise med refill    Last OV: 12/10/19    Last fill   11/15/19    [place] done on  11/15/19    Thank you,    Taina

## 2020-01-02 ENCOUNTER — TELEPHONE (OUTPATIENT)
Dept: INTERNAL MEDICINE | Facility: CLINIC | Age: 60
End: 2020-01-02

## 2020-01-02 DIAGNOSIS — G62.9 PERIPHERAL POLYNEUROPATHY: Primary | ICD-10-CM

## 2020-01-02 NOTE — TELEPHONE ENCOUNTER
Patient called and requested a refill for LYRICA 150 MG capsule. She stated that She has discussed this with Radha Sy and was instructed to call when she needed this.    Benny Olguin in [facility]. Her best contact is [phone]

## 2020-01-03 RX ORDER — PREGABALIN 150 MG/1
150 CAPSULE ORAL DAILY
Qty: 30 CAPSULE | Refills: 1 | Status: SHIPPED | OUTPATIENT
Start: 2020-01-03 | End: 2020-03-01

## 2020-01-03 NOTE — TELEPHONE ENCOUNTER
Pt called to request generic for LYRICA to be called in. Pt went to  Rx and it was over $200.     Pt call back  [phone]    Discussed with patient, brand name is too expensive and she is okay with generic. Rx for generic sent to pharmacy.

## 2020-01-27 DIAGNOSIS — E03.9 ACQUIRED HYPOTHYROIDISM: ICD-10-CM

## 2020-01-27 RX ORDER — LEVOTHYROXINE SODIUM 0.15 MG/1
TABLET ORAL
Qty: 30 TABLET | Refills: 5 | Status: SHIPPED | OUTPATIENT
Start: 2020-01-27 | End: 2020-07-20

## 2020-01-29 RX ORDER — FLUTICASONE PROPIONATE 50 MCG
SPRAY, SUSPENSION (ML) NASAL
Qty: 64 G | Refills: 2 | Status: SHIPPED | OUTPATIENT
Start: 2020-01-29 | End: 2020-11-22 | Stop reason: SDUPTHER

## 2020-02-20 DIAGNOSIS — F41.9 ANXIETY: ICD-10-CM

## 2020-02-20 DIAGNOSIS — M17.11 PRIMARY OSTEOARTHRITIS OF RIGHT KNEE: ICD-10-CM

## 2020-02-20 RX ORDER — MELOXICAM 15 MG/1
TABLET ORAL
Qty: 90 TABLET | Refills: 0 | Status: SHIPPED | OUTPATIENT
Start: 2020-02-20 | End: 2020-06-01

## 2020-02-20 RX ORDER — ALPRAZOLAM 1 MG/1
TABLET ORAL
Qty: 45 TABLET | Refills: 1 | Status: SHIPPED | OUTPATIENT
Start: 2020-02-20 | End: 2020-06-01

## 2020-02-28 DIAGNOSIS — G62.9 PERIPHERAL POLYNEUROPATHY: ICD-10-CM

## 2020-03-01 RX ORDER — PREGABALIN 150 MG/1
150 CAPSULE ORAL DAILY
Qty: 30 CAPSULE | Refills: 1 | Status: SHIPPED | OUTPATIENT
Start: 2020-03-01 | End: 2020-04-28

## 2020-03-11 ENCOUNTER — OFFICE VISIT (OUTPATIENT)
Dept: INTERNAL MEDICINE | Facility: CLINIC | Age: 60
End: 2020-03-11

## 2020-03-11 VITALS
TEMPERATURE: 97.8 F | HEIGHT: 65 IN | BODY MASS INDEX: 29.49 KG/M2 | DIASTOLIC BLOOD PRESSURE: 100 MMHG | WEIGHT: 177 LBS | RESPIRATION RATE: 12 BRPM | SYSTOLIC BLOOD PRESSURE: 150 MMHG

## 2020-03-11 DIAGNOSIS — R42 VERTIGO: Primary | ICD-10-CM

## 2020-03-11 DIAGNOSIS — I10 ESSENTIAL HYPERTENSION: ICD-10-CM

## 2020-03-11 DIAGNOSIS — M54.2 CERVICALGIA: ICD-10-CM

## 2020-03-11 LAB
ALBUMIN SERPL-MCNC: 4.4 G/DL (ref 3.5–5.2)
ALBUMIN/GLOB SERPL: 1.7 G/DL
ALP SERPL-CCNC: 102 U/L (ref 39–117)
ALT SERPL-CCNC: 43 U/L (ref 1–33)
AST SERPL-CCNC: 25 U/L (ref 1–32)
BASOPHILS # BLD AUTO: 0.04 10*3/MM3 (ref 0–0.2)
BASOPHILS NFR BLD AUTO: 0.6 % (ref 0–1.5)
BILIRUB SERPL-MCNC: 0.6 MG/DL (ref 0.2–1.2)
BUN SERPL-MCNC: 16 MG/DL (ref 6–20)
BUN/CREAT SERPL: 24.6 (ref 7–25)
CALCIUM SERPL-MCNC: 9.5 MG/DL (ref 8.6–10.5)
CHLORIDE SERPL-SCNC: 100 MMOL/L (ref 98–107)
CO2 SERPL-SCNC: 27.2 MMOL/L (ref 22–29)
CREAT SERPL-MCNC: 0.65 MG/DL (ref 0.57–1)
EOSINOPHIL # BLD AUTO: 0.21 10*3/MM3 (ref 0–0.4)
EOSINOPHIL NFR BLD AUTO: 2.9 % (ref 0.3–6.2)
ERYTHROCYTE [DISTWIDTH] IN BLOOD BY AUTOMATED COUNT: 12.4 % (ref 12.3–15.4)
GLOBULIN SER CALC-MCNC: 2.6 GM/DL
GLUCOSE SERPL-MCNC: 93 MG/DL (ref 65–99)
HCT VFR BLD AUTO: 41.1 % (ref 34–46.6)
HGB BLD-MCNC: 14.3 G/DL (ref 12–15.9)
IMM GRANULOCYTES # BLD AUTO: 0.02 10*3/MM3 (ref 0–0.05)
IMM GRANULOCYTES NFR BLD AUTO: 0.3 % (ref 0–0.5)
LYMPHOCYTES # BLD AUTO: 1.77 10*3/MM3 (ref 0.7–3.1)
LYMPHOCYTES NFR BLD AUTO: 24.7 % (ref 19.6–45.3)
MCH RBC QN AUTO: 30.7 PG (ref 26.6–33)
MCHC RBC AUTO-ENTMCNC: 34.8 G/DL (ref 31.5–35.7)
MCV RBC AUTO: 88.2 FL (ref 79–97)
MONOCYTES # BLD AUTO: 0.68 10*3/MM3 (ref 0.1–0.9)
MONOCYTES NFR BLD AUTO: 9.5 % (ref 5–12)
NEUTROPHILS # BLD AUTO: 4.45 10*3/MM3 (ref 1.7–7)
NEUTROPHILS NFR BLD AUTO: 62 % (ref 42.7–76)
NRBC BLD AUTO-RTO: 0 /100 WBC (ref 0–0.2)
PLATELET # BLD AUTO: 272 10*3/MM3 (ref 140–450)
POTASSIUM SERPL-SCNC: 3.8 MMOL/L (ref 3.5–5.2)
PROT SERPL-MCNC: 7 G/DL (ref 6–8.5)
RBC # BLD AUTO: 4.66 10*6/MM3 (ref 3.77–5.28)
SODIUM SERPL-SCNC: 141 MMOL/L (ref 136–145)
WBC # BLD AUTO: 7.17 10*3/MM3 (ref 3.4–10.8)

## 2020-03-11 PROCEDURE — 93000 ELECTROCARDIOGRAM COMPLETE: CPT | Performed by: NURSE PRACTITIONER

## 2020-03-11 PROCEDURE — 99213 OFFICE O/P EST LOW 20 MIN: CPT | Performed by: NURSE PRACTITIONER

## 2020-03-11 RX ORDER — MECLIZINE HYDROCHLORIDE 25 MG/1
25 TABLET ORAL 3 TIMES DAILY PRN
Qty: 25 TABLET | Refills: 0 | Status: SHIPPED | OUTPATIENT
Start: 2020-03-11

## 2020-03-11 RX ORDER — METHYLPREDNISOLONE 4 MG/1
TABLET ORAL
Qty: 21 TABLET | Refills: 0 | Status: SHIPPED | OUTPATIENT
Start: 2020-03-11 | End: 2020-03-17

## 2020-03-16 NOTE — PROGRESS NOTES
Cary Robbins is a 59 y.o. female who presents due to dizziness and feeling lightheaded.    She presents due to increased congestion and drainage over the past week. However, she c/o feeling dizzy and lightheaded since Sunday with a personal hx of vertigo. She was seen at [facility] yesterday which was mildly helpful but continues to experience symptoms. She c/o a sensation that the room is spinning which is worse with turning head/changing position. Denies shortness of breath or chest pain.  She also c/o neck pain and stiffness with hx of DDD of cervical spine, denies paresthesias of upper extremities. No recent injury or trauma.    Dizziness   This is a new problem. The current episode started in the past 7 days. The problem occurs daily. The problem has been waxing and waning. Associated symptoms include congestion, coughing, nausea, neck pain (c/o pressure in head and neck) and a sore throat. Pertinent negatives include no abdominal pain, chest pain, chills, fatigue, fever, headaches, numbness, visual change, vomiting or weakness. She has tried lying down and rest (PT) for the symptoms. The treatment provided mild relief.        The following portions of the patient's history were reviewed and updated as appropriate: allergies, current medications, past social history and problem list.    Past Medical History:   Diagnosis Date   • Anxiety    • Graves disease    • Hard to intubate     TROUBLE WITH INTUBATION... has had cervical neck surgery and unable to bend neck too far   • Hypertension    • Hypothyroidism     under went radiation of thyroid and on supplement   • Low back pain     undergoing epidural   • Neuropathy     bilateral lower legs   • Osteoarthritis    • PONV (postoperative nausea and vomiting)    • Restless leg syndrome     TAKES XANAX FOR..   • Vertigo          Current Outpatient Medications:   •  ALLEGRA-D ALLERGY & CONGESTION  MG per 12 hr tablet, take 1 tablet by mouth twice a day,  Disp: 120 tablet, Rfl: 5  •  ALPRAZolam (XANAX) 1 MG tablet, TAKE 1 TABLET BY MOUTH THREE TIMES DAILY AS NEEDED FOR ANXIETY, Disp: 45 tablet, Rfl: 1  •  CBD (cannabidiol) oral oil, Take  by mouth. 500 mg twice daily, Disp: , Rfl:   •  estradiol (ESTRACE) 1 MG tablet, Take 1 tablet by mouth Daily., Disp: 90 tablet, Rfl: 1  •  fluticasone (FLONASE) 50 MCG/ACT nasal spray, INSTILL 2 SPRAYS INTO EACH NOSTRIL ONCE DAILY, Disp: 64 g, Rfl: 2  •  irbesartan-hydrochlorothiazide (AVALIDE) 150-12.5 MG tablet, TAKE 1 TABLET BY MOUTH ONCE DAILY, Disp: 120 tablet, Rfl: 5  •  levothyroxine (SYNTHROID, LEVOTHROID) 150 MCG tablet, TAKE 1 TABLET BY MOUTH ONCE DAILY, Disp: 30 tablet, Rfl: 5  •  meloxicam (MOBIC) 15 MG tablet, TAKE 1 TABLET BY MOUTH ONCE DAILY, Disp: 90 tablet, Rfl: 0  •  NON FORMULARY, Hemp cream, apply to knees, Disp: , Rfl:   •  pregabalin (LYRICA) 150 MG capsule, TAKE 1 CAPSULE BY MOUTH DAILY, Disp: 30 capsule, Rfl: 1  •  tiZANidine (ZANAFLEX) 4 MG tablet, Take 1 tablet by mouth Every 8 (Eight) Hours As Needed for Muscle Spasms., Disp: 90 tablet, Rfl: 0  •  meclizine (ANTIVERT) 25 MG tablet, Take 1 tablet by mouth 3 (Three) Times a Day As Needed for Dizziness., Disp: 25 tablet, Rfl: 0  •  methylPREDNISolone (MEDROL) 4 MG tablet, follow package directions, Disp: 21 tablet, Rfl: 0  •  montelukast (SINGULAIR) 10 MG tablet, Take 10 mg by mouth Every Morning., Disp: , Rfl:     Allergies   Allergen Reactions   • Ampicillin Nausea Only   • Oxycodone Nausea And Vomiting       Review of Systems   Constitutional: Negative for appetite change, chills, fatigue and fever.   HENT: Positive for congestion, ear pain, postnasal drip, rhinorrhea, sinus pressure and sore throat. Negative for ear discharge, facial swelling, sneezing and tinnitus.    Respiratory: Positive for cough. Negative for chest tightness, shortness of breath and wheezing.    Cardiovascular: Negative for chest pain, palpitations and leg swelling.  "  Gastrointestinal: Positive for nausea. Negative for abdominal pain, diarrhea and vomiting.   Musculoskeletal: Positive for neck pain (c/o pressure in head and neck) and neck stiffness.   Neurological: Positive for dizziness. Negative for weakness, numbness and headaches.   Hematological: Negative for adenopathy.       Objective   Vitals:    03/11/20 0902   BP: 150/100   BP Location: Left arm   Patient Position: Sitting   Cuff Size: Adult   Resp: 12   Temp: 97.8 °F (36.6 °C)   Weight: 80.3 kg (177 lb)   Height: 165.1 cm (65\")     Body mass index is 29.45 kg/m².  Physical Exam   Constitutional: She appears well-developed and well-nourished. She is cooperative. She does not have a sickly appearance. She does not appear ill.   HENT:   Head: Normocephalic.   Right Ear: Hearing and external ear normal. No drainage, swelling or tenderness. Tympanic membrane is bulging. Tympanic membrane is not erythematous. No middle ear effusion. No decreased hearing is noted.   Left Ear: Hearing and external ear normal. No drainage, swelling or tenderness. Tympanic membrane is bulging. Tympanic membrane is not erythematous.  No middle ear effusion. No decreased hearing is noted.   Nose: Nose normal. No mucosal edema, rhinorrhea, sinus tenderness or nasal deformity. Right sinus exhibits no maxillary sinus tenderness and no frontal sinus tenderness. Left sinus exhibits no maxillary sinus tenderness and no frontal sinus tenderness.   Mouth/Throat: Mucous membranes are normal. Posterior oropharyngeal erythema present.   Eyes: Conjunctivae and lids are normal.   Neck: Trachea normal. Muscular tenderness present.   Cardiovascular: Regular rhythm, normal heart sounds and normal pulses.   No murmur heard.  Pulmonary/Chest: Breath sounds normal. No respiratory distress. She has no decreased breath sounds. She has no wheezes. She has no rhonchi. She has no rales.   Lymphadenopathy:     She has no cervical adenopathy.   Neurological: She is " alert. She has normal strength. No sensory deficit.   Skin: Skin is warm, dry and intact.   Nursing note and vitals reviewed.      Assessment/Plan   Nancy was seen today for dizziness and neck pain.    Diagnoses and all orders for this visit:    Vertigo  -     CBC & Differential  -     Comprehensive Metabolic Panel  -     meclizine (ANTIVERT) 25 MG tablet; Take 1 tablet by mouth 3 (Three) Times a Day As Needed for Dizziness.    Cervicalgia  -     methylPREDNISolone (MEDROL) 4 MG tablet; follow package directions    Essential hypertension    Other orders  -     ECG 12 Lead      ECG 12 Lead  Date/Time: 3/11/2020 8:39 AM  Performed by: Laurie Moya MA  Authorized by: Radha Sy APRN   Comparison: compared with previous ECG from 12/18/2019  Similar to previous ECG  Rhythm: sinus rhythm  Rate: normal  BPM: 75  Conduction: conduction normal  ST Segments: ST segments normal  T Waves: T waves normal  QRS axis: normal    Clinical impression: normal ECG        1. No acute abnl noted on ECG. She will return to PT for additional therapy, requests refill on Meclizine which she states has been helpful at night.  2. She has a hx of DDD of cervical spine, will treat acute flare with Medrol Lupillo and continue to monitor.  3. Her BP is elevated today which I believe is due to vertigo and increased pain, continue to monitor (will f/u with office tomorrow with a phone call).

## 2020-04-03 ENCOUNTER — TELEPHONE (OUTPATIENT)
Dept: INTERNAL MEDICINE | Facility: CLINIC | Age: 60
End: 2020-04-03

## 2020-04-03 NOTE — TELEPHONE ENCOUNTER
Patient is returning call for Radha in regards to FMLA paperwork    Patient wants to advise that the dates are as followed  03/10/20-3/14/20 and patient returned to work on 03/16/20    Please advise  Patient can be called back at [phone]

## 2020-04-03 NOTE — TELEPHONE ENCOUNTER
Left message for patient to clarify what dates should be included on the [facility] paperwork. She was seen 3/11/2020, off work 3/11-3/14?    HUB: If patient returns call, please clarify what days she missed work. Thanks.

## 2020-04-03 NOTE — TELEPHONE ENCOUNTER
I spoke with pt and she said the paperwork needs to be completed for her vertigo, she had to miss four days of work.  She was seen 3/11/2020.  I put the form in your clinic basket.

## 2020-04-03 NOTE — TELEPHONE ENCOUNTER
Left message to let pt know we received [facility] paperwork, but there is no information with it, so we need to know why we are completing.   HUB:Please ask pt what she needs the [facility] paperwork for.  Thank you.

## 2020-04-13 RX ORDER — ESTRADIOL 2 MG/1
TABLET ORAL
Qty: 90 TABLET | Refills: 1 | Status: SHIPPED | OUTPATIENT
Start: 2020-04-13 | End: 2020-05-22 | Stop reason: DRUGHIGH

## 2020-04-27 DIAGNOSIS — J30.9 ALLERGIC RHINITIS: ICD-10-CM

## 2020-04-27 DIAGNOSIS — G62.9 PERIPHERAL POLYNEUROPATHY: ICD-10-CM

## 2020-04-28 RX ORDER — FEXOFENADINE HYDROCHLORIDE AND PSEUDOEPHEDRINE HYDROCHLORIDE 60; 120 MG/1; MG/1
TABLET, FILM COATED, EXTENDED RELEASE ORAL
Qty: 120 TABLET | Refills: 5 | Status: SHIPPED | OUTPATIENT
Start: 2020-04-28 | End: 2021-01-11

## 2020-04-28 RX ORDER — PREGABALIN 150 MG/1
150 CAPSULE ORAL DAILY
Qty: 30 CAPSULE | Refills: 1 | Status: SHIPPED | OUTPATIENT
Start: 2020-04-28 | End: 2020-06-29

## 2020-05-22 ENCOUNTER — TELEMEDICINE (OUTPATIENT)
Dept: INTERNAL MEDICINE | Facility: CLINIC | Age: 60
End: 2020-05-22

## 2020-05-22 DIAGNOSIS — M17.12 PRIMARY OSTEOARTHRITIS OF LEFT KNEE: ICD-10-CM

## 2020-05-22 DIAGNOSIS — M48.061 SPINAL STENOSIS OF LUMBAR REGION, UNSPECIFIED WHETHER NEUROGENIC CLAUDICATION PRESENT: Primary | ICD-10-CM

## 2020-05-22 PROCEDURE — 99213 OFFICE O/P EST LOW 20 MIN: CPT | Performed by: NURSE PRACTITIONER

## 2020-05-22 RX ORDER — HYDROCODONE BITARTRATE AND ACETAMINOPHEN 5; 325 MG/1; MG/1
1 TABLET ORAL EVERY 6 HOURS PRN
Qty: 15 TABLET | Refills: 0 | Status: SHIPPED | OUTPATIENT
Start: 2020-05-22 | End: 2020-05-25

## 2020-05-22 NOTE — PROGRESS NOTES
Subjective   Nancy Robbins is a 59 y.o. female who presents for a video visit regarding low back pain. She also c/o left knee pain.    She has a hx of chronic low back pain with previous hx of discectomy. She was doing well until Tuesday night when she c/o increased low back pain radiating into her left leg. She has a hx of chronic knee pain as well, has noticed increased pain in joint. She has taken Ibuprofen and Zanaflex with little improvement in sx. Denies change in bowel or bladder function.       The following portions of the patient's history were reviewed and updated as appropriate: allergies, current medications, past social history and problem list.    Past Medical History:   Diagnosis Date   • Anxiety    • Graves disease    • Hard to intubate     TROUBLE WITH INTUBATION... has had cervical neck surgery and unable to bend neck too far   • Hypertension    • Hypothyroidism     under went radiation of thyroid and on supplement   • Low back pain     undergoing epidural   • Neuropathy     bilateral lower legs   • Osteoarthritis    • PONV (postoperative nausea and vomiting)    • Restless leg syndrome     TAKES XANAX FOR..   • Vertigo          Current Outpatient Medications:   •  ALLEGRA-D ALLERGY & CONGESTION  MG per 12 hr tablet, TAKE 1 TABLET BY MOUTH TWICE A DAY, Disp: 120 tablet, Rfl: 5  •  ALPRAZolam (XANAX) 1 MG tablet, TAKE 1 TABLET BY MOUTH THREE TIMES DAILY AS NEEDED FOR ANXIETY, Disp: 45 tablet, Rfl: 1  •  CBD (cannabidiol) oral oil, Take  by mouth. 500 mg twice daily, Disp: , Rfl:   •  estradiol (ESTRACE) 1 MG tablet, Take 1 tablet by mouth Daily., Disp: 90 tablet, Rfl: 1  •  fluticasone (FLONASE) 50 MCG/ACT nasal spray, INSTILL 2 SPRAYS INTO EACH NOSTRIL ONCE DAILY, Disp: 64 g, Rfl: 2  •  irbesartan-hydrochlorothiazide (AVALIDE) 150-12.5 MG tablet, TAKE 1 TABLET BY MOUTH ONCE DAILY, Disp: 120 tablet, Rfl: 5  •  levothyroxine (SYNTHROID, LEVOTHROID) 150 MCG tablet, TAKE 1 TABLET BY MOUTH ONCE  DAILY, Disp: 30 tablet, Rfl: 5  •  meclizine (ANTIVERT) 25 MG tablet, Take 1 tablet by mouth 3 (Three) Times a Day As Needed for Dizziness., Disp: 25 tablet, Rfl: 0  •  meloxicam (MOBIC) 15 MG tablet, TAKE 1 TABLET BY MOUTH ONCE DAILY, Disp: 90 tablet, Rfl: 0  •  montelukast (SINGULAIR) 10 MG tablet, Take 10 mg by mouth Every Morning., Disp: , Rfl:   •  NON FORMULARY, Hemp cream, apply to knees, Disp: , Rfl:   •  pregabalin (LYRICA) 150 MG capsule, TAKE 1 CAPSULE BY MOUTH DAILY, Disp: 30 capsule, Rfl: 1  •  tiZANidine (ZANAFLEX) 4 MG tablet, Take 1 tablet by mouth Every 8 (Eight) Hours As Needed for Muscle Spasms., Disp: 90 tablet, Rfl: 0    Allergies   Allergen Reactions   • Ampicillin Nausea Only   • Oxycodone Nausea And Vomiting       Review of Systems   Constitutional: Negative for chills, fatigue, fever and unexpected weight change.   HENT: Negative for congestion, ear pain, postnasal drip, sinus pressure, sore throat and trouble swallowing.    Eyes: Negative for visual disturbance.   Respiratory: Negative for cough, chest tightness and wheezing.    Cardiovascular: Negative for chest pain, palpitations and leg swelling.   Gastrointestinal: Negative for abdominal pain, blood in stool, nausea and vomiting.   Genitourinary: Negative for dysuria, frequency and urgency.   Musculoskeletal: Positive for arthralgias and back pain. Negative for joint swelling.   Skin: Negative for color change.   Neurological: Negative for syncope, weakness and headaches.   Hematological: Does not bruise/bleed easily.       Objective   There were no vitals filed for this visit.  There is no height or weight on file to calculate BMI.  Physical Exam   Constitutional: She is oriented to person, place, and time. She appears well-developed and well-nourished.   HENT:   Head: Normocephalic and atraumatic.   Eyes: Conjunctivae are normal. Right eye exhibits no discharge. Left eye exhibits no discharge.   Pulmonary/Chest: Effort normal.    Neurological: She is alert and oriented to person, place, and time.   Psychiatric: She has a normal mood and affect. Her behavior is normal. Judgment and thought content normal.       Assessment/Plan   Nancy was seen today for back pain and knee pain.    Diagnoses and all orders for this visit:    Spinal stenosis of lumbar region, unspecified whether neurogenic claudication present  -     HYDROcodone-acetaminophen (NORCO) 5-325 MG per tablet; Take 1 tablet by mouth Every 6 (Six) Hours As Needed for Severe Pain  for up to 3 days.    Primary osteoarthritis of left knee    She will continue Ibuprofen and Zanaflex for acute pain and inflammation. She is also given Hydrocodone for breakthrough pain for short-term use. Discussed importance of not taking medication with Xanax (uses PRN).  She has seen ortho in past for left knee pain, will consider referral back if pain persists.    History and medical judgement obtained from video conversation with patient. No hands-on physical examination was performed. Approximately 15 minutes spent in video conversation with patient.    ARIANNA query complete. Treatment plan to include limited course of prescribed  controlled substance. Risks including addiction, benefits, and alternatives presented to patient.

## 2020-05-26 ENCOUNTER — TELEPHONE (OUTPATIENT)
Dept: INTERNAL MEDICINE | Facility: CLINIC | Age: 60
End: 2020-05-26

## 2020-05-26 NOTE — TELEPHONE ENCOUNTER
----- Message from AMBER Hobbs sent at 5/26/2020  7:16 AM EDT -----  Regarding: FW: Visit Follow-Up Question  Contact: [phone]  Please correct work note and send to patient. Thanks.  ----- Message -----  From: Kamla Schaffer LPN  Sent: 5/22/2020   3:24 PM EDT  To: AMBER Hobbs  Subject: FW: Visit Follow-Up Question                         ----- Message -----  From: Nancy Robbins  Sent: 5/22/2020   2:33 PM EDT  To: Wyatt [facility]  Subject: Visit Follow-Up Question                         Can you date the letter starting with 5/20/2020 as being my first day off till 5/27/2020 that way it will include all days for FMLA.    Thank you.

## 2020-05-28 ENCOUNTER — TELEPHONE (OUTPATIENT)
Dept: INTERNAL MEDICINE | Facility: CLINIC | Age: 60
End: 2020-05-28

## 2020-05-28 NOTE — TELEPHONE ENCOUNTER
Talked to patient.  She needed return to work date changed on her letter from May 28 to May 29th because the 28th is a scheduled day off for her.  This has been taken care of per Radha.  [facility] and emailed a copy to patient at [email or web address]

## 2020-05-29 DIAGNOSIS — M17.11 PRIMARY OSTEOARTHRITIS OF RIGHT KNEE: ICD-10-CM

## 2020-05-29 DIAGNOSIS — F41.9 ANXIETY: ICD-10-CM

## 2020-06-01 RX ORDER — MELOXICAM 15 MG/1
TABLET ORAL
Qty: 90 TABLET | Refills: 0 | Status: SHIPPED | OUTPATIENT
Start: 2020-06-01 | End: 2020-11-22 | Stop reason: SDUPTHER

## 2020-06-01 RX ORDER — ALPRAZOLAM 1 MG/1
TABLET ORAL
Qty: 45 TABLET | Refills: 0 | Status: SHIPPED | OUTPATIENT
Start: 2020-06-01 | End: 2020-06-29

## 2020-06-04 ENCOUNTER — TELEPHONE (OUTPATIENT)
Dept: INTERNAL MEDICINE | Facility: CLINIC | Age: 60
End: 2020-06-04

## 2020-06-28 DIAGNOSIS — F41.9 ANXIETY: ICD-10-CM

## 2020-06-29 DIAGNOSIS — G62.9 PERIPHERAL POLYNEUROPATHY: ICD-10-CM

## 2020-06-29 RX ORDER — PREGABALIN 150 MG/1
150 CAPSULE ORAL DAILY
Qty: 30 CAPSULE | Refills: 3 | Status: SHIPPED | OUTPATIENT
Start: 2020-06-29 | End: 2020-10-20 | Stop reason: SDUPTHER

## 2020-06-29 RX ORDER — ALPRAZOLAM 1 MG/1
TABLET ORAL
Qty: 45 TABLET | Refills: 0 | Status: SHIPPED | OUTPATIENT
Start: 2020-06-29 | End: 2020-08-29

## 2020-07-18 DIAGNOSIS — E03.9 ACQUIRED HYPOTHYROIDISM: ICD-10-CM

## 2020-07-20 RX ORDER — LEVOTHYROXINE SODIUM 0.15 MG/1
TABLET ORAL
Qty: 30 TABLET | Refills: 5 | Status: SHIPPED | OUTPATIENT
Start: 2020-07-20 | End: 2021-04-04 | Stop reason: SDUPTHER

## 2020-07-20 RX ORDER — LEVOTHYROXINE SODIUM 0.15 MG/1
150 TABLET ORAL DAILY
Qty: 30 TABLET | Refills: 1 | Status: SHIPPED | OUTPATIENT
Start: 2020-07-20 | End: 2021-02-26

## 2020-08-27 DIAGNOSIS — F41.9 ANXIETY: ICD-10-CM

## 2020-08-29 RX ORDER — ALPRAZOLAM 1 MG/1
TABLET ORAL
Qty: 45 TABLET | Refills: 0 | Status: SHIPPED | OUTPATIENT
Start: 2020-08-29 | End: 2020-10-20 | Stop reason: SDUPTHER

## 2020-09-23 PROCEDURE — 99203 OFFICE O/P NEW LOW 30 MIN: CPT | Performed by: EMERGENCY MEDICINE

## 2020-10-20 DIAGNOSIS — F41.9 ANXIETY: ICD-10-CM

## 2020-10-20 DIAGNOSIS — G62.9 PERIPHERAL POLYNEUROPATHY: ICD-10-CM

## 2020-10-21 NOTE — TELEPHONE ENCOUNTER
She is on controlled substances and and has not been seen since 5/2020, needs office visit. Thanks.

## 2020-10-22 RX ORDER — PREGABALIN 150 MG/1
150 CAPSULE ORAL DAILY
Qty: 30 CAPSULE | Refills: 1 | Status: SHIPPED | OUTPATIENT
Start: 2020-10-22 | End: 2021-01-07 | Stop reason: SDUPTHER

## 2020-10-22 RX ORDER — ALPRAZOLAM 1 MG/1
1 TABLET ORAL 3 TIMES DAILY PRN
Qty: 45 TABLET | Refills: 0 | Status: SHIPPED | OUTPATIENT
Start: 2020-10-22 | End: 2021-01-16 | Stop reason: SDUPTHER

## 2020-10-26 ENCOUNTER — OFFICE VISIT (OUTPATIENT)
Dept: INTERNAL MEDICINE | Facility: CLINIC | Age: 60
End: 2020-10-26

## 2020-10-26 VITALS
HEIGHT: 65 IN | DIASTOLIC BLOOD PRESSURE: 92 MMHG | TEMPERATURE: 97.8 F | BODY MASS INDEX: 29.99 KG/M2 | WEIGHT: 180 LBS | HEART RATE: 64 BPM | SYSTOLIC BLOOD PRESSURE: 148 MMHG | OXYGEN SATURATION: 98 %

## 2020-10-26 DIAGNOSIS — E03.9 ACQUIRED HYPOTHYROIDISM: ICD-10-CM

## 2020-10-26 DIAGNOSIS — Z23 NEED FOR INFLUENZA VACCINATION: ICD-10-CM

## 2020-10-26 DIAGNOSIS — G62.9 PERIPHERAL POLYNEUROPATHY: Primary | ICD-10-CM

## 2020-10-26 DIAGNOSIS — G25.81 RESTLESS LEGS: ICD-10-CM

## 2020-10-26 PROCEDURE — 90471 IMMUNIZATION ADMIN: CPT | Performed by: NURSE PRACTITIONER

## 2020-10-26 PROCEDURE — 99214 OFFICE O/P EST MOD 30 MIN: CPT | Performed by: NURSE PRACTITIONER

## 2020-10-26 PROCEDURE — 90686 IIV4 VACC NO PRSV 0.5 ML IM: CPT | Performed by: NURSE PRACTITIONER

## 2020-10-26 RX ORDER — PREGABALIN 75 MG/1
75 CAPSULE ORAL EVERY MORNING
Qty: 30 CAPSULE | Refills: 0 | Status: SHIPPED | OUTPATIENT
Start: 2020-10-26 | End: 2020-11-22 | Stop reason: SDUPTHER

## 2020-10-27 LAB
ALBUMIN SERPL-MCNC: 4.5 G/DL (ref 3.8–4.9)
ALBUMIN/GLOB SERPL: 1.9 {RATIO} (ref 1.2–2.2)
ALP SERPL-CCNC: 98 IU/L (ref 39–117)
ALT SERPL-CCNC: 26 IU/L (ref 0–32)
AST SERPL-CCNC: 18 IU/L (ref 0–40)
BASOPHILS # BLD AUTO: 0.1 X10E3/UL (ref 0–0.2)
BASOPHILS NFR BLD AUTO: 1 %
BILIRUB SERPL-MCNC: 0.4 MG/DL (ref 0–1.2)
BUN SERPL-MCNC: 14 MG/DL (ref 8–27)
BUN/CREAT SERPL: 23 (ref 12–28)
CALCIUM SERPL-MCNC: 9.6 MG/DL (ref 8.7–10.3)
CHLORIDE SERPL-SCNC: 101 MMOL/L (ref 96–106)
CO2 SERPL-SCNC: 27 MMOL/L (ref 20–29)
CREAT SERPL-MCNC: 0.62 MG/DL (ref 0.57–1)
EOSINOPHIL # BLD AUTO: 0.3 X10E3/UL (ref 0–0.4)
EOSINOPHIL NFR BLD AUTO: 4 %
ERYTHROCYTE [DISTWIDTH] IN BLOOD BY AUTOMATED COUNT: 11.9 % (ref 11.7–15.4)
GLOBULIN SER CALC-MCNC: 2.4 G/DL (ref 1.5–4.5)
GLUCOSE SERPL-MCNC: 90 MG/DL (ref 65–99)
HCT VFR BLD AUTO: 39.2 % (ref 34–46.6)
HGB BLD-MCNC: 13.5 G/DL (ref 11.1–15.9)
IMM GRANULOCYTES # BLD AUTO: 0 X10E3/UL (ref 0–0.1)
IMM GRANULOCYTES NFR BLD AUTO: 0 %
LYMPHOCYTES # BLD AUTO: 1.9 X10E3/UL (ref 0.7–3.1)
LYMPHOCYTES NFR BLD AUTO: 26 %
MCH RBC QN AUTO: 31.1 PG (ref 26.6–33)
MCHC RBC AUTO-ENTMCNC: 34.4 G/DL (ref 31.5–35.7)
MCV RBC AUTO: 90 FL (ref 79–97)
MONOCYTES # BLD AUTO: 0.6 X10E3/UL (ref 0.1–0.9)
MONOCYTES NFR BLD AUTO: 8 %
NEUTROPHILS # BLD AUTO: 4.4 X10E3/UL (ref 1.4–7)
NEUTROPHILS NFR BLD AUTO: 61 %
PLATELET # BLD AUTO: 292 X10E3/UL (ref 150–450)
POTASSIUM SERPL-SCNC: 3.6 MMOL/L (ref 3.5–5.2)
PROT SERPL-MCNC: 6.9 G/DL (ref 6–8.5)
RBC # BLD AUTO: 4.34 X10E6/UL (ref 3.77–5.28)
SODIUM SERPL-SCNC: 140 MMOL/L (ref 134–144)
TSH SERPL DL<=0.005 MIU/L-ACNC: 1.33 UIU/ML (ref 0.27–4.2)
WBC # BLD AUTO: 7.2 X10E3/UL (ref 3.4–10.8)

## 2020-10-27 NOTE — PROGRESS NOTES
Subjective   Nancy Robbins is a 60 y.o. female who presents for f/u regarding hypothyroidism, restless legs and peripheral neuropathy.    She has a hx of DDD of lumbar spine with bilateral peripheral neuropathy. She is currently managed on Lyrica 150 mg at night which she is tolerating well.  However, she continues to complain of a burning pain during the day as well as the evening.  She also c/o restless legs at night which has been recurrent for several years, currently taking 1/2-1 Xanax nightly with good results.    Hypothyroidism  This is a chronic problem. The current episode started more than 1 year ago. The problem occurs daily. The problem has been unchanged. Associated symptoms include arthralgias, congestion, neck pain and numbness. Pertinent negatives include no abdominal pain, chest pain, chills, coughing, fatigue, fever, headaches, joint swelling, nausea, sore throat, vomiting or weakness. Treatments tried: Synthroid.   Peripheral Neuropathy  Associated symptoms include arthralgias, congestion, neck pain and numbness. Pertinent negatives include no abdominal pain, chest pain, chills, coughing, fatigue, fever, headaches, joint swelling, nausea, sore throat, vomiting or weakness.        The following portions of the patient's history were reviewed and updated as appropriate: allergies, current medications, past social history and problem list.    Past Medical History:   Diagnosis Date   • Anxiety    • Graves disease    • Hard to intubate     TROUBLE WITH INTUBATION... has had cervical neck surgery and unable to bend neck too far   • Hypertension    • Hypothyroidism     under went radiation of thyroid and on supplement   • Low back pain     undergoing epidural   • Neuropathy     bilateral lower legs   • Osteoarthritis    • PONV (postoperative nausea and vomiting)    • Restless leg syndrome     TAKES XANAX FOR..   • Vertigo          Current Outpatient Medications:   •  ALLEGRA-D ALLERGY & CONGESTION   MG per 12 hr tablet, TAKE 1 TABLET BY MOUTH TWICE A DAY, Disp: 120 tablet, Rfl: 5  •  ALPRAZolam (XANAX) 1 MG tablet, Take 1 tablet by mouth 3 (Three) Times a Day As Needed for Anxiety. for anxiety, Disp: 45 tablet, Rfl: 0  •  CBD (cannabidiol) oral oil, Take  by mouth. 500 mg twice daily, Disp: , Rfl:   •  estradiol (ESTRACE) 1 MG tablet, Take 1 tablet by mouth Daily., Disp: 90 tablet, Rfl: 1  •  fluticasone (FLONASE) 50 MCG/ACT nasal spray, INSTILL 2 SPRAYS INTO EACH NOSTRIL ONCE DAILY, Disp: 64 g, Rfl: 2  •  irbesartan-hydrochlorothiazide (AVALIDE) 150-12.5 MG tablet, TAKE 1 TABLET BY MOUTH ONCE DAILY, Disp: 120 tablet, Rfl: 5  •  levothyroxine (SYNTHROID, LEVOTHROID) 150 MCG tablet, Take 1 tablet by mouth Daily. Okay to fill, but she needs appt to follow up and do labs., Disp: 30 tablet, Rfl: 1  •  meclizine (ANTIVERT) 25 MG tablet, Take 1 tablet by mouth 3 (Three) Times a Day As Needed for Dizziness., Disp: 25 tablet, Rfl: 0  •  meloxicam (MOBIC) 15 MG tablet, TAKE 1 TABLET BY MOUTH EVERY DAY, Disp: 90 tablet, Rfl: 0  •  NON FORMULARY, Hemp cream, apply to knees, Disp: , Rfl:   •  pregabalin (LYRICA) 150 MG capsule, Take 1 capsule by mouth Daily., Disp: 30 capsule, Rfl: 1  •  tiZANidine (ZANAFLEX) 4 MG tablet, Take 1 tablet by mouth Every 8 (Eight) Hours As Needed for Muscle Spasms., Disp: 90 tablet, Rfl: 0  •  levothyroxine (SYNTHROID, LEVOTHROID) 150 MCG tablet, TAKE 1 TABLET BY MOUTH EVERY DAY, Disp: 30 tablet, Rfl: 5  •  pregabalin (LYRICA) 75 MG capsule, Take 1 capsule by mouth Every Morning., Disp: 30 capsule, Rfl: 0    Allergies   Allergen Reactions   • Ampicillin Nausea Only   • Oxycodone Nausea And Vomiting       Review of Systems   Constitutional: Negative for chills, fatigue, fever and unexpected weight change.   HENT: Positive for congestion and postnasal drip. Negative for ear pain, sinus pressure, sore throat and trouble swallowing.    Eyes: Negative for visual disturbance.   Respiratory: Negative  "for cough, chest tightness and wheezing.    Cardiovascular: Negative for chest pain, palpitations and leg swelling.   Gastrointestinal: Negative for abdominal pain, blood in stool, nausea and vomiting.   Genitourinary: Negative for dysuria, frequency and urgency.   Musculoskeletal: Positive for arthralgias, back pain, neck pain and neck stiffness. Negative for joint swelling.   Skin: Negative for color change.   Neurological: Positive for numbness. Negative for syncope, weakness and headaches.   Hematological: Does not bruise/bleed easily.   Psychiatric/Behavioral: The patient is nervous/anxious.        Objective   Vitals:    10/26/20 1510   BP: 148/92   BP Location: Left arm   Patient Position: Sitting   Cuff Size: Adult   Pulse: 64   Temp: 97.8 °F (36.6 °C)   TempSrc: Oral   SpO2: 98%   Weight: 81.6 kg (180 lb)   Height: 165.1 cm (65\")     Body mass index is 29.95 kg/m².  Physical Exam  Constitutional:       Appearance: She is well-developed. She is not ill-appearing.   HENT:      Head: Normocephalic.      Right Ear: Hearing, tympanic membrane and external ear normal.      Left Ear: Hearing, tympanic membrane and external ear normal.      Nose: Nose normal. No nasal deformity, mucosal edema or rhinorrhea.      Right Sinus: No maxillary sinus tenderness or frontal sinus tenderness.      Left Sinus: No maxillary sinus tenderness or frontal sinus tenderness.      Mouth/Throat:      Dentition: Normal dentition.   Eyes:      General: Lids are normal.         Right eye: No discharge.         Left eye: No discharge.      Conjunctiva/sclera: Conjunctivae normal.      Right eye: No exudate.     Left eye: No exudate.     Pupils: Pupils are equal, round, and reactive to light.   Neck:      Musculoskeletal: Normal range of motion. No edema.      Thyroid: No thyroid mass or thyromegaly.      Vascular: No carotid bruit.      Trachea: Trachea normal.   Cardiovascular:      Rate and Rhythm: Regular rhythm.      Pulses: Normal " pulses.      Heart sounds: Normal heart sounds. No murmur.   Pulmonary:      Effort: No respiratory distress.      Breath sounds: Normal breath sounds. No decreased breath sounds, wheezing, rhonchi or rales.   Abdominal:      General: Bowel sounds are normal.      Palpations: Abdomen is soft.      Tenderness: There is no abdominal tenderness.   Lymphadenopathy:      Head:      Right side of head: No submental, submandibular, tonsillar, preauricular, posterior auricular or occipital adenopathy.      Left side of head: No submental, submandibular, tonsillar, preauricular, posterior auricular or occipital adenopathy.   Skin:     General: Skin is warm and dry.      Nails: There is no clubbing.     Neurological:      Mental Status: She is alert.   Psychiatric:         Behavior: Behavior is cooperative.         Assessment/Plan   Diagnoses and all orders for this visit:    1. Peripheral polyneuropathy (Primary)  -     pregabalin (LYRICA) 75 MG capsule; Take 1 capsule by mouth Every Morning.  Dispense: 30 capsule; Refill: 0    2. Acquired hypothyroidism  -     CBC & Differential  -     Comprehensive Metabolic Panel  -     TSH  -     CBC Auto Differential    3. Restless legs    4. Need for influenza vaccination  -     FluLaval Quad >6 Months ([id number])    Other orders  -     TSH  -     Comprehensive Metabolic Panel  -     CBC & Differential    1. Peripheral neuropathy-discussed use of Lyrica, will start 75 mg in the morning if tolerated and continue 150 mg at night.  2. Hypothyroidism-managed on Synthroid, recheck TSH.  3. Restless legs-discussed that Xanax is not ideal tx for restless legs, will try to titrate dose and continue to monitor.    ARIANNA query complete. Treatment plan to include limited course of prescribed  controlled substance. Risks including addiction, benefits, and alternatives presented to patient.

## 2020-10-29 LAB
REF LAB TEST METHOD: NORMAL

## 2020-11-06 DIAGNOSIS — N95.1 MENOPAUSAL SYMPTOMS: ICD-10-CM

## 2020-11-09 RX ORDER — ESTRADIOL 1 MG/1
1 TABLET ORAL DAILY
Qty: 90 TABLET | Refills: 1
Start: 2020-11-09 | End: 2020-11-12 | Stop reason: SDUPTHER

## 2020-11-12 DIAGNOSIS — N95.1 MENOPAUSAL SYMPTOMS: ICD-10-CM

## 2020-11-12 RX ORDER — ESTRADIOL 1 MG/1
1 TABLET ORAL DAILY
Qty: 90 TABLET | Refills: 1 | Status: SHIPPED | OUTPATIENT
Start: 2020-11-12 | End: 2021-02-08 | Stop reason: SDUPTHER

## 2020-11-22 DIAGNOSIS — G62.9 PERIPHERAL POLYNEUROPATHY: ICD-10-CM

## 2020-11-22 DIAGNOSIS — M17.11 PRIMARY OSTEOARTHRITIS OF RIGHT KNEE: ICD-10-CM

## 2020-11-23 RX ORDER — FLUTICASONE PROPIONATE 50 MCG
2 SPRAY, SUSPENSION (ML) NASAL DAILY
Qty: 64 G | Refills: 2 | Status: SHIPPED | OUTPATIENT
Start: 2020-11-23 | End: 2021-01-21 | Stop reason: SDUPTHER

## 2020-11-23 RX ORDER — PREGABALIN 75 MG/1
75 CAPSULE ORAL EVERY MORNING
Qty: 30 CAPSULE | Refills: 2 | Status: SHIPPED | OUTPATIENT
Start: 2020-11-23 | End: 2021-02-08 | Stop reason: SDUPTHER

## 2020-11-23 RX ORDER — MELOXICAM 15 MG/1
15 TABLET ORAL DAILY
Qty: 90 TABLET | Refills: 1 | Status: SHIPPED | OUTPATIENT
Start: 2020-11-23 | End: 2021-01-21 | Stop reason: SDUPTHER

## 2021-01-06 ENCOUNTER — IMMUNIZATION (OUTPATIENT)
Dept: VACCINE CLINIC | Facility: HOSPITAL | Age: 61
End: 2021-01-06

## 2021-01-06 PROCEDURE — 0001A: CPT | Performed by: INTERNAL MEDICINE

## 2021-01-06 PROCEDURE — 91300 HC SARSCOV02 VAC 30MCG/0.3ML IM: CPT | Performed by: INTERNAL MEDICINE

## 2021-01-07 DIAGNOSIS — G62.9 PERIPHERAL POLYNEUROPATHY: ICD-10-CM

## 2021-01-08 RX ORDER — PREGABALIN 150 MG/1
150 CAPSULE ORAL DAILY
Qty: 30 CAPSULE | Refills: 1 | Status: SHIPPED | OUTPATIENT
Start: 2021-01-08 | End: 2021-02-08 | Stop reason: SDUPTHER

## 2021-01-11 DIAGNOSIS — J30.9 ALLERGIC RHINITIS: ICD-10-CM

## 2021-01-11 RX ORDER — FEXOFENADINE HCL AND PSEUDOEPHEDRINE HCI 60; 120 MG/1; MG/1
TABLET, EXTENDED RELEASE ORAL
Qty: 120 TABLET | Refills: 5 | Status: SHIPPED | OUTPATIENT
Start: 2021-01-11 | End: 2022-03-21

## 2021-01-16 DIAGNOSIS — F41.9 ANXIETY: ICD-10-CM

## 2021-01-18 RX ORDER — ALPRAZOLAM 1 MG/1
1 TABLET ORAL 3 TIMES DAILY PRN
Qty: 45 TABLET | Refills: 0 | Status: SHIPPED | OUTPATIENT
Start: 2021-01-18 | End: 2021-01-27 | Stop reason: SDUPTHER

## 2021-01-21 DIAGNOSIS — M17.11 PRIMARY OSTEOARTHRITIS OF RIGHT KNEE: ICD-10-CM

## 2021-01-21 RX ORDER — IRBESARTAN AND HYDROCHLOROTHIAZIDE 150; 12.5 MG/1; MG/1
1 TABLET, FILM COATED ORAL DAILY
Qty: 90 TABLET | Refills: 1 | Status: SHIPPED | OUTPATIENT
Start: 2021-01-21 | End: 2021-07-17 | Stop reason: SDUPTHER

## 2021-01-21 RX ORDER — FLUTICASONE PROPIONATE 50 MCG
2 SPRAY, SUSPENSION (ML) NASAL DAILY
Qty: 64 G | Refills: 2 | Status: SHIPPED | OUTPATIENT
Start: 2021-01-21 | End: 2021-12-20 | Stop reason: SDUPTHER

## 2021-01-22 RX ORDER — MELOXICAM 15 MG/1
15 TABLET ORAL DAILY
Qty: 90 TABLET | Refills: 1 | Status: SHIPPED | OUTPATIENT
Start: 2021-01-22 | End: 2021-07-25 | Stop reason: SDUPTHER

## 2021-01-27 ENCOUNTER — IMMUNIZATION (OUTPATIENT)
Dept: VACCINE CLINIC | Facility: HOSPITAL | Age: 61
End: 2021-01-27

## 2021-01-27 DIAGNOSIS — F41.9 ANXIETY: ICD-10-CM

## 2021-01-27 PROCEDURE — 91300 HC SARSCOV02 VAC 30MCG/0.3ML IM: CPT | Performed by: INTERNAL MEDICINE

## 2021-01-27 PROCEDURE — 0002A: CPT | Performed by: INTERNAL MEDICINE

## 2021-01-28 RX ORDER — ALPRAZOLAM 1 MG/1
1 TABLET ORAL 3 TIMES DAILY PRN
Qty: 45 TABLET | Refills: 0 | Status: SHIPPED | OUTPATIENT
Start: 2021-01-28 | End: 2021-05-14 | Stop reason: SDUPTHER

## 2021-02-08 DIAGNOSIS — G62.9 PERIPHERAL POLYNEUROPATHY: ICD-10-CM

## 2021-02-08 DIAGNOSIS — N95.1 MENOPAUSAL SYMPTOMS: ICD-10-CM

## 2021-02-08 RX ORDER — ESTRADIOL 1 MG/1
1 TABLET ORAL DAILY
Qty: 90 TABLET | Refills: 1 | Status: SHIPPED | OUTPATIENT
Start: 2021-02-08 | End: 2021-08-01 | Stop reason: SDUPTHER

## 2021-02-09 RX ORDER — PREGABALIN 75 MG/1
75 CAPSULE ORAL EVERY MORNING
Qty: 30 CAPSULE | Refills: 2 | Status: SHIPPED | OUTPATIENT
Start: 2021-02-09 | End: 2021-05-12 | Stop reason: SDUPTHER

## 2021-02-09 RX ORDER — PREGABALIN 150 MG/1
150 CAPSULE ORAL DAILY
Qty: 30 CAPSULE | Refills: 2 | Status: SHIPPED | OUTPATIENT
Start: 2021-02-09 | End: 2021-05-12 | Stop reason: SDUPTHER

## 2021-02-11 DIAGNOSIS — F41.9 ANXIETY: ICD-10-CM

## 2021-02-11 NOTE — TELEPHONE ENCOUNTER
Controlled Contract needs to be updated.   Last OV 10/26/2020  Next OV 2/26/2021  [place] may need to be reviewed

## 2021-02-15 RX ORDER — ALPRAZOLAM 1 MG/1
1 TABLET ORAL 3 TIMES DAILY PRN
Qty: 45 TABLET | Refills: 0 | OUTPATIENT
Start: 2021-02-15

## 2021-02-26 ENCOUNTER — OFFICE VISIT (OUTPATIENT)
Dept: INTERNAL MEDICINE | Facility: CLINIC | Age: 61
End: 2021-02-26

## 2021-02-26 VITALS
TEMPERATURE: 97.8 F | DIASTOLIC BLOOD PRESSURE: 84 MMHG | HEART RATE: 105 BPM | SYSTOLIC BLOOD PRESSURE: 134 MMHG | WEIGHT: 188 LBS | BODY MASS INDEX: 31.32 KG/M2 | OXYGEN SATURATION: 98 % | HEIGHT: 65 IN

## 2021-02-26 DIAGNOSIS — F41.9 ANXIETY: ICD-10-CM

## 2021-02-26 DIAGNOSIS — E03.9 ACQUIRED HYPOTHYROIDISM: ICD-10-CM

## 2021-02-26 DIAGNOSIS — M48.062 SPINAL STENOSIS OF LUMBAR REGION WITH NEUROGENIC CLAUDICATION: Primary | ICD-10-CM

## 2021-02-26 DIAGNOSIS — I10 ESSENTIAL HYPERTENSION: Chronic | ICD-10-CM

## 2021-02-26 DIAGNOSIS — G62.9 PERIPHERAL POLYNEUROPATHY: ICD-10-CM

## 2021-02-26 DIAGNOSIS — J30.9 ALLERGIC RHINITIS, UNSPECIFIED SEASONALITY, UNSPECIFIED TRIGGER: Chronic | ICD-10-CM

## 2021-02-26 PROCEDURE — 99214 OFFICE O/P EST MOD 30 MIN: CPT | Performed by: NURSE PRACTITIONER

## 2021-02-26 RX ORDER — MONTELUKAST SODIUM 10 MG/1
10 TABLET ORAL NIGHTLY
Qty: 90 TABLET | Refills: 1 | Status: SHIPPED | OUTPATIENT
Start: 2021-02-26 | End: 2021-05-23 | Stop reason: SDUPTHER

## 2021-02-26 RX ORDER — TIZANIDINE 4 MG/1
4 TABLET ORAL EVERY 8 HOURS PRN
Qty: 90 TABLET | Refills: 2 | Status: SHIPPED | OUTPATIENT
Start: 2021-02-26 | End: 2022-04-19 | Stop reason: SDUPTHER

## 2021-02-26 NOTE — PROGRESS NOTES
"Chief Complaint  Hypertension, Hypothyroidism, Anxiety, and Knee Pain    Subjective          Nancy Robbins presents to [facility] for f/u regarding peripheral neuropathy, hypothyroidism and chronic knee and back pain.    She continues to c/o significant right knee pain, history of knee replacement in 2018.  She states pain is continuous but is worse with weightbearing activities.  She also has a history of chronic low back pain and has had epidurals in the past.  She is currently managed on Lyrica which has been helpful, also takes Tizanidine as needed.  She is now working in a position where she sits for long period of time.  She is tapering her Xanax usage as we discussed at her last visit and is doing well with this.  She does complain of increased sinus congestion and drainage without fever or chills.  She is currently taking Allegra and Flonase, takes Allegra-D as needed.  However, she continues to complain of sinus pressure and drainage.      Review of Systems   HENT: Positive for congestion, postnasal drip and sinus pressure.    Musculoskeletal: Positive for arthralgias, back pain, neck pain and neck stiffness.   Psychiatric/Behavioral: Positive for decreased concentration. The patient is nervous/anxious.      Objective   Vital Signs:   /84 (BP Location: Left arm, Patient Position: Sitting, Cuff Size: Adult)   Pulse 105   Temp 97.8 °F (36.6 °C) (Oral)   Ht 165.1 cm (65\")   Wt 85.3 kg (188 lb)   SpO2 98%   BMI 31.28 kg/m²     Physical Exam  Vitals signs and nursing note reviewed.   Constitutional:       Appearance: She is well-developed. She is not ill-appearing.   HENT:      Head: Normocephalic.      Right Ear: Hearing and external ear normal. No decreased hearing noted. No drainage, swelling or tenderness. No middle ear effusion. Tympanic membrane is bulging. Tympanic membrane is not erythematous.      Left Ear: Hearing and external ear normal. No decreased hearing " noted. No drainage, swelling or tenderness.  No middle ear effusion. Tympanic membrane is bulging. Tympanic membrane is not erythematous.      Nose: Nose normal. No nasal deformity, mucosal edema or rhinorrhea.      Right Sinus: No maxillary sinus tenderness or frontal sinus tenderness.      Left Sinus: No maxillary sinus tenderness or frontal sinus tenderness.      Mouth/Throat:      Pharynx: Posterior oropharyngeal erythema present.   Eyes:      General: Lids are normal.      Conjunctiva/sclera: Conjunctivae normal.   Neck:      Trachea: Trachea normal.   Cardiovascular:      Rate and Rhythm: Regular rhythm.      Pulses: Normal pulses.      Heart sounds: Normal heart sounds. No murmur.   Pulmonary:      Effort: No respiratory distress.      Breath sounds: Normal breath sounds. No decreased breath sounds, wheezing, rhonchi or rales.   Lymphadenopathy:      Cervical: No cervical adenopathy.   Skin:     General: Skin is warm and dry.   Neurological:      Mental Status: She is alert.   Psychiatric:         Behavior: Behavior is cooperative.        Result Review :   The following data was reviewed by: AMBER Gallegos on 02/26/2021:  Common labs    Common Labsle 3/11/20 3/11/20 10/26/20 10/26/20    1038 1038 1600 1600   Glucose  93 90    BUN  16 14    Creatinine  0.65 0.62    eGFR Non  Am  93 98    eGFR African Am  113 113    Sodium  141 140    Potassium  3.8 3.6    Chloride  100 101    Calcium  9.5 9.6    Total Protein  7.0 6.9    Albumin  4.40 4.5    Total Bilirubin  0.6 0.4    Alkaline Phosphatase  102 98    AST (SGOT)  25 18    ALT (SGPT)  43 (A) 26    WBC 7.17   7.2   Hemoglobin 14.3   13.5   Hematocrit 41.1   39.2   Platelets 272   292   (A) Abnormal value                      Assessment and Plan    Diagnoses and all orders for this visit:    1. Spinal stenosis of lumbar region with neurogenic claudication (Primary)  Assessment & Plan:  We discussed her job which requires prolonged sitting, discussed  stretching exercises which were given today.  She is currently managed on Lyrica and Tizanidine.    Orders:  -     tiZANidine (ZANAFLEX) 4 MG tablet; Take 1 tablet by mouth Every 8 (Eight) Hours As Needed for Muscle Spasms.  Dispense: 90 tablet; Refill: 2    2. Anxiety  Assessment & Plan:  She is tapering her Xanax usage, now at 1 daily.  She is encouraged to continue tapering medication due to addictive nature which we discussed today.      3. Peripheral polyneuropathy  Assessment & Plan:  Symptoms are currently managed with Lyrica       4. Acquired hypothyroidism  Assessment & Plan:  Currently managed on Synthroid, TSH within therapeutic range 10/2020      5. Allergic rhinitis, unspecified seasonality, unspecified trigger  Assessment & Plan:  She will continue Allegra and Flonase, will begin Singulair for better management of symptoms.  We discussed Allegra-D and its negative effect on blood pressure which she will continue to monitor.    Orders:  -     montelukast (SINGULAIR) 10 MG tablet; Take 1 tablet by mouth Every Night.  Dispense: 90 tablet; Refill: 1    6. Essential hypertension  Assessment & Plan:  Hypertension is unchanged.  Continue current treatment regimen.  Dietary sodium restriction.  Blood pressure will be reassessed at the next regular appointment.        Follow Up   Return in about 4 months (around 6/26/2021) for Annual physical with pap.  Patient was given instructions and counseling regarding her condition or for health maintenance advice. Please see specific information pulled into the AVS if appropriate.       Answers for HPI/ROS submitted by the patient on 2/26/2021   What is the primary reason for your visit?: Other  Please describe your symptoms.: Follow up  Have you had these symptoms before?: Yes  How long have you been having these symptoms?: Greater than 2 weeks  Please list any medications you are currently taking for this condition.: Lyrica  Please describe any probable cause for  these symptoms. : Pinched nerve

## 2021-02-26 NOTE — PATIENT INSTRUCTIONS
Back Exercises  These exercises help to make your trunk and back strong. They also help to keep the lower back flexible. Doing these exercises can help to prevent back pain or lessen existing pain.  · If you have back pain, try to do these exercises 2-3 times each day or as told by your doctor.  · As you get better, do the exercises once each day. Repeat the exercises more often as told by your doctor.  · To stop back pain from coming back, do the exercises once each day, or as told by your doctor.  Exercises  Single knee to chest  Do these steps 3-5 times in a row for each le. Lie on your back on a firm bed or the floor with your legs stretched out.  2. Bring one knee to your chest.  3. Grab your knee or thigh with both hands and hold them it in place.  4. Pull on your knee until you feel a gentle stretch in your lower back or buttocks.  5. Keep doing the stretch for 10-30 seconds.  6. Slowly let go of your leg and straighten it.  Pelvic tilt  Do these steps 5-10 times in a row:  1. Lie on your back on a firm bed or the floor with your legs stretched out.  2. Bend your knees so they point up to the ceiling. Your feet should be flat on the floor.  3. Tighten your lower belly (abdomen) muscles to press your lower back against the floor. This will make your tailbone point up to the ceiling instead of pointing down to your feet or the floor.  4. Stay in this position for 5-10 seconds while you gently tighten your muscles and breathe evenly.  Cat-cow  Do these steps until your lower back bends more easily:  1. Get on your hands and knees on a firm surface. Keep your hands under your shoulders, and keep your knees under your hips. You may put padding under your knees.  2. Let your head hang down toward your chest. Tighten (contract) the muscles in your belly. Point your tailbone toward the floor so your lower back becomes rounded like the back of a cat.  3. Stay in this position for 5 seconds.  4. Slowly lift your  head. Let the muscles of your belly relax. Point your tailbone up toward the ceiling so your back forms a sagging arch like the back of a cow.  5. Stay in this position for 5 seconds.    Press-ups  Do these steps 5-10 times in a row:  1. Lie on your belly (face-down) on the floor.  2. Place your hands near your head, about shoulder-width apart.  3. While you keep your back relaxed and keep your hips on the floor, slowly straighten your arms to raise the top half of your body and lift your shoulders. Do not use your back muscles. You may change where you place your hands in order to make yourself more comfortable.  4. Stay in this position for 5 seconds.  5. Slowly return to lying flat on the floor.    Bridges  Do these steps 10 times in a row:  1. Lie on your back on a firm surface.  2. Bend your knees so they point up to the ceiling. Your feet should be flat on the floor. Your arms should be flat at your sides, next to your body.  3. Tighten your butt muscles and lift your butt off the floor until your waist is almost as high as your knees. If you do not feel the muscles working in your butt and the back of your thighs, slide your feet 1-2 inches farther away from your butt.  4. Stay in this position for 3-5 seconds.  5. Slowly lower your butt to the floor, and let your butt muscles relax.  If this exercise is too easy, try doing it with your arms crossed over your chest.  Belly crunches  Do these steps 5-10 times in a row:  1. Lie on your back on a firm bed or the floor with your legs stretched out.  2. Bend your knees so they point up to the ceiling. Your feet should be flat on the floor.  3. Cross your arms over your chest.  4. Tip your chin a little bit toward your chest but do not bend your neck.  5. Tighten your belly muscles and slowly raise your chest just enough to lift your shoulder blades a tiny bit off of the floor. Avoid raising your body higher than that, because it can put too much stress on your low  back.  6. Slowly lower your chest and your head to the floor.  Back lifts  Do these steps 5-10 times in a row:  1. Lie on your belly (face-down) with your arms at your sides, and rest your forehead on the floor.  2. Tighten the muscles in your legs and your butt.  3. Slowly lift your chest off of the floor while you keep your hips on the floor. Keep the back of your head in line with the curve in your back. Look at the floor while you do this.  4. Stay in this position for 3-5 seconds.  5. Slowly lower your chest and your face to the floor.  Contact a doctor if:  · Your back pain gets a lot worse when you do an exercise.  · Your back pain does not get better 2 hours after you exercise.  If you have any of these problems, stop doing the exercises. Do not do them again unless your doctor says it is okay.  Get help right away if:  · You have sudden, very bad back pain. If this happens, stop doing the exercises. Do not do them again unless your doctor says it is okay.  This information is not intended to replace advice given to you by your health care provider. Make sure you discuss any questions you have with your health care provider.  Document Revised: 09/12/2019 Document Reviewed: 09/12/2019  Elsevier Patient Education © 2020 [employer]

## 2021-02-28 PROBLEM — M17.9 DJD (DEGENERATIVE JOINT DISEASE) OF KNEE: Chronic | Status: ACTIVE | Noted: 2018-12-10

## 2021-02-28 PROBLEM — G89.29 CHRONIC LOW BACK PAIN: Chronic | Status: ACTIVE | Noted: 2018-05-17

## 2021-02-28 PROBLEM — F41.9 ANXIETY: Chronic | Status: ACTIVE | Noted: 2021-02-28

## 2021-02-28 PROBLEM — F41.9 ANXIETY: Status: ACTIVE | Noted: 2021-02-28

## 2021-02-28 PROBLEM — M48.062 SPINAL STENOSIS OF LUMBAR REGION WITH NEUROGENIC CLAUDICATION: Status: ACTIVE | Noted: 2021-02-28

## 2021-02-28 PROBLEM — I10 ESSENTIAL HYPERTENSION: Chronic | Status: ACTIVE | Noted: 2018-05-17

## 2021-02-28 PROBLEM — M54.50 CHRONIC LOW BACK PAIN: Chronic | Status: ACTIVE | Noted: 2018-05-17

## 2021-02-28 PROBLEM — M48.062 SPINAL STENOSIS OF LUMBAR REGION WITH NEUROGENIC CLAUDICATION: Chronic | Status: ACTIVE | Noted: 2021-02-28

## 2021-02-28 PROBLEM — G62.9 PERIPHERAL POLYNEUROPATHY: Chronic | Status: ACTIVE | Noted: 2019-09-10

## 2021-02-28 NOTE — ASSESSMENT & PLAN NOTE
She continues to complain of significant right knee pain since replacement in 2018, she will schedule a follow-up appoint with Dr. Fu to further evaluate.

## 2021-02-28 NOTE — ASSESSMENT & PLAN NOTE
She is tapering her Xanax usage, now at 1 daily.  She is encouraged to continue tapering medication due to addictive nature which we discussed today.

## 2021-02-28 NOTE — ASSESSMENT & PLAN NOTE
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  She is currently managed on Lyrica and Tizanidine.

## 2021-02-28 NOTE — ASSESSMENT & PLAN NOTE
She will continue Allegra and Flonase, will begin Singulair for better management of symptoms.  We discussed Allegra-D and its negative effect on blood pressure which she will continue to monitor.

## 2021-04-04 DIAGNOSIS — E03.9 ACQUIRED HYPOTHYROIDISM: ICD-10-CM

## 2021-04-05 RX ORDER — LEVOTHYROXINE SODIUM 0.15 MG/1
150 TABLET ORAL DAILY
Qty: 90 TABLET | Refills: 1 | Status: SHIPPED | OUTPATIENT
Start: 2021-04-05 | End: 2021-10-08 | Stop reason: SDUPTHER

## 2021-05-12 DIAGNOSIS — G62.9 PERIPHERAL POLYNEUROPATHY: ICD-10-CM

## 2021-05-12 RX ORDER — PREGABALIN 150 MG/1
150 CAPSULE ORAL DAILY
Qty: 30 CAPSULE | Refills: 0 | Status: SHIPPED | OUTPATIENT
Start: 2021-05-12 | End: 2021-06-11 | Stop reason: SDUPTHER

## 2021-05-12 RX ORDER — PREGABALIN 75 MG/1
75 CAPSULE ORAL EVERY MORNING
Qty: 30 CAPSULE | Refills: 0 | Status: SHIPPED | OUTPATIENT
Start: 2021-05-12 | End: 2021-06-11 | Stop reason: SDUPTHER

## 2021-05-12 NOTE — TELEPHONE ENCOUNTER
Pt needs to update her contract last one on file 12/11/19  Last OV 2/26/21  No future visits scheduled

## 2021-05-14 DIAGNOSIS — F41.9 ANXIETY: ICD-10-CM

## 2021-05-17 RX ORDER — ALPRAZOLAM 1 MG/1
1 TABLET ORAL 3 TIMES DAILY PRN
Qty: 45 TABLET | Refills: 0 | Status: SHIPPED | OUTPATIENT
Start: 2021-05-17 | End: 2021-07-14 | Stop reason: SDUPTHER

## 2021-05-23 DIAGNOSIS — J30.9 ALLERGIC RHINITIS, UNSPECIFIED SEASONALITY, UNSPECIFIED TRIGGER: Chronic | ICD-10-CM

## 2021-05-24 RX ORDER — MONTELUKAST SODIUM 10 MG/1
10 TABLET ORAL NIGHTLY
Qty: 90 TABLET | Refills: 1 | Status: SHIPPED | OUTPATIENT
Start: 2021-05-24 | End: 2021-11-04 | Stop reason: SDUPTHER

## 2021-06-11 DIAGNOSIS — G62.9 PERIPHERAL POLYNEUROPATHY: ICD-10-CM

## 2021-06-11 RX ORDER — PREGABALIN 75 MG/1
75 CAPSULE ORAL EVERY MORNING
Qty: 30 CAPSULE | Refills: 1 | Status: SHIPPED | OUTPATIENT
Start: 2021-06-11 | End: 2021-08-08 | Stop reason: SDUPTHER

## 2021-06-11 RX ORDER — PREGABALIN 150 MG/1
150 CAPSULE ORAL DAILY
Qty: 30 CAPSULE | Refills: 1 | Status: SHIPPED | OUTPATIENT
Start: 2021-06-11 | End: 2021-08-08 | Stop reason: SDUPTHER

## 2021-06-22 ENCOUNTER — APPOINTMENT (OUTPATIENT)
Dept: WOMENS IMAGING | Facility: HOSPITAL | Age: 61
End: 2021-06-22

## 2021-06-22 ENCOUNTER — OFFICE VISIT (OUTPATIENT)
Dept: INTERNAL MEDICINE | Facility: CLINIC | Age: 61
End: 2021-06-22

## 2021-06-22 VITALS
WEIGHT: 190 LBS | SYSTOLIC BLOOD PRESSURE: 144 MMHG | BODY MASS INDEX: 31.65 KG/M2 | OXYGEN SATURATION: 98 % | HEIGHT: 65 IN | DIASTOLIC BLOOD PRESSURE: 80 MMHG | TEMPERATURE: 97.3 F | HEART RATE: 80 BPM

## 2021-06-22 DIAGNOSIS — M25.562 LEFT KNEE PAIN, UNSPECIFIED CHRONICITY: ICD-10-CM

## 2021-06-22 DIAGNOSIS — M48.062 SPINAL STENOSIS OF LUMBAR REGION WITH NEUROGENIC CLAUDICATION: Primary | Chronic | ICD-10-CM

## 2021-06-22 PROCEDURE — 73560 X-RAY EXAM OF KNEE 1 OR 2: CPT | Performed by: RADIOLOGY

## 2021-06-22 PROCEDURE — 73560 X-RAY EXAM OF KNEE 1 OR 2: CPT | Performed by: INTERNAL MEDICINE

## 2021-06-22 PROCEDURE — 99214 OFFICE O/P EST MOD 30 MIN: CPT | Performed by: INTERNAL MEDICINE

## 2021-06-22 RX ORDER — METHYLPREDNISOLONE 4 MG/1
TABLET ORAL
Qty: 21 TABLET | Refills: 0 | Status: SHIPPED | OUTPATIENT
Start: 2021-06-22 | End: 2021-12-14

## 2021-06-22 NOTE — PROGRESS NOTES
Chief Complaint   Patient presents with   • Leg Pain     left leg pain and knee is hard to bend very painful   • Knee Pain     swelling and pain   Answers for HPI/ROS submitted by the patient on 6/22/2021  Please describe your symptoms.: Pain in left leg& knee  Have you had these symptoms before?: Yes  How long have you been having these symptoms?: 1-4 days  Please list any medications you are currently taking for this condition.: Tylenol, Ibuprofen  Please describe any probable cause for these symptoms. : Sort of bent over, Went to move and could not bend knee, Excrutiating pain  What is the primary reason for your visit?: Other        Subjective   Nancy Robbins is a 60 y.o. female.     History of Present Illness     On Saturday, she was sort of bent over, when she stood up straight she had pain in her left knee.  She is having problems bending her knee due to pain.  She also has pain in her back. She has history of spinal stenosis and arthritis.She has been resting, using ice, tylenol and ibuprofen.  In the past, she has had epidural injections which did not help much.     The following portions of the patient's history were reviewed and updated as appropriate: allergies, current medications, past family history, past medical history, past social history, past surgical history and problem list.    Current Outpatient Medications on File Prior to Visit   Medication Sig   • ALPRAZolam (XANAX) 1 MG tablet Take 1 tablet by mouth 3 (Three) Times a Day As Needed for Anxiety. for anxiety   • estradiol (ESTRACE) 1 MG tablet Take 1 tablet by mouth Daily.   • fexofenadine-pseudoephedrine (Allegra-D Allergy & Congestion)  MG per 12 hr tablet TAKE 1 TABLET BY MOUTH TWICE DAILY   • fluticasone (FLONASE) 50 MCG/ACT nasal spray Use 2 sprays by in each nostril Daily.   • irbesartan-hydrochlorothiazide (AVALIDE) 150-12.5 MG tablet Take 1 tablet by mouth Daily.   • levothyroxine (SYNTHROID, LEVOTHROID) 150 MCG tablet Take 1  "tablet by mouth Daily.   • meclizine (ANTIVERT) 25 MG tablet Take 1 tablet by mouth 3 (Three) Times a Day As Needed for Dizziness.   • meloxicam (MOBIC) 15 MG tablet Take 1 tablet by mouth Daily.   • montelukast (SINGULAIR) 10 MG tablet Take 1 tablet by mouth Every Night.   • NON FORMULARY Hemp cream, apply to knees   • pregabalin (LYRICA) 150 MG capsule Take 1 capsule by mouth Daily.   • pregabalin (LYRICA) 75 MG capsule Take 1 capsule by mouth Every Morning.   • tiZANidine (ZANAFLEX) 4 MG tablet Take 1 tablet by mouth Every 8 (Eight) Hours As Needed for Muscle Spasms.     No current facility-administered medications on file prior to visit.           Review of Systems   Constitutional: Negative for fever.   Cardiovascular: Negative for leg swelling.   Musculoskeletal: Positive for arthralgias, back pain and gait problem (hurts to bend her knee. ).           Objective     /80   Pulse 80   Temp 97.3 °F (36.3 °C)   Ht 165.1 cm (65\")   Wt 86.2 kg (190 lb)   SpO2 98%   BMI 31.62 kg/m²       Physical Exam  Constitutional:       Appearance: Normal appearance.   Cardiovascular:      Rate and Rhythm: Normal rate and regular rhythm.   Pulmonary:      Effort: Pulmonary effort is normal. No respiratory distress.      Breath sounds: Normal breath sounds.   Musculoskeletal:      Lumbar back: Tenderness (some discomfort with bending forward) present. Decreased range of motion.      Left knee: Decreased range of motion. Tenderness present.   Neurological:      Mental Status: She is alert.           Assessment/Plan   Diagnoses and all orders for this visit:    1. Spinal stenosis of lumbar region with neurogenic claudication (Primary)  -     MRI Lumbar Spine Without Contrast; Future    2. Left knee pain, unspecified chronicity  -     XR Knee 1 or 2 View Left (In Office)    Other orders  -     methylPREDNISolone (MEDROL) 4 MG dose pack; Take as directed on package instructions.  Dispense: 21 tablet; Refill: 0      Advised " her she should not combine meloxicam with over the counter NSAID such as advil, ibuprofen, aleve.

## 2021-06-28 ENCOUNTER — HOSPITAL ENCOUNTER (OUTPATIENT)
Dept: MRI IMAGING | Facility: HOSPITAL | Age: 61
Discharge: HOME | End: 2021-06-28
Admitting: INTERNAL MEDICINE

## 2021-06-28 DIAGNOSIS — M48.062 SPINAL STENOSIS OF LUMBAR REGION WITH NEUROGENIC CLAUDICATION: Chronic | ICD-10-CM

## 2021-06-28 PROCEDURE — 72148 MRI LUMBAR SPINE W/O DYE: CPT

## 2021-06-30 ENCOUNTER — TELEPHONE (OUTPATIENT)
Dept: INTERNAL MEDICINE | Facility: CLINIC | Age: 61
End: 2021-06-30

## 2021-06-30 NOTE — TELEPHONE ENCOUNTER
----- Message from Liana River MD sent at 6/30/2021  7:00 AM EDT -----  Please let her know her back MRI shows slightly more prominent spinal stenosis.  Her left knee xray shows arthritis. Would she like to see an orthopedist for the knee?  Would she like to see a back specialist for the back?

## 2021-06-30 NOTE — TELEPHONE ENCOUNTER
Left message on VM informing and asking if she wants to go to orthopedist and or back specialist   HUB can inform

## 2021-07-13 ENCOUNTER — TELEPHONE (OUTPATIENT)
Dept: INTERNAL MEDICINE | Facility: CLINIC | Age: 61
End: 2021-07-13

## 2021-07-13 NOTE — TELEPHONE ENCOUNTER
Caller: Nancy Robbins    Relationship: Self    Best call back number: [phone] ([phone]    What is the medical concern/diagnosis: LOWER BACK PAIN AND MRI    What specialty or service is being requested: NEUROLOGY    What is the provider, practice or medical service name: [facility]    What is the office location: [facility]    What is the office phone number: UNKNOWN    Any additional details: PATIENT IS REQUESTING THAT [facility] TRY TO GET PATIENT SCHEDULED WITH NEUROLOGIST ASAP, PREFERS LATER IN THE AFTERNOON IF POSSIBLE

## 2021-07-14 DIAGNOSIS — M48.062 SPINAL STENOSIS OF LUMBAR REGION WITH NEUROGENIC CLAUDICATION: Primary | ICD-10-CM

## 2021-07-14 DIAGNOSIS — F41.9 ANXIETY: ICD-10-CM

## 2021-07-15 RX ORDER — ALPRAZOLAM 1 MG/1
1 TABLET ORAL NIGHTLY PRN
Qty: 30 TABLET | Refills: 0 | Status: SHIPPED | OUTPATIENT
Start: 2021-07-15 | End: 2021-09-08 | Stop reason: SDUPTHER

## 2021-07-15 NOTE — TELEPHONE ENCOUNTER
Contract 12/11/19-needs to be updated at next visit  Last OV 6/22/21 w/ Dr River  No f/u scheduled

## 2021-07-19 RX ORDER — IRBESARTAN AND HYDROCHLOROTHIAZIDE 150; 12.5 MG/1; MG/1
1 TABLET, FILM COATED ORAL DAILY
Qty: 90 TABLET | Refills: 1 | Status: SHIPPED | OUTPATIENT
Start: 2021-07-19 | End: 2022-01-12 | Stop reason: SDUPTHER

## 2021-07-25 DIAGNOSIS — M17.11 PRIMARY OSTEOARTHRITIS OF RIGHT KNEE: ICD-10-CM

## 2021-07-26 RX ORDER — MELOXICAM 15 MG/1
15 TABLET ORAL DAILY
Qty: 90 TABLET | Refills: 1 | Status: SHIPPED | OUTPATIENT
Start: 2021-07-26 | End: 2022-01-22 | Stop reason: SDUPTHER

## 2021-08-01 DIAGNOSIS — N95.1 MENOPAUSAL SYMPTOMS: ICD-10-CM

## 2021-08-01 RX ORDER — ESTRADIOL 1 MG/1
1 TABLET ORAL DAILY
Qty: 90 TABLET | Refills: 1 | Status: CANCELLED | OUTPATIENT
Start: 2021-08-01

## 2021-08-02 RX ORDER — ESTRADIOL 1 MG/1
1 TABLET ORAL DAILY
Qty: 90 TABLET | Refills: 1 | Status: SHIPPED | OUTPATIENT
Start: 2021-08-02 | End: 2022-01-25 | Stop reason: SDUPTHER

## 2021-08-08 DIAGNOSIS — G62.9 PERIPHERAL POLYNEUROPATHY: ICD-10-CM

## 2021-08-09 DIAGNOSIS — G62.9 PERIPHERAL POLYNEUROPATHY: ICD-10-CM

## 2021-08-09 RX ORDER — PREGABALIN 150 MG/1
150 CAPSULE ORAL DAILY
Qty: 30 CAPSULE | Refills: 1 | Status: SHIPPED | OUTPATIENT
Start: 2021-08-09 | End: 2021-10-07 | Stop reason: SDUPTHER

## 2021-08-09 RX ORDER — PREGABALIN 150 MG/1
150 CAPSULE ORAL DAILY
Qty: 30 CAPSULE | Refills: 1 | Status: CANCELLED | OUTPATIENT
Start: 2021-08-09

## 2021-08-09 RX ORDER — PREGABALIN 75 MG/1
75 CAPSULE ORAL EVERY MORNING
Qty: 30 CAPSULE | Refills: 1 | Status: SHIPPED | OUTPATIENT
Start: 2021-08-09 | End: 2021-10-07 | Stop reason: SDUPTHER

## 2021-08-09 RX ORDER — PREGABALIN 75 MG/1
75 CAPSULE ORAL EVERY MORNING
Qty: 30 CAPSULE | Refills: 1 | Status: CANCELLED | OUTPATIENT
Start: 2021-08-09

## 2021-08-09 NOTE — TELEPHONE ENCOUNTER
Please review refill request:    Last OV: 2021    Next OV: 2021    Last Prescribed: 2021    ARIANNA: Provider to retrieve and review    Controlled Substance Agreement Form: -will need to update at next OV.    Thank you,    Filipe

## 2021-09-08 DIAGNOSIS — F41.9 ANXIETY: ICD-10-CM

## 2021-09-08 RX ORDER — ALPRAZOLAM 1 MG/1
1 TABLET ORAL NIGHTLY PRN
Qty: 30 TABLET | Refills: 0 | Status: CANCELLED | OUTPATIENT
Start: 2021-09-08

## 2021-09-08 NOTE — TELEPHONE ENCOUNTER
Contract 12/11/19-needs to be updated at her next visit  Last OV 6/22/21 w/ Dr River  No f/u scheduled

## 2021-09-09 RX ORDER — ALPRAZOLAM 1 MG/1
1 TABLET ORAL NIGHTLY PRN
Qty: 30 TABLET | Refills: 0 | Status: SHIPPED | OUTPATIENT
Start: 2021-09-09 | End: 2021-11-04 | Stop reason: SDUPTHER

## 2021-10-07 DIAGNOSIS — G62.9 PERIPHERAL POLYNEUROPATHY: ICD-10-CM

## 2021-10-07 RX ORDER — PREGABALIN 75 MG/1
75 CAPSULE ORAL EVERY MORNING
Qty: 30 CAPSULE | Refills: 1 | Status: SHIPPED | OUTPATIENT
Start: 2021-10-07 | End: 2021-12-20 | Stop reason: SDUPTHER

## 2021-10-07 RX ORDER — PREGABALIN 150 MG/1
150 CAPSULE ORAL DAILY
Qty: 30 CAPSULE | Refills: 1 | Status: SHIPPED | OUTPATIENT
Start: 2021-10-07 | End: 2021-12-03 | Stop reason: SDUPTHER

## 2021-10-08 DIAGNOSIS — E03.9 ACQUIRED HYPOTHYROIDISM: ICD-10-CM

## 2021-10-08 RX ORDER — LEVOTHYROXINE SODIUM 0.15 MG/1
150 TABLET ORAL DAILY
Qty: 90 TABLET | Refills: 1 | Status: SHIPPED | OUTPATIENT
Start: 2021-10-08 | End: 2022-04-02 | Stop reason: SDUPTHER

## 2021-11-04 DIAGNOSIS — F41.9 ANXIETY: ICD-10-CM

## 2021-11-04 DIAGNOSIS — J30.9 ALLERGIC RHINITIS, UNSPECIFIED SEASONALITY, UNSPECIFIED TRIGGER: Chronic | ICD-10-CM

## 2021-11-05 RX ORDER — ALPRAZOLAM 1 MG/1
1 TABLET ORAL NIGHTLY PRN
Qty: 30 TABLET | Refills: 1 | Status: SHIPPED | OUTPATIENT
Start: 2021-11-05 | End: 2022-03-23 | Stop reason: SDUPTHER

## 2021-11-05 RX ORDER — MONTELUKAST SODIUM 10 MG/1
10 TABLET ORAL NIGHTLY
Qty: 90 TABLET | Refills: 1 | Status: SHIPPED | OUTPATIENT
Start: 2021-11-05 | End: 2022-05-09 | Stop reason: SDUPTHER

## 2021-12-03 DIAGNOSIS — G62.9 PERIPHERAL POLYNEUROPATHY: ICD-10-CM

## 2021-12-03 RX ORDER — PREGABALIN 75 MG/1
75 CAPSULE ORAL EVERY MORNING
Qty: 30 CAPSULE | Refills: 1 | Status: CANCELLED | OUTPATIENT
Start: 2021-12-03

## 2021-12-03 RX ORDER — PREGABALIN 150 MG/1
150 CAPSULE ORAL DAILY
Qty: 30 CAPSULE | Refills: 1 | Status: CANCELLED | OUTPATIENT
Start: 2021-12-03

## 2021-12-03 NOTE — PROGRESS NOTES
Subjective   Patient ID: Nancy Robbins is a 61 y.o. female is being seen for consultation today at the request of AMBER Hobbs for lumbar spinal stenosis.  MRI lumbar spine done on 6/28/21.    Patient reports today low back pain that radiates down both legs to her knees.  She states she has neuropathy.    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  She has had low back pain for 7 or so years.  Over the last 2 or 3 years she has had severe bilateral buttock and leg pain in an L4 distribution.  The most of the pain now still is in the back itself.  She has had a previous neck surgery by Dr. Whitley in 2003 from the front.  She is had a right knee replacement done by Dr. Fu about 3 or 4 years ago.  She continues to work.  She has had physical therapy and chiropractic which did not help.  She has had epidural blocks which really did not help.  She is never had an ablation.  She tells me that it is the low back that bothers her the most all of the legs are painful.  She still is working.  She takes Lyrica which helps her leg pain.  She has not had an ablation.  She has no motor deficits.  I told her ablation is not an unreasonable next step.  If that does not help then were talking about a major surgery such as a decompression and fusion at L3-L4 and L4-L5.  We will arrange for her to go to the pain clinic to try the ablation approach and have her come back.  But if that does not work then the fusion operation is probably going to need to do.        Back Pain  The pain is present in the lumbar spine. The quality of the pain is described as burning, stabbing and aching. The pain radiates to the right thigh and left thigh. The pain is at a severity of 7/10. The pain is the same all the time. The symptoms are aggravated by bending, lying down, sitting, stress, twisting, standing, position and coughing. Associated symptoms include leg pain, numbness and tingling. Pertinent negatives include no bladder  incontinence, bowel incontinence, chest pain or fever. She has tried bed rest, ice, heat, muscle relaxant and analgesics for the symptoms. The treatment provided no relief.       The following portions of the patient's history were reviewed and updated as appropriate: allergies, current medications, past family history, past medical history, past social history, past surgical history and problem list.    Review of Systems   Constitutional: Negative for chills and fever.   HENT: Negative for congestion.    Eyes: Negative for visual disturbance.   Respiratory: Negative for shortness of breath.    Cardiovascular: Negative for chest pain.   Gastrointestinal: Negative for bowel incontinence and nausea.   Endocrine: Positive for heat intolerance.        Weather makes her symptoms worse   Genitourinary: Negative for bladder incontinence and urgency.   Musculoskeletal: Positive for back pain.   Skin: Negative for rash.   Allergic/Immunologic: Negative for environmental allergies.   Neurological: Positive for tingling and numbness.   Hematological: Negative for adenopathy.   Psychiatric/Behavioral: Negative for sleep disturbance.   All other systems reviewed and are negative.      Objective   Physical Exam  Constitutional:       Appearance: She is well-developed.   HENT:      Head: Normocephalic and atraumatic.   Eyes:      Extraocular Movements: EOM normal.      Conjunctiva/sclera: Conjunctivae normal.      Pupils: Pupils are equal, round, and reactive to light.   Neck:      Vascular: No carotid bruit.   Neurological:      Mental Status: She is oriented to person, place, and time.      Coordination: Finger-Nose-Finger Test and Heel to Shin Test normal.      Gait: Gait is intact.      Deep Tendon Reflexes:      Reflex Scores:       Tricep reflexes are 2+ on the right side and 2+ on the left side.       Bicep reflexes are 2+ on the right side and 2+ on the left side.       Brachioradialis reflexes are 2+ on the right side and  2+ on the left side.       Patellar reflexes are 2+ on the right side and 2+ on the left side.       Achilles reflexes are 2+ on the right side and 2+ on the left side.  Psychiatric:         Speech: Speech normal.       Neurologic Exam     Mental Status   Oriented to person, place, and time.   Registration of memory: Good recent and remote memory.   Attention: normal. Concentration: normal.   Speech: speech is normal   Level of consciousness: alert  Knowledge: consistent with education.     Cranial Nerves     CN II   Visual fields full to confrontation.   Visual acuity: normal    CN III, IV, VI   Pupils are equal, round, and reactive to light.  Extraocular motions are normal.     CN V   Facial sensation intact.   Right corneal reflex: normal  Left corneal reflex: normal    CN VII   Facial expression full, symmetric.   Right facial weakness: none  Left facial weakness: none    CN VIII   Hearing: intact    CN IX, X   Palate: symmetric    CN XI   Right sternocleidomastoid strength: normal  Left sternocleidomastoid strength: normal    CN XII   Tongue: not atrophic  Tongue deviation: none    Motor Exam   Muscle bulk: normal  Right arm tone: normal  Left arm tone: normal  Right leg tone: normal  Left leg tone: normal    Strength   Strength 5/5 except as noted.     Sensory Exam   Light touch normal.     Gait, Coordination, and Reflexes     Gait  Gait: normal    Coordination   Finger to nose coordination: normal  Heel to shin coordination: normal    Reflexes   Right brachioradialis: 2+  Left brachioradialis: 2+  Right biceps: 2+  Left biceps: 2+  Right triceps: 2+  Left triceps: 2+  Right patellar: 2+  Left patellar: 2+  Right achilles: 2+  Left achilles: 2+  Right : 2+  Left : 2+      Assessment/Plan   Independent Review of Radiographic Studies:      I reviewed her lumbar MRI done on 6/28/2021 which shows spinal stenosis of a severe nature at L3-L4 and of the significant degree at L4-L5 with a superimposed  spondylolisthesis.  Agree with the report.      Medical Decision Making:    As per the above discussion, we will send her to Dr. Brito for lumbar medial branch blocks and possible radiofrequency ablation.  Hopefully this will reproduce some of her low back pain.  We will see her in about 5 months.  If her back and leg pain progressed to the point that it is absolutely intractable then she will need myelography and possible considerations for a decompression and fusion from L3-L5.      Diagnoses and all orders for this visit:    1. Spinal stenosis of lumbar region with neurogenic claudication (Primary)  -     Ambulatory Referral to Pain Management    2. Spondylolisthesis at L4-L5 level  -     Ambulatory Referral to Pain Management    3. Lumbar facet joint syndrome  -     Ambulatory Referral to Pain Management      Return in about 5 months (around 5/8/2022) for face to face.

## 2021-12-06 NOTE — TELEPHONE ENCOUNTER
Left detailed message with Radha' s comments advised to call back and schedule in order to receive refill.

## 2021-12-07 RX ORDER — PREGABALIN 150 MG/1
150 CAPSULE ORAL DAILY
Qty: 30 CAPSULE | Refills: 1 | Status: SHIPPED | OUTPATIENT
Start: 2021-12-07 | End: 2022-01-30 | Stop reason: SDUPTHER

## 2021-12-08 ENCOUNTER — OFFICE VISIT (OUTPATIENT)
Dept: NEUROSURGERY | Facility: CLINIC | Age: 61
End: 2021-12-08

## 2021-12-08 VITALS
OXYGEN SATURATION: 97 % | HEART RATE: 88 BPM | DIASTOLIC BLOOD PRESSURE: 74 MMHG | SYSTOLIC BLOOD PRESSURE: 132 MMHG | BODY MASS INDEX: 31.72 KG/M2 | WEIGHT: 190.6 LBS | TEMPERATURE: 97.7 F

## 2021-12-08 DIAGNOSIS — M47.816 LUMBAR FACET JOINT SYNDROME: ICD-10-CM

## 2021-12-08 DIAGNOSIS — M43.16 SPONDYLOLISTHESIS AT L4-L5 LEVEL: ICD-10-CM

## 2021-12-08 DIAGNOSIS — M48.062 SPINAL STENOSIS OF LUMBAR REGION WITH NEUROGENIC CLAUDICATION: Primary | ICD-10-CM

## 2021-12-08 PROCEDURE — 99204 OFFICE O/P NEW MOD 45 MIN: CPT | Performed by: NEUROLOGICAL SURGERY

## 2021-12-10 ENCOUNTER — PATIENT ROUNDING (BHMG ONLY) (OUTPATIENT)
Dept: NEUROSURGERY | Facility: CLINIC | Age: 61
End: 2021-12-10

## 2021-12-14 ENCOUNTER — OFFICE VISIT (OUTPATIENT)
Dept: INTERNAL MEDICINE | Facility: CLINIC | Age: 61
End: 2021-12-14

## 2021-12-14 VITALS
TEMPERATURE: 97.7 F | SYSTOLIC BLOOD PRESSURE: 140 MMHG | OXYGEN SATURATION: 98 % | DIASTOLIC BLOOD PRESSURE: 86 MMHG | HEIGHT: 65 IN | HEART RATE: 99 BPM | WEIGHT: 191 LBS | BODY MASS INDEX: 31.82 KG/M2

## 2021-12-14 DIAGNOSIS — E03.9 ACQUIRED HYPOTHYROIDISM: Chronic | ICD-10-CM

## 2021-12-14 DIAGNOSIS — J30.9 ALLERGIC RHINITIS, UNSPECIFIED SEASONALITY, UNSPECIFIED TRIGGER: Chronic | ICD-10-CM

## 2021-12-14 DIAGNOSIS — Z00.00 PHYSICAL EXAM: Primary | ICD-10-CM

## 2021-12-14 DIAGNOSIS — M48.062 SPINAL STENOSIS OF LUMBAR REGION WITH NEUROGENIC CLAUDICATION: Chronic | ICD-10-CM

## 2021-12-14 DIAGNOSIS — I10 ESSENTIAL HYPERTENSION: Chronic | ICD-10-CM

## 2021-12-14 PROCEDURE — 99396 PREV VISIT EST AGE 40-64: CPT | Performed by: NURSE PRACTITIONER

## 2021-12-14 NOTE — PROGRESS NOTES
"Chief Complaint  Hyperlipidemia, Hypothyroidism, and Dizziness    Subjective     {Problem List  Visit Diagnosis   Encounters  Notes  Medications  Labs  Result Review Imaging  Media :23}     Nancy Robbins presents to [facility] who presents for a physical exam.    History of Present Illness    Objective   Vital Signs:   /86 (BP Location: Left arm, Patient Position: Sitting, Cuff Size: Adult)   Pulse 99   Temp 97.7 °F (36.5 °C) (Temporal)   Ht 165.1 cm (65\")   Wt 86.6 kg (191 lb)   SpO2 98%   BMI 31.78 kg/m²     Physical Exam   Result Review :{Labs  Result Review  Imaging  Med Tab  Media  Procedures :23}   {The following data was reviewed by (Optional):91995}  {Ambulatory Labs (Optional):34649}  {Data reviewed (Optional):80736:::1}          Assessment and Plan {CC Problem List  Visit Diagnosis   ROS  Review (Popup)  Health Maintenance  Quality  BestPractice  Medications  SmartSets  SnapShot Encounters  Media :23}   There are no diagnoses linked to this encounter.  {Time Spent (Optional):33944}  Follow Up {Instructions Charge Capture  Follow-up Communications :23}  No follow-ups on file.  Patient was given instructions and counseling regarding her condition or for health maintenance advice. Please see specific information pulled into the AVS if appropriate.       Answers for HPI/ROS submitted by the patient on 12/14/2021  Please describe your symptoms.: Sinus drainage,pressure in ears,light headed  Have you had these symptoms before?: Yes  How long have you been having these symptoms?: 1-2 weeks  What is the primary reason for your visit?: Other      "

## 2021-12-15 PROBLEM — M43.16 SPONDYLOLISTHESIS AT L4-L5 LEVEL: Chronic | Status: ACTIVE | Noted: 2021-12-08

## 2021-12-15 LAB
ALBUMIN SERPL-MCNC: 4 G/DL (ref 3.8–4.8)
ALBUMIN/GLOB SERPL: 1.7 {RATIO} (ref 1.2–2.2)
ALP SERPL-CCNC: 95 IU/L (ref 44–121)
ALT SERPL-CCNC: 24 IU/L (ref 0–32)
AST SERPL-CCNC: 15 IU/L (ref 0–40)
BASOPHILS # BLD AUTO: 0 X10E3/UL (ref 0–0.2)
BASOPHILS NFR BLD AUTO: 1 %
BILIRUB SERPL-MCNC: 0.2 MG/DL (ref 0–1.2)
BUN SERPL-MCNC: 12 MG/DL (ref 8–27)
BUN/CREAT SERPL: 16 (ref 12–28)
CALCIUM SERPL-MCNC: 9.4 MG/DL (ref 8.7–10.3)
CHLORIDE SERPL-SCNC: 105 MMOL/L (ref 96–106)
CHOLEST SERPL-MCNC: 217 MG/DL (ref 100–199)
CO2 SERPL-SCNC: 21 MMOL/L (ref 20–29)
CREAT SERPL-MCNC: 0.76 MG/DL (ref 0.57–1)
EOSINOPHIL # BLD AUTO: 0.2 X10E3/UL (ref 0–0.4)
EOSINOPHIL NFR BLD AUTO: 4 %
ERYTHROCYTE [DISTWIDTH] IN BLOOD BY AUTOMATED COUNT: 11.9 % (ref 11.7–15.4)
GLOBULIN SER CALC-MCNC: 2.3 G/DL (ref 1.5–4.5)
GLUCOSE SERPL-MCNC: 115 MG/DL (ref 65–99)
HCT VFR BLD AUTO: 39.6 % (ref 34–46.6)
HDLC SERPL-MCNC: 60 MG/DL
HGB BLD-MCNC: 13.4 G/DL (ref 11.1–15.9)
IMM GRANULOCYTES # BLD AUTO: 0 X10E3/UL (ref 0–0.1)
IMM GRANULOCYTES NFR BLD AUTO: 0 %
LDLC SERPL CALC-MCNC: 135 MG/DL (ref 0–99)
LYMPHOCYTES # BLD AUTO: 1.7 X10E3/UL (ref 0.7–3.1)
LYMPHOCYTES NFR BLD AUTO: 29 %
MCH RBC QN AUTO: 30.7 PG (ref 26.6–33)
MCHC RBC AUTO-ENTMCNC: 33.8 G/DL (ref 31.5–35.7)
MCV RBC AUTO: 91 FL (ref 79–97)
MONOCYTES # BLD AUTO: 0.7 X10E3/UL (ref 0.1–0.9)
MONOCYTES NFR BLD AUTO: 11 %
NEUTROPHILS # BLD AUTO: 3.2 X10E3/UL (ref 1.4–7)
NEUTROPHILS NFR BLD AUTO: 55 %
PLATELET # BLD AUTO: 266 X10E3/UL (ref 150–450)
POTASSIUM SERPL-SCNC: 3.9 MMOL/L (ref 3.5–5.2)
PROT SERPL-MCNC: 6.3 G/DL (ref 6–8.5)
RBC # BLD AUTO: 4.36 X10E6/UL (ref 3.77–5.28)
SODIUM SERPL-SCNC: 141 MMOL/L (ref 134–144)
T4 FREE SERPL-MCNC: 1.6 NG/DL (ref 0.82–1.77)
TRIGL SERPL-MCNC: 125 MG/DL (ref 0–149)
TSH SERPL DL<=0.005 MIU/L-ACNC: 0.86 UIU/ML (ref 0.45–4.5)
VLDLC SERPL CALC-MCNC: 22 MG/DL (ref 5–40)
WBC # BLD AUTO: 5.8 X10E3/UL (ref 3.4–10.8)

## 2021-12-15 NOTE — PROGRESS NOTES
Subjective   Nancy Robbins is a 61 y.o. female who presents for a physical exam.    She has seen neurosurgery and has been referred to Dr. Brito for lumbar medial branch blocks and possible radiofrequency ablation. She will f/u with neurosurgery in April to re-evaluate low back pain which she states has been more severe over the past several months.       The following portions of the patient's history were reviewed and updated as appropriate: allergies, current medications, past social history and problem list.    Past Medical History:   Diagnosis Date   • Anxiety    • Graves disease    • Hard to intubate     TROUBLE WITH INTUBATION... has had cervical neck surgery and unable to bend neck too far   • Hypertension    • Hyperthyroidism    • Hypothyroidism     under went radiation of thyroid and on supplement   • Low back pain     undergoing epidural   • Neuropathy     bilateral lower legs   • Osteoarthritis    • PONV (postoperative nausea and vomiting)    • Restless leg syndrome     TAKES XANAX FOR..   • Vertigo          Current Outpatient Medications:   •  ALPRAZolam (XANAX) 1 MG tablet, Take 1 tablet by mouth At Night As Needed for Anxiety. for anxiety, Disp: 30 tablet, Rfl: 1  •  estradiol (ESTRACE) 1 MG tablet, Take 1 tablet by mouth Daily., Disp: 90 tablet, Rfl: 1  •  fexofenadine-pseudoephedrine (Allegra-D Allergy & Congestion)  MG per 12 hr tablet, TAKE 1 TABLET BY MOUTH TWICE DAILY, Disp: 120 tablet, Rfl: 5  •  fluticasone (FLONASE) 50 MCG/ACT nasal spray, Use 2 sprays by in each nostril Daily., Disp: 64 g, Rfl: 2  •  irbesartan-hydrochlorothiazide (AVALIDE) 150-12.5 MG tablet, Take 1 tablet by mouth Daily., Disp: 90 tablet, Rfl: 1  •  levothyroxine (SYNTHROID, LEVOTHROID) 150 MCG tablet, Take 1 tablet by mouth Daily., Disp: 90 tablet, Rfl: 1  •  meclizine (ANTIVERT) 25 MG tablet, Take 1 tablet by mouth 3 (Three) Times a Day As Needed for Dizziness., Disp: 25 tablet, Rfl: 0  •  meloxicam (MOBIC) 15 MG  tablet, Take 1 tablet by mouth Daily., Disp: 90 tablet, Rfl: 1  •  montelukast (SINGULAIR) 10 MG tablet, Take 1 tablet by mouth Every Night., Disp: 90 tablet, Rfl: 1  •  pregabalin (LYRICA) 150 MG capsule, Take 1 capsule by mouth Daily., Disp: 30 capsule, Rfl: 1  •  pregabalin (LYRICA) 75 MG capsule, Take 1 capsule by mouth Every Morning., Disp: 30 capsule, Rfl: 1  •  tiZANidine (ZANAFLEX) 4 MG tablet, Take 1 tablet by mouth Every 8 (Eight) Hours As Needed for Muscle Spasms., Disp: 90 tablet, Rfl: 2    Allergies   Allergen Reactions   • Ampicillin Nausea Only   • Oxycodone Nausea And Vomiting       Review of Systems   Constitutional: Negative for activity change, appetite change, chills, diaphoresis, fatigue, fever and unexpected weight change.   HENT: Positive for congestion, ear pain (bilateral ear pressure over past few days) and postnasal drip. Negative for dental problem, drooling, ear discharge, facial swelling, hearing loss, mouth sores, nosebleeds, rhinorrhea, sinus pressure, sore throat, tinnitus and trouble swallowing.    Eyes: Negative for photophobia, pain, discharge, redness, itching and visual disturbance.   Respiratory: Negative for apnea, cough, choking, chest tightness, shortness of breath and wheezing.    Cardiovascular: Negative for chest pain, palpitations and leg swelling.        No orthopnea, PND, VARELA   Gastrointestinal: Negative for abdominal pain, blood in stool, constipation, diarrhea, nausea and vomiting.   Endocrine: Negative for cold intolerance, heat intolerance, polydipsia and polyuria.   Genitourinary: Negative for decreased urine volume, dysuria, enuresis, flank pain, frequency, hematuria and urgency.   Musculoskeletal: Positive for arthralgias, back pain, neck pain and neck stiffness. Negative for gait problem, joint swelling and myalgias.   Skin: Negative for color change and rash.        No hair changes, no nail changes   Allergic/Immunologic: Negative for environmental allergies,  "food allergies and immunocompromised state.   Neurological: Positive for dizziness and light-headedness (intermittent episodes of vertigo). Negative for tremors, seizures, syncope, speech difficulty, weakness, numbness and headaches.   Hematological: Negative for adenopathy. Does not bruise/bleed easily.   Psychiatric/Behavioral: Negative for agitation, confusion, decreased concentration, dysphoric mood, sleep disturbance and suicidal ideas. The patient is nervous/anxious (has been able to decrease use of Xanax).        Objective   Vitals:    12/14/21 0946   BP: 140/86   BP Location: Left arm   Patient Position: Sitting   Cuff Size: Adult   Pulse: 99   Temp: 97.7 °F (36.5 °C)   TempSrc: Temporal   SpO2: 98%   Weight: 86.6 kg (191 lb)   Height: 165.1 cm (65\")     Body mass index is 31.78 kg/m².  Physical Exam  Constitutional:       General: She is not in acute distress.     Appearance: Normal appearance. She is not diaphoretic.   HENT:      Head: Normocephalic and atraumatic.      Right Ear: Tympanic membrane, ear canal and external ear normal.      Left Ear: Tympanic membrane, ear canal and external ear normal.      Nose: Nose normal. No rhinorrhea.      Mouth/Throat:      Mouth: Mucous membranes are moist.      Pharynx: Oropharynx is clear.   Eyes:      General:         Right eye: No discharge.         Left eye: No discharge.      Conjunctiva/sclera: Conjunctivae normal.   Cardiovascular:      Rate and Rhythm: Normal rate and regular rhythm.      Pulses: Normal pulses.      Heart sounds: Normal heart sounds.   Pulmonary:      Effort: Pulmonary effort is normal.      Breath sounds: Normal breath sounds.   Abdominal:      General: Bowel sounds are normal.      Tenderness: There is no abdominal tenderness.   Musculoskeletal:         General: No swelling or tenderness.      Cervical back: Normal range of motion.   Skin:     General: Skin is warm and dry.   Neurological:      General: No focal deficit present.      " Mental Status: She is alert and oriented to person, place, and time.   Psychiatric:         Mood and Affect: Mood normal.         Behavior: Behavior normal.         Judgment: Judgment normal.       Assessment/Plan   Diagnoses and all orders for this visit:    1. Physical exam (Primary)  -     CBC & Differential  -     Comprehensive Metabolic Panel  -     Lipid Panel  -     TSH    2. Essential hypertension  Assessment & Plan:  Her blood pressure is mildly elevated in the office today which she attributes to increased pain.  She will continue irbesartan-HCTZ along with home blood pressure readings.  Patient advised to call the office if readings are consistently >140/90.      3. Acquired hypothyroidism  Assessment & Plan:  She is currently managed on Synthroid, recheck TSH today.    Orders:  -     T4, Free    4. Allergic rhinitis, unspecified seasonality, unspecified trigger  Assessment & Plan:  Symptoms currently managed on Flonase and Allegra-D.  She notes increased ear pressure over the past several days, will add Mucinex 600 mg twice daily and continue to monitor.      5. Spinal stenosis of lumbar region with neurogenic claudication  Assessment & Plan:  Followed by neurosurgery, recently referred to pain management for further treatment.        Risk assessment:  She has a history of chronic back and neck, currently managed on Lyrica. She has also been referred to Dr. Brito for lumbar medial branch blocks and possible radiofrequency ablation.  Her Body mass index is 31.78 kg/m². - She tries to follow a low-fat, low-cholesterol diet but unfortunately activity is limited to chronic pain and prolonged sitting at job.    Prevention:  She has received her annual flu vaccine. Tdap is current.  Colonoscopy is due in 2026.   She has received the COVID-19 vaccine, due for booster.    Discussed healthy lifestyle choices such as maintaining a balanced diet low in carbohydrates and limiting caffeine and alcohol intake.   Recommended routine exercise for bone strength and cardiovascular health.

## 2021-12-15 NOTE — ASSESSMENT & PLAN NOTE
Symptoms currently managed on Flonase and Allegra-D.  She notes increased ear pressure over the past several days, will add Mucinex 600 mg twice daily and continue to monitor.

## 2021-12-15 NOTE — ASSESSMENT & PLAN NOTE
She has a longstanding history of anxiety.  She has tried to decrease her Xanax use which we have discussed.

## 2021-12-15 NOTE — ASSESSMENT & PLAN NOTE
Her blood pressure is mildly elevated in the office today which she attributes to increased pain.  She will continue irbesartan-HCTZ along with home blood pressure readings.  Patient advised to call the office if readings are consistently >140/90.

## 2021-12-20 DIAGNOSIS — G62.9 PERIPHERAL POLYNEUROPATHY: ICD-10-CM

## 2021-12-20 RX ORDER — FLUTICASONE PROPIONATE 50 MCG
2 SPRAY, SUSPENSION (ML) NASAL DAILY
Qty: 64 G | Refills: 2 | Status: CANCELLED | OUTPATIENT
Start: 2021-12-20

## 2021-12-20 RX ORDER — PREGABALIN 75 MG/1
75 CAPSULE ORAL EVERY MORNING
Qty: 30 CAPSULE | Refills: 1 | Status: CANCELLED | OUTPATIENT
Start: 2021-12-20

## 2021-12-20 NOTE — PROGRESS NOTES
Nancy, your hemoglobin and hematocrit are normal and do not show anemia.  Kidney and liver function as well as electrolytes are normal.  Glucose is mildly elevated but normal if labs are not done fasting.  Your cholesterol is mildly elevated (goal is for LDL to be under 100)-continue low-fat, low-cholesterol diet along with regular exercise.  Thyroid function is within therapeutic range-continue current dose of Synthroid.  Take care and I will see you in April.

## 2021-12-21 RX ORDER — FLUTICASONE PROPIONATE 50 MCG
2 SPRAY, SUSPENSION (ML) NASAL DAILY
Qty: 64 G | Refills: 2 | Status: SHIPPED | OUTPATIENT
Start: 2021-12-21 | End: 2022-12-02 | Stop reason: SDUPTHER

## 2021-12-21 RX ORDER — PREGABALIN 75 MG/1
75 CAPSULE ORAL EVERY MORNING
Qty: 30 CAPSULE | Refills: 1 | Status: SHIPPED | OUTPATIENT
Start: 2021-12-21 | End: 2022-02-28 | Stop reason: SDUPTHER

## 2022-01-11 ENCOUNTER — APPOINTMENT (OUTPATIENT)
Dept: PAIN MEDICINE | Facility: HOSPITAL | Age: 62
End: 2022-01-11

## 2022-01-12 RX ORDER — IRBESARTAN AND HYDROCHLOROTHIAZIDE 150; 12.5 MG/1; MG/1
1 TABLET, FILM COATED ORAL DAILY
Qty: 90 TABLET | Refills: 3 | Status: SHIPPED | OUTPATIENT
Start: 2022-01-12 | End: 2022-08-24 | Stop reason: DRUGHIGH

## 2022-01-22 DIAGNOSIS — M17.11 PRIMARY OSTEOARTHRITIS OF RIGHT KNEE: ICD-10-CM

## 2022-01-24 RX ORDER — MELOXICAM 15 MG/1
15 TABLET ORAL DAILY
Qty: 90 TABLET | Refills: 1 | Status: SHIPPED | OUTPATIENT
Start: 2022-01-24 | End: 2022-07-17 | Stop reason: SDUPTHER

## 2022-01-25 DIAGNOSIS — N95.1 MENOPAUSAL SYMPTOMS: ICD-10-CM

## 2022-01-25 RX ORDER — ESTRADIOL 1 MG/1
1 TABLET ORAL DAILY
Qty: 90 TABLET | Refills: 1 | Status: SHIPPED | OUTPATIENT
Start: 2022-01-25 | End: 2022-07-17 | Stop reason: SDUPTHER

## 2022-01-26 ENCOUNTER — ANESTHESIA EVENT (OUTPATIENT)
Dept: PAIN MEDICINE | Facility: HOSPITAL | Age: 62
End: 2022-01-26

## 2022-01-26 ENCOUNTER — HOSPITAL ENCOUNTER (OUTPATIENT)
Dept: PAIN MEDICINE | Facility: HOSPITAL | Age: 62
Discharge: HOME | End: 2022-01-26

## 2022-01-26 ENCOUNTER — HOSPITAL ENCOUNTER (OUTPATIENT)
Dept: GENERAL RADIOLOGY | Facility: HOSPITAL | Age: 62
Discharge: HOME | End: 2022-01-26

## 2022-01-26 ENCOUNTER — ANESTHESIA (OUTPATIENT)
Dept: PAIN MEDICINE | Facility: HOSPITAL | Age: 62
End: 2022-01-26

## 2022-01-26 VITALS
SYSTOLIC BLOOD PRESSURE: 134 MMHG | WEIGHT: 190 LBS | HEART RATE: 67 BPM | RESPIRATION RATE: 16 BRPM | BODY MASS INDEX: 30.53 KG/M2 | TEMPERATURE: 97.8 F | OXYGEN SATURATION: 98 % | HEIGHT: 66 IN | DIASTOLIC BLOOD PRESSURE: 63 MMHG

## 2022-01-26 DIAGNOSIS — R52 PAIN: ICD-10-CM

## 2022-01-26 DIAGNOSIS — M47.816 LUMBAR FACET ARTHROPATHY: Primary | ICD-10-CM

## 2022-01-26 PROCEDURE — 77003 FLUOROGUIDE FOR SPINE INJECT: CPT

## 2022-01-26 PROCEDURE — 25010000002 ROPIVACAINE PER 1 MG: Performed by: ANESTHESIOLOGY

## 2022-01-26 RX ORDER — LIDOCAINE HYDROCHLORIDE 10 MG/ML
1 INJECTION, SOLUTION INFILTRATION; PERINEURAL ONCE
Status: DISCONTINUED | OUTPATIENT
Start: 2022-01-26 | End: 2022-01-27 | Stop reason: HOSPADM

## 2022-01-26 RX ORDER — SODIUM CHLORIDE 0.9 % (FLUSH) 0.9 %
3-10 SYRINGE (ML) INJECTION AS NEEDED
Status: DISCONTINUED | OUTPATIENT
Start: 2022-01-26 | End: 2022-01-27 | Stop reason: HOSPADM

## 2022-01-26 RX ORDER — VIT C/B6/B5/MAGNESIUM/HERB 173 50-5-6-5MG
1000 CAPSULE ORAL DAILY
COMMUNITY
End: 2022-08-24

## 2022-01-26 RX ORDER — MIDAZOLAM HYDROCHLORIDE 1 MG/ML
1 INJECTION INTRAMUSCULAR; INTRAVENOUS AS NEEDED
Status: DISCONTINUED | OUTPATIENT
Start: 2022-01-26 | End: 2022-01-27 | Stop reason: HOSPADM

## 2022-01-26 RX ORDER — FENTANYL CITRATE 50 UG/ML
50 INJECTION, SOLUTION INTRAMUSCULAR; INTRAVENOUS AS NEEDED
Status: DISCONTINUED | OUTPATIENT
Start: 2022-01-26 | End: 2022-01-27 | Stop reason: HOSPADM

## 2022-01-26 RX ORDER — ROPIVACAINE HYDROCHLORIDE 5 MG/ML
20 INJECTION, SOLUTION EPIDURAL; INFILTRATION; PERINEURAL ONCE
Status: COMPLETED | OUTPATIENT
Start: 2022-01-26 | End: 2022-01-26

## 2022-01-26 RX ORDER — SODIUM CHLORIDE 0.9 % (FLUSH) 0.9 %
3 SYRINGE (ML) INJECTION EVERY 12 HOURS SCHEDULED
Status: DISCONTINUED | OUTPATIENT
Start: 2022-01-26 | End: 2022-01-27 | Stop reason: HOSPADM

## 2022-01-26 RX ADMIN — ROPIVACAINE HYDROCHLORIDE 30 ML: 5 INJECTION, SOLUTION EPIDURAL; INFILTRATION; PERINEURAL at 13:49

## 2022-01-26 NOTE — ANESTHESIA PROCEDURE NOTES
Bilateral diagnostic lumbar facet medial branch blocks to cover the levels of L3-L4 and L4-5    Pre-sedation assessment completed: 1/26/2022 1:43 PM    Patient reassessed immediately prior to procedure    Patient location during procedure: Pain Clinic  Start time: 1/26/2022 1:44 PM  Stop Time: 1/26/2022 1:54 PM    Reason for block: procedure for pain  Performed by  Anesthesiologist: Waldo Brito MD  Preanesthetic Checklist  Completed: patient identified, IV checked, site marked, risks and benefits discussed, surgical consent, monitors and equipment checked, pre-op evaluation and timeout performed  Prep:  Patient position: prone  Sterile barriers:cap, gloves, mask and sterile barrier  Prep: ChloraPrep  Patient monitoring: blood pressure monitoring, continuous pulse oximetry and EKG  Procedure:  Sedation:no  Approach:bilateral  Guidance:fluoroscopy  Location:lumbar  Interspace: to cover the levels of L3-L4 and L4-5.  Needle Type:Quincke  Needle Gauge:25 G  Aspiration:negative  Medications:  Comment:With 0.5% Ropivacaine, 0.5 mls per injection site.     Post Assessment  Injection Assessment: negative  Patient Tolerance:comfortable throughout block  Complications:no  Additional Notes  Post-Op Diagnosis Codes:     * Lumbar facet arthropathy (M47.816)     * Spinal stenosis, lumbar (M48.061)     * Spondylolisthesis of lumbar region (M43.16)    The patient was observed in recovery with no evidence of neurological deficits or other problems. All questions were answered. The patient was discharged with appropriate instructions.

## 2022-01-26 NOTE — H&P
CHIEF COMPLAINT:  Low back pain        HISTORY OF PRESENT ILLNESS:  This patient is complaining of low back pain which radiates in a bandlike pattern across her back.  She does have some pain at times in the buttock and posterior legs which is possibly related to the stenosis in her back.  It ranges between a 4 and 9 out of 10 on the pain scale.  The pain is described as aching, constant, cramping, deep, dull, numb, pressure, tingling, sharp, spasming, throbbing and stabbing in nature. The pain is exacerbated by initially standing and walking in upright position as well as twisting of her lower back seem to bother her the most.  Now she states that her pain is fairly bad and seems to bother her with most positions.  Her pain is relieved by rest and lying down.  The patient has tried conservative therapy for greater than 6 weeks including: Ice, rest, heat, over-the-counter medication, and prescription medication.  The pain is significantly decreasing the patient's Activities of Daily Living.      Diagnostic imaging: MRI of the lumbar spine from 6/28/2021 shows multilevel facet degeneration and stenosis.  The full dictation by the radiologist is available in the chart.  There is anterolisthesis of L4 on L5.    This patient was referred to our clinic by Dr. Gene Reed for bilateral diagnostic lumbar facet medial branch blocks to cover the levels of L3-L4 and L4-L5.  This would possibly lead to future radiofrequency ablation if appropriate.         PAST MEDICAL HISTORY:  Current Outpatient Medications on File Prior to Encounter   Medication Sig Dispense Refill   • ALPRAZolam (XANAX) 1 MG tablet Take 1 tablet by mouth At Night As Needed for Anxiety. for anxiety 30 tablet 1   • estradiol (ESTRACE) 1 MG tablet Take 1 tablet by mouth Daily. 90 tablet 1   • fexofenadine-pseudoephedrine (Allegra-D Allergy & Congestion)  MG per 12 hr tablet TAKE 1 TABLET BY MOUTH TWICE DAILY 120 tablet 5   • fluticasone (FLONASE)  "50 MCG/ACT nasal spray Use 2 sprays by in each nostril Daily. 64 g 2   • irbesartan-hydrochlorothiazide (AVALIDE) 150-12.5 MG tablet Take 1 tablet by mouth Daily. 90 tablet 3   • levothyroxine (SYNTHROID, LEVOTHROID) 150 MCG tablet Take 1 tablet by mouth Daily. 90 tablet 1   • meclizine (ANTIVERT) 25 MG tablet Take 1 tablet by mouth 3 (Three) Times a Day As Needed for Dizziness. 25 tablet 0   • meloxicam (MOBIC) 15 MG tablet Take 1 tablet by mouth Daily. 90 tablet 1   • montelukast (SINGULAIR) 10 MG tablet Take 1 tablet by mouth Every Night. 90 tablet 1   • pregabalin (LYRICA) 150 MG capsule Take 1 capsule by mouth Daily. 30 capsule 1   • pregabalin (LYRICA) 75 MG capsule Take 1 capsule by mouth Every Morning. 30 capsule 1   • tiZANidine (ZANAFLEX) 4 MG tablet Take 1 tablet by mouth Every 8 (Eight) Hours As Needed for Muscle Spasms. 90 tablet 2   • Turmeric 500 MG capsule Take 1,000 mg by mouth Daily.       No current facility-administered medications on file prior to encounter.       Past Medical History:   Diagnosis Date   • Anxiety    • Graves disease    • Hard to intubate     TROUBLE WITH INTUBATION... has had cervical neck surgery and unable to bend neck too far   • Hypertension    • Hyperthyroidism    • Hypothyroidism     under went radiation of thyroid and on supplement   • Low back pain     undergoing epidural   • Neuropathy     bilateral lower legs   • Osteoarthritis    • PONV (postoperative nausea and vomiting)    • Restless leg syndrome     TAKES XANAX FOR..   • Vertigo        SOCIAL HISTORY:  Counseled to avoid tobacco     REVIEW OF SYSTEMS:  No pertinent hematologic, infectious, or constitutional symptoms.  Other review of systems non-contributory     PHYSICAL EXAM:  /87 (BP Location: Left arm, Patient Position: Sitting)   Pulse 76   Temp 36.6 °C (97.8 °F) (Infrared)   Resp 16   Ht 167.6 cm (66\")   Wt 86.2 kg (190 lb)   SpO2 96%   BMI 30.67 kg/m²   Constitutional: Well-developed " well-nourished no acute distress  General: Alert and oriented  Neuromuscular: Tenderness palpation of the bilateral lumbar facets.  Facet loading is positive bilaterally.       DIAGNOSIS:  Post-Op Diagnosis Codes:  Post-Op Diagnosis Codes:     * Lumbar facet arthropathy [M47.816]     * Spinal stenosis, lumbar [M48.061]     * Spondylolisthesis of lumbar region [M43.16]     PLAN:  -Bilateral Lumbar facet medial branch injections to cover the level(s) of L3-L4 and L4-L5 spaced approximately 2-4 weeks apart. These injections will be for diagnostic purposes. If pain control is good but temporary, it was discussed with the patient that they may be a candidate for radiofrequency lesioning in the future.     - Risks were discussed with the patient, including but not limited to: failure of relief, worsening pain, tissue, nerve, or blood vessel damage, bleeding, infection, and abscess formation. Benefits and alternatives were also discussed. No promises were made. The patient voiced understanding.  -  Physical therapy exercises at home should be considered, as tolerated, as prescribed by physical therapy or from the pain clinic handout (given to the patient) to allow for a home exercise program.  Continuation of these exercises every day, or multiple times per week, even when the patient has good pain relief, was stressed to the patient as a preventative measure to decrease the frequency and severity of future pain episodes.  -  It is recommended that the patient use the least amount of opioid medication possible.     -  Heat and ice to the affected area as tolerated for pain control.  It was discussed that heating pads can cause burns.  -  Daily low impact exercise such as walking or water exercise was recommended to maintain overall health and aid in weight control.   -  Patient was counseled to abstain from tobacco products.  -  Follow up as needed for subsequent injections.      Waldo Brito M.D.  [facility]  [facility]  Board Certified in Pain Medicine by the American Board of Anesthesiology  Board Certified in Anesthesiology by the American Board of Anesthesiology     Much of this encounter note is an electronic transcription/translation of spoken language to printed text. The electronic translation of spoken language may permit erroneous, or at times, nonsensical words or phrases to be inadvertently transcribed. Although I have reviewed the note for such errors, some may still exist.

## 2022-01-30 DIAGNOSIS — G62.9 PERIPHERAL POLYNEUROPATHY: ICD-10-CM

## 2022-01-30 RX ORDER — PREGABALIN 150 MG/1
150 CAPSULE ORAL DAILY
Qty: 30 CAPSULE | Refills: 1 | Status: CANCELLED | OUTPATIENT
Start: 2022-01-30

## 2022-01-31 DIAGNOSIS — G62.9 PERIPHERAL POLYNEUROPATHY: ICD-10-CM

## 2022-02-01 RX ORDER — PREGABALIN 150 MG/1
150 CAPSULE ORAL DAILY
Qty: 30 CAPSULE | Refills: 1 | Status: SHIPPED | OUTPATIENT
Start: 2022-02-01 | End: 2022-04-01 | Stop reason: SDUPTHER

## 2022-02-16 ENCOUNTER — ANESTHESIA EVENT (OUTPATIENT)
Dept: PAIN MEDICINE | Facility: HOSPITAL | Age: 62
End: 2022-02-16

## 2022-02-16 ENCOUNTER — HOSPITAL ENCOUNTER (OUTPATIENT)
Dept: PAIN MEDICINE | Facility: HOSPITAL | Age: 62
Discharge: HOME | End: 2022-02-16

## 2022-02-16 ENCOUNTER — HOSPITAL ENCOUNTER (OUTPATIENT)
Dept: GENERAL RADIOLOGY | Facility: HOSPITAL | Age: 62
Discharge: HOME | End: 2022-02-16

## 2022-02-16 ENCOUNTER — ANESTHESIA (OUTPATIENT)
Dept: PAIN MEDICINE | Facility: HOSPITAL | Age: 62
End: 2022-02-16

## 2022-02-16 VITALS
DIASTOLIC BLOOD PRESSURE: 80 MMHG | HEART RATE: 80 BPM | TEMPERATURE: 98 F | SYSTOLIC BLOOD PRESSURE: 130 MMHG | RESPIRATION RATE: 16 BRPM | OXYGEN SATURATION: 97 %

## 2022-02-16 DIAGNOSIS — M47.816 LUMBAR FACET ARTHROPATHY: ICD-10-CM

## 2022-02-16 DIAGNOSIS — R52 PAIN: ICD-10-CM

## 2022-02-16 PROCEDURE — 77003 FLUOROGUIDE FOR SPINE INJECT: CPT

## 2022-02-16 PROCEDURE — 25010000002 ROPIVACAINE PER 1 MG: Performed by: ANESTHESIOLOGY

## 2022-02-16 RX ORDER — MIDAZOLAM HYDROCHLORIDE 1 MG/ML
1 INJECTION INTRAMUSCULAR; INTRAVENOUS AS NEEDED
Status: DISCONTINUED | OUTPATIENT
Start: 2022-02-16 | End: 2022-02-17 | Stop reason: HOSPADM

## 2022-02-16 RX ORDER — FENTANYL CITRATE 50 UG/ML
50 INJECTION, SOLUTION INTRAMUSCULAR; INTRAVENOUS AS NEEDED
Status: DISCONTINUED | OUTPATIENT
Start: 2022-02-16 | End: 2022-02-17 | Stop reason: HOSPADM

## 2022-02-16 RX ORDER — SODIUM CHLORIDE 0.9 % (FLUSH) 0.9 %
3-10 SYRINGE (ML) INJECTION AS NEEDED
Status: DISCONTINUED | OUTPATIENT
Start: 2022-02-16 | End: 2022-02-17 | Stop reason: HOSPADM

## 2022-02-16 RX ORDER — SODIUM CHLORIDE 0.9 % (FLUSH) 0.9 %
3 SYRINGE (ML) INJECTION EVERY 12 HOURS SCHEDULED
Status: DISCONTINUED | OUTPATIENT
Start: 2022-02-16 | End: 2022-02-17 | Stop reason: HOSPADM

## 2022-02-16 RX ORDER — LIDOCAINE HYDROCHLORIDE 10 MG/ML
1 INJECTION, SOLUTION INFILTRATION; PERINEURAL ONCE
Status: DISCONTINUED | OUTPATIENT
Start: 2022-02-16 | End: 2022-02-17 | Stop reason: HOSPADM

## 2022-02-16 RX ORDER — ROPIVACAINE HYDROCHLORIDE 5 MG/ML
20 INJECTION, SOLUTION EPIDURAL; INFILTRATION; PERINEURAL ONCE
Status: COMPLETED | OUTPATIENT
Start: 2022-02-16 | End: 2022-02-16

## 2022-02-16 RX ADMIN — ROPIVACAINE HYDROCHLORIDE 30 ML: 5 INJECTION, SOLUTION EPIDURAL; INFILTRATION; PERINEURAL at 14:22

## 2022-02-16 NOTE — H&P
CHIEF COMPLAINT:  Low back pain        HISTORY OF PRESENT ILLNESS:  This patient is complaining of low back pain which radiates in a bandlike pattern across her back.  She does have some pain at times in the buttock and posterior legs which is possibly related to the stenosis in her back.  It ranges between a 4 and 9 out of 10 on the pain scale.  The pain is significantly decreasing the patient's Activities of Daily Living.       Diagnostic imaging: MRI of the lumbar spine from 6/28/2021 shows multilevel facet degeneration and stenosis.  The full dictation by the radiologist is available in the chart.  There is anterolisthesis of L4 on L5.     This patient was referred to our clinic by Dr. Gene Reed for bilateral diagnostic lumbar facet medial branch blocks to cover the levels of L3-L4 and L4-L5.  This would possibly lead to future radiofrequency ablation if appropriate.    Prior diagnostic lumbar facet medial branch blocks performed on 1/26/22 injections afforded greater than 80% relief of pain and significantly increased activities of daily living.  The relief was temporary as expected.     PAST MEDICAL HISTORY:  Current Outpatient Medications on File Prior to Encounter   Medication Sig Dispense Refill   • ALPRAZolam (XANAX) 1 MG tablet Take 1 tablet by mouth At Night As Needed for Anxiety. for anxiety 30 tablet 1   • estradiol (ESTRACE) 1 MG tablet Take 1 tablet by mouth Daily. 90 tablet 1   • fexofenadine-pseudoephedrine (Allegra-D Allergy & Congestion)  MG per 12 hr tablet TAKE 1 TABLET BY MOUTH TWICE DAILY 120 tablet 5   • fluticasone (FLONASE) 50 MCG/ACT nasal spray Use 2 sprays by in each nostril Daily. 64 g 2   • irbesartan-hydrochlorothiazide (AVALIDE) 150-12.5 MG tablet Take 1 tablet by mouth Daily. 90 tablet 3   • levothyroxine (SYNTHROID, LEVOTHROID) 150 MCG tablet Take 1 tablet by mouth Daily. 90 tablet 1   • meclizine (ANTIVERT) 25 MG tablet Take 1 tablet by mouth 3 (Three) Times a Day As  Needed for Dizziness. 25 tablet 0   • meloxicam (MOBIC) 15 MG tablet Take 1 tablet by mouth Daily. 90 tablet 1   • montelukast (SINGULAIR) 10 MG tablet Take 1 tablet by mouth Every Night. 90 tablet 1   • pregabalin (LYRICA) 150 MG capsule Take 1 capsule by mouth Daily. 30 capsule 1   • pregabalin (LYRICA) 75 MG capsule Take 1 capsule by mouth Every Morning. 30 capsule 1   • tiZANidine (ZANAFLEX) 4 MG tablet Take 1 tablet by mouth Every 8 (Eight) Hours As Needed for Muscle Spasms. 90 tablet 2   • Turmeric 500 MG capsule Take 1,000 mg by mouth Daily.       No current facility-administered medications on file prior to encounter.       Past Medical History:   Diagnosis Date   • Anxiety    • Graves disease    • Hard to intubate     TROUBLE WITH INTUBATION... has had cervical neck surgery and unable to bend neck too far   • Hypertension    • Hyperthyroidism    • Hypothyroidism     under went radiation of thyroid and on supplement   • Low back pain     undergoing epidural   • Neuropathy     bilateral lower legs   • Osteoarthritis    • PONV (postoperative nausea and vomiting)    • Restless leg syndrome     TAKES XANAX FOR..   • Vertigo        SOCIAL HISTORY:  Counseled to avoid tobacco     REVIEW OF SYSTEMS:  No pertinent hematologic, infectious, or constitutional symptoms.  Other review of systems non-contributory     PHYSICAL EXAM:  /95 (BP Location: Left arm, Patient Position: Sitting)   Pulse 86   Temp 36.7 °C (98 °F) (Infrared)   Resp 16   SpO2 97%   Constitutional: Well-developed well-nourished no acute distress  General: Alert and oriented  Neuromuscular: Facet loading is positive bilaterally.  She has tenderness palpation of the bilateral lumbar paraspinal muscles and facets.       DIAGNOSIS:  Post-Op Diagnosis Codes:  Post-Op Diagnosis Codes:     * Lumbar facet arthropathy [M47.816]     * Spinal stenosis, lumbar [M48.061]     * Spondylolisthesis, lumbar region [M43.16]     PLAN:  -Bilateral Lumbar facet  medial branch injections #2 in the series to cover the level(s) of L3-L4 and L4-L5 spaced approximately 2-4 weeks apart. These injections will be for diagnostic purposes. If pain control is good but temporary, it was discussed with the patient that they will likely be a candidate for radiofrequency lesioning in the future.     - Risks were discussed with the patient, including but not limited to: failure of relief, worsening pain, tissue, nerve, or blood vessel damage, bleeding, infection, and abscess formation. Benefits and alternatives were also discussed. No promises were made. The patient voiced understanding.  -  Physical therapy exercises at home should be considered, as tolerated, as prescribed by physical therapy or from the pain clinic handout (given to the patient) to allow for a home exercise program.  Continuation of these exercises every day, or multiple times per week, even when the patient has good pain relief, was stressed to the patient as a preventative measure to decrease the frequency and severity of future pain episodes.  -  It is recommended that the patient use the least amount of opioid medication possible.     -  Heat and ice to the affected area as tolerated for pain control.  It was discussed that heating pads can cause burns.  -  Daily low impact exercise such as walking or water exercise was recommended to maintain overall health and aid in weight control.   -  Patient was counseled to abstain from tobacco products.  -  Follow up as needed for subsequent injections.      Waldo Brito M.D.  [place]  Board Certified in Pain Medicine by the American Board of Anesthesiology  Board Certified in Anesthesiology by the American Board of Anesthesiology     Much of this encounter note is an electronic transcription/translation of spoken language to printed text. The electronic translation of spoken language may permit erroneous, or at times,  nonsensical words or phrases to be inadvertently transcribed. Although I have reviewed the note for such errors, some may still exist.

## 2022-02-16 NOTE — ANESTHESIA PROCEDURE NOTES
Bilateral diagnostic lumbar facet medial branch blocks to cover the levels of L3-L4 L4-L5    Pre-sedation assessment completed: 2/16/2022 2:05 PM    Patient reassessed immediately prior to procedure    Patient location during procedure: Pain Clinic  Start time: 2/16/2022 2:08 PM  Stop Time: 2/16/2022 2:23 PM    Reason for block: procedure for pain  Performed by  Anesthesiologist: Waldo Brito MD  Preanesthetic Checklist  Completed: patient identified, IV checked, site marked, risks and benefits discussed, surgical consent, monitors and equipment checked, pre-op evaluation and timeout performed  Prep:  Patient position: prone  Sterile barriers:cap, gloves, mask and sterile barrier  Prep: ChloraPrep  Patient monitoring: blood pressure monitoring, continuous pulse oximetry and EKG  Procedure:  Sedation:no  Approach:bilateral  Guidance:fluoroscopy  Location:lumbar  Interspace: to cover the levels of L3-L4 L4-L5.  Needle Type:Quincke  Needle Gauge:25 G  Aspiration:negative  Medications:  Comment:With 0.5% Ropivacaine, 0.5 mls per injection site.     Post Assessment  Injection Assessment: negative  Patient Tolerance:comfortable throughout block  Complications:no  Additional Notes  Post-Op Diagnosis Codes:     * Lumbar facet arthropathy (M47.816)     * Spinal stenosis, lumbar (M48.061)     * Spondylolisthesis, lumbar region (M43.16)    The patient was observed in recovery with no evidence of neurological deficits or other problems. All questions were answered. The patient was discharged with appropriate instructions.             Parent

## 2022-02-23 DIAGNOSIS — G62.9 PERIPHERAL POLYNEUROPATHY: ICD-10-CM

## 2022-02-23 RX ORDER — PREGABALIN 75 MG/1
75 CAPSULE ORAL EVERY MORNING
Qty: 30 CAPSULE | Refills: 1 | OUTPATIENT
Start: 2022-02-23

## 2022-02-28 DIAGNOSIS — G62.9 PERIPHERAL POLYNEUROPATHY: ICD-10-CM

## 2022-02-28 RX ORDER — PREGABALIN 75 MG/1
75 CAPSULE ORAL EVERY MORNING
Qty: 30 CAPSULE | Refills: 1 | Status: CANCELLED | OUTPATIENT
Start: 2022-02-28

## 2022-03-01 DIAGNOSIS — G62.9 PERIPHERAL POLYNEUROPATHY: ICD-10-CM

## 2022-03-04 RX ORDER — PREGABALIN 75 MG/1
75 CAPSULE ORAL EVERY MORNING
Qty: 30 CAPSULE | Refills: 2 | Status: SHIPPED | OUTPATIENT
Start: 2022-03-04 | End: 2022-05-29 | Stop reason: SDUPTHER

## 2022-03-08 ENCOUNTER — ANESTHESIA (OUTPATIENT)
Dept: PAIN MEDICINE | Facility: HOSPITAL | Age: 62
End: 2022-03-08

## 2022-03-08 ENCOUNTER — ANESTHESIA EVENT (OUTPATIENT)
Dept: PAIN MEDICINE | Facility: HOSPITAL | Age: 62
End: 2022-03-08

## 2022-03-08 ENCOUNTER — HOSPITAL ENCOUNTER (OUTPATIENT)
Dept: GENERAL RADIOLOGY | Facility: HOSPITAL | Age: 62
Discharge: HOME | End: 2022-03-08

## 2022-03-08 ENCOUNTER — HOSPITAL ENCOUNTER (OUTPATIENT)
Dept: PAIN MEDICINE | Facility: HOSPITAL | Age: 62
Discharge: HOME | End: 2022-03-08

## 2022-03-08 VITALS
TEMPERATURE: 98.4 F | DIASTOLIC BLOOD PRESSURE: 89 MMHG | OXYGEN SATURATION: 97 % | SYSTOLIC BLOOD PRESSURE: 139 MMHG | RESPIRATION RATE: 16 BRPM | HEART RATE: 81 BPM

## 2022-03-08 DIAGNOSIS — M47.816 LUMBAR FACET ARTHROPATHY: ICD-10-CM

## 2022-03-08 DIAGNOSIS — R52 PAIN: ICD-10-CM

## 2022-03-08 PROCEDURE — 77003 FLUOROGUIDE FOR SPINE INJECT: CPT

## 2022-03-08 RX ORDER — LIDOCAINE HYDROCHLORIDE 10 MG/ML
1 INJECTION, SOLUTION INFILTRATION; PERINEURAL ONCE
Status: DISCONTINUED | OUTPATIENT
Start: 2022-03-08 | End: 2022-03-09 | Stop reason: HOSPADM

## 2022-03-08 RX ORDER — MIDAZOLAM HYDROCHLORIDE 1 MG/ML
1 INJECTION INTRAMUSCULAR; INTRAVENOUS AS NEEDED
Status: DISCONTINUED | OUTPATIENT
Start: 2022-03-08 | End: 2022-03-09 | Stop reason: HOSPADM

## 2022-03-08 RX ORDER — SODIUM CHLORIDE 0.9 % (FLUSH) 0.9 %
3-10 SYRINGE (ML) INJECTION AS NEEDED
Status: DISCONTINUED | OUTPATIENT
Start: 2022-03-08 | End: 2022-03-09 | Stop reason: HOSPADM

## 2022-03-08 RX ORDER — FENTANYL CITRATE 50 UG/ML
50 INJECTION, SOLUTION INTRAMUSCULAR; INTRAVENOUS AS NEEDED
Status: DISCONTINUED | OUTPATIENT
Start: 2022-03-08 | End: 2022-03-09 | Stop reason: HOSPADM

## 2022-03-08 RX ORDER — LIDOCAINE HYDROCHLORIDE 20 MG/ML
10 INJECTION, SOLUTION INFILTRATION; PERINEURAL ONCE
Status: COMPLETED | OUTPATIENT
Start: 2022-03-08 | End: 2022-03-08

## 2022-03-08 RX ORDER — LIDOCAINE HYDROCHLORIDE 10 MG/ML
10 INJECTION, SOLUTION INFILTRATION; PERINEURAL ONCE
Status: DISCONTINUED | OUTPATIENT
Start: 2022-03-08 | End: 2022-03-09 | Stop reason: HOSPADM

## 2022-03-08 RX ORDER — SODIUM CHLORIDE 0.9 % (FLUSH) 0.9 %
3 SYRINGE (ML) INJECTION EVERY 12 HOURS SCHEDULED
Status: DISCONTINUED | OUTPATIENT
Start: 2022-03-08 | End: 2022-03-09 | Stop reason: HOSPADM

## 2022-03-08 RX ADMIN — LIDOCAINE HYDROCHLORIDE 5 ML: 20 INJECTION, SOLUTION EPIDURAL; INFILTRATION; INTRACAUDAL; PERINEURAL at 12:24

## 2022-03-08 NOTE — H&P
CHIEF COMPLAINT:  Low back pain        HISTORY OF PRESENT ILLNESS:  This patient is complaining of low back pain which radiates in a bandlike pattern across her back.  She does have some pain at times in the buttock and posterior legs which is possibly related to the stenosis in her back.  It ranges between a 4 and 9 out of 10 on the pain scale.  The pain is significantly decreasing the patient's Activities of Daily Living.       Diagnostic imaging: MRI of the lumbar spine from 6/28/2021 shows multilevel facet degeneration and stenosis.  The full dictation by the radiologist is available in the chart.  There is anterolisthesis of L4 on L5.     This patient was referred to our clinic by Dr. Gene Reed for bilateral diagnostic lumbar facet medial branch blocks to cover the levels of L3-L4 and L4-L5.  This would possibly lead to future radiofrequency ablation if appropriate.     Prior diagnostic lumbar facet medial branch blocks performed on 1/26/22 and 2/16/2022 injections afforded greater than 80% relief of pain and significantly increased activities of daily living.  The relief was temporary as expected.       PAST MEDICAL HISTORY:  Current Outpatient Medications on File Prior to Encounter   Medication Sig Dispense Refill   • ALPRAZolam (XANAX) 1 MG tablet Take 1 tablet by mouth At Night As Needed for Anxiety. for anxiety 30 tablet 1   • estradiol (ESTRACE) 1 MG tablet Take 1 tablet by mouth Daily. 90 tablet 1   • fexofenadine-pseudoephedrine (Allegra-D Allergy & Congestion)  MG per 12 hr tablet TAKE 1 TABLET BY MOUTH TWICE DAILY 120 tablet 5   • fluticasone (FLONASE) 50 MCG/ACT nasal spray Use 2 sprays by in each nostril Daily. 64 g 2   • irbesartan-hydrochlorothiazide (AVALIDE) 150-12.5 MG tablet Take 1 tablet by mouth Daily. 90 tablet 3   • levothyroxine (SYNTHROID, LEVOTHROID) 150 MCG tablet Take 1 tablet by mouth Daily. 90 tablet 1   • meclizine (ANTIVERT) 25 MG tablet Take 1 tablet by mouth 3  (Three) Times a Day As Needed for Dizziness. 25 tablet 0   • meloxicam (MOBIC) 15 MG tablet Take 1 tablet by mouth Daily. 90 tablet 1   • montelukast (SINGULAIR) 10 MG tablet Take 1 tablet by mouth Every Night. 90 tablet 1   • pregabalin (LYRICA) 150 MG capsule Take 1 capsule by mouth Daily. 30 capsule 1   • pregabalin (LYRICA) 75 MG capsule Take 1 capsule by mouth Every Morning. 30 capsule 2   • tiZANidine (ZANAFLEX) 4 MG tablet Take 1 tablet by mouth Every 8 (Eight) Hours As Needed for Muscle Spasms. 90 tablet 2   • Turmeric 500 MG capsule Take 1,000 mg by mouth Daily.       No current facility-administered medications on file prior to encounter.       Past Medical History:   Diagnosis Date   • Anxiety    • Graves disease    • Hard to intubate     TROUBLE WITH INTUBATION... has had cervical neck surgery and unable to bend neck too far   • Hypertension    • Hyperthyroidism    • Hypothyroidism     under went radiation of thyroid and on supplement   • Low back pain     undergoing epidural   • Neuropathy     bilateral lower legs   • Osteoarthritis    • PONV (postoperative nausea and vomiting)    • Restless leg syndrome     TAKES XANAX FOR..   • Vertigo        SOCIAL HISTORY:  Counseled to avoid tobacco     REVIEW OF SYSTEMS:  No pertinent hematologic, infectious, or constitutional symptoms.  Other review of systems non-contributory     PHYSICAL EXAM:  /87 (BP Location: Left arm, Patient Position: Sitting)   Pulse 84   Temp 36.9 °C (98.4 °F) (Infrared)   Resp 16   SpO2 98%   Constitutional: Well-developed well-nourished no acute distress  General: Alert and oriented  Neuromuscular: Tenderness palpation of the bilateral lumbar facets and paraspinal muscles.  Facet loading is positive bilaterally.       DIAGNOSIS:  Post-Op Diagnosis Codes:  Post-Op Diagnosis Codes:     * Lumbar facet arthropathy [M47.816]     * Lumbar spinal stenosis [M48.061]     * Spondylolisthesis, lumbar region [M43.16]      PLAN:  -Left-sided Lumbar facet medial branch radio frequency ablation to cover the level(s) of L3-L4 and L4-L5.  We will bring her back next time for the contralateral side.  - This patient has received good but temporary relief from previous diagnostic lumbar facet medial branch injections.   - Risks were discussed with the patient, including but not limited to: failure of relief, worsening pain, dysesthesia, sensory loss, radicular pain, radiculopathy, weakness, tissue, nerve, nerve root or blood vessel damage, bleeding, infection, and abscess formation. Benefits and alternatives were also discussed. No promises were made. The patient voiced understanding.  -  Physical therapy exercises at home should be considered, as tolerated, as prescribed by physical therapy or from the pain clinic handout (given to the patient) to allow for a home exercise program.  Continuation of these exercises every day, or multiple times per week, even when the patient has good pain relief, was stressed to the patient as a preventative measure to decrease the frequency and severity of future pain episodes.  -  It is recommended that the patient use the least amount of opioid medication possible.     -  Heat and ice to the affected area as tolerated for pain control.  It was discussed that heating pads can cause burns.  -  Daily low impact exercise such as walking or water exercise was recommended to maintain overall health and aid in weight control.   -  Patient was counseled to abstain from tobacco products.  -  Follow up as needed for subsequent injections.      Waldo Brito M.D.  [facility], [facility]  Board Certified in Pain Medicine by the American Board of Anesthesiology  Board Certified in Anesthesiology by the American Board of Anesthesiology     Much of this encounter note is an electronic transcription/translation of spoken language to printed text. The electronic translation of  spoken language may permit erroneous, or at times, nonsensical words or phrases to be inadvertently transcribed. Although I have reviewed the note for such errors, some may still exist.

## 2022-03-08 NOTE — ANESTHESIA PROCEDURE NOTES
Left-sided lumbar facet medial branch radiofrequency ablation to cover the levels of L3-L4 and L4-L5    Pre-sedation assessment completed: 3/8/2022 12:09 PM    Patient reassessed immediately prior to procedure    Patient location during procedure: Pain Clinic  Start time: 3/8/2022 12:10 PM  Stop Time: 3/8/2022 12:29 PM    Reason for block: procedure for pain  Performed by  Anesthesiologist: Waldo Brito MD  Preanesthetic Checklist  Completed: patient identified, IV checked, site marked, risks and benefits discussed, surgical consent, monitors and equipment checked, pre-op evaluation and timeout performed  Prep:  Patient position: prone  Sterile barriers:cap, gloves, mask and sterile barrier  Prep: ChloraPrep  Patient monitoring: blood pressure monitoring, continuous pulse oximetry and EKG  Procedure:  Sedation:no  Approach:unilateral left  Guidance:fluoroscopy  Location:lumbar  Interspace:  to cover the levels of L3-L4 and L4-L5.  Needle type: Radiofrequency ablation needles with 1 cm active tips.  Needle Gauge:20 G  Aspiration:negative  Medications:  Comment:0.5 cc of 2% lidocaine was injected at each level after positive sensory and negative motor stimulation testing was performed.    Post Assessment  Injection Assessment: negative  Patient Tolerance:comfortable throughout block  Complications:no  Additional Notes  The patient was brought into the procedure room and placed in a prone position and was comfortable.  ChloraPrep was used and allowed to dry appropriately.  Sterile towels were placed around the patient's back.  An ipsilateral oblique and caudal tilt was used on the x-ray machine to visualize targets.  Skin was numbed up with 2 cc of 1% lidocaine at each injection site.  Radiofrequency needles were advanced to the groove between the transverse process and the superior articular process.  This was done to cover the left-sided L3-L4 and L4-5 level(s) (designated by the SAP and transverse process).   Once the needle was seated against the superior margin of the transverse process, where it joins the superior articular process of the facet, cannula was walked off the superior margin of the transverse process and advanced 2 mm to position the active tip along the course of the medial branch nerve.  Sensory testing was positive at each location with a stimulation at 50 Hz at less than 0.5 V.  There was no motor stimulation to the affected myotomes of the lower extremities at 2 Hz up to 2 V.  Impedances were 266, 355, and 371 ohms respectively.  Great care was taken not to move the cannulae.  Each level was anesthetized with 0.5 mL of 2% lidocaine, and lesions were created at 80 °C for 90 seconds.  Needles were removed and bandages were placed per nursing.  At no time during the ablation did the patient have any pain or symptoms radiating down hthe buttocks, groin, hip, or leg.  The patient was taken to recovery by nursing and observed appropriately.    Post-Op Diagnosis Codes:     * Lumbar facet arthropathy (M47.816)     * Lumbar spinal stenosis (M48.061)     * Spondylolisthesis, lumbar region (M43.16)    The patient was observed in recovery with no evidence of new onset neurological deficits or other problems. All questions were answered. The patient was discharged with appropriate instructions.

## 2022-03-08 NOTE — EXTERNAL PATIENT INSTRUCTIONS
Patient Education   Table of Contents       Back Exercises       Radiofrequency Lesioning       Radiofrequency Lesioning, Care After     To view videos and all your education online visit,   [email or web address]   or scan this QR code with your smartphone.                  Back Exercises     These exercises help to make your trunk and back strong. They also help to keep the lower back flexible. Doing these exercises can help to prevent back pain or lessen existing pain.       If you have back pain, try to do these exercises 2?3 times each day or as told by your doctor.       As you get better, do the exercises once each day. Repeat the exercises more often as told by your doctor.       To stop back pain from coming back, do the exercises once each day, or as told by your doctor.     Exercises   Single knee to chest    Do these steps 3?5 times in a row for each leg:     Lie on your back on a firm bed or the floor with your legs stretched out.       Bring one knee to your chest.       Grab your knee or thigh with both hands and hold them it in place.       Pull on your knee until you feel a gentle stretch in your lower back or buttocks.       Keep doing the stretch for 10?30 seconds.       Slowly let go of your leg and straighten it.   Pelvic tilt    Do these steps 5?10 times in a row:     Lie on your back on a firm bed or the floor with your legs stretched out.       Bend your knees so they point up to the ceiling. Your feet should be flat on the floor.       Tighten your lower belly (abdomen) muscles to press your lower back against the floor. This will make your tailbone point up to the ceiling instead of pointing down to your feet or the floor.       Stay in this position for 5?10 seconds while you gently tighten your muscles and breathe evenly.   Cat?cow       Do these steps until your lower back bends more easily:     Get on your hands and knees on a firm surface. Keep your hands under your shoulders,  and keep your knees under your hips. You may put padding under your knees.       Let your head hang down toward your chest. Tighten (contract) the muscles in your belly. Point your tailbone toward the floor so your lower back becomes rounded like the back of a cat.       Stay in this position for 5 seconds.       Slowly lift your head. Let the muscles of your belly relax. Point your tailbone up toward the ceiling so your back forms a sagging arch like the back of a cow.       Stay in this position for 5 seconds.   Press-ups       Do these steps 5?10 times in a row:     Lie on your belly (face-down) on the floor.       Place your hands near your head, about shoulder-width apart.       While you keep your back relaxed and keep your hips on the floor, slowly straighten your arms to raise the top half of your body and lift your shoulders. Do not  use your back muscles. You may change where you place your hands in order to make yourself more comfortable.       Stay in this position for 5 seconds.       Slowly return to lying flat on the floor.     Bridges       Do these steps 10 times in a row:     Lie on your back on a firm surface.       Bend your knees so they point up to the ceiling. Your feet should be flat on the floor. Your arms should be flat at your sides, next to your body.       Tighten your butt muscles and lift your butt off the floor until your waist is almost as high as your knees. If you do not feel the muscles working in your butt and the back of your thighs, slide your feet 1?2 inches farther away from your butt.       Stay in this position for 3?5 seconds.       Slowly lower your butt to the floor, and let your butt muscles relax.   If this exercise is too easy, try doing it with your arms crossed over your chest.     Belly crunches    Do these steps 5?10 times in a row:     Lie on your back on a firm bed or the floor with your legs stretched out.       Bend your knees so they point up to the ceiling.  Your feet should be flat on the floor.       Cross your arms over your chest.       Tip your chin a little bit toward your chest but do not  bend your neck.       Tighten your belly muscles and slowly raise your chest just enough to lift your shoulder blades a tiny bit off of the floor. Avoid raising your body higher than that, because it can put too much stress on your low back.       Slowly lower your chest and your head to the floor.   Back lifts    Do these steps 5?10 times in a row:     Lie on your belly (face-down) with your arms at your sides, and rest your forehead on the floor.       Tighten the muscles in your legs and your butt.       Slowly lift your chest off of the floor while you keep your hips on the floor. Keep the back of your head in line with the curve in your back. Look at the floor while you do this.       Stay in this position for 3?5 seconds.       Slowly lower your chest and your face to the floor.     Contact a doctor if:         Your back pain gets a lot worse when you do an exercise.       Your back pain does not get better 2 hours after you exercise.     If you have any of these problems, stop doing the exercises. Do not  do them again unless your doctor says it is okay.   Get help right away if:         You have sudden, very bad back pain. If this happens, stop doing the exercises. Do not  do them again unless your doctor says it is okay.     This information is not intended to replace advice given to you by your health care provider. Make sure you discuss any questions you have with your health care provider.     Document Released: 01/20/2012Document Revised: 09/12/2019Document Reviewed: 09/12/2019     Elsevier Patient Education ? 2022 [employer]         Radiofrequency Lesioning     Radiofrequency lesioning is a procedure that is performed to relieve pain. The procedure is often used for back, neck, or arm pain. Radiofrequency lesioning involves the use of a machine that creates  radio waves to make heat. During the procedure, the heat is applied to the nerve that carries the pain signal. The heat damages the nerve and interferes with the pain signal. Pain relief usually starts about 2 weeks after the procedure and lasts for 6 months to 1 year.   You will be awake during the procedure. You will need to be able to talk with the health care provider during the procedure.   Tell a health care provider about:         Any allergies you have.       All medicines you are taking, including vitamins, herbs, eye drops, creams, and over-the-counter medicines.       Any problems you or family members have had with anesthetic medicines.       Any blood disorders you have.       Any surgeries you have had.       Any medical conditions you have or have had.       Whether you are pregnant or may be pregnant.     What are the risks?    Generally, this is a safe procedure. However, problems may occur, including:       Pain or soreness at the injection site.       Allergic reaction to medicines given during the procedure.       Bleeding.       Infection at the injection site.       Damage to nerves or blood vessels.     What happens before the procedure?   Staying hydrated    Follow instructions from your health care provider about hydration, which may include:       Up to 2 hours before the procedure - you may continue to drink clear liquids, such as water, clear fruit juice, black coffee, and plain tea.     Eating and drinking    Follow instructions from your health care provider about eating and drinking, which may include:       8 hours before the procedure - stop eating heavy meals or foods, such as meat, fried foods, or fatty foods.       6 hours before the procedure - stop eating light meals or foods, such as toast or cereal.       6 hours before the procedure - stop drinking milk or drinks that contain milk.       2 hours before the procedure - stop drinking clear liquids.     Medicines    Ask your  health care provider about:       Changing or stopping your regular medicines. This is especially important if you are taking diabetes medicines or blood thinners.       Taking medicines such as aspirin and ibuprofen. These medicines can thin your blood. Do not  take these medicines unless your health care provider tells you to take them.       Taking over-the-counter medicines, vitamins, herbs, and supplements.     General instructions         Plan to have someone take you home from the hospital or clinic.       If you will be going home right after the procedure, plan to have someone with you for 24 hours.      Ask your health care provider what steps will be taken to help prevent infection. These may include:       Removing hair at the procedure site.       Washing skin with a germ-killing soap.       Taking antibiotic medicine.     What happens during the procedure?            An IV will be inserted into one of your veins.      You will be given one or more of the following:       A medicine to help you relax (sedative).       A medicine to numb the area (local anesthetic).       Your health care provider will insert a radiofrequency needle into the area to be treated. This is done with the help of a type of X-ray (fluoroscopy).       A wire that carries the radio waves (electrode) will be put through the radiofrequency needle.       An electrical pulse will be sent through the electrode to verify the correct nerve that is causing your pain. You will feel a tingling sensation, and you may have muscle twitching.       The tissue around the needle tip will be heated by an electric current that comes from the radiofrequency machine. This will numb the nerves.       The needle will be removed.       A bandage (dressing) will be put on the insertion area.     The procedure may vary among health care providers and hospitals.     What happens after the procedure?         Your blood pressure, heart rate, breathing rate,  and blood oxygen level will be monitored until you leave the hospital or clinic.       Return to your normal activities as told by your health care provider. Ask your health care provider what activities are safe for you.      Do not  drive for 24 hours if you were given a sedative during your procedure.     Summary         Radiofrequency lesioning is a procedure that is performed to relieve pain. The procedure is often used for back, neck, or arm pain.       Radiofrequency lesioning involves the use of a machine that creates radio waves to make heat.       Plan to have someone take you home from the hospital or clinic. Do not  drive for 24 hours if you were given a sedative during your procedure.       Return to your normal activities as told by your health care provider. Ask your health care provider what activities are safe for you.     This information is not intended to replace advice given to you by your health care provider. Make sure you discuss any questions you have with your health care provider.     Document Released: 08/15/2012Document Revised: 09/05/2019Document Reviewed: 09/05/2019     [employer] Patient Education ? 2022 [employer]         Radiofrequency Lesioning, Care After     This sheet gives you information about how to care for yourself after your procedure. Your health care provider may also give you more specific instructions. If you have problems or questions, contact your health care provider.   What can I expect after the procedure?    After the procedure, it is common to have:       Slight pain in the area where the procedure was done.       Temporary numbness.     Follow these instructions at home:      Medicines         Take over-the-counter and prescription medicines only as told by your health care provider.      Do not  drive for 24 hours if you were given a sedative during your procedure.      Do not  drive or use heavy machinery while taking prescription pain medicine.      Insertion site care            Remove the bandage (dressing) after 24 hours or as directed by your health care provider.      Check your needle insertion site every day for signs of infection. Watch for:       Redness, swelling, or pain.       Fluid or blood.       Warmth.       Pus or a bad smell.       Managing pain           If directed, put ice on the painful area.       Put ice in a plastic bag.       Place a towel between your skin and the bag.       Leave the ice on for 20 minutes, 2?3 times a day.       General instructions         Return to your normal activities as told by your health care provider. Ask your health care provider what activities are safe for you.       Pay close attention to how you feel after the procedure. If you start to have pain, write down when it hurts and how it feels. This will help you and your health care provider know if you need an additional treatment.       Keep all follow-up visits as told by your health care provider. This is important.       Contact a health care provider if you have:         Pain that does not get better.       Redness, warmth, swelling, or pain at the needle insertion site.       Fluid, blood, or pus coming from the needle insertion site.       A fever.     Get help right away if you develop:         Sudden, severe pain.       Numbness or tingling near the insertion site and those symptoms do not go away.     Summary         After the procedure, it is common to have slight pain and temporary numbness in the area where the procedure was done.      Do not  drive for 24 hours if you were given a sedative during your procedure.       Pay close attention to how you feel after the procedure. If you start to have pain, write down when it hurts and how it feels. This will help you and your health care provider know if you need an additional treatment.       Contact a health care provider if you have a fever or pain that does not get better, or if you notice  redness, warmth, swelling, pain, fluid, blood, or pus at the insertion site.       Get help right away if you develop sudden, severe pain, or numbness or tingling near the insertion site.     This information is not intended to replace advice given to you by your health care provider. Make sure you discuss any questions you have with your health care provider.     Document Released: 08/16/2012Document Revised: 09/05/2019Document Reviewed: 09/05/2019     Elsevier Patient Education ? 2022 Elsevier Inc[employer]

## 2022-03-20 DIAGNOSIS — J30.9 ALLERGIC RHINITIS: ICD-10-CM

## 2022-03-21 RX ORDER — FEXOFENADINE HYDROCHLORIDE AND PSEUDOEPHEDRINE HYDROCHLORIDE 60; 120 MG/1; MG/1
TABLET, FILM COATED, EXTENDED RELEASE ORAL
Qty: 120 TABLET | Refills: 5 | Status: SHIPPED | OUTPATIENT
Start: 2022-03-21 | End: 2022-11-16

## 2022-03-23 DIAGNOSIS — F41.9 ANXIETY: ICD-10-CM

## 2022-03-23 RX ORDER — ALPRAZOLAM 1 MG/1
1 TABLET ORAL NIGHTLY PRN
Qty: 30 TABLET | Refills: 1 | Status: CANCELLED | OUTPATIENT
Start: 2022-03-23

## 2022-03-24 ENCOUNTER — ANESTHESIA EVENT (OUTPATIENT)
Dept: PAIN MEDICINE | Facility: HOSPITAL | Age: 62
End: 2022-03-24

## 2022-03-24 ENCOUNTER — HOSPITAL ENCOUNTER (OUTPATIENT)
Dept: GENERAL RADIOLOGY | Facility: HOSPITAL | Age: 62
Discharge: HOME | End: 2022-03-24

## 2022-03-24 ENCOUNTER — HOSPITAL ENCOUNTER (OUTPATIENT)
Dept: PAIN MEDICINE | Facility: HOSPITAL | Age: 62
Discharge: HOME | End: 2022-03-24

## 2022-03-24 ENCOUNTER — ANESTHESIA (OUTPATIENT)
Dept: PAIN MEDICINE | Facility: HOSPITAL | Age: 62
End: 2022-03-24

## 2022-03-24 VITALS
TEMPERATURE: 98.6 F | OXYGEN SATURATION: 96 % | HEART RATE: 82 BPM | SYSTOLIC BLOOD PRESSURE: 149 MMHG | RESPIRATION RATE: 16 BRPM | DIASTOLIC BLOOD PRESSURE: 86 MMHG

## 2022-03-24 DIAGNOSIS — R52 PAIN: ICD-10-CM

## 2022-03-24 PROCEDURE — 77003 FLUOROGUIDE FOR SPINE INJECT: CPT

## 2022-03-24 RX ORDER — MIDAZOLAM HYDROCHLORIDE 1 MG/ML
1 INJECTION INTRAMUSCULAR; INTRAVENOUS AS NEEDED
Status: DISCONTINUED | OUTPATIENT
Start: 2022-03-24 | End: 2022-03-25 | Stop reason: HOSPADM

## 2022-03-24 RX ORDER — FENTANYL CITRATE 50 UG/ML
50 INJECTION, SOLUTION INTRAMUSCULAR; INTRAVENOUS AS NEEDED
Status: DISCONTINUED | OUTPATIENT
Start: 2022-03-24 | End: 2022-03-25 | Stop reason: HOSPADM

## 2022-03-24 RX ORDER — ALPRAZOLAM 1 MG/1
1 TABLET ORAL NIGHTLY PRN
Qty: 30 TABLET | Refills: 1 | Status: SHIPPED | OUTPATIENT
Start: 2022-03-24 | End: 2022-08-24 | Stop reason: SDUPTHER

## 2022-03-24 RX ORDER — LIDOCAINE HYDROCHLORIDE 10 MG/ML
1 INJECTION, SOLUTION INFILTRATION; PERINEURAL ONCE
Status: DISCONTINUED | OUTPATIENT
Start: 2022-03-24 | End: 2022-03-25 | Stop reason: HOSPADM

## 2022-03-24 RX ORDER — LIDOCAINE HYDROCHLORIDE 20 MG/ML
10 INJECTION, SOLUTION INFILTRATION; PERINEURAL ONCE
Status: COMPLETED | OUTPATIENT
Start: 2022-03-24 | End: 2022-03-24

## 2022-03-24 RX ORDER — LIDOCAINE HYDROCHLORIDE 10 MG/ML
10 INJECTION, SOLUTION INFILTRATION; PERINEURAL ONCE
Status: DISCONTINUED | OUTPATIENT
Start: 2022-03-24 | End: 2022-03-25 | Stop reason: HOSPADM

## 2022-03-24 RX ORDER — SODIUM CHLORIDE 0.9 % (FLUSH) 0.9 %
3-10 SYRINGE (ML) INJECTION AS NEEDED
Status: DISCONTINUED | OUTPATIENT
Start: 2022-03-24 | End: 2022-03-25 | Stop reason: HOSPADM

## 2022-03-24 RX ORDER — SODIUM CHLORIDE 0.9 % (FLUSH) 0.9 %
3 SYRINGE (ML) INJECTION EVERY 12 HOURS SCHEDULED
Status: DISCONTINUED | OUTPATIENT
Start: 2022-03-24 | End: 2022-03-25 | Stop reason: HOSPADM

## 2022-03-24 RX ADMIN — LIDOCAINE HYDROCHLORIDE 5 ML: 20 INJECTION, SOLUTION EPIDURAL; INFILTRATION; INTRACAUDAL at 12:02

## 2022-03-24 NOTE — H&P
CHIEF COMPLAINT:  Low back pain        HISTORY OF PRESENT ILLNESS:  This patient is complaining of low back pain which radiates in a bandlike pattern across her back.  She does have some pain at times in the buttock and posterior legs which is possibly related to the stenosis in her back.  It ranges between a 4 and 9 out of 10 on the pain scale.  The pain is significantly decreasing the patient's Activities of Daily Living.       Diagnostic imaging: MRI of the lumbar spine from 6/28/2021 shows multilevel facet degeneration and stenosis.  The full dictation by the radiologist is available in the chart.  There is anterolisthesis of L4 on L5.     This patient was referred to our clinic by Dr. Gene Reed for bilateral diagnostic lumbar facet medial branch blocks to cover the levels of L3-L4 and L4-L5.  This would possibly lead to future radiofrequency ablation if appropriate.     Prior diagnostic lumbar facet medial branch blocks performed on 1/26/22 and 2/16/2022 injections afforded greater than 80% relief of pain and significantly increased activities of daily living.  The relief was temporary as expected.    She presented on 3/8/2022 for a left-sided lumbar facet medial branch radiofrequency ablation to cover the levels of L3-L4 and L4-L5.  She is here today for the right side.      PAST MEDICAL HISTORY:  Current Outpatient Medications on File Prior to Encounter   Medication Sig Dispense Refill   • Allegra-D Allergy & Congestion  MG per 12 hr tablet TAKE 1 TABLET BY MOUTH TWICE DAILY 120 tablet 5   • ALPRAZolam (XANAX) 1 MG tablet Take 1 tablet by mouth At Night As Needed for Anxiety. for anxiety 30 tablet 1   • estradiol (ESTRACE) 1 MG tablet Take 1 tablet by mouth Daily. 90 tablet 1   • fluticasone (FLONASE) 50 MCG/ACT nasal spray Use 2 sprays by in each nostril Daily. 64 g 2   • irbesartan-hydrochlorothiazide (AVALIDE) 150-12.5 MG tablet Take 1 tablet by mouth Daily. 90 tablet 3   • levothyroxine  (SYNTHROID, LEVOTHROID) 150 MCG tablet Take 1 tablet by mouth Daily. 90 tablet 1   • meclizine (ANTIVERT) 25 MG tablet Take 1 tablet by mouth 3 (Three) Times a Day As Needed for Dizziness. 25 tablet 0   • meloxicam (MOBIC) 15 MG tablet Take 1 tablet by mouth Daily. 90 tablet 1   • montelukast (SINGULAIR) 10 MG tablet Take 1 tablet by mouth Every Night. 90 tablet 1   • pregabalin (LYRICA) 150 MG capsule Take 1 capsule by mouth Daily. 30 capsule 1   • pregabalin (LYRICA) 75 MG capsule Take 1 capsule by mouth Every Morning. 30 capsule 2   • tiZANidine (ZANAFLEX) 4 MG tablet Take 1 tablet by mouth Every 8 (Eight) Hours As Needed for Muscle Spasms. 90 tablet 2   • Turmeric 500 MG capsule Take 1,000 mg by mouth Daily.       No current facility-administered medications on file prior to encounter.       Past Medical History:   Diagnosis Date   • Anxiety    • Graves disease    • Hard to intubate     TROUBLE WITH INTUBATION... has had cervical neck surgery and unable to bend neck too far   • Hypertension    • Hyperthyroidism    • Hypothyroidism     under went radiation of thyroid and on supplement   • Low back pain     undergoing epidural   • Neuropathy     bilateral lower legs   • Osteoarthritis    • PONV (postoperative nausea and vomiting)    • Restless leg syndrome     TAKES XANAX FOR..   • Vertigo        SOCIAL HISTORY:  Counseled to avoid tobacco     REVIEW OF SYSTEMS:  No pertinent hematologic, infectious, or constitutional symptoms.  Other review of systems non-contributory     PHYSICAL EXAM:  There were no vitals taken for this visit.  Constitutional: Well-developed well-nourished no acute distress  General: Alert and oriented  Neuromuscular: Tenderness palpation of the right side lumbar facets with positive facet loading on the right.       DIAGNOSIS:  Post-Op Diagnosis Codes:  Post-Op Diagnosis Codes:     * Lumbar facet arthropathy [M47.816]     * Lumbar stenosis [M48.061]     * Spondylolisthesis, lumbar region  [M43.16]     PLAN:  -Right-sided Lumbar facet medial branch radio frequency ablation to cover the level(s) of L3-L4 and L4-L5.   - This patient has received good but temporary relief from previous diagnostic lumbar facet medial branch injections.   - Risks were discussed with the patient, including but not limited to: failure of relief, worsening pain, dysesthesia, sensory loss, radicular pain, radiculopathy, weakness, tissue, nerve, nerve root or blood vessel damage, bleeding, infection, and abscess formation. Benefits and alternatives were also discussed. No promises were made. The patient voiced understanding.  -  Physical therapy exercises at home should be considered, as tolerated, as prescribed by physical therapy or from the pain clinic handout (given to the patient) to allow for a home exercise program.  Continuation of these exercises every day, or multiple times per week, even when the patient has good pain relief, was stressed to the patient as a preventative measure to decrease the frequency and severity of future pain episodes.  -  It is recommended that the patient use the least amount of opioid medication possible.     -  Heat and ice to the affected area as tolerated for pain control.  It was discussed that heating pads can cause burns.  -  Daily low impact exercise such as walking or water exercise was recommended to maintain overall health and aid in weight control.   -  Patient was counseled to abstain from tobacco products.  -  Follow up as needed for subsequent injections.      Waldo Brito M.D.  [place], Cj, [facility], [facility]  Board Certified in Pain Medicine by the American Board of Anesthesiology  Board Certified in Anesthesiology by the American Board of Anesthesiology     Much of this encounter note is an electronic transcription/translation of spoken language to printed text. The electronic translation of spoken language may permit erroneous, or at times,  nonsensical words or phrases to be inadvertently transcribed. Although I have reviewed the note for such errors, some may still exist.

## 2022-03-24 NOTE — ANESTHESIA PROCEDURE NOTES
Right-sided lumbar facet medial branch radiofrequency ablation to cover the levels of L3-L4 and L4-L5    Pre-sedation assessment completed: 3/24/2022 11:37 AM    Patient reassessed immediately prior to procedure    Patient location during procedure: Pain Clinic  Start time: 3/24/2022 11:46 AM  Stop Time: 3/24/2022 12:06 PM    Reason for block: procedure for pain  Performed by  Anesthesiologist: Waldo Brito MD  Preanesthetic Checklist  Completed: patient identified, IV checked, site marked, risks and benefits discussed, surgical consent, monitors and equipment checked, pre-op evaluation and timeout performed  Prep:  Patient position: prone  Sterile barriers:cap, gloves, mask and sterile barrier  Prep: ChloraPrep  Patient monitoring: blood pressure monitoring, continuous pulse oximetry and EKG  Procedure:  Sedation:no  Approach:unilateral right  Guidance:fluoroscopy  Location:lumbar  Interspace: to cover the levels of L3-L4 and L4-L5.  Needle type: Radiofrequency ablation needles with 1 cm active tips.  Needle Gauge:20 G  Aspiration:negative  Medications:  Comment:0.5 cc of 2% lidocaine was injected at each level after positive sensory and negative motor stimulation testing was performed.      Post Assessment  Injection Assessment: negative  Patient Tolerance:comfortable throughout block  Complications:no  Additional Notes  The patient was brought into the procedure room and placed in a prone position and was comfortable.  ChloraPrep was used and allowed to dry appropriately.  Sterile towels were placed around the patient's back.  An ipsilateral oblique and caudal tilt was used on the x-ray machine to visualize targets.  Skin was numbed up with 2 cc of 1% lidocaine at each injection site.  Radiofrequency needles were advanced to the groove between the transverse process and the superior articular process.  This was done to cover the right-sided L3-L4 and L4-L5 level(s) (designated by the SAP and transverse  process).  Once the needle was seated against the superior margin of the transverse process, where it joins the superior articular process of the facet, cannula was walked off the superior margin of the transverse process and advanced 2 mm to position the active tip along the course of the medial branch nerve.  Sensory testing was positive at each location with a stimulation at 50 Hz at less than 0.5 V.  There was no motor stimulation to the affected myotomes of the lower extremities at 2 Hz up to 2 V.  Impedances were 228, 248, and 387 ohms respectively.  Great care was taken not to move the cannulae.  Each level was anesthetized with 0.5 mL of 2% lidocaine, and lesions were created at 80 °C for 90 seconds.  Needles were removed and bandages were placed per nursing.  At no time during the ablation did the patient have any pain or symptoms radiating down the buttocks, groin, hip, or leg.  The patient was taken to recovery by nursing and observed appropriately.    Post-Op Diagnosis Codes:     * Lumbar facet arthropathy (M47.816)     * Lumbar stenosis (M48.061)     * Spondylolisthesis, lumbar region (M43.16)    The patient was observed in recovery with no evidence of new onset neurological deficits or other problems. All questions were answered. The patient was discharged with appropriate instructions.

## 2022-04-01 DIAGNOSIS — G62.9 PERIPHERAL POLYNEUROPATHY: ICD-10-CM

## 2022-04-02 DIAGNOSIS — E03.9 ACQUIRED HYPOTHYROIDISM: ICD-10-CM

## 2022-04-02 RX ORDER — LEVOTHYROXINE SODIUM 0.15 MG/1
150 TABLET ORAL DAILY
Qty: 90 TABLET | Refills: 1 | Status: CANCELLED | OUTPATIENT
Start: 2022-04-02

## 2022-04-03 RX ORDER — PREGABALIN 150 MG/1
150 CAPSULE ORAL DAILY
Qty: 30 CAPSULE | Refills: 1 | Status: SHIPPED | OUTPATIENT
Start: 2022-04-03 | End: 2022-06-01 | Stop reason: SDUPTHER

## 2022-04-04 DIAGNOSIS — E03.9 ACQUIRED HYPOTHYROIDISM: ICD-10-CM

## 2022-04-04 RX ORDER — LEVOTHYROXINE SODIUM 0.15 MG/1
150 TABLET ORAL DAILY
Qty: 90 TABLET | Refills: 1 | Status: SHIPPED | OUTPATIENT
Start: 2022-04-04 | End: 2022-09-26 | Stop reason: SDUPTHER

## 2022-04-05 NOTE — PROGRESS NOTES
Subjective   Patient ID: Nancy Robbins is a 61 y.o. female is here today for a 4 month follow up of lumbar stenosis with neurogenic claudication.    Today, ms. Robbins reports that she had a left and right radiofrequency ablation. She reports mild relief from the left sided radiofrequency ablation but with the right one she states it made her symptoms worse. She reports back pain that radiates into bilateral legs with numbness and tingling. She denies weakness and loss of bowel/bladder control.     This lady has severe spinal stenosis at L3-L4 and L4-L5 with a spondylolisthesis at the lateral level.  She works for [employer] and continues to work.  Epidural blocks never really helped her.  We went through the ablation approach with .  The left-sided ablation helped her but the right sided ablation done just about 2 to 3 weeks ago made her feel worse and she feels worse all over including the left side.  She is discouraged.  She takes Lyrica at 75/150 and meloxicam.  She has no motor deficits.  She does have quite a bit of radiating buttock and leg pain as well from her stenosis as well as the pain from her facet disease.  We will try a steroid pack and have her come back in 6 weeks.  If she continues to hurt we should probably reimage her and potentially discuss surgical options if she feels that her symptoms in the back of the leg are disabling.    Back Pain  The problem is unchanged. The pain is present in the lumbar spine. The quality of the pain is described as burning and stabbing. The pain radiates to the right thigh, right foot, right knee, left thigh, left knee and left foot. Associated symptoms include numbness and tingling. Pertinent negatives include no bladder incontinence, bowel incontinence, chest pain, fever or weakness. Treatments tried: Bilateral radiofrequency ablation.       The following portions of the patient's history were reviewed and updated as appropriate: allergies,  current medications, past family history, past medical history, past social history, past surgical history and problem list.    Review of Systems   Constitutional: Negative for fever.   Respiratory: Negative for chest tightness and shortness of breath.    Cardiovascular: Negative for chest pain.   Gastrointestinal: Negative for bowel incontinence.   Genitourinary: Negative for bladder incontinence.   Musculoskeletal: Positive for back pain.   Neurological: Positive for tingling and numbness. Negative for weakness.   All other systems reviewed and are negative.      Objective   Physical Exam  Constitutional:       Appearance: She is well-developed.   HENT:      Head: Normocephalic and atraumatic.   Eyes:      Extraocular Movements: EOM normal.      Conjunctiva/sclera: Conjunctivae normal.      Pupils: Pupils are equal, round, and reactive to light.   Neck:      Vascular: No carotid bruit.   Neurological:      Mental Status: She is oriented to person, place, and time.      Coordination: Finger-Nose-Finger Test and Heel to Shin Test normal.      Gait: Gait is intact.      Deep Tendon Reflexes:      Reflex Scores:       Tricep reflexes are 2+ on the right side and 2+ on the left side.       Bicep reflexes are 2+ on the right side and 2+ on the left side.       Brachioradialis reflexes are 2+ on the right side and 2+ on the left side.       Patellar reflexes are 2+ on the right side and 2+ on the left side.       Achilles reflexes are 2+ on the right side and 2+ on the left side.  Psychiatric:         Speech: Speech normal.       Neurologic Exam     Mental Status   Oriented to person, place, and time.   Registration of memory: Good recent and remote memory.   Attention: normal. Concentration: normal.   Speech: speech is normal   Level of consciousness: alert  Knowledge: consistent with education.     Cranial Nerves     CN II   Visual fields full to confrontation.   Visual acuity: normal    CN III, IV, VI   Pupils are  equal, round, and reactive to light.  Extraocular motions are normal.     CN V   Facial sensation intact.   Right corneal reflex: normal  Left corneal reflex: normal    CN VII   Facial expression full, symmetric.   Right facial weakness: none  Left facial weakness: none    CN VIII   Hearing: intact    CN IX, X   Palate: symmetric    CN XI   Right sternocleidomastoid strength: normal  Left sternocleidomastoid strength: normal    CN XII   Tongue: not atrophic  Tongue deviation: none    Motor Exam   Muscle bulk: normal  Right arm tone: normal  Left arm tone: normal  Right leg tone: normal  Left leg tone: normal    Strength   Strength 5/5 except as noted.     Sensory Exam   Light touch normal.     Gait, Coordination, and Reflexes     Gait  Gait: normal    Coordination   Finger to nose coordination: normal  Heel to shin coordination: normal    Reflexes   Right brachioradialis: 2+  Left brachioradialis: 2+  Right biceps: 2+  Left biceps: 2+  Right triceps: 2+  Left triceps: 2+  Right patellar: 2+  Left patellar: 2+  Right achilles: 2+  Left achilles: 2+  Right : 2+  Left : 2+      Assessment/Plan   Independent Review of Radiographic Studies:      I reviewed her lumbar MRI done on 6/28/2021 which shows spinal stenosis of a severe nature at L3-L4 and of the significant degree at L4-L5 with a superimposed spondylolisthesis.  Agree with the report.    Medical Decision Making:      We will try Medrol Dosepak to see if that helps her symptoms and have her come back in 6 weeks.  If there is no improvement we should probably reimage her with an MRI and flexion-extension films and discuss her surgical options.      Diagnoses and all orders for this visit:    1. Lumbar facet joint syndrome (Primary)    2. Spinal stenosis of lumbar region with neurogenic claudication    3. Spondylolisthesis at L4-L5 level    Other orders  -     methylPREDNISolone (MEDROL) 4 MG dose pack; Take as directed on package instructions.  Dispense:  21 tablet; Refill: 0      Return in about 6 weeks (around 5/25/2022) for Face-to-face.

## 2022-04-13 ENCOUNTER — OFFICE VISIT (OUTPATIENT)
Dept: NEUROSURGERY | Facility: CLINIC | Age: 62
End: 2022-04-13

## 2022-04-13 VITALS
HEIGHT: 66 IN | WEIGHT: 190 LBS | HEART RATE: 91 BPM | DIASTOLIC BLOOD PRESSURE: 76 MMHG | BODY MASS INDEX: 30.53 KG/M2 | TEMPERATURE: 96.9 F | SYSTOLIC BLOOD PRESSURE: 120 MMHG | OXYGEN SATURATION: 96 %

## 2022-04-13 DIAGNOSIS — M47.816 LUMBAR FACET JOINT SYNDROME: Primary | ICD-10-CM

## 2022-04-13 DIAGNOSIS — M48.062 SPINAL STENOSIS OF LUMBAR REGION WITH NEUROGENIC CLAUDICATION: ICD-10-CM

## 2022-04-13 DIAGNOSIS — M43.16 SPONDYLOLISTHESIS AT L4-L5 LEVEL: ICD-10-CM

## 2022-04-13 PROCEDURE — 99214 OFFICE O/P EST MOD 30 MIN: CPT | Performed by: NEUROLOGICAL SURGERY

## 2022-04-13 RX ORDER — METHYLPREDNISOLONE 4 MG/1
TABLET ORAL
Qty: 21 TABLET | Refills: 0 | Status: SHIPPED | OUTPATIENT
Start: 2022-04-13 | End: 2022-06-21

## 2022-04-19 ENCOUNTER — OFFICE VISIT (OUTPATIENT)
Dept: INTERNAL MEDICINE | Facility: CLINIC | Age: 62
End: 2022-04-19

## 2022-04-19 VITALS
OXYGEN SATURATION: 99 % | WEIGHT: 189 LBS | HEIGHT: 66 IN | DIASTOLIC BLOOD PRESSURE: 80 MMHG | SYSTOLIC BLOOD PRESSURE: 118 MMHG | TEMPERATURE: 98.4 F | HEART RATE: 93 BPM | BODY MASS INDEX: 30.37 KG/M2

## 2022-04-19 DIAGNOSIS — I10 ESSENTIAL HYPERTENSION: Primary | Chronic | ICD-10-CM

## 2022-04-19 DIAGNOSIS — M48.062 SPINAL STENOSIS OF LUMBAR REGION WITH NEUROGENIC CLAUDICATION: ICD-10-CM

## 2022-04-19 DIAGNOSIS — E03.9 ACQUIRED HYPOTHYROIDISM: Chronic | ICD-10-CM

## 2022-04-19 PROCEDURE — 99214 OFFICE O/P EST MOD 30 MIN: CPT | Performed by: NURSE PRACTITIONER

## 2022-04-19 RX ORDER — TIZANIDINE 4 MG/1
4 TABLET ORAL EVERY 8 HOURS PRN
Qty: 90 TABLET | Refills: 2 | Status: SHIPPED | OUTPATIENT
Start: 2022-04-19

## 2022-04-19 NOTE — PROGRESS NOTES
"Chief Complaint  Hypertension, Hypothyroidism, and Back Pain    Subjective          Nancy Robbins presents to [facility] for f/u regarding chronic low back pain, hypothyroidism and HTN.    She has had 2 ablations on low back which have mildly helpful; first ablation was helpful but second was not.  She has recently followed up with neurosurgery and was prescribed a Medrol Dosepak which has been helpful.  However, she notes mild increase in symptoms as dose is tapered.  She has a follow-up appoint with neurosurgery to discuss next steps.  She does continue on Lyrica for numbness and tingling of lower extremities which has been helpful.  She is also taking a muscle relaxer as needed for muscle spasms and tightness.    Objective   Vital Signs:   /80 (BP Location: Left arm, Patient Position: Sitting, Cuff Size: Adult)   Pulse 93   Temp 98.4 °F (36.9 °C) (Temporal)   Ht 167.6 cm (66\")   Wt 85.7 kg (189 lb)   SpO2 99%   BMI 30.51 kg/m²            Physical Exam  Constitutional:       Appearance: She is well-developed. She is not ill-appearing.   HENT:      Head: Normocephalic.      Right Ear: Hearing, tympanic membrane and external ear normal.      Left Ear: Hearing, tympanic membrane and external ear normal.      Nose: Nose normal. No nasal deformity, mucosal edema or rhinorrhea.      Right Sinus: No maxillary sinus tenderness or frontal sinus tenderness.      Left Sinus: No maxillary sinus tenderness or frontal sinus tenderness.      Mouth/Throat:      Dentition: Normal dentition.   Eyes:      General: Lids are normal.         Right eye: No discharge.         Left eye: No discharge.      Conjunctiva/sclera: Conjunctivae normal.      Right eye: No exudate.     Left eye: No exudate.  Neck:      Thyroid: No thyroid mass or thyromegaly.      Vascular: No carotid bruit.      Trachea: Trachea normal.   Cardiovascular:      Rate and Rhythm: Regular rhythm.      Pulses: Normal pulses.    "   Heart sounds: Normal heart sounds. No murmur heard.  Pulmonary:      Effort: No respiratory distress.      Breath sounds: Normal breath sounds. No decreased breath sounds, wheezing, rhonchi or rales.   Abdominal:      General: Bowel sounds are normal.      Palpations: Abdomen is soft.      Tenderness: There is no abdominal tenderness.   Musculoskeletal:      Cervical back: Normal range of motion. No edema.   Lymphadenopathy:      Head:      Right side of head: No submental, submandibular, tonsillar, preauricular, posterior auricular or occipital adenopathy.      Left side of head: No submental, submandibular, tonsillar, preauricular, posterior auricular or occipital adenopathy.   Skin:     General: Skin is warm and dry.      Nails: There is no clubbing.   Neurological:      Mental Status: She is alert.   Psychiatric:         Behavior: Behavior is cooperative.        Result Review :   The following data was reviewed by: AMBER Gallegos on 04/19/2022:  Common labs    Common Labsle 12/14/21 12/14/21 12/14/21    [id number]   Glucose  115 (A)    BUN  12    Creatinine  0.76    eGFR Non  Am  85    eGFR African Am  98    Sodium  141    Potassium  3.9    Chloride  105    Calcium  9.4    Total Protein  6.3    Albumin  4.0    Total Bilirubin  0.2    Alkaline Phosphatase  95    AST (SGOT)  15    ALT (SGPT)  24    WBC 5.8     Hemoglobin 13.4     Hematocrit 39.6     Platelets 266     Total Cholesterol   217 (A)   Triglycerides   125   HDL Cholesterol   60   LDL Cholesterol    135 (A)   (A) Abnormal value       Comments are available for some flowsheets but are not being displayed.           Data reviewed: Consultant notes Neurosurgery 4/13/22          Assessment and Plan    Diagnoses and all orders for this visit:    1. Essential hypertension (Primary)  Assessment & Plan:  Blood pressure has improved on irbesartan-HCTZ which she will continue along with a low-sodium diet.      2. Acquired  hypothyroidism  Assessment & Plan:  TSH 0.856 with December 2021 labs; continue levothyroxine 150 mcg daily.      3. Spinal stenosis of lumbar region with neurogenic claudication  Assessment & Plan:  She takes Lyrica for neuropathy, notes increased pain over the past few months and is working with neurosurgery.  She may take muscle relaxers as needed for muscle spasms and tightness.    Orders:  -     tiZANidine (ZANAFLEX) 4 MG tablet; Take 1 tablet by mouth Every 8 (Eight) Hours As Needed for Muscle Spasms.  Dispense: 90 tablet; Refill: 2    She has declined screening mammograms despite known risk-states she would not treat cancer if she were diagnosed so does not want screening.      Follow Up   Return in about 4 months (around 8/19/2022).  Patient was given instructions and counseling regarding her condition or for health maintenance advice. Please see specific information pulled into the AVS if appropriate.

## 2022-04-20 NOTE — ASSESSMENT & PLAN NOTE
She takes Lyrica for neuropathy, notes increased pain over the past few months and is working with neurosurgery.  She may take muscle relaxers as needed for muscle spasms and tightness.

## 2022-04-20 NOTE — ASSESSMENT & PLAN NOTE
Blood pressure has improved on irbesartan-HCTZ which she will continue along with a low-sodium diet.

## 2022-05-09 DIAGNOSIS — J30.9 ALLERGIC RHINITIS, UNSPECIFIED SEASONALITY, UNSPECIFIED TRIGGER: Chronic | ICD-10-CM

## 2022-05-09 RX ORDER — MONTELUKAST SODIUM 10 MG/1
10 TABLET ORAL NIGHTLY
Qty: 90 TABLET | Refills: 1 | Status: SHIPPED | OUTPATIENT
Start: 2022-05-09 | End: 2022-11-01 | Stop reason: SDUPTHER

## 2022-05-11 NOTE — PROGRESS NOTES
Subjective   Patient ID: Nancy Robbins is a 61 y.o. female is here today for follow-up of lumbar facet joint syndrome.  Pt last seen on 4/13/22 and reported back pain that radiates into bilateral legs with numbness and tingling.  Pt given MDP at last visit.     Today patient reports low back pain that radiates into B/L legs with the L leg being the worst. Patient also has N/T in B/L legs    He has severe spinal stenosis at L3-L4 and L4-L5 with a spondylolisthesis at L4-L5.  Epidural blocks do not work any longer.  She takes 75/150 of Lyrica and meloxicam.  She continues to work as a  which is a seated job mostly at [employer].  We talked about whether or not she felt she is at the endpoint because if she is then we need to get a new MRI and flexion  extension films and talk about surgery.  Is possible that this might be best to do through a front and back approach but would have to get a new study to make those plans.  She is not sure that she is at the breaking point.  I think she has to feel that she is that way.  She has no motor deficits.  We decided on following up with her in 4 months.  I gave her a small amount of tramadol to use for breakthrough pain.  If she feels that she is reached the endpoint that she needs to let us know so we can get the new radiographic studies.        Back Pain  This is a recurrent problem. The problem occurs constantly. The pain is present in the lumbar spine. The quality of the pain is described as stabbing. The pain is at a severity of 5/10. The pain is moderate. The pain is the same all the time. The symptoms are aggravated by position, standing, sitting, twisting and lying down. Associated symptoms include numbness, tingling and weakness. Pertinent negatives include no dysuria or fever. (B/L legs) She has tried bed rest for the symptoms. The treatment provided no relief.       The following portions of the patient's history were reviewed and updated as appropriate:  allergies, current medications, past family history, past medical history, past social history, past surgical history and problem list.    Review of Systems   Constitutional: Negative for chills and fever.   HENT: Negative for congestion.    Genitourinary: Negative for difficulty urinating and dysuria.   Musculoskeletal: Positive for back pain and myalgias.        B/L leg pain   Neurological: Positive for tingling, weakness and numbness.   All other systems reviewed and are negative.      Objective   Physical Exam  Constitutional:       Appearance: She is well-developed.   HENT:      Head: Normocephalic and atraumatic.   Eyes:      Extraocular Movements: EOM normal.      Conjunctiva/sclera: Conjunctivae normal.      Pupils: Pupils are equal, round, and reactive to light.   Neck:      Vascular: No carotid bruit.   Neurological:      Mental Status: She is oriented to person, place, and time.      Coordination: Finger-Nose-Finger Test and Heel to Shin Test normal.      Gait: Gait is intact.      Deep Tendon Reflexes:      Reflex Scores:       Tricep reflexes are 2+ on the right side and 2+ on the left side.       Bicep reflexes are 2+ on the right side and 2+ on the left side.       Brachioradialis reflexes are 2+ on the right side and 2+ on the left side.       Patellar reflexes are 2+ on the right side and 2+ on the left side.       Achilles reflexes are 2+ on the right side and 2+ on the left side.  Psychiatric:         Speech: Speech normal.       Neurologic Exam     Mental Status   Oriented to person, place, and time.   Registration of memory: Good recent and remote memory.   Attention: normal. Concentration: normal.   Speech: speech is normal   Level of consciousness: alert  Knowledge: consistent with education.     Cranial Nerves     CN II   Visual fields full to confrontation.   Visual acuity: normal    CN III, IV, VI   Pupils are equal, round, and reactive to light.  Extraocular motions are normal.     CN V    Facial sensation intact.   Right corneal reflex: normal  Left corneal reflex: normal    CN VII   Facial expression full, symmetric.   Right facial weakness: none  Left facial weakness: none    CN VIII   Hearing: intact    CN IX, X   Palate: symmetric    CN XI   Right sternocleidomastoid strength: normal  Left sternocleidomastoid strength: normal    CN XII   Tongue: not atrophic  Tongue deviation: none    Motor Exam   Muscle bulk: normal  Right arm tone: normal  Left arm tone: normal  Right leg tone: normal  Left leg tone: normal    Strength   Strength 5/5 except as noted.     Sensory Exam   Light touch normal.     Gait, Coordination, and Reflexes     Gait  Gait: normal    Coordination   Finger to nose coordination: normal  Heel to shin coordination: normal    Reflexes   Right brachioradialis: 2+  Left brachioradialis: 2+  Right biceps: 2+  Left biceps: 2+  Right triceps: 2+  Left triceps: 2+  Right patellar: 2+  Left patellar: 2+  Right achilles: 2+  Left achilles: 2+  Right : 2+  Left : 2+      Assessment & Plan   Independent Review of Radiographic Studies:      I reviewed her lumbar MRI done on 6/28/2021 which shows spinal stenosis of a severe nature at L3-L4 and of the significant degree at L4-L5 with a superimposed spondylolisthesis.  Agree with the report.    Medical Decision Making:      Should continue taking the Lyrica.  I have given her a small amount of tramadol to take for breakthrough pain.  She will see me in 4 months.  If she feels that she reaches the endpoint then we will order a new lumbar MRI and flexion-extension films and talk about a fusion surgery.  But she does not feel she is at that point right now.      Diagnoses and all orders for this visit:    1. Spinal stenosis of lumbar region with neurogenic claudication (Primary)  -     traMADol (ULTRAM) 50 MG tablet; Take 1 tablet by mouth 2 (Two) Times a Day As Needed for Moderate Pain .  Dispense: 30 tablet; Refill: 0    2.  Spondylolisthesis at L4-L5 level  -     traMADol (ULTRAM) 50 MG tablet; Take 1 tablet by mouth 2 (Two) Times a Day As Needed for Moderate Pain .  Dispense: 30 tablet; Refill: 0    3. DDD (degenerative disc disease), lumbar  -     traMADol (ULTRAM) 50 MG tablet; Take 1 tablet by mouth 2 (Two) Times a Day As Needed for Moderate Pain .  Dispense: 30 tablet; Refill: 0      Return in about 4 months (around 9/23/2022) for Face-to-face.

## 2022-05-23 ENCOUNTER — OFFICE VISIT (OUTPATIENT)
Dept: NEUROSURGERY | Facility: CLINIC | Age: 62
End: 2022-05-23

## 2022-05-23 VITALS
HEART RATE: 78 BPM | WEIGHT: 189 LBS | HEIGHT: 66 IN | DIASTOLIC BLOOD PRESSURE: 84 MMHG | SYSTOLIC BLOOD PRESSURE: 132 MMHG | BODY MASS INDEX: 30.37 KG/M2 | TEMPERATURE: 98 F

## 2022-05-23 DIAGNOSIS — M43.16 SPONDYLOLISTHESIS AT L4-L5 LEVEL: ICD-10-CM

## 2022-05-23 DIAGNOSIS — M51.36 DDD (DEGENERATIVE DISC DISEASE), LUMBAR: ICD-10-CM

## 2022-05-23 DIAGNOSIS — M48.062 SPINAL STENOSIS OF LUMBAR REGION WITH NEUROGENIC CLAUDICATION: Primary | ICD-10-CM

## 2022-05-23 PROBLEM — M51.369 DDD (DEGENERATIVE DISC DISEASE), LUMBAR: Status: ACTIVE | Noted: 2022-05-23

## 2022-05-23 PROCEDURE — 99214 OFFICE O/P EST MOD 30 MIN: CPT | Performed by: NEUROLOGICAL SURGERY

## 2022-05-23 RX ORDER — TRAMADOL HYDROCHLORIDE 50 MG/1
50 TABLET ORAL 2 TIMES DAILY PRN
Qty: 30 TABLET | Refills: 0 | Status: SHIPPED | OUTPATIENT
Start: 2022-05-23

## 2022-05-29 DIAGNOSIS — G62.9 PERIPHERAL POLYNEUROPATHY: ICD-10-CM

## 2022-05-31 RX ORDER — PREGABALIN 75 MG/1
75 CAPSULE ORAL EVERY MORNING
Qty: 30 CAPSULE | Refills: 2 | Status: SHIPPED | OUTPATIENT
Start: 2022-05-31 | End: 2022-09-01 | Stop reason: SDUPTHER

## 2022-06-01 DIAGNOSIS — G62.9 PERIPHERAL POLYNEUROPATHY: ICD-10-CM

## 2022-06-01 RX ORDER — PREGABALIN 150 MG/1
150 CAPSULE ORAL DAILY
Qty: 30 CAPSULE | Refills: 1 | Status: SHIPPED | OUTPATIENT
Start: 2022-06-01 | End: 2022-07-31 | Stop reason: SDUPTHER

## 2022-06-01 RX ORDER — PREGABALIN 150 MG/1
150 CAPSULE ORAL DAILY
Qty: 30 CAPSULE | Refills: 1 | Status: CANCELLED | OUTPATIENT
Start: 2022-06-01

## 2022-07-17 DIAGNOSIS — N95.1 MENOPAUSAL SYMPTOMS: ICD-10-CM

## 2022-07-17 DIAGNOSIS — M17.11 PRIMARY OSTEOARTHRITIS OF RIGHT KNEE: ICD-10-CM

## 2022-07-18 RX ORDER — MELOXICAM 15 MG/1
15 TABLET ORAL DAILY
Qty: 90 TABLET | Refills: 1 | Status: SHIPPED | OUTPATIENT
Start: 2022-07-18 | End: 2023-02-14 | Stop reason: SDUPTHER

## 2022-07-18 RX ORDER — ESTRADIOL 1 MG/1
1 TABLET ORAL DAILY
Qty: 90 TABLET | Refills: 1 | Status: SHIPPED | OUTPATIENT
Start: 2022-07-18 | End: 2023-01-16 | Stop reason: SDUPTHER

## 2022-07-31 DIAGNOSIS — G62.9 PERIPHERAL POLYNEUROPATHY: ICD-10-CM

## 2022-07-31 RX ORDER — PREGABALIN 150 MG/1
150 CAPSULE ORAL DAILY
Qty: 30 CAPSULE | Refills: 1 | Status: CANCELLED | OUTPATIENT
Start: 2022-07-31

## 2022-08-01 DIAGNOSIS — G62.9 PERIPHERAL POLYNEUROPATHY: ICD-10-CM

## 2022-08-05 RX ORDER — PREGABALIN 150 MG/1
150 CAPSULE ORAL DAILY
Qty: 30 CAPSULE | Refills: 1 | Status: SHIPPED | OUTPATIENT
Start: 2022-08-05 | End: 2022-09-30 | Stop reason: SDUPTHER

## 2022-08-21 DIAGNOSIS — F41.9 ANXIETY: ICD-10-CM

## 2022-08-21 RX ORDER — ALPRAZOLAM 1 MG/1
1 TABLET ORAL NIGHTLY PRN
Qty: 30 TABLET | Refills: 1 | Status: CANCELLED | OUTPATIENT
Start: 2022-08-21

## 2022-08-22 DIAGNOSIS — F41.9 ANXIETY: ICD-10-CM

## 2022-08-22 RX ORDER — ALPRAZOLAM 1 MG/1
1 TABLET ORAL NIGHTLY PRN
Qty: 30 TABLET | Refills: 1 | Status: CANCELLED | OUTPATIENT
Start: 2022-08-21

## 2022-08-22 RX ORDER — ALPRAZOLAM 1 MG/1
1 TABLET ORAL NIGHTLY PRN
Qty: 30 TABLET | Refills: 1 | OUTPATIENT
Start: 2022-08-22

## 2022-08-24 ENCOUNTER — OFFICE VISIT (OUTPATIENT)
Dept: INTERNAL MEDICINE | Facility: CLINIC | Age: 62
End: 2022-08-24

## 2022-08-24 VITALS
SYSTOLIC BLOOD PRESSURE: 146 MMHG | WEIGHT: 187.2 LBS | BODY MASS INDEX: 30.08 KG/M2 | DIASTOLIC BLOOD PRESSURE: 94 MMHG | HEART RATE: 87 BPM | TEMPERATURE: 97.7 F | OXYGEN SATURATION: 98 % | HEIGHT: 66 IN

## 2022-08-24 DIAGNOSIS — F41.9 ANXIETY: ICD-10-CM

## 2022-08-24 DIAGNOSIS — M17.12 PRIMARY OSTEOARTHRITIS OF LEFT KNEE: Chronic | ICD-10-CM

## 2022-08-24 DIAGNOSIS — E03.9 ACQUIRED HYPOTHYROIDISM: Chronic | ICD-10-CM

## 2022-08-24 DIAGNOSIS — I10 ESSENTIAL HYPERTENSION: Primary | Chronic | ICD-10-CM

## 2022-08-24 DIAGNOSIS — M43.16 SPONDYLOLISTHESIS AT L4-L5 LEVEL: Chronic | ICD-10-CM

## 2022-08-24 PROCEDURE — 99214 OFFICE O/P EST MOD 30 MIN: CPT | Performed by: NURSE PRACTITIONER

## 2022-08-24 RX ORDER — IRBESARTAN AND HYDROCHLOROTHIAZIDE 300; 12.5 MG/1; MG/1
1 TABLET, FILM COATED ORAL DAILY
Qty: 90 TABLET | Refills: 1 | Status: SHIPPED | OUTPATIENT
Start: 2022-08-24 | End: 2023-02-15 | Stop reason: SDUPTHER

## 2022-08-24 RX ORDER — ALPRAZOLAM 1 MG/1
1 TABLET ORAL NIGHTLY PRN
Qty: 30 TABLET | Refills: 1 | Status: CANCELLED | OUTPATIENT
Start: 2022-08-24

## 2022-08-25 DIAGNOSIS — F41.9 ANXIETY: ICD-10-CM

## 2022-08-26 RX ORDER — ALPRAZOLAM 1 MG/1
1 TABLET ORAL NIGHTLY PRN
Qty: 30 TABLET | Refills: 1 | Status: SHIPPED | OUTPATIENT
Start: 2022-08-26 | End: 2022-12-18 | Stop reason: SDUPTHER

## 2022-08-28 NOTE — ASSESSMENT & PLAN NOTE
Sx managed with Lyrica daily, Tramadol for breakthrough pain (per neurology). She has f/u appt to discuss possible repeat MRI with subsequent lumbar fusion.

## 2022-08-28 NOTE — ASSESSMENT & PLAN NOTE
She feels left knee pain is contributing to low back pain which we discussed. However, she is postponing knee replacement for now. Discussed possible referral to Dr. James Moya which she would like to hold off on for now, continue to monitor.

## 2022-08-28 NOTE — ASSESSMENT & PLAN NOTE
BP is elevated in the office, notes home readings are consistently >140/90. She is advised to increase Irbesartan-HCTZ to 300-12.5mg daily and continue to monitor BP. Continue low sodium diet.

## 2022-08-28 NOTE — PROGRESS NOTES
"Chief Complaint  Hypertension (4 month follow up), Back Pain, and Hypothyroidism    Subjective        Nancy Robbins presents to [facility] for f/u regarding HTN, hypothyroidism and chronic back pain.    She has seen neurosurgery regarding spinal stenosis of a severe nature at L3-L4 and of the significant degree at L4-L5 with a superimposed spondylolisthesis. They are considering a lumbar fusion if back pain does not improve, sx not improved with epidurals. She is currently managed on Lyrica, Tramadol given by neurosurgery for breakthrough pain.    Objective   Vital Signs:  /94 (BP Location: Left arm, Patient Position: Sitting, Cuff Size: Adult)   Pulse 87   Temp 97.7 °F (36.5 °C) (Temporal)   Ht 167.6 cm (66\")   Wt 84.9 kg (187 lb 3.2 oz)   SpO2 98%   BMI 30.21 kg/m²   Estimated body mass index is 30.21 kg/m² as calculated from the following:    Height as of this encounter: 167.6 cm (66\").    Weight as of this encounter: 84.9 kg (187 lb 3.2 oz).    BMI is >= 30 and <35. (Class 1 Obesity). The following options were offered after discussion;: nutrition counseling/recommendations      Physical Exam  Constitutional:       Appearance: She is well-developed. She is not ill-appearing.   HENT:      Head: Normocephalic.      Right Ear: Hearing, tympanic membrane and external ear normal.      Left Ear: Hearing, tympanic membrane and external ear normal.      Nose: Nose normal. No nasal deformity, mucosal edema or rhinorrhea.      Right Sinus: No maxillary sinus tenderness or frontal sinus tenderness.      Left Sinus: No maxillary sinus tenderness or frontal sinus tenderness.      Mouth/Throat:      Dentition: Normal dentition.   Eyes:      General: Lids are normal.         Right eye: No discharge.         Left eye: No discharge.      Conjunctiva/sclera: Conjunctivae normal.      Right eye: No exudate.     Left eye: No exudate.  Neck:      Thyroid: No thyroid mass or thyromegaly.      " Vascular: No carotid bruit.      Trachea: Trachea normal.   Cardiovascular:      Rate and Rhythm: Regular rhythm.      Pulses: Normal pulses.      Heart sounds: Normal heart sounds. No murmur heard.  Pulmonary:      Effort: No respiratory distress.      Breath sounds: Normal breath sounds. No decreased breath sounds, wheezing, rhonchi or rales.   Abdominal:      General: Bowel sounds are normal.      Palpations: Abdomen is soft.      Tenderness: There is no abdominal tenderness.   Musculoskeletal:      Cervical back: Normal range of motion. No edema.   Lymphadenopathy:      Head:      Right side of head: No submental, submandibular, tonsillar, preauricular, posterior auricular or occipital adenopathy.      Left side of head: No submental, submandibular, tonsillar, preauricular, posterior auricular or occipital adenopathy.   Skin:     General: Skin is warm and dry.      Nails: There is no clubbing.   Neurological:      Mental Status: She is alert.   Psychiatric:         Behavior: Behavior is cooperative.        Result Review :  The following data was reviewed by: AMBER Gallegos on 08/24/2022:  Common labs    Common Labsle 12/14/21 12/14/21 12/14/21    1038 [id number]   Glucose  115 (A)    BUN  12    Creatinine  0.76    eGFR Non  Am  85    eGFR African Am  98    Sodium  141    Potassium  3.9    Chloride  105    Calcium  9.4    Total Protein  6.3    Albumin  4.0    Total Bilirubin  0.2    Alkaline Phosphatase  95    AST (SGOT)  15    ALT (SGPT)  24    WBC 5.8     Hemoglobin 13.4     Hematocrit 39.6     Platelets 266     Total Cholesterol   217 (A)   Triglycerides   125   HDL Cholesterol   60   LDL Cholesterol    135 (A)   (A) Abnormal value       Comments are available for some flowsheets but are not being displayed.           Data reviewed: Consultant notes Neurosurgery 5/2022          Assessment and Plan   Diagnoses and all orders for this visit:    1. Essential hypertension (Primary)  Assessment &  Plan:  BP is elevated in the office, notes home readings are consistently >140/90. She is advised to increase Irbesartan-HCTZ to 300-12.5mg daily and continue to monitor BP. Continue low sodium diet.    Orders:  -     irbesartan-hydrochlorothiazide (AVALIDE) 300-12.5 MG tablet; Take 1 tablet by mouth Daily.  Dispense: 90 tablet; Refill: 1    2. Acquired hypothyroidism  Assessment & Plan:  TSH 0.856, continue current Synthroid dose for replacement      3. Spondylolisthesis at L4-L5 level  Assessment & Plan:  Sx managed with Lyrica daily, Tramadol for breakthrough pain (per neurology). She has f/u appt to discuss possible repeat MRI with subsequent lumbar fusion.      4. Primary osteoarthritis of left knee  Assessment & Plan:  She feels left knee pain is contributing to low back pain which we discussed. However, she is postponing knee replacement for now. Discussed possible referral to Dr. James Moya which she would like to hold off on for now, continue to monitor.           Follow Up   Return in about 4 months (around 12/24/2022) for Annual physical-after 12/14.  Patient was given instructions and counseling regarding her condition or for health maintenance advice. Please see specific information pulled into the AVS if appropriate.       Answers for HPI/ROS submitted by the patient on 8/24/2022  Please describe your symptoms.: Same  Have you had these symptoms before?: Yes  How long have you been having these symptoms?: Greater than 2 weeks  Please list any medications you are currently taking for this condition.: Same  Please describe any probable cause for these symptoms. : Same  What is the primary reason for your visit?: Other

## 2022-08-29 DIAGNOSIS — G62.9 PERIPHERAL POLYNEUROPATHY: ICD-10-CM

## 2022-08-29 RX ORDER — PREGABALIN 75 MG/1
75 CAPSULE ORAL EVERY MORNING
Qty: 30 CAPSULE | Refills: 2 | Status: CANCELLED | OUTPATIENT
Start: 2022-08-29

## 2022-08-30 RX ORDER — PREGABALIN 75 MG/1
75 CAPSULE ORAL EVERY MORNING
Qty: 30 CAPSULE | Refills: 2 | Status: CANCELLED | OUTPATIENT
Start: 2022-08-30

## 2022-09-01 DIAGNOSIS — G62.9 PERIPHERAL POLYNEUROPATHY: ICD-10-CM

## 2022-09-01 RX ORDER — PREGABALIN 75 MG/1
75 CAPSULE ORAL EVERY MORNING
Qty: 30 CAPSULE | Refills: 2 | Status: CANCELLED | OUTPATIENT
Start: 2022-09-01

## 2022-09-04 RX ORDER — PREGABALIN 75 MG/1
75 CAPSULE ORAL EVERY MORNING
Qty: 30 CAPSULE | Refills: 2 | Status: SHIPPED | OUTPATIENT
Start: 2022-09-04 | End: 2022-12-28 | Stop reason: SDUPTHER

## 2022-09-26 DIAGNOSIS — E03.9 ACQUIRED HYPOTHYROIDISM: ICD-10-CM

## 2022-09-26 RX ORDER — LEVOTHYROXINE SODIUM 0.15 MG/1
150 TABLET ORAL DAILY
Qty: 90 TABLET | Refills: 1 | Status: SHIPPED | OUTPATIENT
Start: 2022-09-26 | End: 2023-01-27 | Stop reason: SDUPTHER

## 2022-09-30 DIAGNOSIS — G62.9 PERIPHERAL POLYNEUROPATHY: ICD-10-CM

## 2022-09-30 RX ORDER — PREGABALIN 150 MG/1
150 CAPSULE ORAL DAILY
Qty: 30 CAPSULE | Refills: 1 | Status: SHIPPED | OUTPATIENT
Start: 2022-09-30 | End: 2022-11-29 | Stop reason: SDUPTHER

## 2022-11-01 DIAGNOSIS — J30.9 ALLERGIC RHINITIS, UNSPECIFIED SEASONALITY, UNSPECIFIED TRIGGER: Chronic | ICD-10-CM

## 2022-11-01 RX ORDER — MONTELUKAST SODIUM 10 MG/1
10 TABLET ORAL NIGHTLY
Qty: 90 TABLET | Refills: 1 | Status: SHIPPED | OUTPATIENT
Start: 2022-11-01

## 2022-11-15 DIAGNOSIS — J30.9 ALLERGIC RHINITIS: ICD-10-CM

## 2022-11-29 DIAGNOSIS — G62.9 PERIPHERAL POLYNEUROPATHY: ICD-10-CM

## 2022-11-29 RX ORDER — PREGABALIN 150 MG/1
150 CAPSULE ORAL DAILY
Qty: 30 CAPSULE | Refills: 2 | Status: SHIPPED | OUTPATIENT
Start: 2022-11-29 | End: 2023-02-20 | Stop reason: SDUPTHER

## 2022-12-02 RX ORDER — FLUTICASONE PROPIONATE 50 MCG
2 SPRAY, SUSPENSION (ML) NASAL DAILY
Qty: 64 G | Refills: 2 | Status: SHIPPED | OUTPATIENT
Start: 2022-12-02

## 2022-12-18 DIAGNOSIS — F41.9 ANXIETY: ICD-10-CM

## 2022-12-19 NOTE — TELEPHONE ENCOUNTER
Rx Refill Note  Requested Prescriptions     Pending Prescriptions Disp Refills   • ALPRAZolam (XANAX) 1 MG tablet 30 tablet 1     Sig: Take 1 tablet by mouth At Night As Needed for Anxiety      Last office visit with prescribing clinician: 8/24/2022   Last telemedicine visit with prescribing clinician: 1/18/2023   Next office visit with prescribing clinician: 1/18/2023         Caroline Bill MA  12/19/22, 14:11 EST

## 2022-12-20 RX ORDER — ALPRAZOLAM 1 MG/1
1 TABLET ORAL NIGHTLY PRN
Qty: 30 TABLET | Refills: 1 | Status: SHIPPED | OUTPATIENT
Start: 2022-12-20

## 2022-12-28 DIAGNOSIS — G62.9 PERIPHERAL POLYNEUROPATHY: ICD-10-CM

## 2022-12-28 NOTE — TELEPHONE ENCOUNTER
Last office visit with prescribing clinician: 8/24/2022     Next office visit with prescribing clinician: 1/18/2023

## 2022-12-29 RX ORDER — PREGABALIN 75 MG/1
75 CAPSULE ORAL EVERY MORNING
Qty: 30 CAPSULE | Refills: 2 | Status: SHIPPED | OUTPATIENT
Start: 2022-12-29

## 2023-01-16 DIAGNOSIS — N95.1 MENOPAUSAL SYMPTOMS: ICD-10-CM

## 2023-01-16 RX ORDER — ESTRADIOL 1 MG/1
1 TABLET ORAL DAILY
Qty: 90 TABLET | Refills: 1 | Status: SHIPPED | OUTPATIENT
Start: 2023-01-16

## 2023-01-18 ENCOUNTER — OFFICE VISIT (OUTPATIENT)
Dept: INTERNAL MEDICINE | Facility: CLINIC | Age: 63
End: 2023-01-18
Payer: COMMERCIAL

## 2023-01-18 VITALS
SYSTOLIC BLOOD PRESSURE: 144 MMHG | HEART RATE: 116 BPM | WEIGHT: 185.6 LBS | TEMPERATURE: 97.3 F | OXYGEN SATURATION: 98 % | BODY MASS INDEX: 29.83 KG/M2 | DIASTOLIC BLOOD PRESSURE: 88 MMHG | HEIGHT: 66 IN

## 2023-01-18 DIAGNOSIS — M48.062 SPINAL STENOSIS OF LUMBAR REGION WITH NEUROGENIC CLAUDICATION: Chronic | ICD-10-CM

## 2023-01-18 DIAGNOSIS — E03.9 ACQUIRED HYPOTHYROIDISM: Chronic | ICD-10-CM

## 2023-01-18 DIAGNOSIS — M17.12 PRIMARY OSTEOARTHRITIS OF LEFT KNEE: ICD-10-CM

## 2023-01-18 DIAGNOSIS — M72.2 PLANTAR FASCIITIS OF RIGHT FOOT: ICD-10-CM

## 2023-01-18 DIAGNOSIS — F41.9 ANXIETY: Chronic | ICD-10-CM

## 2023-01-18 DIAGNOSIS — I10 ESSENTIAL HYPERTENSION: Chronic | ICD-10-CM

## 2023-01-18 DIAGNOSIS — Z00.00 PE (PHYSICAL EXAM), ANNUAL: Primary | ICD-10-CM

## 2023-01-18 PROCEDURE — 99396 PREV VISIT EST AGE 40-64: CPT | Performed by: NURSE PRACTITIONER

## 2023-01-18 RX ORDER — METHYLPREDNISOLONE 4 MG/1
TABLET ORAL
COMMUNITY
Start: 2022-10-31 | End: 2023-01-18

## 2023-01-18 RX ORDER — AMLODIPINE BESYLATE 5 MG/1
5 TABLET ORAL DAILY
Qty: 90 TABLET | Refills: 1 | Status: SHIPPED | OUTPATIENT
Start: 2023-01-18

## 2023-01-18 RX ORDER — DICLOFENAC SODIUM 75 MG/1
75 TABLET, DELAYED RELEASE ORAL DAILY
COMMUNITY
Start: 2022-11-21 | End: 2023-02-14 | Stop reason: SDUPTHER

## 2023-01-18 RX ORDER — OXYCODONE HYDROCHLORIDE AND ACETAMINOPHEN 5; 325 MG/1; MG/1
TABLET ORAL SEE ADMIN INSTRUCTIONS
COMMUNITY
Start: 2022-11-21 | End: 2023-01-18

## 2023-01-19 LAB
ALBUMIN SERPL-MCNC: 4.4 G/DL (ref 3.5–5.2)
ALBUMIN/GLOB SERPL: 1.9 G/DL
ALP SERPL-CCNC: 104 U/L (ref 39–117)
ALT SERPL-CCNC: 66 U/L (ref 1–33)
APPEARANCE UR: CLEAR
AST SERPL-CCNC: 36 U/L (ref 1–32)
BILIRUB SERPL-MCNC: 0.3 MG/DL (ref 0–1.2)
BILIRUB UR QL STRIP: NEGATIVE
BUN SERPL-MCNC: 20 MG/DL (ref 8–23)
BUN/CREAT SERPL: 27.8 (ref 7–25)
CALCIUM SERPL-MCNC: 9.6 MG/DL (ref 8.6–10.5)
CHLORIDE SERPL-SCNC: 100 MMOL/L (ref 98–107)
CHOLEST SERPL-MCNC: 230 MG/DL (ref 0–200)
CO2 SERPL-SCNC: 30.6 MMOL/L (ref 22–29)
COLOR UR: YELLOW
CREAT SERPL-MCNC: 0.72 MG/DL (ref 0.57–1)
EGFRCR SERPLBLD CKD-EPI 2021: 94.7 ML/MIN/1.73
ERYTHROCYTE [DISTWIDTH] IN BLOOD BY AUTOMATED COUNT: 12.5 % (ref 12.3–15.4)
GLOBULIN SER CALC-MCNC: 2.3 GM/DL
GLUCOSE SERPL-MCNC: 97 MG/DL (ref 65–99)
GLUCOSE UR QL STRIP: NEGATIVE
HCT VFR BLD AUTO: 41 % (ref 34–46.6)
HDLC SERPL-MCNC: 77 MG/DL (ref 40–60)
HGB BLD-MCNC: 13.5 G/DL (ref 12–15.9)
HGB UR QL STRIP: NEGATIVE
KETONES UR QL STRIP: NEGATIVE
LDLC SERPL CALC-MCNC: 128 MG/DL (ref 0–100)
LEUKOCYTE ESTERASE UR QL STRIP: NEGATIVE
MCH RBC QN AUTO: 30.5 PG (ref 26.6–33)
MCHC RBC AUTO-ENTMCNC: 32.9 G/DL (ref 31.5–35.7)
MCV RBC AUTO: 92.6 FL (ref 79–97)
NITRITE UR QL STRIP: NEGATIVE
PH UR STRIP: 6 [PH] (ref 5–8)
PLATELET # BLD AUTO: 275 10*3/MM3 (ref 140–450)
POTASSIUM SERPL-SCNC: 3.9 MMOL/L (ref 3.5–5.2)
PROT SERPL-MCNC: 6.7 G/DL (ref 6–8.5)
PROT UR QL STRIP: NEGATIVE
RBC # BLD AUTO: 4.43 10*6/MM3 (ref 3.77–5.28)
SODIUM SERPL-SCNC: 140 MMOL/L (ref 136–145)
SP GR UR STRIP: 1.02 (ref 1–1.03)
TRIGL SERPL-MCNC: 146 MG/DL (ref 0–150)
TSH SERPL DL<=0.005 MIU/L-ACNC: 0.2 UIU/ML (ref 0.27–4.2)
UROBILINOGEN UR STRIP-MCNC: NORMAL MG/DL
VLDLC SERPL CALC-MCNC: 25 MG/DL (ref 5–40)
WBC # BLD AUTO: 6.65 10*3/MM3 (ref 3.4–10.8)

## 2023-01-27 DIAGNOSIS — E03.9 ACQUIRED HYPOTHYROIDISM: Primary | ICD-10-CM

## 2023-01-27 DIAGNOSIS — E03.9 ACQUIRED HYPOTHYROIDISM: ICD-10-CM

## 2023-01-27 RX ORDER — LEVOTHYROXINE SODIUM 137 UG/1
150 TABLET ORAL DAILY
Qty: 90 TABLET | Refills: 1 | Status: SHIPPED | OUTPATIENT
Start: 2023-01-27 | End: 2023-02-15 | Stop reason: SDUPTHER

## 2023-02-02 PROBLEM — M72.2 PLANTAR FASCIITIS OF RIGHT FOOT: Chronic | Status: ACTIVE | Noted: 2023-01-18

## 2023-02-02 NOTE — PROGRESS NOTES
Subjective   Nancy Robbins is a 62 y.o. female who is here for a physical exam.    History of Present Illness  She c/o increased left knee pain with a hx of OA, has seen [facility] for mgmt of OA and is requesting surgical evaluation. She has received steroid injection with mild relief. She has a hx of right knee replacement with Dr. Fu but is requesting to transfer to Dr. Moya for mgmt of left knee OA.       The following portions of the patient's history were reviewed and updated as appropriate: allergies, current medications, past social history and problem list.    Past Medical History:   Diagnosis Date   • Anxiety    • COVID 10/31/2022   • Graves disease    • Hard to intubate     TROUBLE WITH INTUBATION... has had cervical neck surgery and unable to bend neck too far   • Hypertension    • Hyperthyroidism    • Hypothyroidism     under went radiation of thyroid and on supplement   • Low back pain     undergoing epidural   • Neuropathy     bilateral lower legs   • Osteoarthritis    • PONV (postoperative nausea and vomiting)    • Restless leg syndrome     TAKES XANAX FOR..   • Vertigo          Current Outpatient Medications:   •  ALPRAZolam (XANAX) 1 MG tablet, Take 1 tablet by mouth At Night As Needed for Anxiety, Disp: 30 tablet, Rfl: 1  •  diclofenac (VOLTAREN) 75 MG EC tablet, Take 75 mg by mouth Daily., Disp: , Rfl:   •  estradiol (ESTRACE) 1 MG tablet, Take 1 tablet by mouth Daily., Disp: 90 tablet, Rfl: 1  •  fluticasone (FLONASE) 50 MCG/ACT nasal spray, Use 2 sprays by in each nostril Daily., Disp: 64 g, Rfl: 2  •  irbesartan-hydrochlorothiazide (AVALIDE) 300-12.5 MG tablet, Take 1 tablet by mouth Daily., Disp: 90 tablet, Rfl: 1  •  meclizine (ANTIVERT) 25 MG tablet, Take 1 tablet by mouth 3 (Three) Times a Day As Needed for Dizziness., Disp: 25 tablet, Rfl: 0  •  meloxicam (MOBIC) 15 MG tablet, Take 1 tablet by mouth Daily., Disp: 90 tablet, Rfl: 1  •  montelukast (SINGULAIR) 10 MG tablet, Take 1 tablet  by mouth Every Night., Disp: 90 tablet, Rfl: 1  •  pregabalin (LYRICA) 150 MG capsule, Take 1 capsule by mouth Daily., Disp: 30 capsule, Rfl: 2  •  pregabalin (LYRICA) 75 MG capsule, Take 1 capsule by mouth Every Morning., Disp: 30 capsule, Rfl: 2  •  tiZANidine (ZANAFLEX) 4 MG tablet, Take 1 tablet by mouth Every 8 (Eight) Hours As Needed for Muscle Spasms., Disp: 90 tablet, Rfl: 2  •  traMADol (ULTRAM) 50 MG tablet, Take 1 tablet by mouth 2 (Two) Times a Day As Needed for Moderate Pain ., Disp: 30 tablet, Rfl: 0  •  Wal-Fex D Allergy & Congestion  MG per 12 hr tablet, TAKE 1 TABLET BY MOUTH TWICE DAILY, Disp: 120 tablet, Rfl: 5  •  amLODIPine (NORVASC) 5 MG tablet, Take 1 tablet by mouth Daily., Disp: 90 tablet, Rfl: 1  •  levothyroxine (SYNTHROID, LEVOTHROID) 137 MCG tablet, Take 1 tablet by mouth Daily., Disp: 90 tablet, Rfl: 1    Allergies   Allergen Reactions   • Ampicillin Nausea Only   • Oxycodone Nausea And Vomiting       Review of Systems   Constitutional: Negative for activity change, appetite change, chills, diaphoresis, fatigue, fever and unexpected weight change.   HENT: Positive for congestion and postnasal drip. Negative for dental problem, drooling, ear discharge, ear pain, facial swelling, hearing loss, mouth sores, nosebleeds, rhinorrhea, sinus pressure, sore throat, tinnitus and trouble swallowing.    Eyes: Negative for photophobia, pain, discharge, redness, itching and visual disturbance.   Respiratory: Negative for apnea, cough, choking, chest tightness, shortness of breath and wheezing.    Cardiovascular: Negative for chest pain, palpitations and leg swelling.        No orthopnea, PND, VARELA   Gastrointestinal: Negative for abdominal pain, blood in stool, constipation, diarrhea, nausea and vomiting.   Endocrine: Negative for cold intolerance, heat intolerance, polydipsia and polyuria.   Genitourinary: Negative for decreased urine volume, dysuria, enuresis, flank pain, frequency, hematuria  "and urgency.   Musculoskeletal: Positive for arthralgias, back pain, neck pain and neck stiffness. Negative for gait problem, joint swelling and myalgias.   Skin: Negative for color change and rash.        No hair changes, no nail changes   Allergic/Immunologic: Negative for environmental allergies, food allergies and immunocompromised state.   Neurological: Negative for dizziness, tremors, seizures, syncope, speech difficulty, weakness, light-headedness, numbness and headaches.   Hematological: Negative for adenopathy. Does not bruise/bleed easily.   Psychiatric/Behavioral: Negative for agitation, confusion, decreased concentration, dysphoric mood, sleep disturbance and suicidal ideas. The patient is nervous/anxious.        Objective   Vitals:    01/18/23 1431 01/18/23 1459   BP: 146/96 144/88   BP Location: Left arm Left arm   Patient Position: Sitting Sitting   Cuff Size: Adult Adult   Pulse: 116    Temp: 97.3 °F (36.3 °C)    TempSrc: Temporal    SpO2: 98%    Weight: 84.2 kg (185 lb 9.6 oz)    Height: 167.6 cm (66\")      Physical Exam  Constitutional:       General: She is not in acute distress.     Appearance: Normal appearance. She is not diaphoretic.   HENT:      Head: Normocephalic and atraumatic.      Right Ear: Tympanic membrane, ear canal and external ear normal.      Left Ear: Tympanic membrane, ear canal and external ear normal.      Nose: Nose normal. No rhinorrhea.      Mouth/Throat:      Mouth: Mucous membranes are moist.      Pharynx: Oropharynx is clear.   Eyes:      General:         Right eye: No discharge.         Left eye: No discharge.      Conjunctiva/sclera: Conjunctivae normal.   Cardiovascular:      Rate and Rhythm: Normal rate and regular rhythm.      Pulses: Normal pulses.      Heart sounds: Normal heart sounds.   Pulmonary:      Effort: Pulmonary effort is normal.      Breath sounds: Normal breath sounds.   Abdominal:      General: Bowel sounds are normal.      Tenderness: There is no " abdominal tenderness.   Musculoskeletal:         General: No swelling or tenderness.      Cervical back: Normal range of motion.   Skin:     General: Skin is warm and dry.   Neurological:      General: No focal deficit present.      Mental Status: She is alert and oriented to person, place, and time.   Psychiatric:         Mood and Affect: Mood normal.         Behavior: Behavior normal.         Judgment: Judgment normal.         Assessment & Plan   Diagnoses and all orders for this visit:    1. PE (physical exam), annual (Primary)  -     CBC (No Diff)  -     Comprehensive Metabolic Panel  -     Lipid Panel  -     TSH  -     Urinalysis With Microscopic If Indicated (No Culture) - Urine, Clean Catch    2. Essential hypertension  Assessment & Plan:  BP is elevated in the office and has remained elevated with recent visits; continue Irbesartan-HCTZ but add amlodipine 5 mg daily for further BP lowering.    Orders:  -     amLODIPine (NORVASC) 5 MG tablet; Take 1 tablet by mouth Daily.  Dispense: 90 tablet; Refill: 1    3. Acquired hypothyroidism  Assessment & Plan:  She is on Synthroid 137 mcg for replacement.   Lab Results   Component Value Date    TSH 0.195 (L) 01/18/2023         4. Anxiety  Assessment & Plan:  She is currently managed on Xanax which she is tapering, discussed addictive nature of medication and importance of using sparingly.      5. Plantar fasciitis of right foot  Assessment & Plan:  She is working with podiatry regarding plantar fasciitis, notes gradual improvement in sx      6. Primary osteoarthritis of left knee  Assessment & Plan:  She requests evaluation by Dr. Moya to discuss knee replacement    Orders:  -     Ambulatory Referral to Orthopedic Surgery    7. Spinal stenosis of lumbar region with neurogenic claudication  Assessment & Plan:  She is currently managed on Lyrica for neuropathy with muscle relaxers and Tramadol as needed.        Risk assessment:  She has a family hx (father) of lung  cancer.  Her Body mass index is 29.96 kg/m². - She tries to remain as active as possible with chronic joint pain and follow a low-fat, low-cholesterol diet.    Prevention:  Health Maintenance   Topic Date Due   • PAP SMEAR  Never done   • COVID-19 Vaccine (4 - Booster for Pfizer series) 03/01/2022   • ANNUAL PHYSICAL  12/14/2022   • MAMMOGRAM  01/18/2025 (Originally 5/24/2019)   • COLORECTAL CANCER SCREENING  05/24/2026   • TDAP/TD VACCINES (2 - Td or Tdap) 12/10/2029   • HEPATITIS C SCREENING  Completed   • INFLUENZA VACCINE  Completed   • ZOSTER VACCINE  Completed   • Pneumococcal Vaccine 0-64  Aged Out       Discussed healthy lifestyle choices such as maintaining a balanced diet low in carbohydrates and limiting caffeine and alcohol intake.  Recommended routine exercise for bone strength and cardiovascular health.

## 2023-02-02 NOTE — ASSESSMENT & PLAN NOTE
She is currently managed on Xanax which she is tapering, discussed addictive nature of medication and importance of using sparingly.

## 2023-02-02 NOTE — ASSESSMENT & PLAN NOTE
She is on Synthroid 137 mcg for replacement.   Lab Results   Component Value Date    TSH 0.195 (L) 01/18/2023

## 2023-02-02 NOTE — ASSESSMENT & PLAN NOTE
BP is elevated in the office and has remained elevated with recent visits; continue Irbesartan-HCTZ but add amlodipine 5 mg daily for further BP lowering.

## 2023-02-14 ENCOUNTER — OFFICE VISIT (OUTPATIENT)
Dept: ORTHOPEDIC SURGERY | Facility: CLINIC | Age: 63
End: 2023-02-14
Payer: COMMERCIAL

## 2023-02-14 VITALS — BODY MASS INDEX: 30.05 KG/M2 | TEMPERATURE: 97.1 F | WEIGHT: 187 LBS | HEIGHT: 66 IN

## 2023-02-14 DIAGNOSIS — R52 PAIN: Primary | ICD-10-CM

## 2023-02-14 DIAGNOSIS — M17.11 PRIMARY OSTEOARTHRITIS OF RIGHT KNEE: ICD-10-CM

## 2023-02-14 DIAGNOSIS — M17.12 PRIMARY OSTEOARTHRITIS OF LEFT KNEE: ICD-10-CM

## 2023-02-14 DIAGNOSIS — M25.562 ACUTE PAIN OF LEFT KNEE: ICD-10-CM

## 2023-02-14 PROCEDURE — 73562 X-RAY EXAM OF KNEE 3: CPT | Performed by: NURSE PRACTITIONER

## 2023-02-14 PROCEDURE — 99214 OFFICE O/P EST MOD 30 MIN: CPT | Performed by: NURSE PRACTITIONER

## 2023-02-14 PROCEDURE — 20610 DRAIN/INJ JOINT/BURSA W/O US: CPT | Performed by: NURSE PRACTITIONER

## 2023-02-14 RX ORDER — DICLOFENAC SODIUM 75 MG/1
75 TABLET, DELAYED RELEASE ORAL DAILY
Qty: 90 TABLET | Refills: 1 | Status: SHIPPED | OUTPATIENT
Start: 2023-02-14

## 2023-02-14 RX ORDER — METHYLPREDNISOLONE ACETATE 80 MG/ML
80 INJECTION, SUSPENSION INTRA-ARTICULAR; INTRALESIONAL; INTRAMUSCULAR; SOFT TISSUE
Status: COMPLETED | OUTPATIENT
Start: 2023-02-14 | End: 2023-02-14

## 2023-02-14 RX ORDER — MELOXICAM 15 MG/1
15 TABLET ORAL DAILY
Qty: 90 TABLET | Refills: 1 | Status: SHIPPED | OUTPATIENT
Start: 2023-02-14

## 2023-02-14 RX ADMIN — METHYLPREDNISOLONE ACETATE 80 MG: 80 INJECTION, SUSPENSION INTRA-ARTICULAR; INTRALESIONAL; INTRAMUSCULAR; SOFT TISSUE at 16:39

## 2023-02-14 NOTE — PROGRESS NOTES
Patient: Nancy Robbins  YOB: 1960 62 y.o. female  Medical Record Number: [id number]    Chief Complaints:   Chief Complaint   Patient presents with   • Left Knee - Initial Evaluation       History of Present Illness:Nancy Robbins is a 62 y.o. female who presents as a new patient both myself as well as to the practice with complaints of increasing left knee pain.  Patient had previous right total knee replacement in 2018, right knee is doing great.  Reports her left knee has been painful off and on for years but worse the last 6 months or so.  She denies any injury.  She had an injection in 2018 but has never had formal physical therapy for her left knee.    Allergies:   Allergies   Allergen Reactions   • Ampicillin Nausea Only   • Oxycodone Nausea And Vomiting       Medications:   Current Outpatient Medications   Medication Sig Dispense Refill   • ALPRAZolam (XANAX) 1 MG tablet Take 1 tablet by mouth At Night As Needed for Anxiety 30 tablet 1   • amLODIPine (NORVASC) 5 MG tablet Take 1 tablet by mouth Daily. 90 tablet 1   • diclofenac (VOLTAREN) 75 MG EC tablet Take 1 tablet by mouth Daily. 90 tablet 1   • estradiol (ESTRACE) 1 MG tablet Take 1 tablet by mouth Daily. 90 tablet 1   • fluticasone (FLONASE) 50 MCG/ACT nasal spray Use 2 sprays by in each nostril Daily. 64 g 2   • irbesartan-hydrochlorothiazide (AVALIDE) 300-12.5 MG tablet Take 1 tablet by mouth Daily. 90 tablet 1   • levothyroxine (SYNTHROID, LEVOTHROID) 137 MCG tablet Take 1 tablet by mouth Daily. 90 tablet 1   • meclizine (ANTIVERT) 25 MG tablet Take 1 tablet by mouth 3 (Three) Times a Day As Needed for Dizziness. 25 tablet 0   • meloxicam (MOBIC) 15 MG tablet Take 1 tablet by mouth Daily. 90 tablet 1   • montelukast (SINGULAIR) 10 MG tablet Take 1 tablet by mouth Every Night. 90 tablet 1   • pregabalin (LYRICA) 150 MG capsule Take 1 capsule by mouth Daily. 30 capsule 2   • pregabalin (LYRICA) 75 MG capsule Take 1 capsule by mouth Every  "Morning. 30 capsule 2   • tiZANidine (ZANAFLEX) 4 MG tablet Take 1 tablet by mouth Every 8 (Eight) Hours As Needed for Muscle Spasms. 90 tablet 2   • traMADol (ULTRAM) 50 MG tablet Take 1 tablet by mouth 2 (Two) Times a Day As Needed for Moderate Pain . 30 tablet 0   • Wal-Fex D Allergy & Congestion  MG per 12 hr tablet TAKE 1 TABLET BY MOUTH TWICE DAILY 120 tablet 5     No current facility-administered medications for this visit.         The following portions of the patient's history were reviewed and updated as appropriate: allergies, current medications, past family history, past medical history, past social history, past surgical history and problem list.    Review of Systems:   A 14 point review of systems was performed. All systems negative except pertinent positives/negative listed in HPI above    Physical Exam:   Vitals:    02/14/23 1550   Temp: 97.1 °F (36.2 °C)   TempSrc: Temporal   Weight: 84.8 kg (187 lb)   Height: 167.6 cm (65.98\")       General: A and O x 3, ASA, NAD    SCLERA:    Normal    Skin clear no unusual lesions noted  Left knee the patient has trace amount of effusion noted with 116 degrees flexion neutral in extension with a positive Konstantin negative Lockman calf soft and nontender       Radiology:  Xrays 3views (ap,lateral, sunrise) left knee were ordered and reviewed today secondary to pain and show bone-on-bone end-stage osteoarthritis with cyst and spur formation.  No compared to views available    Assessment/Plan: End-stage osteoarthritis left knee with increasing pain    Patient and I discussed options, we will proceed with left knee cortisone injection, Pennsaid gel twice daily, physical therapy, I will see her back for follow-up in 3 months and will see the patient back in 3 months to potentially discuss total knee replacement at that time    Large Joint Arthrocentesis: L knee  Date/Time: 2/14/2023 4:39 PM  Consent given by: patient  Site marked: site marked  Timeout: " Immediately prior to procedure a time out was called to verify the correct patient, procedure, equipment, support staff and site/side marked as required   Supporting Documentation  Indications: pain and joint swelling   Procedure Details  Location: knee - L knee  Preparation: Patient was prepped and draped in the usual sterile fashion  Needle size: 22 G  Approach: anterolateral  Medications administered: 80 mg methylPREDNISolone acetate 80 MG/ML; 2 mL lidocaine (cardiac)  Patient tolerance: patient tolerated the procedure well with no immediate complications            Sara Orozco, APRN  2/14/2023

## 2023-02-15 DIAGNOSIS — I10 ESSENTIAL HYPERTENSION: Chronic | ICD-10-CM

## 2023-02-15 DIAGNOSIS — E03.9 ACQUIRED HYPOTHYROIDISM: ICD-10-CM

## 2023-02-15 RX ORDER — IRBESARTAN AND HYDROCHLOROTHIAZIDE 300; 12.5 MG/1; MG/1
1 TABLET, FILM COATED ORAL DAILY
Qty: 90 TABLET | Refills: 1 | Status: SHIPPED | OUTPATIENT
Start: 2023-02-15

## 2023-02-15 RX ORDER — LEVOTHYROXINE SODIUM 137 UG/1
137 TABLET ORAL DAILY
Qty: 90 TABLET | Refills: 1 | Status: SHIPPED | OUTPATIENT
Start: 2023-02-15

## 2023-02-20 DIAGNOSIS — G62.9 PERIPHERAL POLYNEUROPATHY: ICD-10-CM

## 2023-02-20 RX ORDER — PREGABALIN 150 MG/1
150 CAPSULE ORAL DAILY
Qty: 30 CAPSULE | Refills: 2 | Status: CANCELLED | OUTPATIENT
Start: 2023-02-20

## 2023-02-20 NOTE — TELEPHONE ENCOUNTER
Rx Refill Note  Requested Prescriptions     Pending Prescriptions Disp Refills   • pregabalin (LYRICA) 150 MG capsule 30 capsule 2     Sig: Take 1 capsule by mouth Daily.      Last office visit with prescribing clinician: 1/18/2023   Last telemedicine visit with prescribing clinician: 5/19/2023   Next office visit with prescribing clinician: 5/19/2023                         Would you like a call back once the refill request has been completed: [] Yes [] No    If the office needs to give you a call back, can they leave a voicemail: [] Yes [] No    Renetta Langley  02/20/23, 07:53 EST

## 2023-02-22 DIAGNOSIS — G62.9 PERIPHERAL POLYNEUROPATHY: ICD-10-CM

## 2023-02-23 RX ORDER — PREGABALIN 150 MG/1
150 CAPSULE ORAL DAILY
Qty: 30 CAPSULE | Refills: 2 | Status: SHIPPED | OUTPATIENT
Start: 2023-02-23

## 2023-03-06 ENCOUNTER — TREATMENT (OUTPATIENT)
Dept: PHYSICAL THERAPY | Facility: CLINIC | Age: 63
End: 2023-03-06
Payer: COMMERCIAL

## 2023-03-06 DIAGNOSIS — M25.562 ACUTE PAIN OF LEFT KNEE: Primary | ICD-10-CM

## 2023-03-06 PROCEDURE — 97110 THERAPEUTIC EXERCISES: CPT | Performed by: PHYSICAL THERAPIST

## 2023-03-06 PROCEDURE — 97112 NEUROMUSCULAR REEDUCATION: CPT | Performed by: PHYSICAL THERAPIST

## 2023-03-06 PROCEDURE — 97161 PT EVAL LOW COMPLEX 20 MIN: CPT | Performed by: PHYSICAL THERAPIST

## 2023-03-06 NOTE — PROGRESS NOTES
Physical Therapy Initial Evaluation and Plan of Care  [address], [address]  [place] [address]    Patient: Nancy Robbins   : 1960  Diagnosis/ICD-10 Code:  Acute pain of left knee [M25.562]  Referring practitioner: AMBER Marie  Date of Initial Visit: 3/6/2023  Today's Date: 3/6/2023  Patient seen for 1 session         Visit Diagnoses:    ICD-10-CM ICD-9-CM   1. Acute pain of left knee  M25.562 719.46         Subjective Questionnaire: LEFS: 47      Subjective Evaluation    History of Present Illness  Mechanism of injury: Patient reports that her left knee has bothered her since about .  Patient denies an injury to the left knee.  Reports that the left knee is bone on bone.  Reports having an injection a few weeks ago, which has helped.    History of right TKA.  History of spinal stenosis.      Patient Occupation: Dialective Pain  Current pain rating: 3  At worst pain ratin  Location: left medial knee  Quality: sharp and dull ache  Alleviating factors: nothing helps long term.  Exacerbated by: nothing specific.  Progression: improved    Treatments  Previous treatment: injection treatment           Objective          Observations     Additional Knee Observation Details  Normal sit to stand.  Patient ambulates with a normal gait pattern.    Palpation     Additional Palpation Details  TTP to the left medial joint line.    Active Range of Motion   Left Knee   Flexion: Left knee active flexion: about 100.   Extension: Left knee active extension: about -8.     Strength/Myotome Testing     Left Knee   Flexion: 4-  Extension: 4    Tests     Additional Tests Details  N/T at this time.          Assessment & Plan     Assessment  Impairments: abnormal or restricted ROM, activity intolerance, impaired physical strength, lacks appropriate home exercise program and pain with function    Assessment details: Patient presents with c/o pain, TTP, limited AROM and decreased strength  which is limiting her ability to perform activities.    Barriers to therapy: none  Prognosis: good  Prognosis details: STG's to be met by 2 weeks  1)  Independent with HEP  2)  Decrease pain by 50% or more  3)  AROM left knee flexion to 110  4)  Min to no TTP present    LTG's to be met by 4 weeks  1)  Independent with HEP progression  2)  Decrease pain by 75% or more  3)  Increase strength for the left knee to 4/5 or more  4)  No TTP present  5)  Patient to perform ADL'S without limitations      Plan  Therapy options: will be seen for skilled therapy services  Planned therapy interventions: strengthening, stretching, therapeutic activities, home exercise program and neuromuscular re-education  Frequency: 1x week  Duration in weeks: 4  Treatment plan discussed with: patient            Timed:         Manual Therapy:    0     mins  50941;     Therapeutic Exercise:    10     mins  11528;     Neuromuscular Josh:    8    mins  90946;    Therapeutic Activity:     0     mins  35894;     Gait Trainin     mins  96214;     Ultrasound:     0     mins  94174;          Un-Timed:  Electrical Stimulation:    0     mins  03081 ( );    Low Eval     12     Mins  91707  Mod Eval     0     Mins  44245  High Eval                       0     Mins  82727        Timed Treatment:   18   mins   Total Treatment:     30   mins          PT: Thanh Mendes PT     [place] License [id number]  Electronically signed by Thanh Mendes PT, 23, 4:30 PM EST    Certification Period: 3/6/2023 thru 6/3/2023  I certify that the therapy services are furnished while this patient is under my care.  The services outlined above are required by this patient, and will be reviewed every 90 days.    Sara Orozco, Aprn  [address] [place] [address]   NPI: [id number]      Thanh Mendes PT   License number: [id number]        Physician Signature:__________________________________________________    PHYSICIAN: Sara Orozco  L, APRN      DATE:     Please sign and return via fax to [email or web address] . Thank you, [facility].

## 2023-03-07 ENCOUNTER — TELEPHONE (OUTPATIENT)
Dept: PHYSICAL THERAPY | Facility: CLINIC | Age: 63
End: 2023-03-07
Payer: COMMERCIAL

## 2023-03-13 ENCOUNTER — TREATMENT (OUTPATIENT)
Dept: PHYSICAL THERAPY | Facility: CLINIC | Age: 63
End: 2023-03-13
Payer: COMMERCIAL

## 2023-03-13 DIAGNOSIS — M25.562 ACUTE PAIN OF LEFT KNEE: Primary | ICD-10-CM

## 2023-03-13 PROCEDURE — 97110 THERAPEUTIC EXERCISES: CPT | Performed by: PHYSICAL THERAPIST

## 2023-03-13 NOTE — PROGRESS NOTES
"Physical Therapy Daily Treatment Note  [address], [address]  [place] [address]  Visit # 2      Subjective Evaluation    History of Present Illness    Subjective comment: Pt reports that her (L) knee is feeling \"pretty good\".       Objective   See Exercise, Manual, and Modality Logs for complete treatment.   Added standing hip abd, ext (B), standing knee flexion (B) and squats    Assessment & Plan     Assessment    Assessment details: Pt completed treatment with no c/o pain in (L) knee.  Pt tolerated progression in exercises with no issues. Her HEP was updated and given to with advise for progression in reps.                       Manual Therapy:    0     mins  78709;  Therapeutic Exercise:    23     mins  57216;     Neuromuscular Josh:    0    mins  27124;    Therapeutic Activity:     0     mins  84445;     Gait Trainin     mins  07727;     Ultrasound:     0     mins  02931;    Work Hardening           0      mins 87074  Iontophoresis               0   mins 79766  E-Stim                          _0_ mins 35269 ( )    Timed Treatment:   23   mins   Total Treatment:     23   mins    Maynor Douglas PTA  Physical Therapist Assistant  "

## 2023-04-12 ENCOUNTER — DOCUMENTATION (OUTPATIENT)
Dept: PHYSICAL THERAPY | Facility: CLINIC | Age: 63
End: 2023-04-12
Payer: COMMERCIAL

## 2023-04-24 DIAGNOSIS — J30.9 ALLERGIC RHINITIS, UNSPECIFIED SEASONALITY, UNSPECIFIED TRIGGER: Chronic | ICD-10-CM

## 2023-04-24 RX ORDER — MONTELUKAST SODIUM 10 MG/1
10 TABLET ORAL NIGHTLY
Qty: 90 TABLET | Refills: 1 | Status: SHIPPED | OUTPATIENT
Start: 2023-04-24

## 2023-05-12 DIAGNOSIS — F41.9 ANXIETY: ICD-10-CM

## 2023-05-15 RX ORDER — ALPRAZOLAM 1 MG/1
1 TABLET ORAL NIGHTLY PRN
Qty: 30 TABLET | Refills: 1 | Status: SHIPPED | OUTPATIENT
Start: 2023-05-15

## 2023-05-15 NOTE — TELEPHONE ENCOUNTER
Rx Refill Note  Requested Prescriptions     Pending Prescriptions Disp Refills   • ALPRAZolam (XANAX) 1 MG tablet 30 tablet 1     Sig: Take 1 tablet by mouth At Night As Needed for Anxiety      Last office visit with prescribing clinician: 1/18/2023   Last telemedicine visit with prescribing clinician: 4/24/2023   Next office visit with prescribing clinician: 5/19/2023                         Would you like a call back once the refill request has been completed: [] Yes [] No    If the office needs to give you a call back, can they leave a voicemail: [] Yes [] No    Renetta Langley  05/15/23, 08:07 EDT

## 2023-05-17 DIAGNOSIS — G62.9 PERIPHERAL POLYNEUROPATHY: ICD-10-CM

## 2023-05-17 NOTE — TELEPHONE ENCOUNTER
Last office visit with prescribing clinician: 1/18/2023     Next office visit with prescribing clinician: 7/28/2023

## 2023-05-18 RX ORDER — PREGABALIN 150 MG/1
150 CAPSULE ORAL DAILY
Qty: 30 CAPSULE | Refills: 2 | Status: SHIPPED | OUTPATIENT
Start: 2023-05-18

## 2023-05-23 ENCOUNTER — CLINICAL SUPPORT (OUTPATIENT)
Dept: ORTHOPEDIC SURGERY | Facility: CLINIC | Age: 63
End: 2023-05-23
Payer: COMMERCIAL

## 2023-05-23 VITALS — WEIGHT: 187 LBS | TEMPERATURE: 97.5 F | BODY MASS INDEX: 30.05 KG/M2 | HEIGHT: 66 IN

## 2023-05-23 DIAGNOSIS — M25.562 ACUTE PAIN OF LEFT KNEE: Primary | ICD-10-CM

## 2023-05-23 RX ORDER — LIDOCAINE HYDROCHLORIDE 10 MG/ML
2 INJECTION, SOLUTION EPIDURAL; INFILTRATION; INTRACAUDAL; PERINEURAL
Status: COMPLETED | OUTPATIENT
Start: 2023-05-23 | End: 2023-05-23

## 2023-05-23 RX ORDER — METHYLPREDNISOLONE ACETATE 80 MG/ML
80 INJECTION, SUSPENSION INTRA-ARTICULAR; INTRALESIONAL; INTRAMUSCULAR; SOFT TISSUE
Status: COMPLETED | OUTPATIENT
Start: 2023-05-23 | End: 2023-05-23

## 2023-05-23 RX ADMIN — LIDOCAINE HYDROCHLORIDE 2 ML: 10 INJECTION, SOLUTION EPIDURAL; INFILTRATION; INTRACAUDAL; PERINEURAL at 15:31

## 2023-05-23 RX ADMIN — METHYLPREDNISOLONE ACETATE 80 MG: 80 INJECTION, SUSPENSION INTRA-ARTICULAR; INTRALESIONAL; INTRAMUSCULAR; SOFT TISSUE at 15:31

## 2023-05-23 NOTE — PROGRESS NOTES
5/23/2023    Nancy Robbins is here today for worsening knee pain. Pt has undergone injection of the knee in the past with good resolution of symptoms. Pt is requesting a repeat injection.     KNEE Injection Procedure Note:    Large Joint Arthrocentesis: L knee  Date/Time: 5/23/2023 3:31 PM  Consent given by: patient  Site marked: site marked  Timeout: Immediately prior to procedure a time out was called to verify the correct patient, procedure, equipment, support staff and site/side marked as required   Supporting Documentation  Indications: pain   Procedure Details  Location: knee - L knee  Preparation: Patient was prepped and draped in the usual sterile fashion  Needle gauge: 21.  Approach: anterolateral  Medications administered: 80 mg methylPREDNISolone acetate 80 MG/ML; 2 mL lidocaine PF 1% 1 %  Patient tolerance: patient tolerated the procedure well with no immediate complications          At the conclusion of the injection I discussed the importance of continued quad strengthening exercises on a daily basis. I will see the patient back if the symptoms should fail to improve or worsen.    Sara Orozco, APRN

## 2023-06-07 DIAGNOSIS — G62.9 PERIPHERAL POLYNEUROPATHY: ICD-10-CM

## 2023-06-07 NOTE — TELEPHONE ENCOUNTER
Rx Refill Note  Requested Prescriptions     Pending Prescriptions Disp Refills    pregabalin (LYRICA) 75 MG capsule 30 capsule 2     Sig: Take 1 capsule by mouth Every Morning.      Last office visit with prescribing clinician: 1/18/2023   Last telemedicine visit with prescribing clinician: Visit date not found   Next office visit with prescribing clinician: 7/28/2023                         Would you like a call back once the refill request has been completed: [] Yes [] No    If the office needs to give you a call back, can they leave a voicemail: [] Yes [] No    Renetta Langley  06/07/23, 08:37 EDT

## 2023-06-11 RX ORDER — PREGABALIN 75 MG/1
75 CAPSULE ORAL EVERY MORNING
Qty: 30 CAPSULE | Refills: 2 | Status: SHIPPED | OUTPATIENT
Start: 2023-06-11

## 2023-07-28 ENCOUNTER — OFFICE VISIT (OUTPATIENT)
Dept: INTERNAL MEDICINE | Facility: CLINIC | Age: 63
End: 2023-07-28
Payer: COMMERCIAL

## 2023-07-28 VITALS
HEIGHT: 66 IN | WEIGHT: 188.4 LBS | OXYGEN SATURATION: 97 % | DIASTOLIC BLOOD PRESSURE: 94 MMHG | BODY MASS INDEX: 30.28 KG/M2 | TEMPERATURE: 98.2 F | HEART RATE: 96 BPM | SYSTOLIC BLOOD PRESSURE: 140 MMHG

## 2023-07-28 DIAGNOSIS — I10 ESSENTIAL HYPERTENSION: Primary | Chronic | ICD-10-CM

## 2023-07-28 DIAGNOSIS — F41.9 ANXIETY: Chronic | ICD-10-CM

## 2023-07-28 DIAGNOSIS — R25.2 LEG CRAMPS: ICD-10-CM

## 2023-07-28 DIAGNOSIS — E03.9 ACQUIRED HYPOTHYROIDISM: Chronic | ICD-10-CM

## 2023-07-28 PROCEDURE — 99214 OFFICE O/P EST MOD 30 MIN: CPT | Performed by: NURSE PRACTITIONER

## 2023-07-29 LAB
ALBUMIN SERPL-MCNC: 4.4 G/DL (ref 3.5–5.2)
ALBUMIN/GLOB SERPL: 2 G/DL
ALP SERPL-CCNC: 123 U/L (ref 39–117)
ALT SERPL-CCNC: 51 U/L (ref 1–33)
AST SERPL-CCNC: 31 U/L (ref 1–32)
BILIRUB SERPL-MCNC: 0.3 MG/DL (ref 0–1.2)
BUN SERPL-MCNC: 9 MG/DL (ref 8–23)
BUN/CREAT SERPL: 12.2 (ref 7–25)
CALCIUM SERPL-MCNC: 9.5 MG/DL (ref 8.6–10.5)
CHLORIDE SERPL-SCNC: 101 MMOL/L (ref 98–107)
CO2 SERPL-SCNC: 28.2 MMOL/L (ref 22–29)
CREAT SERPL-MCNC: 0.74 MG/DL (ref 0.57–1)
EGFRCR SERPLBLD CKD-EPI 2021: 91.6 ML/MIN/1.73
ERYTHROCYTE [DISTWIDTH] IN BLOOD BY AUTOMATED COUNT: 12.1 % (ref 12.3–15.4)
GLOBULIN SER CALC-MCNC: 2.2 GM/DL
GLUCOSE SERPL-MCNC: 84 MG/DL (ref 65–99)
HCT VFR BLD AUTO: 41.4 % (ref 34–46.6)
HGB BLD-MCNC: 14.3 G/DL (ref 12–15.9)
MAGNESIUM SERPL-MCNC: 2.3 MG/DL (ref 1.6–2.4)
MCH RBC QN AUTO: 31.8 PG (ref 26.6–33)
MCHC RBC AUTO-ENTMCNC: 34.5 G/DL (ref 31.5–35.7)
MCV RBC AUTO: 92 FL (ref 79–97)
PLATELET # BLD AUTO: 273 10*3/MM3 (ref 140–450)
POTASSIUM SERPL-SCNC: 4.1 MMOL/L (ref 3.5–5.2)
PROT SERPL-MCNC: 6.6 G/DL (ref 6–8.5)
RBC # BLD AUTO: 4.5 10*6/MM3 (ref 3.77–5.28)
SODIUM SERPL-SCNC: 140 MMOL/L (ref 136–145)
T4 FREE SERPL-MCNC: 1.64 NG/DL (ref 0.93–1.7)
TSH SERPL DL<=0.005 MIU/L-ACNC: 2.06 UIU/ML (ref 0.27–4.2)
WBC # BLD AUTO: 9.38 10*3/MM3 (ref 3.4–10.8)

## 2023-08-05 DIAGNOSIS — E03.9 ACQUIRED HYPOTHYROIDISM: ICD-10-CM

## 2023-08-07 RX ORDER — LEVOTHYROXINE SODIUM 137 UG/1
137 TABLET ORAL DAILY
Qty: 90 TABLET | Refills: 1 | Status: SHIPPED | OUTPATIENT
Start: 2023-08-07

## 2023-08-13 PROBLEM — R25.2 LEG CRAMPS: Chronic | Status: ACTIVE | Noted: 2023-08-13

## 2023-08-13 PROBLEM — R25.2 LEG CRAMPS: Status: ACTIVE | Noted: 2023-08-13

## 2023-08-13 NOTE — ASSESSMENT & PLAN NOTE
She is currently managed on Synthroid 137 mcg daily (lowered from 150 mcg 1/2023) for replacement, recheck TSH.

## 2023-08-13 NOTE — ASSESSMENT & PLAN NOTE
BP is mildly elevated in the office today despite taking amlodipine and irbesartan-HCTZ which she will continue along with a low sodium diet. She will monitor BP at home and call if readings are consistently >140/90.

## 2023-08-13 NOTE — PROGRESS NOTES
"Chief Complaint  Hypertension (4 month follow up), Hypothyroidism, Anxiety, and Leg cramps    Subjective        Nancy Robbins presents to [facility] for f/u regarding HTN, hypothyroidism and anxiety. She also c/o leg cramps.    History of Present Illness  She c/o bilateral leg cramps which are worse at night, notes moderate amount of fluid intake throughout day. She continues to c/o low back pain with knee pain. She received a steroid inj 5/2023 in the left knee, notes improvement in pain but has been recurrent.    Objective   Vital Signs:  /94   Pulse 96   Temp 98.2 øF (36.8 øC) (Temporal)   Ht 167.6 cm (65.98\")   Wt 85.5 kg (188 lb 6.4 oz)   SpO2 97%   BMI 30.43 kg/mý   Estimated body mass index is 30.43 kg/mý as calculated from the following:    Height as of this encounter: 167.6 cm (65.98\").    Weight as of this encounter: 85.5 kg (188 lb 6.4 oz).       BMI is >= 30 and <35. (Class 1 Obesity). The following options were offered after discussion;: nutrition counseling/recommendations      Physical Exam  Constitutional:       Appearance: She is well-developed. She is not ill-appearing.   HENT:      Head: Normocephalic.      Right Ear: Hearing, tympanic membrane and external ear normal.      Left Ear: Hearing, tympanic membrane and external ear normal.      Nose: Nose normal. No nasal deformity, mucosal edema or rhinorrhea.      Right Sinus: No maxillary sinus tenderness or frontal sinus tenderness.      Left Sinus: No maxillary sinus tenderness or frontal sinus tenderness.      Mouth/Throat:      Dentition: Normal dentition.   Eyes:      General: Lids are normal.         Right eye: No discharge.         Left eye: No discharge.      Conjunctiva/sclera: Conjunctivae normal.      Right eye: No exudate.     Left eye: No exudate.  Neck:      Thyroid: No thyroid mass or thyromegaly.      Vascular: No carotid bruit.      Trachea: Trachea normal.   Cardiovascular:      Rate and " Rhythm: Regular rhythm.      Pulses: Normal pulses.      Heart sounds: Normal heart sounds. No murmur heard.  Pulmonary:      Effort: No respiratory distress.      Breath sounds: Normal breath sounds. No decreased breath sounds, wheezing, rhonchi or rales.   Abdominal:      General: Bowel sounds are normal.      Palpations: Abdomen is soft.      Tenderness: There is no abdominal tenderness.   Musculoskeletal:      Cervical back: Normal range of motion. No edema.   Lymphadenopathy:      Head:      Right side of head: No submental, submandibular, tonsillar, preauricular, posterior auricular or occipital adenopathy.      Left side of head: No submental, submandibular, tonsillar, preauricular, posterior auricular or occipital adenopathy.   Skin:     General: Skin is warm and dry.      Nails: There is no clubbing.   Neurological:      Mental Status: She is alert.   Psychiatric:         Behavior: Behavior is cooperative.      Result Review :  The following data was reviewed by: AMBER Gallegos on 07/28/2023:  Common labs          1/18/2023    15:20 7/28/2023    16:07   Common Labs   Glucose 97  84    BUN 20  9    Creatinine 0.72  0.74    Sodium 140  140    Potassium 3.9  4.1    Chloride 100  101    Calcium 9.6  9.5    Total Protein 6.7  6.6    Albumin 4.4  4.4    Total Bilirubin 0.3  0.3    Alkaline Phosphatase 104  123    AST (SGOT) 36  31    ALT (SGPT) 66  51    WBC 6.65  9.38    Hemoglobin 13.5  14.3    Hematocrit 41.0  41.4    Platelets 275  273    Total Cholesterol 230     Triglycerides 146     HDL Cholesterol 77     LDL Cholesterol  128       Data reviewed : Consultant notes ortho 5/2023           Assessment and Plan   Diagnoses and all orders for this visit:    1. Essential hypertension (Primary)  Assessment & Plan:  BP is mildly elevated in the office today despite taking amlodipine and irbesartan-HCTZ which she will continue along with a low sodium diet. She will monitor BP at home and call if readings are  consistently >140/90.    Orders:  -     CBC (No Diff)  -     Comprehensive Metabolic Panel    2. Acquired hypothyroidism  Assessment & Plan:  She is currently managed on Synthroid 137 mcg daily (lowered from 150 mcg 1/2023) for replacement, recheck TSH.    Orders:  -     TSH  -     T4, Free    3. Anxiety  Assessment & Plan:  Sx managed with Xanax as needed for severe symptoms, gradually tapering off; (last filled 5/2023).      4. Leg cramps  Assessment & Plan:  Discussed increasing fluids and stretching exercises, check electrolytes.     Orders:  -     Magnesium             Follow Up   Return in about 4 months (around 11/28/2023).  Patient was given instructions and counseling regarding her condition or for health maintenance advice. Please see specific information pulled into the AVS if appropriate.       Answers submitted by the patient for this visit:  Other (Submitted on 7/24/2023)  Please describe your symptoms.: Follow up  Have you had these symptoms before?: No  How long have you been having these symptoms?: 1-4 days  Please list any medications you are currently taking for this condition.: N/a  Please describe any probable cause for these symptoms. : NA  Primary Reason for Visit (Submitted on 7/24/2023)  What is the primary reason for your visit?: Other

## 2023-08-13 NOTE — ASSESSMENT & PLAN NOTE
Sx managed with Xanax as needed for severe symptoms, gradually tapering off; (last filled 5/2023).

## 2023-08-29 ENCOUNTER — CLINICAL SUPPORT (OUTPATIENT)
Dept: ORTHOPEDIC SURGERY | Facility: CLINIC | Age: 63
End: 2023-08-29
Payer: COMMERCIAL

## 2023-08-29 VITALS — TEMPERATURE: 96.6 F | WEIGHT: 189 LBS | BODY MASS INDEX: 30.37 KG/M2 | HEIGHT: 66 IN

## 2023-08-29 DIAGNOSIS — M17.12 PRIMARY OSTEOARTHRITIS OF LEFT KNEE: Primary | ICD-10-CM

## 2023-08-29 RX ORDER — METHYLPREDNISOLONE ACETATE 80 MG/ML
80 INJECTION, SUSPENSION INTRA-ARTICULAR; INTRALESIONAL; INTRAMUSCULAR; SOFT TISSUE
Status: COMPLETED | OUTPATIENT
Start: 2023-08-29 | End: 2023-08-29

## 2023-08-29 RX ORDER — LIDOCAINE HYDROCHLORIDE 10 MG/ML
2 INJECTION, SOLUTION EPIDURAL; INFILTRATION; INTRACAUDAL; PERINEURAL
Status: COMPLETED | OUTPATIENT
Start: 2023-08-29 | End: 2023-08-29

## 2023-08-29 RX ADMIN — LIDOCAINE HYDROCHLORIDE 2 ML: 10 INJECTION, SOLUTION EPIDURAL; INFILTRATION; INTRACAUDAL; PERINEURAL at 15:49

## 2023-08-29 RX ADMIN — METHYLPREDNISOLONE ACETATE 80 MG: 80 INJECTION, SUSPENSION INTRA-ARTICULAR; INTRALESIONAL; INTRAMUSCULAR; SOFT TISSUE at 15:49

## 2023-08-29 NOTE — PROGRESS NOTES
Patient: Nancy Robbins  YOB: 1960 62 y.o. female  Medical Record Number: [id number]    Chief Complaints:   Chief Complaint   Patient presents with    Left Knee - Follow-up       History of Present Illness:Nancy Robbins is a 62 y.o. female who presents with complaints of severe left knee pain.  She has tried and failed conservative measures, she would like to discuss left total knee replacement.  Her pain is severe worse with any increased activity it definitely throughout the day.  She tried over-the-counter medications with minimal improvement    Allergies:   Allergies   Allergen Reactions    Ampicillin Nausea Only    Oxycodone Nausea And Vomiting       Medications:   Current Outpatient Medications   Medication Sig Dispense Refill    ALPRAZolam (XANAX) 1 MG tablet Take 1 tablet by mouth At Night As Needed for Anxiety 30 tablet 1    amLODIPine (NORVASC) 5 MG tablet Take 1 tablet by mouth Daily. 90 tablet 1    diclofenac (VOLTAREN) 75 MG EC tablet Take 1 tablet by mouth 2 (Two) Times a Day. 180 tablet 0    estradiol (ESTRACE) 1 MG tablet Take 1 tablet by mouth Daily. 90 tablet 1    fluticasone (FLONASE) 50 MCG/ACT nasal spray Use 2 sprays by in each nostril Daily. 64 g 2    irbesartan-hydrochlorothiazide (AVALIDE) 300-12.5 MG tablet Take 1 tablet by mouth Daily. 90 tablet 1    levothyroxine (SYNTHROID, LEVOTHROID) 137 MCG tablet Take 1 tablet by mouth Daily. 90 tablet 1    meclizine (ANTIVERT) 25 MG tablet Take 1 tablet by mouth 3 (Three) Times a Day As Needed for Dizziness. 25 tablet 0    montelukast (SINGULAIR) 10 MG tablet Take 1 tablet by mouth Every Night. 90 tablet 1    pregabalin (LYRICA) 150 MG capsule Take 1 capsule by mouth Daily. 30 capsule 2    pregabalin (LYRICA) 75 MG capsule Take 1 capsule by mouth Every Morning. 30 capsule 2    tiZANidine (ZANAFLEX) 4 MG tablet Take 1 tablet by mouth Every 8 (Eight) Hours As Needed for Muscle Spasms. 90 tablet 2    traMADol (ULTRAM) 50 MG tablet Take 1  "tablet by mouth 2 (Two) Times a Day As Needed for Moderate Pain . 30 tablet 0    Wal-Fex D Allergy & Congestion  MG per 12 hr tablet TAKE 1 TABLET BY MOUTH TWICE DAILY 120 tablet 5     No current facility-administered medications for this visit.         The following portions of the patient's history were reviewed and updated as appropriate: allergies, current medications, past family history, past medical history, past social history, past surgical history and problem list.    Review of Systems:   14 point review of systems was performed. All systems negative except pertinent positives/negatives listed in HPI above    Physical Exam:   Vitals:    08/29/23 1552   Temp: 96.6 øF (35.9 øC)   TempSrc: Tympanic   Weight: 85.7 kg (189 lb)   Height: 167.6 cm (65.98\")   PainSc:   6   PainLoc: Knee       General: A and O x 3, ASA, NAD   Skin clear no unusual lesions noted  Left knee patient has trace amount of effusion noted with 116 degrees flexion neutral in extension with a positive Konstantin negative Lockman calf soft and nontender    Radiology:  Xrays 3views (ap,lateral, sunrise) previous x-ray of the left knee was reviewed shows bone-on-bone end-stage osteoarthritis with cyst and spur formation.    Assessment/Plan: End-stage osteoarthritis left knee with severe pain    Patient and I discussed options, she would like to proceed with left total knee replacement, she would like to hold off on scheduling now but we discussed risks benefits alternatives.  In the meantime she would like a left knee cortisone injection even though it does not help for very long, we will continue with physical therapy exercises and again will let us know when she is ready to proceed with surgery    Continuation of conservative management vs. TKA discussed.  The patient wishes to proceed with total knee replacement.  At this point the patient has failed the full compliment of conservative treatment and stating complete understanding of the " risks/benefits/ anternatives wishes to proceed with surgical treatment.    Risk and benefits of surgery were reviewed.  Including, but not limited to, blood clots or pulmonary embolism, anesthesia risk, infection, fracture, skin/leg numbness, persistent pain/crepitance/popping/catching, failure of the implant, need for future surgeries, hematoma, possible nerve or blood vessel injury, need for transfusion, and potential risk of stroke,heart attack or death, among others.  The patient understands and wishes to proceed.     It was explained that if tissue has been repaired or reconstructed, there is also an increased chance of failure which may require further management.  Following the completion of the discussion, the patient expressed understanding of this planned course of care, all their questions were answered and consent will be obtained preoperatively.    Operative Plan: Smith and Nephrandy Oxinium Total Knee Replacement performing the procedure on an outpatient basis with home health rehab       Sara Orozco, AMBER  8/29/2023    KNEE Injection Procedure Note:    Large Joint Arthrocentesis: L knee  Date/Time: 8/29/2023 3:49 PM  Consent given by: patient  Site marked: site marked  Timeout: Immediately prior to procedure a time out was called to verify the correct patient, procedure, equipment, support staff and site/side marked as required   Supporting Documentation  Indications: pain   Procedure Details  Location: knee - L knee  Preparation: Patient was prepped and draped in the usual sterile fashion  Needle gauge: 21.  Approach: anterolateral  Medications administered: 80 mg methylPREDNISolone acetate 80 MG/ML; 2 mL lidocaine PF 1% 1 %  Patient tolerance: patient tolerated the procedure well with no immediate complications      At the conclusion of the injection I discussed the importance of continued quad strengthening exercises on a daily basis. I will see the patient back if the symptoms should fail to  improve or worsen.

## 2023-09-12 DIAGNOSIS — J30.9 ALLERGIC RHINITIS: ICD-10-CM

## 2023-09-12 RX ORDER — FEXOFENADINE HYDROCHLORIDE AND PSEUDOEPHEDRINE HYDROCHLORIDE 60; 120 MG/1; MG/1
TABLET, FILM COATED, EXTENDED RELEASE ORAL
Qty: 120 TABLET | Refills: 5 | Status: SHIPPED | OUTPATIENT
Start: 2023-09-12

## 2023-09-16 DIAGNOSIS — G62.9 PERIPHERAL POLYNEUROPATHY: ICD-10-CM

## 2023-09-18 NOTE — TELEPHONE ENCOUNTER
Rx Refill Note  Requested Prescriptions     Pending Prescriptions Disp Refills    pregabalin (LYRICA) 75 MG capsule 30 capsule 0     Sig: Take 1 capsule by mouth Every Morning.      Last office visit with prescribing clinician: 7/28/2023   Last telemedicine visit with prescribing clinician: Visit date not found   Next office visit with prescribing clinician: 12/1/2023

## 2023-09-19 DIAGNOSIS — F41.9 ANXIETY: ICD-10-CM

## 2023-09-20 NOTE — TELEPHONE ENCOUNTER
Last office visit with prescribing clinician: 7/28/2023     Next office visit with prescribing clinician: 12/1/2023

## 2023-09-21 RX ORDER — ALPRAZOLAM 1 MG/1
1 TABLET ORAL NIGHTLY PRN
Qty: 30 TABLET | Refills: 1 | Status: SHIPPED | OUTPATIENT
Start: 2023-09-21

## 2023-09-21 RX ORDER — PREGABALIN 75 MG/1
75 CAPSULE ORAL EVERY MORNING
Qty: 30 CAPSULE | Refills: 2 | Status: SHIPPED | OUTPATIENT
Start: 2023-09-21

## 2023-10-16 DIAGNOSIS — G62.9 PERIPHERAL POLYNEUROPATHY: ICD-10-CM

## 2023-10-16 NOTE — TELEPHONE ENCOUNTER
Rx Refill Note  Requested Prescriptions     Pending Prescriptions Disp Refills    pregabalin (LYRICA) 150 MG capsule 30 capsule 2     Sig: Take 1 capsule by mouth Daily.      Last office visit with prescribing clinician: 7/28/2023   Last telemedicine visit with prescribing clinician: Visit date not found   Next office visit with prescribing clinician: 12/1/2023                         Would you like a call back once the refill request has been completed: [] Yes [] No    If the office needs to give you a call back, can they leave a voicemail: [] Yes [] No    Renetta Langley  10/16/23, 08:07 EDT

## 2023-10-17 DIAGNOSIS — J30.9 ALLERGIC RHINITIS, UNSPECIFIED SEASONALITY, UNSPECIFIED TRIGGER: Chronic | ICD-10-CM

## 2023-10-17 RX ORDER — MONTELUKAST SODIUM 10 MG/1
10 TABLET ORAL NIGHTLY
Qty: 90 TABLET | Refills: 0 | Status: SHIPPED | OUTPATIENT
Start: 2023-10-17

## 2023-10-19 RX ORDER — PREGABALIN 150 MG/1
150 CAPSULE ORAL DAILY
Qty: 30 CAPSULE | Refills: 2 | Status: SHIPPED | OUTPATIENT
Start: 2023-10-19

## 2023-11-14 ENCOUNTER — PATIENT MESSAGE (OUTPATIENT)
Dept: INTERNAL MEDICINE | Facility: CLINIC | Age: 63
End: 2023-11-14
Payer: COMMERCIAL

## 2023-11-14 RX ORDER — DICLOFENAC SODIUM 75 MG/1
75 TABLET, DELAYED RELEASE ORAL 2 TIMES DAILY
Qty: 180 TABLET | Refills: 1 | Status: SHIPPED | OUTPATIENT
Start: 2023-11-14

## 2023-11-14 NOTE — TELEPHONE ENCOUNTER
From: Nancy Robbins  To: Radha Sy  Sent: 2023 7:04 AM EST  Subject: Voltaren    Can you send new order for Voltaren 75mg  To be filled thru [facility]?     Thank you,  Nancy Robbins    1960

## 2023-11-17 RX ORDER — FLUTICASONE PROPIONATE 50 MCG
2 SPRAY, SUSPENSION (ML) NASAL DAILY
Qty: 64 G | Refills: 2 | Status: SHIPPED | OUTPATIENT
Start: 2023-11-17

## 2023-11-27 ENCOUNTER — TREATMENT (OUTPATIENT)
Dept: PHYSICAL THERAPY | Facility: CLINIC | Age: 63
End: 2023-11-27
Payer: COMMERCIAL

## 2023-11-27 DIAGNOSIS — M79.671 RIGHT FOOT PAIN: Primary | ICD-10-CM

## 2023-11-27 DIAGNOSIS — M72.2 PLANTAR FASCIITIS OF RIGHT FOOT: ICD-10-CM

## 2023-11-27 PROCEDURE — 20561 NDL INSJ W/O NJX 3+ MUSC: CPT | Performed by: PHYSICAL THERAPIST

## 2023-11-27 PROCEDURE — 97161 PT EVAL LOW COMPLEX 20 MIN: CPT | Performed by: PHYSICAL THERAPIST

## 2023-11-27 NOTE — PROGRESS NOTES
Physical Therapy Initial Evaluation and Plan of Care  [address], [address], [place] [address]    Patient: Nancy Robbins   : 1960  Diagnosis/ICD-10 Code:  Right foot pain [M79.671]  Referring practitioner: Self Referring    Subjective Evaluation    History of Present Illness  Onset date: 3-4 months ago.  Mechanism of injury: Patient reports (R) plantar foot pain for about 3-4 months.  Onset was insdious, gradual.  She was seeing an orthopedic MD who gave her 2 injections which gave her temporary relief, and she was referred to their PT there who did dry needling on her foot (2 treatments) which was helpful in relieving her symptoms for about 2-3 months.  She also did a stint of PT at [facility] recently for her lumbar spinal stenosis and knees.  [facility] is notable for (R) TKA, lumbar discectomy around , cervical fusion around , and she is in need of (L) TKA. She sees [facility] for this.     Patient reports feeling like her arch is collapsing on her (R) foot.  She has been told her (R) foot pain is due to plantar fasciitis.  Patient reports (R) foot pain with standing, walking, and negotiating steps.  She does some previously prescribed exercises but does not find them particularly helpful.  She finds the most relief with frozen water bottle rolling.  She reports her first few steps of the day are painful and then her symptoms are worse by the end of the day.      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     Additional Tenderness Details  Mod (+) TTP (R) plantar heel, posterior heel, plantar fascia    Active Range of Motion   Left Ankle/Foot   Plantar flexion: 65 degrees   Inversion: 33 degrees   Eversion: 9 degrees     Right Ankle/Foot   Plantar flexion: 67 degrees   Inversion: 28 degrees   Eversion: 8 degrees     Additional Active Range of Motion Details  (L) ankle DF AROM lacking 5 deg  (R) ankle DF AROM lacking 27 deg, limited by tightness    AROM of (B) toes limited approximately 50%    Strength/Myotome Testing     Left Ankle/Foot   Dorsiflexion: 4  Plantar flexion: 4  Inversion: 4-  Eversion: 4-    Right Ankle/Foot   Dorsiflexion: 4-  Plantar flexion: 4-  Inversion: 3+  Eversion: 3+    Ambulation     Observational Gait   Gait: antalgic   Decreased walking speed, stride length, right stance time and left step length.   Left foot contact pattern: foot flat  Right foot contact pattern: foot flat          Assessment & Plan       Assessment  Impairments: abnormal coordination, abnormal gait, abnormal or restricted ROM, activity intolerance, impaired balance, impaired physical strength and pain with function   Functional limitations: walking, uncomfortable because of pain, standing and stooping   Assessment details: Patient is a 63 y.o female who reports a 3-4 month history of plantar (R) foot pain.  She received 2 injections which were temporarily helpful, and she underwent 2 dry needling sessions by a PT which were significantly helpful.  In fact, her symptoms were minimal for 2-3 months.  She also received an HEP but admits limited compliance.  She reports (R) plantar foot symptoms 2-10/10, worst with standing, walking, and negotiating steps.  She exhibits plantar calcaneus and plantar fascia tenderness, decreased (R) ankle AROM, decreased (R) ankle strength, and compensated gait due to (R) LE antalgia.  She may benefit from a course of  skilled PT including dry needling to reduce symptoms and improve functional mobility tolerance.  Prognosis: good    Goals  Plan Goals: STGs: to be met in 6 weeks  1. Patient will experience no adverse response to initial dry needling sessions (2-3)  2. Patient will report improved (R) plantar foot symptoms 0-5/10 for improved functional mobility tolerance  3. Patient will demonstrate improved (R) ankle AROM equal to (L) with minimal symptoms for improved mobility    LTGs: to be met in 12 weeks  1. Patient will report resolution of (R) plantar foot symptoms  2. Patient will demonstrate uncompensated, non-antalgic gait for community distances, prolonged standing, and stair negotiation    Plan  Therapy options: will be seen for skilled therapy services  Planned modality interventions: dry needling, thermotherapy (hydrocollator packs) and cryotherapy  Frequency: 1x week  Duration in weeks: 12  Treatment plan discussed with: patient        Manual Therapy:         mins  36486;  Therapeutic Exercise:         mins  39208;     Neuromuscular Josh:        mins  88092;    Therapeutic Activity:          mins  83049;     Gait Training:           mins  72748;     Ultrasound:          mins  45754;    Electrical Stimulation:         mins  86195 ( );  Dry Needling     16     mins self-pay    Timed Treatment:   16   mins   Total Treatment:     55   mins    PT SIGNATURE: Prabha Iniguez PT, DPT, OCS  Electronically signed by: Prabha Iniguez PT, 11/27/23, 9:26 AM EST  [place] License #[id number]     DATE TREATMENT INITIATED: 11/27/2023    Initial Certification  Certification Period: 11/27/2023 thru 2/24/2024  I certify that the therapy services are furnished while this patient is under my care.  The services outlined above are required by this patient, and will be reviewed every 90 days.     PHYSICIAN: Referring, Self                                            DATE:     Please sign and return via fax to [phone]. Thank you, [facility].

## 2023-12-01 ENCOUNTER — OFFICE VISIT (OUTPATIENT)
Dept: INTERNAL MEDICINE | Facility: CLINIC | Age: 63
End: 2023-12-01
Payer: COMMERCIAL

## 2023-12-01 VITALS
BODY MASS INDEX: 31.08 KG/M2 | HEART RATE: 86 BPM | DIASTOLIC BLOOD PRESSURE: 88 MMHG | OXYGEN SATURATION: 98 % | HEIGHT: 66 IN | WEIGHT: 193.4 LBS | SYSTOLIC BLOOD PRESSURE: 138 MMHG

## 2023-12-01 DIAGNOSIS — I10 ESSENTIAL HYPERTENSION: Chronic | ICD-10-CM

## 2023-12-01 DIAGNOSIS — M17.12 PRIMARY OSTEOARTHRITIS OF LEFT KNEE: Chronic | ICD-10-CM

## 2023-12-01 DIAGNOSIS — J30.9 ALLERGIC RHINITIS, UNSPECIFIED SEASONALITY, UNSPECIFIED TRIGGER: Chronic | ICD-10-CM

## 2023-12-01 DIAGNOSIS — M72.2 PLANTAR FASCIITIS OF RIGHT FOOT: Primary | Chronic | ICD-10-CM

## 2023-12-01 PROCEDURE — 99214 OFFICE O/P EST MOD 30 MIN: CPT | Performed by: NURSE PRACTITIONER

## 2023-12-01 RX ORDER — NABUMETONE 500 MG/1
500 TABLET, FILM COATED ORAL 2 TIMES DAILY PRN
Qty: 180 TABLET | Refills: 1 | Status: SHIPPED | OUTPATIENT
Start: 2023-12-01

## 2023-12-03 NOTE — ASSESSMENT & PLAN NOTE
She is currently receiving dry needling due to persistent pain, states pain limits her mobility and ability to exercise.

## 2023-12-03 NOTE — ASSESSMENT & PLAN NOTE
She will continue Singulair and Flonase, may add extra dose of Allegra (plain, not Allegra-D) due to increased drainage

## 2023-12-03 NOTE — ASSESSMENT & PLAN NOTE
BP is mildly elevated in the office today; continue amlodipine and irbesartan-HCTZ along with a low sodium diet.

## 2023-12-03 NOTE — ASSESSMENT & PLAN NOTE
She c/o increased joint pain and stiffness, has appt next week with ortho to discuss possible knee replacement.

## 2023-12-03 NOTE — PROGRESS NOTES
"Chief Complaint  Hypertension (4 month follow up), Knee Pain, and Allergies    Subjective        Nancy Robbins presents to [facility] for f/u regarding HTN, increased sinus drainage and c/o joint pain (especially left knee).    History of Present Illness    She notes increased postnasal drainage and congestion with an intermittent dry cough. Denies dyspnea. No fever and/or chills. She is using Flonase NS and taking Singulair daily with moderate relief.    She c/o increased joint pain, more severe in the left knee. She has a hx of OA and is followed by ortho, has an appt next week to discuss possible joint replacement. She is taking diclofenac for mgmt of OA, requests different medication due to persistent pain.    She is also seeing podiatry for mgmt of right plantar fasciitis, currently receiving dry needling for mgmt of recurrent pain.    Objective   Vital Signs:  /88 (BP Location: Left arm, Patient Position: Sitting, Cuff Size: Adult)   Pulse 86   Ht 167.6 cm (65.98\")   Wt 87.7 kg (193 lb 6.4 oz)   SpO2 98%   BMI 31.23 kg/m²   Estimated body mass index is 31.23 kg/m² as calculated from the following:    Height as of this encounter: 167.6 cm (65.98\").    Weight as of this encounter: 87.7 kg (193 lb 6.4 oz).               Physical Exam  Constitutional:       Appearance: She is well-developed. She is not ill-appearing.   HENT:      Head: Normocephalic.      Right Ear: Hearing, tympanic membrane and external ear normal.      Left Ear: Hearing, tympanic membrane and external ear normal.      Nose: Nose normal. No nasal deformity, mucosal edema or rhinorrhea.      Right Sinus: No maxillary sinus tenderness or frontal sinus tenderness.      Left Sinus: No maxillary sinus tenderness or frontal sinus tenderness.      Mouth/Throat:      Dentition: Normal dentition.      Pharynx: Posterior oropharyngeal erythema present.   Eyes:      General: Lids are normal.         Right eye: No " discharge.         Left eye: No discharge.      Conjunctiva/sclera: Conjunctivae normal.      Right eye: No exudate.     Left eye: No exudate.  Neck:      Thyroid: No thyroid mass or thyromegaly.      Vascular: No carotid bruit.      Trachea: Trachea normal.   Cardiovascular:      Rate and Rhythm: Regular rhythm.      Pulses: Normal pulses.      Heart sounds: Normal heart sounds. No murmur heard.  Pulmonary:      Effort: No respiratory distress.      Breath sounds: Normal breath sounds. No decreased breath sounds, wheezing, rhonchi or rales.   Abdominal:      General: Bowel sounds are normal.      Palpations: Abdomen is soft.      Tenderness: There is no abdominal tenderness.   Musculoskeletal:      Cervical back: Normal range of motion. No edema.   Lymphadenopathy:      Head:      Right side of head: No submental, submandibular, tonsillar, preauricular, posterior auricular or occipital adenopathy.      Left side of head: No submental, submandibular, tonsillar, preauricular, posterior auricular or occipital adenopathy.   Skin:     General: Skin is warm and dry.      Nails: There is no clubbing.   Neurological:      Mental Status: She is alert.   Psychiatric:         Behavior: Behavior is cooperative.        Result Review :  The following data was reviewed by: AMBER Gallegos on 12/01/2023:  Common labs          1/18/2023    15:20 7/28/2023    16:07   Common Labs   Glucose 97  84    BUN 20  9    Creatinine 0.72  0.74    Sodium 140  140    Potassium 3.9  4.1    Chloride 100  101    Calcium 9.6  9.5    Total Protein 6.7  6.6    Albumin 4.4  4.4    Total Bilirubin 0.3  0.3    Alkaline Phosphatase 104  123    AST (SGOT) 36  31    ALT (SGPT) 66  51    WBC 6.65  9.38    Hemoglobin 13.5  14.3    Hematocrit 41.0  41.4    Platelets 275  273    Total Cholesterol 230     Triglycerides 146     HDL Cholesterol 77     LDL Cholesterol  128                    Assessment and Plan   Diagnoses and all orders for this  visit:    1. Plantar fasciitis of right foot (Primary)  Assessment & Plan:  She is currently receiving dry needling due to persistent pain, states pain limits her mobility and ability to exercise.      2. Primary osteoarthritis of left knee  Assessment & Plan:  She c/o increased joint pain and stiffness, has appt next week with ortho to discuss possible knee replacement.    Orders:  -     nabumetone (RELAFEN) 500 MG tablet; Take 1 tablet by mouth 2 (Two) Times a Day As Needed for Mild Pain.  Dispense: 180 tablet; Refill: 1    3. Essential hypertension  Assessment & Plan:  BP is mildly elevated in the office today; continue amlodipine and irbesartan-HCTZ along with a low sodium diet.      4. Allergic rhinitis, unspecified seasonality, unspecified trigger  Assessment & Plan:  She will continue Singulair and Flonase, may add extra dose of Allegra (plain, not Allegra-D) due to increased drainage               Follow Up   Return in about 4 months (around 4/1/2024) for fasting labs.  Patient was given instructions and counseling regarding her condition or for health maintenance advice. Please see specific information pulled into the AVS if appropriate.         Answers submitted by the patient for this visit:  Other (Submitted on 11/25/2023)  Please describe your symptoms.: Follow up  Have you had these symptoms before?: No  How long have you been having these symptoms?: Greater than 2 weeks  Primary Reason for Visit (Submitted on 11/25/2023)  What is the primary reason for your visit?: Other

## 2023-12-07 ENCOUNTER — TREATMENT (OUTPATIENT)
Dept: PHYSICAL THERAPY | Facility: CLINIC | Age: 63
End: 2023-12-07

## 2023-12-07 DIAGNOSIS — F41.9 ANXIETY: ICD-10-CM

## 2023-12-07 DIAGNOSIS — M79.671 RIGHT FOOT PAIN: Primary | ICD-10-CM

## 2023-12-07 DIAGNOSIS — M72.2 PLANTAR FASCIITIS OF RIGHT FOOT: ICD-10-CM

## 2023-12-07 PROCEDURE — 20561 NDL INSJ W/O NJX 3+ MUSC: CPT | Performed by: PHYSICAL THERAPIST

## 2023-12-07 NOTE — PROGRESS NOTES
Physical Therapy Daily Treatment Note      [address], [address], [place] [address]    Patient: Nancy Robbins   : 1960  Referring practitioner: Self Referring  Date of Initial Visit: Type: THERAPY  Noted: 2023  Today's Date: 2023  Patient seen for 2 sessions         Visit Diagnoses:     ICD-10-CM ICD-9-CM   1. Right foot pain  M79.671 729.5   2. Plantar fasciitis of right foot  M72.2 728.71         Subjective Evaluation    History of Present Illness    Subjective comment: The needling has helped my foot feel better.  I could tell a difference that first day and it has felt better since then.  I'm really pleased.       Objective   See Exercise, Manual, and Modality Logs for complete treatment.       Assessment & Plan       Assessment  Assessment details: Patient experiences one area of discomfort with needle placement to (R) plantar aspect of foot over 4th-5th metatarsal heads area.  There is also bleeding following needle removal which stops with firm pressure to the area for 10-15 seconds.  All other needle placements are asymptomatic.  Patient responds positively to MH application following DN.          Progress per Plan of Care         Timed:  Manual Therapy:         mins  09721;  Therapeutic Exercise:         mins  42761;     Neuromuscular Josh:        mins  31219;    Therapeutic Activity:          mins  84623;     Gait Training:           mins  08354;     Ultrasound:          mins  71444;    Untimed:  Electrical Stimulation:         mins  91521 ( );  Mechanical Traction:         mins  04820;   Dry Needling              __12_  mins   50936    Timed Treatment:   12   mins   Total Treatment:     22   mins    Prabha Iniguez PT, DPT, OCS  Physical Therapist  [place] License #[id number]  Electronically signed by: Prabha Iniguez PT, 23, 9:18 AM EST

## 2023-12-08 ENCOUNTER — PREP FOR SURGERY (OUTPATIENT)
Dept: OTHER | Facility: HOSPITAL | Age: 63
End: 2023-12-08
Payer: COMMERCIAL

## 2023-12-08 ENCOUNTER — CLINICAL SUPPORT (OUTPATIENT)
Dept: ORTHOPEDIC SURGERY | Facility: CLINIC | Age: 63
End: 2023-12-08
Payer: COMMERCIAL

## 2023-12-08 VITALS — HEIGHT: 66 IN | WEIGHT: 191.9 LBS | BODY MASS INDEX: 30.84 KG/M2 | TEMPERATURE: 98.4 F

## 2023-12-08 DIAGNOSIS — R52 PAIN: Primary | ICD-10-CM

## 2023-12-08 DIAGNOSIS — M17.12 PRIMARY OSTEOARTHRITIS OF LEFT KNEE: ICD-10-CM

## 2023-12-08 DIAGNOSIS — M17.12 PRIMARY OSTEOARTHRITIS OF LEFT KNEE: Primary | ICD-10-CM

## 2023-12-08 PROCEDURE — 99214 OFFICE O/P EST MOD 30 MIN: CPT | Performed by: NURSE PRACTITIONER

## 2023-12-08 RX ORDER — CHLORHEXIDINE GLUCONATE 500 MG/1
CLOTH TOPICAL 2 TIMES DAILY
OUTPATIENT
Start: 2023-12-08

## 2023-12-08 RX ORDER — MELOXICAM 15 MG/1
15 TABLET ORAL ONCE
OUTPATIENT
Start: 2023-12-08 | End: 2023-12-08

## 2023-12-08 RX ORDER — CEFAZOLIN SODIUM 2 G/100ML
2 INJECTION, SOLUTION INTRAVENOUS ONCE
OUTPATIENT
Start: 2023-12-08 | End: 2023-12-08

## 2023-12-08 RX ORDER — ALPRAZOLAM 1 MG/1
1 TABLET ORAL NIGHTLY PRN
Qty: 30 TABLET | Refills: 1 | Status: SHIPPED | OUTPATIENT
Start: 2023-12-08

## 2023-12-08 RX ORDER — PREGABALIN 75 MG/1
150 CAPSULE ORAL ONCE
OUTPATIENT
Start: 2023-12-08 | End: 2023-12-08

## 2023-12-08 NOTE — PROGRESS NOTES
Patient: Nancy Robbins  YOB: 1960 63 y.o. female  Medical Record Number: [id number]    Chief Complaints:   Chief Complaint   Patient presents with    Left Knee - Follow-up       History of Present Illness:Nancy Robbins is a 63 y.o. female who presents with complaints of worsening in left knee pain, the patient has known bone-on-bone end-stage osteoarthritis, she has tried and failed conservative measures including injections, physical therapy, anti-inflammatories, she would like to proceed with surgery    Allergies:   Allergies   Allergen Reactions    Ampicillin Nausea Only    Oxycodone Nausea And Vomiting       Medications:   Current Outpatient Medications   Medication Sig Dispense Refill    Allegra-D Allergy & Congestion  MG per 12 hr tablet TAKE 1 TABLET BY MOUTH TWICE DAILY 120 tablet 5    ALPRAZolam (XANAX) 1 MG tablet Take 1 tablet by mouth At Night As Needed for Anxiety 30 tablet 1    amLODIPine (NORVASC) 5 MG tablet Take 1 tablet by mouth Daily. 90 tablet 1    estradiol (ESTRACE) 1 MG tablet Take 1 tablet by mouth Daily. 90 tablet 1    fluticasone (FLONASE) 50 MCG/ACT nasal spray Use 2 sprays by in each nostril Daily. 64 g 2    irbesartan-hydrochlorothiazide (AVALIDE) 300-12.5 MG tablet Take 1 tablet by mouth Daily. 90 tablet 1    levothyroxine (SYNTHROID, LEVOTHROID) 137 MCG tablet Take 1 tablet by mouth Daily. 90 tablet 1    meclizine (ANTIVERT) 25 MG tablet Take 1 tablet by mouth 3 (Three) Times a Day As Needed for Dizziness. 25 tablet 0    montelukast (SINGULAIR) 10 MG tablet Take 1 tablet by mouth Every Night. 90 tablet 0    nabumetone (RELAFEN) 500 MG tablet Take 1 tablet by mouth 2 (Two) Times a Day As Needed for Mild Pain. 180 tablet 1    pregabalin (LYRICA) 150 MG capsule Take 1 capsule by mouth Daily. 30 capsule 2    pregabalin (LYRICA) 75 MG capsule Take 1 capsule by mouth Every Morning. 30 capsule 2    tiZANidine (ZANAFLEX) 4 MG tablet Take 1 tablet by mouth Every 8 (Eight)  "Hours As Needed for Muscle Spasms. 90 tablet 2    traMADol (ULTRAM) 50 MG tablet Take 1 tablet by mouth 2 (Two) Times a Day As Needed for Moderate Pain . 30 tablet 0     No current facility-administered medications for this visit.         The following portions of the patient's history were reviewed and updated as appropriate: allergies, current medications, past family history, past medical history, past social history, past surgical history and problem list.    Review of Systems:   14 point review of systems was performed. All systems negative except pertinent positives/negatives listed in HPI above    Physical Exam:   Vitals:    12/08/23 1603   Temp: 98.4 °F (36.9 °C)   TempSrc: Temporal   Weight: 87 kg (191 lb 14.4 oz)   Height: 167.6 cm (65.98\")   PainSc:   8   PainLoc: Knee       General: A and O x 3, ASA, NAD    Skin clear, no unusual lesions noted  Left knee patient has trace amount of effusion noted with 116 degrees flexion neutral in extension with a positive Konstantin negative Lockman calf soft and nontender      Radiology:  Xrays 3views (ap,lateral, sunrise) left knee were ordered and reviewed today secondary to increased pain show bone-on-bone end-stage osteoarthritis with cyst and spur formation.  Comparative views show definite progression in arthritic changes    Assessment/Plan: End-stage osteoarthritis left knee with increasing pain    Patient and I discussed options, she would like to proceed with left total knee replacement overnight stay  Continuation of conservative management vs. TKA discussed.  The patient wishes to proceed with total knee replacement.  At this point the patient has failed the full compliment of conservative treatment and stating complete understanding of the risks/benefits/ anternatives wishes to proceed with surgical treatment.    Risk and benefits of surgery were reviewed.  Including, but not limited to, blood clots or pulmonary embolism, anesthesia risk, infection, " fracture, skin/leg numbness, persistent pain/crepitance/popping/catching, failure of the implant, need for future surgeries, hematoma, possible nerve or blood vessel injury, need for transfusion, and potential risk of stroke,heart attack or death, among others.  The patient understands and wishes to proceed.     It was explained that if tissue has been repaired or reconstructed, there is also an increased chance of failure which may require further management.  Following the completion of the discussion, the patient expressed understanding of this planned course of care, all their questions were answered and consent will be obtained preoperatively.    Operative Plan: Smith and Nephrandy Oxinium Total Knee Replacement an overnight stay with home health rehab       Sara Orozco, APRN  12/8/2023

## 2023-12-08 NOTE — TELEPHONE ENCOUNTER
Last office visit with prescribing clinician: 12/1/2023     Next office visit with prescribing clinician: 4/12/2024

## 2023-12-14 ENCOUNTER — TREATMENT (OUTPATIENT)
Dept: PHYSICAL THERAPY | Facility: CLINIC | Age: 63
End: 2023-12-14

## 2023-12-14 DIAGNOSIS — M79.671 RIGHT FOOT PAIN: Primary | ICD-10-CM

## 2023-12-14 DIAGNOSIS — M72.2 PLANTAR FASCIITIS OF RIGHT FOOT: ICD-10-CM

## 2023-12-14 PROCEDURE — 20561 NDL INSJ W/O NJX 3+ MUSC: CPT | Performed by: PHYSICAL THERAPIST

## 2023-12-14 NOTE — PROGRESS NOTES
Physical Therapy Daily Treatment Note      [address], [address], [place] [address]    Patient: Nancy Robbins   : 1960  Referring practitioner: Self Referring  Date of Initial Visit: Type: THERAPY  Noted: 2023  Today's Date: 2023  Patient seen for 3 sessions         Visit Diagnoses:     ICD-10-CM ICD-9-CM   1. Right foot pain  M79.671 729.5   2. Plantar fasciitis of right foot  M72.2 728.71         Subjective Evaluation    History of Present Illness    Subjective comment: My foot is definitely a lot better.  My (L) knee is getting worse however, so I am going to proceed with scheduling the knee surgery.       Objective   See Exercise, Manual, and Modality Logs for complete treatment.       Assessment & Plan       Assessment  Assessment details: Patient experiences pain reactions at 2 MTP needle placements and 2 calcaneal needle placements, all along the 1st and 2nd rays of the plantar surface of the foot. One needle placement is relocated with reduced symptoms.  Other placements are asymptomatic.  Plan to decrease frequency of needling treatments due to improving (R) plantar foot symptoms. Patient is agreeable to this plan.          Progress per Plan of Care - dry needling as indicated.         Timed:  Manual Therapy:         mins  04604;  Therapeutic Exercise:         mins  48980;     Neuromuscular Josh:        mins  26370;    Therapeutic Activity:          mins  30081;     Gait Training:           mins  96177;     Ultrasound:          mins  42437;    Untimed:  Electrical Stimulation:         mins  07356 ( );  Mechanical Traction:         mins  24534;   Dry Needling              __14_  mins   37444    Timed Treatment:   14   mins   Total Treatment:     24   mins    Prabha Iniguez PT, DPT, [facility]  Physical Therapist  [place] License #[id number]  Electronically signed by: Prabha Iniguez PT, 23, 4:04 PM EST

## 2023-12-28 DIAGNOSIS — G62.9 PERIPHERAL POLYNEUROPATHY: ICD-10-CM

## 2023-12-28 NOTE — TELEPHONE ENCOUNTER
Rx Refill Note  Requested Prescriptions     Pending Prescriptions Disp Refills    pregabalin (LYRICA) 75 MG capsule 30 capsule 2     Sig: Take 1 capsule by mouth Every Morning.      Last office visit with prescribing clinician: 12/1/2023   Last telemedicine visit with prescribing clinician: Visit date not found   Next office visit with prescribing clinician: 4/12/2024

## 2024-01-03 RX ORDER — PREGABALIN 75 MG/1
75 CAPSULE ORAL EVERY MORNING
Qty: 30 CAPSULE | Refills: 2 | Status: SHIPPED | OUTPATIENT
Start: 2024-01-03

## 2024-01-04 ENCOUNTER — TELEPHONE (OUTPATIENT)
Dept: PHYSICAL THERAPY | Facility: CLINIC | Age: 64
End: 2024-01-04

## 2024-01-04 NOTE — TELEPHONE ENCOUNTER
Caller: Nancy Robbins    Relationship: Self    What was the call regarding: PATIENT FORGOT ABOUT APPT UNTIL IT WAS TO LATE TO TRY AND MAKE IT.  SHE APOLOGIZES.

## 2024-01-05 DIAGNOSIS — N95.1 MENOPAUSAL SYMPTOMS: ICD-10-CM

## 2024-01-05 RX ORDER — ESTRADIOL 1 MG/1
1 TABLET ORAL DAILY
Qty: 90 TABLET | Refills: 1 | Status: SHIPPED | OUTPATIENT
Start: 2024-01-05

## 2024-01-07 DIAGNOSIS — M51.36 DDD (DEGENERATIVE DISC DISEASE), LUMBAR: ICD-10-CM

## 2024-01-07 DIAGNOSIS — M43.16 SPONDYLOLISTHESIS AT L4-L5 LEVEL: ICD-10-CM

## 2024-01-07 DIAGNOSIS — M48.062 SPINAL STENOSIS OF LUMBAR REGION WITH NEUROGENIC CLAUDICATION: ICD-10-CM

## 2024-01-07 DIAGNOSIS — I10 ESSENTIAL HYPERTENSION: Chronic | ICD-10-CM

## 2024-01-08 RX ORDER — AMLODIPINE BESYLATE 5 MG/1
5 TABLET ORAL DAILY
Qty: 90 TABLET | Refills: 1 | Status: SHIPPED | OUTPATIENT
Start: 2024-01-08

## 2024-01-08 NOTE — TELEPHONE ENCOUNTER
Rx Refill Note  Requested Prescriptions     Pending Prescriptions Disp Refills    traMADol (ULTRAM) 50 MG tablet 30 tablet 0     Sig: Take 1 tablet by mouth 2 (Two) Times a Day As Needed for Moderate Pain.      Last office visit with prescribing clinician: 5/23/2022   Last telemedicine visit with prescribing clinician: Visit date not found   Next office visit with prescribing clinician: Visit date not found                         Would you like a call back once the refill request has been completed: [] Yes [] No    If the office needs to give you a call back, can they leave a voicemail: [] Yes [] No    Zoya Dale MA  01/08/24, 08:18 EST

## 2024-01-09 RX ORDER — TRAMADOL HYDROCHLORIDE 50 MG/1
50 TABLET ORAL 2 TIMES DAILY PRN
Qty: 30 TABLET | Refills: 0 | OUTPATIENT
Start: 2024-01-09

## 2024-01-09 NOTE — TELEPHONE ENCOUNTER
She needs to be reevaluated. Hasn't been seen since 5/22. Have her come to see valentino on a day I'm in the office.

## 2024-01-10 ENCOUNTER — TREATMENT (OUTPATIENT)
Dept: PHYSICAL THERAPY | Facility: CLINIC | Age: 64
End: 2024-01-10
Payer: COMMERCIAL

## 2024-01-10 DIAGNOSIS — M72.2 PLANTAR FASCIITIS OF RIGHT FOOT: ICD-10-CM

## 2024-01-10 DIAGNOSIS — M79.671 RIGHT FOOT PAIN: Primary | ICD-10-CM

## 2024-01-10 NOTE — PROGRESS NOTES
Physical Therapy Daily Treatment Note      [address] [facility], [address], [place] [id number]    Patient: Nancy Robbins   : 1960  Referring practitioner: Self Referring  Date of Initial Visit: Type: THERAPY  Noted: 2023  Today's Date: 1/10/2024  Patient seen for 4 sessions         Visit Diagnoses:     ICD-10-CM ICD-9-CM   1. Right foot pain  M79.671 729.5   2. Plantar fasciitis of right foot  M72.2 728.71         Subjective Evaluation    History of Present Illness    Subjective comment: My foot has still been feeling pretty good.  It's almost like the sensation in my foot is improving.  When I walk around the house without shoes I usually put on some type of strap on the arch or ankle support.  I have my knee surgery scheduled for the middle of March. It's getting worse and worse.       Objective   See Exercise, Manual, and Modality Logs for complete treatment.       Assessment & Plan       Assessment  Assessment details: Patient reports moderate pain with needle placements at/near (R) 1st and 2nd MTP joints.  (R) 4th/5th MTP and arch placements are asymptomatic.  There is some bleeding with 3 calcaneal needle placements this date which stops with brief light pressure over affected area.  Consider deferring provocative needle placements and continuing with asymptomatic placements at a decreased frequency of approximately monthly.          Other - dry needling as indicated.  Consider monthly frequency. Also consider deferring needle placements at or near (R) 1st/2nd MTP joints.         Timed:  Manual Therapy:         mins  78558;  Therapeutic Exercise:         mins  81016;     Neuromuscular Josh:        mins  65249;    Therapeutic Activity:          mins  46489;     Gait Training:           mins  25098;     Ultrasound:          mins  29030;    Untimed:  Electrical Stimulation:         mins  93059 ( );  Mechanical Traction:         mins  42883;   Dry Needling              __16_  mins    26563    Timed Treatment:   16   mins   Total Treatment:     26   mins    Prabha Iniguez PT, DPT, OCS  Physical Therapist  [place] License #[id number]  Electronically signed by: Prabha Iniguez PT, 01/10/24, 4:15 PM EST

## 2024-01-11 DIAGNOSIS — M48.062 SPINAL STENOSIS OF LUMBAR REGION WITH NEUROGENIC CLAUDICATION: ICD-10-CM

## 2024-01-12 RX ORDER — TIZANIDINE 4 MG/1
4 TABLET ORAL EVERY 8 HOURS PRN
Qty: 90 TABLET | Refills: 2 | Status: SHIPPED | OUTPATIENT
Start: 2024-01-12

## 2024-01-13 DIAGNOSIS — J30.9 ALLERGIC RHINITIS, UNSPECIFIED SEASONALITY, UNSPECIFIED TRIGGER: Chronic | ICD-10-CM

## 2024-01-13 RX ORDER — MONTELUKAST SODIUM 10 MG/1
10 TABLET ORAL NIGHTLY
Qty: 90 TABLET | Refills: 0 | Status: CANCELLED | OUTPATIENT
Start: 2024-01-13

## 2024-01-14 DIAGNOSIS — J30.9 ALLERGIC RHINITIS, UNSPECIFIED SEASONALITY, UNSPECIFIED TRIGGER: Chronic | ICD-10-CM

## 2024-01-15 RX ORDER — MONTELUKAST SODIUM 10 MG/1
10 TABLET ORAL NIGHTLY
Qty: 90 TABLET | Refills: 0 | Status: SHIPPED | OUTPATIENT
Start: 2024-01-15

## 2024-01-22 DIAGNOSIS — I10 ESSENTIAL HYPERTENSION: Chronic | ICD-10-CM

## 2024-01-23 RX ORDER — IRBESARTAN AND HYDROCHLOROTHIAZIDE 300; 12.5 MG/1; MG/1
1 TABLET, FILM COATED ORAL DAILY
Qty: 90 TABLET | Refills: 1 | Status: SHIPPED | OUTPATIENT
Start: 2024-01-23

## 2024-02-02 DIAGNOSIS — E03.9 ACQUIRED HYPOTHYROIDISM: ICD-10-CM

## 2024-02-02 RX ORDER — LEVOTHYROXINE SODIUM 137 UG/1
137 TABLET ORAL DAILY
Qty: 90 TABLET | Refills: 1 | Status: SHIPPED | OUTPATIENT
Start: 2024-02-02

## 2024-02-15 ENCOUNTER — TREATMENT (OUTPATIENT)
Dept: PHYSICAL THERAPY | Facility: CLINIC | Age: 64
End: 2024-02-15

## 2024-02-15 DIAGNOSIS — M79.671 RIGHT FOOT PAIN: Primary | ICD-10-CM

## 2024-02-15 PROCEDURE — 20561 NDL INSJ W/O NJX 3+ MUSC: CPT | Performed by: PHYSICAL THERAPIST

## 2024-02-16 DIAGNOSIS — G62.9 PERIPHERAL POLYNEUROPATHY: ICD-10-CM

## 2024-02-20 RX ORDER — PREGABALIN 150 MG/1
150 CAPSULE ORAL DAILY
Qty: 30 CAPSULE | Refills: 2 | Status: SHIPPED | OUTPATIENT
Start: 2024-02-20

## 2024-03-08 ENCOUNTER — PRE-ADMISSION TESTING (OUTPATIENT)
Dept: PREADMISSION TESTING | Facility: HOSPITAL | Age: 64
End: 2024-03-08
Payer: COMMERCIAL

## 2024-03-08 VITALS
HEART RATE: 92 BPM | HEIGHT: 64 IN | RESPIRATION RATE: 16 BRPM | TEMPERATURE: 97 F | OXYGEN SATURATION: 98 % | BODY MASS INDEX: 32.61 KG/M2 | DIASTOLIC BLOOD PRESSURE: 77 MMHG | SYSTOLIC BLOOD PRESSURE: 135 MMHG | WEIGHT: 191 LBS

## 2024-03-08 LAB
ANION GAP SERPL CALCULATED.3IONS-SCNC: 11.2 MMOL/L (ref 5–15)
BUN SERPL-MCNC: 16 MG/DL (ref 8–23)
BUN/CREAT SERPL: 18.6 (ref 7–25)
CALCIUM SPEC-SCNC: 9.5 MG/DL (ref 8.6–10.5)
CHLORIDE SERPL-SCNC: 104 MMOL/L (ref 98–107)
CO2 SERPL-SCNC: 25.8 MMOL/L (ref 22–29)
CREAT SERPL-MCNC: 0.86 MG/DL (ref 0.57–1)
DEPRECATED RDW RBC AUTO: 41.8 FL (ref 37–54)
EGFRCR SERPLBLD CKD-EPI 2021: 76 ML/MIN/1.73
ERYTHROCYTE [DISTWIDTH] IN BLOOD BY AUTOMATED COUNT: 12.2 % (ref 12.3–15.4)
GLUCOSE SERPL-MCNC: 89 MG/DL (ref 65–99)
HCT VFR BLD AUTO: 38.9 % (ref 34–46.6)
HGB BLD-MCNC: 13.2 G/DL (ref 12–15.9)
MCH RBC QN AUTO: 31.5 PG (ref 26.6–33)
MCHC RBC AUTO-ENTMCNC: 33.9 G/DL (ref 31.5–35.7)
MCV RBC AUTO: 92.8 FL (ref 79–97)
PLATELET # BLD AUTO: 257 10*3/MM3 (ref 140–450)
PMV BLD AUTO: 10.9 FL (ref 6–12)
POTASSIUM SERPL-SCNC: 3.9 MMOL/L (ref 3.5–5.2)
QT INTERVAL: 376 MS
QTC INTERVAL: 426 MS
RBC # BLD AUTO: 4.19 10*6/MM3 (ref 3.77–5.28)
SODIUM SERPL-SCNC: 141 MMOL/L (ref 136–145)
WBC NRBC COR # BLD AUTO: 6.69 10*3/MM3 (ref 3.4–10.8)

## 2024-03-08 PROCEDURE — 80048 BASIC METABOLIC PNL TOTAL CA: CPT

## 2024-03-08 PROCEDURE — 36415 COLL VENOUS BLD VENIPUNCTURE: CPT

## 2024-03-08 PROCEDURE — 93005 ELECTROCARDIOGRAM TRACING: CPT

## 2024-03-08 PROCEDURE — 93010 ELECTROCARDIOGRAM REPORT: CPT | Performed by: INTERNAL MEDICINE

## 2024-03-08 PROCEDURE — 85027 COMPLETE CBC AUTOMATED: CPT

## 2024-03-08 NOTE — DISCHARGE INSTRUCTIONS
Arrive to hospital on your day of surgery at (WILL CALL WITH ARRIVAL TIME)    Take the following medications the morning of surgery:  AMLODIPINE, LEVOTHYROXINE AND LYRICA IF NEEDED      If you are on prescription narcotic pain medication to control your pain you may also take that medication the morning of surgery.    General Instructions:  Do not eat solid food after midnight the night before surgery.  You may drink clear liquids day of surgery but must stop at least one hour before your hospital arrival time.  It is beneficial for you to have a clear drink that contains carbohydrates the day of surgery.  We suggest a 12 to 20 ounce bottle of Gatorade or Powerade for non-diabetic patients or a 12 to 20 ounce bottle of G2 or Powerade Zero for diabetic patients. (Pediatric patients, are not advised to drink a 12 to 20 ounce carbohydrate drink)    Clear liquids are liquids you can see through.  Nothing red in color.     Plain water                               Sports drinks  Sodas                                   Gelatin (Jell-O)  Fruit juices without pulp such as white grape juice and apple juice  Popsicles that contain no fruit or yogurt  Tea or coffee (no cream or milk added)  Gatorade / Powerade  G2 / Powerade Zero    Infants may have breast milk up to four hours before surgery.  Infants drinking formula may drink formula up to six hours before surgery.   Patients who avoid smoking, chewing tobacco and alcohol for 4 weeks prior to surgery have a reduced risk of post-operative complications.  Quit smoking as many days before surgery as you can.  Do not smoke, use chewing tobacco or drink alcohol the day of surgery.   If applicable bring your C-PAP/ BI-PAP machine in with you to preop day of surgery.  Bring any papers given to you in the doctor’s office.  Wear clean comfortable clothes.  Do not wear contact lenses, false eyelashes or make-up.  Bring a case for your glasses.   Bring crutches or walker if  applicable.  Remove all piercings.  Leave jewelry and any other valuables at home.  Hair extensions with metal clips must be removed prior to surgery.  The Pre-Admission Testing nurse will instruct you to bring medications if unable to obtain an accurate list in Pre-Admission Testing.        If you were given a blood bank ID arm band remember to bring it with you the day of surgery.    Preventing a Surgical Site Infection:  For 2 to 3 days before surgery, avoid shaving with a razor because the razor can irritate skin and make it easier to develop an infection.    Any areas of open skin can increase the risk of a post-operative wound infection by allowing bacteria to enter and travel throughout the body.  Notify your surgeon if you have any skin wounds / rashes even if it is not near the expected surgical site.  The area will need assessed to determine if surgery should be delayed until it is healed.  The night prior to surgery shower using a fresh bar of anti-bacterial soap (such as Dial) and clean washcloth.  Sleep in a clean bed with clean clothing.  Do not allow pets to sleep with you.  Shower on the morning of surgery using a fresh bar of anti-bacterial soap (such as Dial) and clean washcloth.  Dry with a clean towel and dress in clean clothing.  Ask your surgeon if you will be receiving antibiotics prior to surgery.  Make sure you, your family, and all healthcare providers clean their hands with soap and water or an alcohol based hand  before caring for you or your wound.    Day of surgery:  Your arrival time is approximately two hours before your scheduled surgery time.  Upon arrival, a Pre-op nurse and Anesthesiologist will review your health history, obtain vital signs, and answer questions you may have.  The only belongings needed at this time will be a list of your home medications and if applicable your C-PAP/BI-PAP machine.  A Pre-op nurse will start an IV and you may receive medication in  preparation for surgery, including something to help you relax.     Please be aware that surgery does come with discomfort.  We want to make every effort to control your discomfort so please discuss any uncontrolled symptoms with your nurse.   Your doctor will most likely have prescribed pain medications.      If you are going home after surgery you will receive individualized written care instructions before being discharged.  A responsible adult must drive you to and from the hospital on the day of your surgery and ideally stay with you through the night.   .  Discharge prescriptions can be filled by the hospital pharmacy during regular pharmacy hours.  If you are having surgery late in the day/evening your prescription may be e-prescribed to your pharmacy.  Please verify your pharmacy hours or chose a 24 hour pharmacy to avoid not having access to your prescription because your pharmacy has closed for the day.    If you are staying overnight following surgery, you will be transported to your hospital room following the recovery period.  [facility] has all private rooms.    If you have any questions please call Pre-Admission Testing at [phone].  Deductibles and co-payments are collected on the day of service. Please be prepared to pay the required co-pay, deductible or deposit on the day of service as defined by your plan.    Call your surgeon immediately if you experience any of the following symptoms:  Sore Throat  Shortness of Breath or difficulty breathing  Cough  Chills  Body soreness or muscle pain  Headache  Fever  New loss of taste or smell  Do not arrive for your surgery ill.  Your procedure will need to be rescheduled to another time.  You will need to call your physician before the day of surgery to avoid any unnecessary exposure to hospital staff as well as other patients.          CHLORHEXIDINE CLOTH INSTRUCTIONS  The morning of surgery follow these instructions using the  Chlorhexidine cloths you've been given.  These steps reduce bacteria on the body.  Do not use the cloths near your eyes, ears mouth, genitalia or on open wounds.  Throw the cloths away after use but do not try to flush them down a toilet.      Open and remove one cloth at a time from the package.    Leave the cloth unfolded and begin the bathing.  Massage the skin with the cloths using gentle pressure to remove bacteria.  Do not scrub harshly.   Follow the steps below with one 2% CHG cloth per area (6 total cloths).  One cloth for neck, shoulders and chest.  One cloth for both arms, hands, fingers and underarms (do underarms last).  One cloth for the abdomen followed by groin.  One cloth for right leg and foot including between the toes.  One cloth for left leg and foot including between the toes.  The last cloth is to be used for the back of the neck, back and buttocks.    Allow the CHG to air dry 3 minutes on the skin which will give it time to work and decrease the chance of irritation.  The skin may feel sticky until it is dry.  Do not rinse with water or any other liquid or you will lose the beneficial effects of the CHG.  If mild skin irritation occurs, do rinse the skin to remove the CHG.  Report this to the nurse at time of admission.  Do not apply lotions, creams, ointments, deodorants or perfumes after using the clothes. Dress in clean clothes before coming to the hospital.

## 2024-03-11 DIAGNOSIS — F41.9 ANXIETY: ICD-10-CM

## 2024-03-12 RX ORDER — ALPRAZOLAM 1 MG/1
1 TABLET ORAL NIGHTLY PRN
Qty: 30 TABLET | Refills: 1 | Status: ON HOLD | OUTPATIENT
Start: 2024-03-12

## 2024-03-14 ENCOUNTER — OFFICE VISIT (OUTPATIENT)
Dept: ORTHOPEDIC SURGERY | Facility: CLINIC | Age: 64
End: 2024-03-14
Payer: COMMERCIAL

## 2024-03-14 VITALS
SYSTOLIC BLOOD PRESSURE: 135 MMHG | TEMPERATURE: 96.9 F | HEIGHT: 64 IN | BODY MASS INDEX: 32.95 KG/M2 | WEIGHT: 193 LBS | DIASTOLIC BLOOD PRESSURE: 77 MMHG

## 2024-03-14 DIAGNOSIS — M17.12 PRIMARY OSTEOARTHRITIS OF LEFT KNEE: Primary | ICD-10-CM

## 2024-03-14 NOTE — H&P (VIEW-ONLY)
Patient: Nancy Robbins    Date of Admission: 3/18/2024    YOB: 1960    Medical Record Number: [id number]    Admitting Physician: Dr. James Moya    Reason for Admission: End Stage Left Knee OA    History of Present Illness: 63 y.o. female presents with severe end stage knee osteoarthritis which has not been responsive to the full compliment of conservative measures. Despite conservative attempts, there is still severe, constant activity-limiting pain. Given the severity of the pain, the patient has elected to proceed with knee replacement.    Allergies:   Allergies   Allergen Reactions    Ampicillin Nausea Only    Oxycodone Nausea And Vomiting         Current Medications:  Home Medications:    Current Outpatient Medications on File Prior to Visit   Medication Sig    Allegra-D Allergy & Congestion  MG per 12 hr tablet TAKE 1 TABLET BY MOUTH TWICE DAILY (Patient taking differently: Take 1 tablet by mouth 2 (Two) Times a Day.)    ALPRAZolam (XANAX) 1 MG tablet Take 1 tablet by mouth At Night As Needed for Anxiety    amLODIPine (NORVASC) 5 MG tablet Take 1 tablet by mouth Daily.    Calcium Carbonate (CALCIUM 600 PO) Take 1 tablet by mouth Daily. HOLDING FOR DOS    Docusate Sodium (COLACE PO) Take 2 tablets by mouth Daily.    estradiol (ESTRACE) 1 MG tablet Take 1 tablet by mouth Daily.    fluticasone (FLONASE) 50 MCG/ACT nasal spray Use 2 sprays by in each nostril Daily.    irbesartan-hydrochlorothiazide (AVALIDE) 300-12.5 MG tablet Take 1 tablet by mouth Daily.    levothyroxine (SYNTHROID, LEVOTHROID) 137 MCG tablet Take 1 tablet by mouth Daily.    meclizine (ANTIVERT) 25 MG tablet Take 1 tablet by mouth 3 (Three) Times a Day As Needed for Dizziness.    montelukast (SINGULAIR) 10 MG tablet Take 1 tablet by mouth Every Night.    nabumetone (RELAFEN) 500 MG tablet Take 1 tablet by mouth 2 (Two) Times a Day As Needed for Mild Pain. (Patient taking differently: Take 1 tablet by mouth 2 (Two) Times a Day  As Needed for Mild Pain. FOLLOW MD GUIDELINES ON WHEN TO HOLD FOR SURGERY)    Potassium 99 MG tablet Take 1 tablet by mouth Every Other Day. HOLDING FOR DOS    pregabalin (LYRICA) 150 MG capsule Take 1 capsule by mouth Daily.    pregabalin (LYRICA) 75 MG capsule Take 1 capsule by mouth Every Morning.    tiZANidine (ZANAFLEX) 4 MG tablet Take 1 tablet by mouth Every 8 (Eight) Hours As Needed for Muscle Spasms.    traMADol (ULTRAM) 50 MG tablet Take 1 tablet by mouth 2 (Two) Times a Day As Needed for Moderate Pain .    Unable to find Take 1 each by mouth Daily. Med Name: GUPTA REVIVE      HOLDING FOR DOS    VITAMIN D PO Take 1 tablet by mouth Daily. HOLDING FOR DOS     No current facility-administered medications on file prior to visit.     PRN Meds:.    PMH:     Past Medical History:   Diagnosis Date    Anxiety     Chronic cough     COVID 10/31/2022    DDD (degenerative disc disease), lumbar     SPINAL STENOSIS    Graves disease     Hard to intubate     STATES DIFF TO INTUBATE D/T NOT BEING ABLE TO EXTEND NECK    Hypertension     Hypothyroidism     Left knee pain     Neuropathy     bilateral lower legs    Osteoarthritis     Plantar fasciitis     RIGHT FOOT    PONV (postoperative nausea and vomiting)     Restless leg syndrome     TAKES XANAX FOR..    Seasonal allergies     Vertigo        PF/Surg/Soc Hx:     Past Surgical History:   Procedure Laterality Date    BACK SURGERY      lumbar discectomy    COLONOSCOPY N/A 05/24/2016    Procedure: COLONOSCOPY;  Surgeon: Jose G Toro MD;  Location: [facility] ENDOSCOPY;  Service:     EPIDURAL BLOCK      back last one 11/27/18    JOINT REPLACEMENT  12/2018    KNEE MENISCAL REPAIR Bilateral     NECK SURGERY      fusion w/hardware    SUBTOTAL HYSTERECTOMY      TONSILLECTOMY      TOTAL KNEE ARTHROPLASTY Right 12/10/2018    Procedure: RT TOTAL KNEE ARTHROPLASTY;  Surgeon: Geo Fu MD;  Location: [facility] OR;  Service: Orthopedics        Social History     Occupational  "History    Not on file   Tobacco Use    Smoking status: Former     Current packs/day: 0.00     Average packs/day: 0.5 packs/day for 25.0 years (12.5 ttl pk-yrs)     Types: Cigarettes     Start date: 1981     Quit date: 2006     Years since quittin.2    Smokeless tobacco: Never   Vaping Use    Vaping status: Never Used   Substance and Sexual Activity    Alcohol use: Yes     Comment: Occasional    Drug use: No    Sexual activity: Not Currently     Partners: Male     Birth control/protection: None      Social History     Social History Narrative    Not on file        Family History   Problem Relation Age of Onset    Stroke Mother 83    Lung cancer Father     Kidney disease Sister         kidney transplant x2    Crohn's disease Brother     Crohn's disease Son     Malig Hyperthermia Neg Hx          Review of Systems:   A 14 point review of systems was performed, pertinent positives discussed above, all other systems are negative    Physical Exam: 63 y.o. female  Vital Signs :   Vitals:    24 1437   BP: 135/77   BP Location: Left arm   Patient Position: Sitting   Cuff Size: Large Adult   Temp: 96.9 °F (36.1 °C)   TempSrc: Temporal   Weight: 87.5 kg (193 lb)   Height: 162.6 cm (64.02\")   PainSc:   9   PainLoc: Knee     General: Alert and Oriented x 3, No acute distress.  Psych: mood and affect appropriate; recent and remote memory intact  Eyes: conjunctivae clear; pupils equally round and reactive, sclerae anicteric  CV: no peripheral edema  Resp: normal respiratory effort  Skin: no rashes or wounds; normal turgor  Musculosketetal; pain and crepitance with knee range of motion  Vascular: palpable distal pulses    Xrays:  -3 views (AP, lateral, and sunrise) were reviewed demonstrating end-stage OA with bone on bone articulation.  -A full length AP xray was ordered today for purposes of operative alignment demonstrating end stage arthritic findings. There are no previous full length films for review    PAT " results:  CBC- Normal  BMP- Normal  EKG- Normal     No medical clearances were needed      Assessment:  End-stage Left knee osteoarthritis. Conservative measures have failed.      Plan:  The plan is to proceed with Left Total Knee Replacement. The patient voiced understanding of the risks, benefits, and alternative forms of treatment that were discussed with Dr Moya at the time of scheduling. 23H with home health.     Arian Awad, APRN  3/14/2024

## 2024-03-14 NOTE — H&P
Patient: Nancy Robbins    Date of Admission: 3/18/2024    YOB: 1960    Medical Record Number: [id number]    Admitting Physician: Dr. James Moya    Reason for Admission: End Stage Left Knee OA    History of Present Illness: 63 y.o. female presents with severe end stage knee osteoarthritis which has not been responsive to the full compliment of conservative measures. Despite conservative attempts, there is still severe, constant activity-limiting pain. Given the severity of the pain, the patient has elected to proceed with knee replacement.    Allergies:   Allergies   Allergen Reactions    Ampicillin Nausea Only    Oxycodone Nausea And Vomiting         Current Medications:  Home Medications:    Current Outpatient Medications on File Prior to Visit   Medication Sig    Allegra-D Allergy & Congestion  MG per 12 hr tablet TAKE 1 TABLET BY MOUTH TWICE DAILY (Patient taking differently: Take 1 tablet by mouth 2 (Two) Times a Day.)    ALPRAZolam (XANAX) 1 MG tablet Take 1 tablet by mouth At Night As Needed for Anxiety    amLODIPine (NORVASC) 5 MG tablet Take 1 tablet by mouth Daily.    Calcium Carbonate (CALCIUM 600 PO) Take 1 tablet by mouth Daily. HOLDING FOR DOS    Docusate Sodium (COLACE PO) Take 2 tablets by mouth Daily.    estradiol (ESTRACE) 1 MG tablet Take 1 tablet by mouth Daily.    fluticasone (FLONASE) 50 MCG/ACT nasal spray Use 2 sprays by in each nostril Daily.    irbesartan-hydrochlorothiazide (AVALIDE) 300-12.5 MG tablet Take 1 tablet by mouth Daily.    levothyroxine (SYNTHROID, LEVOTHROID) 137 MCG tablet Take 1 tablet by mouth Daily.    meclizine (ANTIVERT) 25 MG tablet Take 1 tablet by mouth 3 (Three) Times a Day As Needed for Dizziness.    montelukast (SINGULAIR) 10 MG tablet Take 1 tablet by mouth Every Night.    nabumetone (RELAFEN) 500 MG tablet Take 1 tablet by mouth 2 (Two) Times a Day As Needed for Mild Pain. (Patient taking differently: Take 1 tablet by mouth 2 (Two) Times a Day  As Needed for Mild Pain. FOLLOW MD GUIDELINES ON WHEN TO HOLD FOR SURGERY)    Potassium 99 MG tablet Take 1 tablet by mouth Every Other Day. HOLDING FOR DOS    pregabalin (LYRICA) 150 MG capsule Take 1 capsule by mouth Daily.    pregabalin (LYRICA) 75 MG capsule Take 1 capsule by mouth Every Morning.    tiZANidine (ZANAFLEX) 4 MG tablet Take 1 tablet by mouth Every 8 (Eight) Hours As Needed for Muscle Spasms.    traMADol (ULTRAM) 50 MG tablet Take 1 tablet by mouth 2 (Two) Times a Day As Needed for Moderate Pain .    Unable to find Take 1 each by mouth Daily. Med Name: GUPTA REVIVE      HOLDING FOR DOS    VITAMIN D PO Take 1 tablet by mouth Daily. HOLDING FOR DOS     No current facility-administered medications on file prior to visit.     PRN Meds:.    PMH:     Past Medical History:   Diagnosis Date    Anxiety     Chronic cough     COVID 10/31/2022    DDD (degenerative disc disease), lumbar     SPINAL STENOSIS    Graves disease     Hard to intubate     STATES DIFF TO INTUBATE D/T NOT BEING ABLE TO EXTEND NECK    Hypertension     Hypothyroidism     Left knee pain     Neuropathy     bilateral lower legs    Osteoarthritis     Plantar fasciitis     RIGHT FOOT    PONV (postoperative nausea and vomiting)     Restless leg syndrome     TAKES XANAX FOR..    Seasonal allergies     Vertigo        PF/Surg/Soc Hx:     Past Surgical History:   Procedure Laterality Date    BACK SURGERY      lumbar discectomy    COLONOSCOPY N/A 05/24/2016    Procedure: COLONOSCOPY;  Surgeon: Jose G Toro MD;  Location: [facility] ENDOSCOPY;  Service:     EPIDURAL BLOCK      back last one 11/27/18    JOINT REPLACEMENT  12/2018    KNEE MENISCAL REPAIR Bilateral     NECK SURGERY      fusion w/hardware    SUBTOTAL HYSTERECTOMY      TONSILLECTOMY      TOTAL KNEE ARTHROPLASTY Right 12/10/2018    Procedure: RT TOTAL KNEE ARTHROPLASTY;  Surgeon: Geo Fu MD;  Location: [facility] OR;  Service: Orthopedics        Social History     Occupational  "History    Not on file   Tobacco Use    Smoking status: Former     Current packs/day: 0.00     Average packs/day: 0.5 packs/day for 25.0 years (12.5 ttl pk-yrs)     Types: Cigarettes     Start date: 1981     Quit date: 2006     Years since quittin.2    Smokeless tobacco: Never   Vaping Use    Vaping status: Never Used   Substance and Sexual Activity    Alcohol use: Yes     Comment: Occasional    Drug use: No    Sexual activity: Not Currently     Partners: Male     Birth control/protection: None      Social History     Social History Narrative    Not on file        Family History   Problem Relation Age of Onset    Stroke Mother 83    Lung cancer Father     Kidney disease Sister         kidney transplant x2    Crohn's disease Brother     Crohn's disease Son     Malig Hyperthermia Neg Hx          Review of Systems:   A 14 point review of systems was performed, pertinent positives discussed above, all other systems are negative    Physical Exam: 63 y.o. female  Vital Signs :   Vitals:    24 1437   BP: 135/77   BP Location: Left arm   Patient Position: Sitting   Cuff Size: Large Adult   Temp: 96.9 °F (36.1 °C)   TempSrc: Temporal   Weight: 87.5 kg (193 lb)   Height: 162.6 cm (64.02\")   PainSc:   9   PainLoc: Knee     General: Alert and Oriented x 3, No acute distress.  Psych: mood and affect appropriate; recent and remote memory intact  Eyes: conjunctivae clear; pupils equally round and reactive, sclerae anicteric  CV: no peripheral edema  Resp: normal respiratory effort  Skin: no rashes or wounds; normal turgor  Musculosketetal; pain and crepitance with knee range of motion  Vascular: palpable distal pulses    Xrays:  -3 views (AP, lateral, and sunrise) were reviewed demonstrating end-stage OA with bone on bone articulation.  -A full length AP xray was ordered today for purposes of operative alignment demonstrating end stage arthritic findings. There are no previous full length films for review    PAT " results:  CBC- Normal  BMP- Normal  EKG- Normal     No medical clearances were needed      Assessment:  End-stage Left knee osteoarthritis. Conservative measures have failed.      Plan:  The plan is to proceed with Left Total Knee Replacement. The patient voiced understanding of the risks, benefits, and alternative forms of treatment that were discussed with Dr Moya at the time of scheduling. 23H with home health.     Arian Awad, APRN  3/14/2024

## 2024-03-18 ENCOUNTER — APPOINTMENT (OUTPATIENT)
Dept: GENERAL RADIOLOGY | Facility: HOSPITAL | Age: 64
End: 2024-03-18
Payer: COMMERCIAL

## 2024-03-18 ENCOUNTER — HOSPITAL ENCOUNTER (OUTPATIENT)
Facility: HOSPITAL | Age: 64
Discharge: HOME HEALTH | End: 2024-03-20
Attending: ORTHOPAEDIC SURGERY | Admitting: ORTHOPAEDIC SURGERY
Payer: COMMERCIAL

## 2024-03-18 ENCOUNTER — ANESTHESIA EVENT (OUTPATIENT)
Dept: PERIOP | Facility: HOSPITAL | Age: 64
End: 2024-03-18
Payer: COMMERCIAL

## 2024-03-18 ENCOUNTER — ANESTHESIA (OUTPATIENT)
Dept: PERIOP | Facility: HOSPITAL | Age: 64
End: 2024-03-18
Payer: COMMERCIAL

## 2024-03-18 DIAGNOSIS — M17.12 PRIMARY OSTEOARTHRITIS OF LEFT KNEE: ICD-10-CM

## 2024-03-18 PROBLEM — M17.9 OA (OSTEOARTHRITIS) OF KNEE: Status: ACTIVE | Noted: 2024-03-18

## 2024-03-18 PROCEDURE — 0 BUPIVACAINE LIPOSOME 1.3 % SUSPENSION 20 ML VIAL: Performed by: ORTHOPAEDIC SURGERY

## 2024-03-18 PROCEDURE — 25010000002 FENTANYL CITRATE (PF) 50 MCG/ML SOLUTION: Performed by: ANESTHESIOLOGY

## 2024-03-18 PROCEDURE — 25810000003 LACTATED RINGERS SOLUTION: Performed by: NURSE PRACTITIONER

## 2024-03-18 PROCEDURE — 25010000002 MIDAZOLAM PER 1 MG: Performed by: ANESTHESIOLOGY

## 2024-03-18 PROCEDURE — C1776 JOINT DEVICE (IMPLANTABLE): HCPCS | Performed by: ORTHOPAEDIC SURGERY

## 2024-03-18 PROCEDURE — 25010000002 FENTANYL CITRATE (PF) 50 MCG/ML SOLUTION: Performed by: REGISTERED NURSE

## 2024-03-18 PROCEDURE — 97162 PT EVAL MOD COMPLEX 30 MIN: CPT | Performed by: PHYSICAL THERAPIST

## 2024-03-18 PROCEDURE — 25010000002 PROPOFOL 10 MG/ML EMULSION: Performed by: REGISTERED NURSE

## 2024-03-18 PROCEDURE — C9290 INJ, BUPIVACAINE LIPOSOME: HCPCS | Performed by: ORTHOPAEDIC SURGERY

## 2024-03-18 PROCEDURE — 25010000002 SUGAMMADEX 200 MG/2ML SOLUTION: Performed by: REGISTERED NURSE

## 2024-03-18 PROCEDURE — 27447 TOTAL KNEE ARTHROPLASTY: CPT | Performed by: ORTHOPAEDIC SURGERY

## 2024-03-18 PROCEDURE — 25810000003 SODIUM CHLORIDE 0.9 % SOLUTION 250 ML FLEX CONT: Performed by: NURSE PRACTITIONER

## 2024-03-18 PROCEDURE — 25010000002 VANCOMYCIN HCL 1.25 G RECONSTITUTED SOLUTION 1 EACH VIAL: Performed by: NURSE PRACTITIONER

## 2024-03-18 PROCEDURE — 25010000002 CEFAZOLIN PER 500 MG: Performed by: ORTHOPAEDIC SURGERY

## 2024-03-18 PROCEDURE — 25010000002 DEXAMETHASONE PER 1 MG: Performed by: REGISTERED NURSE

## 2024-03-18 PROCEDURE — G0378 HOSPITAL OBSERVATION PER HR: HCPCS

## 2024-03-18 PROCEDURE — 25010000002 ROPIVACAINE PER 1 MG: Performed by: ANESTHESIOLOGY

## 2024-03-18 PROCEDURE — 25810000003 LACTATED RINGERS PER 1000 ML: Performed by: ANESTHESIOLOGY

## 2024-03-18 PROCEDURE — 97110 THERAPEUTIC EXERCISES: CPT | Performed by: PHYSICAL THERAPIST

## 2024-03-18 PROCEDURE — 73560 X-RAY EXAM OF KNEE 1 OR 2: CPT

## 2024-03-18 PROCEDURE — 25010000002 ACETAMINOPHEN 10 MG/ML SOLUTION: Performed by: REGISTERED NURSE

## 2024-03-18 PROCEDURE — 27447 TOTAL KNEE ARTHROPLASTY: CPT | Performed by: TECHNICIAN, OTHER

## 2024-03-18 PROCEDURE — 25010000002 ONDANSETRON PER 1 MG: Performed by: REGISTERED NURSE

## 2024-03-18 PROCEDURE — C1713 ANCHOR/SCREW BN/BN,TIS/BN: HCPCS | Performed by: ORTHOPAEDIC SURGERY

## 2024-03-18 PROCEDURE — 25010000002 CEFAZOLIN PER 500 MG: Performed by: NURSE PRACTITIONER

## 2024-03-18 DEVICE — GENESIS II BICONVEX PATELLA 26MM
Type: IMPLANTABLE DEVICE | Site: KNEE | Status: FUNCTIONAL
Brand: GENESIS II

## 2024-03-18 DEVICE — LEGION NARROW CRUCIATE RETAINING                                    OXINIUM SIZE 5N LEFT
Type: IMPLANTABLE DEVICE | Site: KNEE | Status: FUNCTIONAL
Brand: LEGION

## 2024-03-18 DEVICE — CMT BONE PALACOS R HI/VISC 1X40: Type: IMPLANTABLE DEVICE | Site: KNEE | Status: FUNCTIONAL

## 2024-03-18 DEVICE — DEV CONTRL TISS STRATAFIXSPIRALMNCRYL PLSPS2 REV3/0 45CM: Type: IMPLANTABLE DEVICE | Site: KNEE | Status: FUNCTIONAL

## 2024-03-18 DEVICE — IMPLANTABLE DEVICE: Type: IMPLANTABLE DEVICE | Status: FUNCTIONAL

## 2024-03-18 DEVICE — GENESIS II NON-POROUS TIBIAL                                    BASEPLATE SIZE 4 LEFT
Type: IMPLANTABLE DEVICE | Site: KNEE | Status: FUNCTIONAL
Brand: GENESIS II

## 2024-03-18 DEVICE — DEV CONTRL TISS STRATAFIX SYMM PDS PLUS VIL CT-1 60CM: Type: IMPLANTABLE DEVICE | Site: KNEE | Status: FUNCTIONAL

## 2024-03-18 DEVICE — LEGION CRUCIATE RETAINING HIGH                                    FLEX HIGHLY CROSS LINKED                                    POLYETHYLENE SIZE 3-4 9MM
Type: IMPLANTABLE DEVICE | Site: KNEE | Status: FUNCTIONAL
Brand: LEGION

## 2024-03-18 RX ORDER — DROPERIDOL 2.5 MG/ML
0.62 INJECTION, SOLUTION INTRAMUSCULAR; INTRAVENOUS
Status: DISCONTINUED | OUTPATIENT
Start: 2024-03-18 | End: 2024-03-18 | Stop reason: HOSPADM

## 2024-03-18 RX ORDER — OXYCODONE AND ACETAMINOPHEN 7.5; 325 MG/1; MG/1
1 TABLET ORAL EVERY 4 HOURS PRN
Status: DISCONTINUED | OUTPATIENT
Start: 2024-03-18 | End: 2024-03-18 | Stop reason: HOSPADM

## 2024-03-18 RX ORDER — EPHEDRINE SULFATE 50 MG/ML
5 INJECTION, SOLUTION INTRAVENOUS ONCE AS NEEDED
Status: DISCONTINUED | OUTPATIENT
Start: 2024-03-18 | End: 2024-03-18 | Stop reason: HOSPADM

## 2024-03-18 RX ORDER — ASPIRIN 81 MG/1
81 TABLET ORAL EVERY 12 HOURS SCHEDULED
Status: DISCONTINUED | OUTPATIENT
Start: 2024-03-19 | End: 2024-03-20 | Stop reason: HOSPADM

## 2024-03-18 RX ORDER — ONDANSETRON 2 MG/ML
INJECTION INTRAMUSCULAR; INTRAVENOUS AS NEEDED
Status: DISCONTINUED | OUTPATIENT
Start: 2024-03-18 | End: 2024-03-18 | Stop reason: SURG

## 2024-03-18 RX ORDER — SODIUM CHLORIDE 0.9 % (FLUSH) 0.9 %
3-10 SYRINGE (ML) INJECTION AS NEEDED
Status: DISCONTINUED | OUTPATIENT
Start: 2024-03-18 | End: 2024-03-18 | Stop reason: HOSPADM

## 2024-03-18 RX ORDER — PROMETHAZINE HYDROCHLORIDE 25 MG/1
25 SUPPOSITORY RECTAL ONCE AS NEEDED
Status: DISCONTINUED | OUTPATIENT
Start: 2024-03-18 | End: 2024-03-18 | Stop reason: HOSPADM

## 2024-03-18 RX ORDER — ACETAMINOPHEN 10 MG/ML
INJECTION, SOLUTION INTRAVENOUS AS NEEDED
Status: DISCONTINUED | OUTPATIENT
Start: 2024-03-18 | End: 2024-03-18 | Stop reason: SURG

## 2024-03-18 RX ORDER — LIDOCAINE HYDROCHLORIDE 10 MG/ML
0.5 INJECTION, SOLUTION INFILTRATION; PERINEURAL ONCE AS NEEDED
Status: DISCONTINUED | OUTPATIENT
Start: 2024-03-18 | End: 2024-03-18 | Stop reason: HOSPADM

## 2024-03-18 RX ORDER — FAMOTIDINE 10 MG/ML
20 INJECTION, SOLUTION INTRAVENOUS ONCE
Status: COMPLETED | OUTPATIENT
Start: 2024-03-18 | End: 2024-03-18

## 2024-03-18 RX ORDER — SCOLOPAMINE TRANSDERMAL SYSTEM 1 MG/1
1 PATCH, EXTENDED RELEASE TRANSDERMAL
Status: DISCONTINUED | OUTPATIENT
Start: 2024-03-18 | End: 2024-03-20 | Stop reason: HOSPADM

## 2024-03-18 RX ORDER — PANTOPRAZOLE SODIUM 40 MG/1
40 TABLET, DELAYED RELEASE ORAL DAILY
Qty: 14 TABLET | Refills: 0 | Status: SHIPPED | OUTPATIENT
Start: 2024-03-18 | End: 2024-04-01

## 2024-03-18 RX ORDER — ROCURONIUM BROMIDE 10 MG/ML
INJECTION, SOLUTION INTRAVENOUS AS NEEDED
Status: DISCONTINUED | OUTPATIENT
Start: 2024-03-18 | End: 2024-03-18 | Stop reason: SURG

## 2024-03-18 RX ORDER — PROMETHAZINE HYDROCHLORIDE 25 MG/1
25 TABLET ORAL ONCE AS NEEDED
Status: DISCONTINUED | OUTPATIENT
Start: 2024-03-18 | End: 2024-03-18 | Stop reason: HOSPADM

## 2024-03-18 RX ORDER — POLYETHYLENE GLYCOL 3350 17 G/17G
17 POWDER, FOR SOLUTION ORAL 2 TIMES DAILY
Qty: 238 G | Refills: 0 | Status: SHIPPED | OUTPATIENT
Start: 2024-03-18 | End: 2024-03-25

## 2024-03-18 RX ORDER — HYDROCODONE BITARTRATE AND ACETAMINOPHEN 7.5; 325 MG/1; MG/1
1 TABLET ORAL
Qty: 56 TABLET | Refills: 0 | Status: SHIPPED | OUTPATIENT
Start: 2024-03-18 | End: 2024-03-22 | Stop reason: SDUPTHER

## 2024-03-18 RX ORDER — TRANEXAMIC ACID 100 MG/ML
INJECTION, SOLUTION INTRAVENOUS AS NEEDED
Status: DISCONTINUED | OUTPATIENT
Start: 2024-03-18 | End: 2024-03-18 | Stop reason: SURG

## 2024-03-18 RX ORDER — ASPIRIN 81 MG/1
TABLET ORAL
Qty: 60 TABLET | Refills: 0 | Status: SHIPPED | OUTPATIENT
Start: 2024-03-18 | End: 2024-05-01

## 2024-03-18 RX ORDER — IPRATROPIUM BROMIDE AND ALBUTEROL SULFATE 2.5; .5 MG/3ML; MG/3ML
3 SOLUTION RESPIRATORY (INHALATION) ONCE AS NEEDED
Status: DISCONTINUED | OUTPATIENT
Start: 2024-03-18 | End: 2024-03-18 | Stop reason: HOSPADM

## 2024-03-18 RX ORDER — PROMETHAZINE HYDROCHLORIDE 12.5 MG/1
12.5 TABLET ORAL EVERY 4 HOURS PRN
Status: DISCONTINUED | OUTPATIENT
Start: 2024-03-18 | End: 2024-03-20 | Stop reason: HOSPADM

## 2024-03-18 RX ORDER — HYDROMORPHONE HYDROCHLORIDE 1 MG/ML
0.5 INJECTION, SOLUTION INTRAMUSCULAR; INTRAVENOUS; SUBCUTANEOUS
Status: DISCONTINUED | OUTPATIENT
Start: 2024-03-18 | End: 2024-03-18 | Stop reason: HOSPADM

## 2024-03-18 RX ORDER — LABETALOL HYDROCHLORIDE 5 MG/ML
5 INJECTION, SOLUTION INTRAVENOUS
Status: DISCONTINUED | OUTPATIENT
Start: 2024-03-18 | End: 2024-03-18 | Stop reason: HOSPADM

## 2024-03-18 RX ORDER — HYDROCODONE BITARTRATE AND ACETAMINOPHEN 7.5; 325 MG/1; MG/1
1 TABLET ORAL EVERY 4 HOURS PRN
Status: DISCONTINUED | OUTPATIENT
Start: 2024-03-18 | End: 2024-03-20 | Stop reason: HOSPADM

## 2024-03-18 RX ORDER — PREGABALIN 75 MG/1
150 CAPSULE ORAL ONCE
Status: COMPLETED | OUTPATIENT
Start: 2024-03-18 | End: 2024-03-18

## 2024-03-18 RX ORDER — ONDANSETRON 4 MG/1
4 TABLET, FILM COATED ORAL EVERY 8 HOURS PRN
Qty: 10 TABLET | Refills: 0 | Status: SHIPPED | OUTPATIENT
Start: 2024-03-18

## 2024-03-18 RX ORDER — PROPOFOL 10 MG/ML
VIAL (ML) INTRAVENOUS AS NEEDED
Status: DISCONTINUED | OUTPATIENT
Start: 2024-03-18 | End: 2024-03-18 | Stop reason: SURG

## 2024-03-18 RX ORDER — DIPHENHYDRAMINE HYDROCHLORIDE 50 MG/ML
12.5 INJECTION INTRAMUSCULAR; INTRAVENOUS
Status: DISCONTINUED | OUTPATIENT
Start: 2024-03-18 | End: 2024-03-18 | Stop reason: HOSPADM

## 2024-03-18 RX ORDER — HYDRALAZINE HYDROCHLORIDE 20 MG/ML
5 INJECTION INTRAMUSCULAR; INTRAVENOUS
Status: DISCONTINUED | OUTPATIENT
Start: 2024-03-18 | End: 2024-03-18 | Stop reason: HOSPADM

## 2024-03-18 RX ORDER — ONDANSETRON 2 MG/ML
4 INJECTION INTRAMUSCULAR; INTRAVENOUS ONCE AS NEEDED
Status: DISCONTINUED | OUTPATIENT
Start: 2024-03-18 | End: 2024-03-20 | Stop reason: HOSPADM

## 2024-03-18 RX ORDER — FENTANYL CITRATE 50 UG/ML
INJECTION, SOLUTION INTRAMUSCULAR; INTRAVENOUS AS NEEDED
Status: DISCONTINUED | OUTPATIENT
Start: 2024-03-18 | End: 2024-03-18 | Stop reason: SURG

## 2024-03-18 RX ORDER — FENTANYL CITRATE 50 UG/ML
50 INJECTION, SOLUTION INTRAMUSCULAR; INTRAVENOUS ONCE AS NEEDED
Status: COMPLETED | OUTPATIENT
Start: 2024-03-18 | End: 2024-03-18

## 2024-03-18 RX ORDER — ACETAMINOPHEN 325 MG/1
650 TABLET ORAL EVERY 6 HOURS PRN
Status: DISCONTINUED | OUTPATIENT
Start: 2024-03-18 | End: 2024-03-20 | Stop reason: HOSPADM

## 2024-03-18 RX ORDER — ONDANSETRON 4 MG/1
4 TABLET, ORALLY DISINTEGRATING ORAL EVERY 6 HOURS PRN
Status: DISCONTINUED | OUTPATIENT
Start: 2024-03-18 | End: 2024-03-20 | Stop reason: HOSPADM

## 2024-03-18 RX ORDER — HYDROCODONE BITARTRATE AND ACETAMINOPHEN 5; 325 MG/1; MG/1
1 TABLET ORAL ONCE AS NEEDED
Status: COMPLETED | OUTPATIENT
Start: 2024-03-18 | End: 2024-03-18

## 2024-03-18 RX ORDER — FLUMAZENIL 0.1 MG/ML
0.2 INJECTION INTRAVENOUS AS NEEDED
Status: DISCONTINUED | OUTPATIENT
Start: 2024-03-18 | End: 2024-03-18 | Stop reason: HOSPADM

## 2024-03-18 RX ORDER — SODIUM CHLORIDE, SODIUM LACTATE, POTASSIUM CHLORIDE, CALCIUM CHLORIDE 600; 310; 30; 20 MG/100ML; MG/100ML; MG/100ML; MG/100ML
9 INJECTION, SOLUTION INTRAVENOUS CONTINUOUS
Status: DISCONTINUED | OUTPATIENT
Start: 2024-03-18 | End: 2024-03-20 | Stop reason: HOSPADM

## 2024-03-18 RX ORDER — NALOXONE HCL 0.4 MG/ML
0.2 VIAL (ML) INJECTION AS NEEDED
Status: DISCONTINUED | OUTPATIENT
Start: 2024-03-18 | End: 2024-03-18 | Stop reason: HOSPADM

## 2024-03-18 RX ORDER — ONDANSETRON 2 MG/ML
4 INJECTION INTRAMUSCULAR; INTRAVENOUS ONCE AS NEEDED
Status: DISCONTINUED | OUTPATIENT
Start: 2024-03-18 | End: 2024-03-18 | Stop reason: HOSPADM

## 2024-03-18 RX ORDER — ASPIRIN 81 MG/1
81 TABLET ORAL 2 TIMES DAILY
Status: DISPENSED | OUTPATIENT
Start: 2024-03-19 | End: 2024-03-19

## 2024-03-18 RX ORDER — MELOXICAM 15 MG/1
15 TABLET ORAL ONCE
Status: COMPLETED | OUTPATIENT
Start: 2024-03-18 | End: 2024-03-18

## 2024-03-18 RX ORDER — ROPIVACAINE HYDROCHLORIDE 5 MG/ML
INJECTION, SOLUTION EPIDURAL; INFILTRATION; PERINEURAL
Status: COMPLETED | OUTPATIENT
Start: 2024-03-18 | End: 2024-03-18

## 2024-03-18 RX ORDER — MELOXICAM 15 MG/1
15 TABLET ORAL DAILY
Qty: 14 TABLET | Refills: 0 | Status: SHIPPED | OUTPATIENT
Start: 2024-03-18

## 2024-03-18 RX ORDER — MIDAZOLAM HYDROCHLORIDE 1 MG/ML
1 INJECTION INTRAMUSCULAR; INTRAVENOUS
Status: DISCONTINUED | OUTPATIENT
Start: 2024-03-18 | End: 2024-03-18 | Stop reason: HOSPADM

## 2024-03-18 RX ORDER — DEXAMETHASONE SODIUM PHOSPHATE 4 MG/ML
INJECTION, SOLUTION INTRA-ARTICULAR; INTRALESIONAL; INTRAMUSCULAR; INTRAVENOUS; SOFT TISSUE AS NEEDED
Status: DISCONTINUED | OUTPATIENT
Start: 2024-03-18 | End: 2024-03-18 | Stop reason: SURG

## 2024-03-18 RX ORDER — SODIUM CHLORIDE 0.9 % (FLUSH) 0.9 %
3 SYRINGE (ML) INJECTION EVERY 12 HOURS SCHEDULED
Status: DISCONTINUED | OUTPATIENT
Start: 2024-03-18 | End: 2024-03-18 | Stop reason: HOSPADM

## 2024-03-18 RX ORDER — FENTANYL CITRATE 50 UG/ML
50 INJECTION, SOLUTION INTRAMUSCULAR; INTRAVENOUS
Status: DISCONTINUED | OUTPATIENT
Start: 2024-03-18 | End: 2024-03-18 | Stop reason: HOSPADM

## 2024-03-18 RX ORDER — MAGNESIUM HYDROXIDE 1200 MG/15ML
LIQUID ORAL AS NEEDED
Status: DISCONTINUED | OUTPATIENT
Start: 2024-03-18 | End: 2024-03-18 | Stop reason: HOSPADM

## 2024-03-18 RX ORDER — LIDOCAINE HYDROCHLORIDE 20 MG/ML
INJECTION, SOLUTION INFILTRATION; PERINEURAL AS NEEDED
Status: DISCONTINUED | OUTPATIENT
Start: 2024-03-18 | End: 2024-03-18 | Stop reason: SURG

## 2024-03-18 RX ADMIN — PROPOFOL 150 MG: 10 INJECTION, EMULSION INTRAVENOUS at 09:44

## 2024-03-18 RX ADMIN — SODIUM CHLORIDE 2 G: 900 INJECTION INTRAVENOUS at 18:09

## 2024-03-18 RX ADMIN — MELOXICAM 15 MG: 15 TABLET ORAL at 08:08

## 2024-03-18 RX ADMIN — PREGABALIN 150 MG: 75 CAPSULE ORAL at 08:08

## 2024-03-18 RX ADMIN — ROCURONIUM BROMIDE 50 MG: 10 INJECTION, SOLUTION INTRAVENOUS at 09:46

## 2024-03-18 RX ADMIN — SODIUM CHLORIDE 2 G: 900 INJECTION INTRAVENOUS at 09:30

## 2024-03-18 RX ADMIN — SODIUM CHLORIDE, POTASSIUM CHLORIDE, SODIUM LACTATE AND CALCIUM CHLORIDE: 600; 310; 30; 20 INJECTION, SOLUTION INTRAVENOUS at 09:34

## 2024-03-18 RX ADMIN — PROPOFOL 50 MG: 10 INJECTION, EMULSION INTRAVENOUS at 10:27

## 2024-03-18 RX ADMIN — FENTANYL CITRATE 50 MCG: 50 INJECTION, SOLUTION INTRAMUSCULAR; INTRAVENOUS at 08:46

## 2024-03-18 RX ADMIN — TRANEXAMIC ACID 1000 MG: 100 INJECTION INTRAVENOUS at 10:38

## 2024-03-18 RX ADMIN — MIDAZOLAM 1 MG: 1 INJECTION INTRAMUSCULAR; INTRAVENOUS at 08:46

## 2024-03-18 RX ADMIN — DEXAMETHASONE SODIUM PHOSPHATE 10 MG: 4 INJECTION, SOLUTION INTRA-ARTICULAR; INTRALESIONAL; INTRAMUSCULAR; INTRAVENOUS; SOFT TISSUE at 09:47

## 2024-03-18 RX ADMIN — LIDOCAINE HYDROCHLORIDE 50 MG: 20 INJECTION, SOLUTION INFILTRATION; PERINEURAL at 09:44

## 2024-03-18 RX ADMIN — ACETAMINOPHEN 1000 MG: 1000 INJECTION INTRAVENOUS at 09:55

## 2024-03-18 RX ADMIN — FENTANYL CITRATE 50 MCG: 50 INJECTION, SOLUTION INTRAMUSCULAR; INTRAVENOUS at 12:27

## 2024-03-18 RX ADMIN — HYDROCODONE BITARTRATE AND ACETAMINOPHEN 1 TABLET: 7.5; 325 TABLET ORAL at 23:36

## 2024-03-18 RX ADMIN — ONDANSETRON 4 MG: 2 INJECTION INTRAMUSCULAR; INTRAVENOUS at 09:47

## 2024-03-18 RX ADMIN — VANCOMYCIN HYDROCHLORIDE 1250 MG: 1.25 INJECTION, POWDER, LYOPHILIZED, FOR SOLUTION INTRAVENOUS at 08:48

## 2024-03-18 RX ADMIN — HYDROCODONE BITARTRATE AND ACETAMINOPHEN 1 TABLET: 7.5; 325 TABLET ORAL at 15:40

## 2024-03-18 RX ADMIN — PROPOFOL 50 MG: 10 INJECTION, EMULSION INTRAVENOUS at 11:10

## 2024-03-18 RX ADMIN — HYDROCODONE BITARTRATE AND ACETAMINOPHEN 1 TABLET: 5; 325 TABLET ORAL at 11:35

## 2024-03-18 RX ADMIN — SODIUM CHLORIDE, POTASSIUM CHLORIDE, SODIUM LACTATE AND CALCIUM CHLORIDE 500 ML: 600; 310; 30; 20 INJECTION, SOLUTION INTRAVENOUS at 08:16

## 2024-03-18 RX ADMIN — ROPIVACAINE HYDROCHLORIDE 15 ML: 5 INJECTION EPIDURAL; INFILTRATION; PERINEURAL at 08:58

## 2024-03-18 RX ADMIN — PROPOFOL 50 MG: 10 INJECTION, EMULSION INTRAVENOUS at 11:13

## 2024-03-18 RX ADMIN — SCOPALAMINE 1 PATCH: 1 PATCH, EXTENDED RELEASE TRANSDERMAL at 08:57

## 2024-03-18 RX ADMIN — HYDROCODONE BITARTRATE AND ACETAMINOPHEN 1 TABLET: 7.5; 325 TABLET ORAL at 19:48

## 2024-03-18 RX ADMIN — FAMOTIDINE 20 MG: 10 INJECTION INTRAVENOUS at 08:49

## 2024-03-18 RX ADMIN — FENTANYL CITRATE 50 MCG: 50 INJECTION, SOLUTION INTRAMUSCULAR; INTRAVENOUS at 11:36

## 2024-03-18 RX ADMIN — FENTANYL CITRATE 50 MCG: 50 INJECTION, SOLUTION INTRAMUSCULAR; INTRAVENOUS at 10:31

## 2024-03-18 RX ADMIN — PROPOFOL 100 MCG/KG/MIN: 10 INJECTION, EMULSION INTRAVENOUS at 09:44

## 2024-03-18 RX ADMIN — SUGAMMADEX 200 MG: 100 INJECTION, SOLUTION INTRAVENOUS at 10:57

## 2024-03-18 NOTE — ANESTHESIA PROCEDURE NOTES
Peripheral Block      Patient reassessed immediately prior to procedure    Patient location during procedure: holding area  Start time: 3/18/2024 8:48 AM  Stop time: 3/18/2024 8:58 AM  Reason for block: at surgeon's request and post-op pain management  Performed by  Anesthesiologist: Clarissa Bailon MD  Preanesthetic Checklist  Completed: patient identified, IV checked, site marked, risks and benefits discussed, surgical consent, monitors and equipment checked, pre-op evaluation and timeout performed  Prep:  Pt Position: supine  Sterile barriers:gloves  Prep: ChloraPrep  Patient monitoring: continuous pulse oximetry  Procedure    Sedation: yes  Performed under: PNB  Guidance:ultrasound guided    ULTRASOUND INTERPRETATION.  Using ultrasound guidance a 20 G gauge needle was placed in close proximity to the femoral nerve, at which point, under ultrasound guidance anesthetic was injected in the area of the nerve and spread of the anesthesia was seen on ultrasound in close proximity thereto.  There were no abnormalities seen on ultrasound; a digital image was taken; and the patient tolerated the procedure with no complications. Images:still images obtained    Laterality:left  Block Type:adductor canal block  Injection Technique:single-shot  Needle Type:echogenic  Needle Gauge:20 G      Medications Used: ropivacaine (NAROPIN) 0.5 % injection - Injection   15 mL - 3/18/2024 8:58:00 AM      Post Assessment  Injection Assessment: negative aspiration for heme, no paresthesia on injection and incremental injection  Patient Tolerance:comfortable throughout block  Complications:no

## 2024-03-18 NOTE — PLAN OF CARE
Goal Outcome Evaluation:  Plan of Care Reviewed With: patient      Vss, nvi, KB dressing c/d/I, voiding per brp, ambulating assist x1, HV in place, prn norco given x1 for pain with adequate relief, plans to d/c home tomorrow, educated on bp meds and monitoring.   Progress: improving

## 2024-03-18 NOTE — ANESTHESIA POSTPROCEDURE EVALUATION
Patient: Nancy Robbins    Procedure Summary       Date: 03/18/24 Room / Location: [facility] OR [address] [place] [address] [facility]    Anesthesia Start: 0934 Anesthesia Stop: 1129    Procedure: TOTAL KNEE ARTHROPLASTY (Left: Knee) Diagnosis:       Primary osteoarthritis of left knee      (Primary osteoarthritis of left knee [M17.12])    Surgeons: James Moya MD Provider: Fabrice Feldman MD    Anesthesia Type: general with block ASA Status: 3            Anesthesia Type: general with block    Vitals  Vitals Value Taken Time   /78 03/18/24 1230   Temp 36.4 °C (97.5 °F) 03/18/24 1127   Pulse 79 03/18/24 1239   Resp 16 03/18/24 1215   SpO2 95 % 03/18/24 1239   Vitals shown include unfiled device data.        Post Anesthesia Care and Evaluation    Patient location during evaluation: bedside  Patient participation: complete - patient participated  Level of consciousness: awake and alert  Pain management: adequate    Airway patency: patent  Anesthetic complications: No anesthetic complications  PONV Status: controlled  Cardiovascular status: acceptable  Respiratory status: acceptable  Hydration status: acceptable

## 2024-03-18 NOTE — ANESTHESIA PREPROCEDURE EVALUATION
Anesthesia Evaluation     history of anesthetic complications:  PONV difficult airway  NPO Solid Status: > 8 hours             Airway   Mallampati: IV  TM distance: <3 FB  Neck ROM: limited  Difficult intubation highly probable  Dental - normal exam     Pulmonary    (+) a smoker Former,  Cardiovascular     (+) hypertension 2 medications or greater      Neuro/Psych  (+) dizziness/light headedness, numbness, psychiatric history  GI/Hepatic/Renal/Endo    (+) obesity, thyroid problem hypothyroidism    Musculoskeletal     Abdominal    Substance History      OB/GYN          Other   arthritis,                       Anesthesia Plan    ASA 3     general with block     intravenous induction     Anesthetic plan, risks, benefits, and alternatives have been provided, discussed and informed consent has been obtained with: patient.        CODE STATUS:

## 2024-03-18 NOTE — THERAPY EVALUATION
Patient Name: Nancy Robbins  : 1960    MRN: [id number]                              Today's Date: 3/18/2024       Admit Date: 3/18/2024    Visit Dx:     ICD-10-CM ICD-9-CM   1. Primary osteoarthritis of left knee  M17.12 715.16     Patient Active Problem List   Diagnosis    Hypothyroidism    Allergic rhinitis    Essential hypertension    DJD (degenerative joint disease) of knee    Peripheral polyneuropathy    Controlled substance agreement signed    Spinal stenosis of lumbar region with neurogenic claudication    Anxiety    Spondylolisthesis at L4-L5 level    Lumbar facet joint syndrome    DDD (degenerative disc disease), lumbar    Plantar fasciitis of right foot    Leg cramps    OA (osteoarthritis) of knee     Past Medical History:   Diagnosis Date    Anxiety     Chronic cough     COVID 10/31/2022    DDD (degenerative disc disease), lumbar     SPINAL STENOSIS    Graves disease     Hard to intubate     STATES DIFF TO INTUBATE D/T NOT BEING ABLE TO EXTEND NECK    Hypertension     Hypothyroidism     Left knee pain     Neuropathy     bilateral lower legs    Osteoarthritis     Plantar fasciitis     RIGHT FOOT    PONV (postoperative nausea and vomiting)     Restless leg syndrome     TAKES XANAX FOR..    Seasonal allergies     Vertigo      Past Surgical History:   Procedure Laterality Date    BACK SURGERY      lumbar discectomy    COLONOSCOPY N/A 2016    Procedure: COLONOSCOPY;  Surgeon: Jose G Toro MD;  Location: [facility] ENDOSCOPY;  Service:     EPIDURAL BLOCK      back last one 18    JOINT REPLACEMENT  2018    KNEE MENISCAL REPAIR Bilateral     NECK SURGERY      fusion w/hardware    SUBTOTAL HYSTERECTOMY      TONSILLECTOMY      TOTAL KNEE ARTHROPLASTY Right 12/10/2018    Procedure: RT TOTAL KNEE ARTHROPLASTY;  Surgeon: Geo Fu MD;  Location: [facility] MAIN OR;  Service: Orthopedics      General Information       Row Name 24 1558          Physical Therapy Time and Intention     Document Type evaluation  -     Mode of Treatment individual therapy  -       Row Name 03/18/24 1558          General Information    Patient Profile Reviewed yes  -     Prior Level of Function independent:  -     Barriers to Rehab none identified  -       Row Name 03/18/24 1558          Living Environment    People in Home alone  -       Row Name 03/18/24 1558          Home Main Entrance    Number of Stairs, Main Entrance eight  or 4 at her mom's house  -     Stair Railings, Main Entrance none  -       Row Name 03/18/24 1558          Cognition    Orientation Status (Cognition) oriented x 4  -       Row Name 03/18/24 1558          Safety Issues, Functional Mobility    Impairments Affecting Function (Mobility) endurance/activity tolerance;pain;range of motion (ROM)  -               User Key  (r) = Recorded By, (t) = Taken By, (c) = Cosigned By      Initials Name Provider Type    Stefanie Pratt, PT Physical Therapist                   Mobility       Row Name 03/18/24 1622          Bed Mobility    Comment, (Bed Mobility) UIC  -       Row Name 03/18/24 1622          Sit-Stand Transfer    Sit-Stand [place] (Transfers) standby assist  -     Assistive Device (Sit-Stand Transfers) walker, front-wheeled  -       Row Name 03/18/24 1622          Gait/Stairs (Locomotion)    [place] Level (Gait) standby assist  -     Assistive Device (Gait) walker, front-wheeled  -     Distance in Feet (Gait) 120  -     Deviations/Abnormal Patterns (Gait) antalgic;gait speed decreased  -       Row Name 03/18/24 1622          Mobility    Extremity Weight-bearing Status left lower extremity  -     Left Lower Extremity (Weight-bearing Status) weight-bearing as tolerated (WBAT)  -               User Key  (r) = Recorded By, (t) = Taken By, (c) = Cosigned By      Initials Name Provider Type    Stefanie Pratt, PT Physical Therapist                   Obj/Interventions       Row Name  03/18/24 1622          Range of Motion Comprehensive    General Range of Motion lower extremity range of motion deficits identified  -     Comment, General Range of Motion Surgical knee ROM:0-90  -KH       Row Name 03/18/24 1622          Strength Comprehensive (MMT)    General Manual Muscle Testing (MMT) Assessment lower extremity strength deficits identified  -KH       Row Name 03/18/24 1622          Motor Skills    Therapeutic Exercise hip;knee  L TKA protocol x 10 reps  -KH       Row Name 03/18/24 1622          Hip (Therapeutic Exercise)    Hip (Therapeutic Exercise) isometric exercises;strengthening exercise  -     Hip Isometrics (Therapeutic Exercise) flexion;extension;gluteal sets  -KH       Row Name 03/18/24 1622          Knee (Therapeutic Exercise)    Knee (Therapeutic Exercise) isometric exercises;strengthening exercise  -     Knee Isometrics (Therapeutic Exercise) quad sets  -     Knee Strengthening (Therapeutic Exercise) SLR (straight leg raise);SAQ (short arc quad);LAQ (long arc quad);heel slides  -KH       Row Name 03/18/24 1622          Balance    Balance Interventions sitting;standing;sit to stand  -KH       Row Name 03/18/24 1622          Sensory Assessment (Somatosensory)    Sensory Assessment (Somatosensory) LE sensation intact  -               User Key  (r) = Recorded By, (t) = Taken By, (c) = Cosigned By      Initials Name Provider Type     Stefanie Watters, PT Physical Therapist                   Goals/Plan       Row Name 03/18/24 1626          Bed Mobility Goal 1 (PT)    Activity/Assistive Device (Bed Mobility Goal 1, PT) sit to supine/supine to sit  -     [place] Level/Cues Needed (Bed Mobility Goal 1, PT) modified independence  -     Time Frame (Bed Mobility Goal 1, PT) long term goal (LTG);3 days  -KH       Row Name 03/18/24 1626          Transfer Goal 1 (PT)    Activity/Assistive Device (Transfer Goal 1, PT) sit-to-stand/stand-to-sit  -     [place]  Level/Cues Needed (Transfer Goal 1, PT) modified independence  -     Time Frame (Transfer Goal 1, PT) long term goal (LTG);3 days  -       Row Name 03/18/24 1626          Gait Training Goal 1 (PT)    Activity/Assistive Device (Gait Training Goal 1, PT) gait (walking locomotion)  -     [place] Level (Gait Training Goal 1, PT) modified independence  -     Time Frame (Gait Training Goal 1, PT) long term goal (LTG);3 days  -       Row Name 03/18/24 1626          ROM Goal 1 (PT)    ROM Goal 1 (PT) Surgical Knee ROM:0-95  -     Time Frame (ROM Goal 1, PT) long-term goal (LTG);3 days  -       Row Name 03/18/24 1626          Stairs Goal 1 (PT)    Activity/Assistive Device (Stairs Goal 1, PT) stairs, all skills  -     [place] Level/Cues Needed (Stairs Goal 1, PT) contact guard required  -     Number of Stairs (Stairs Goal 1, PT) 4  -KH     Time Frame (Stairs Goal 1, PT) 3 days  -       Row Name 03/18/24 1626          Patient Education Goal (PT)    Activity (Patient Education Goal, PT) HEP  -     [place]/Cues/Accuracy (Memory Goal 2, PT) demonstrates adequately  -     Time Frame (Patient Education Goal, PT) 3 days  -       Row Name 03/18/24 1626          Therapy Assessment/Plan (PT)    Planned Therapy Interventions (PT) balance training;bed mobility training;gait training;home exercise program;ROM (range of motion);patient/family education;stair training;strengthening;stretching;transfer training  -               User Key  (r) = Recorded By, (t) = Taken By, (c) = Cosigned By      Initials Name Provider Type     Stefanie Watters, PT Physical Therapist                   Clinical Impression       Row Name 03/18/24 1623          Pain    Pretreatment Pain Rating 6/10  -     Posttreatment Pain Rating 8/10  -     Pain Location - Side/Orientation Left  -     Pain Location - knee  -     Pain Intervention(s) Cold applied;Rest  -       Row Name 03/18/24 1623          Plan of  Care Review    Plan of Care Reviewed With patient;sibling  -     Outcome Evaluation Pt is s/p L TKA and is WBAT. Pt presents with pain, limited ROM and antalgic gait. Pt tolerated ROM exercises to LE. She stood and ambulated well with Rwx. Pt would benefit from PT to address these impairments. Pt was educated on HEP, use of AD and needs for staff assistance while admitted. Pt plans to d/c home with  PT.  -       Row Name 03/18/24 1623          Therapy Assessment/Plan (PT)    Patient/Family Therapy Goals Statement (PT) [facility]  -     Rehab Potential (PT) good, to achieve stated therapy goals  -     Criteria for Skilled Interventions Met (PT) yes  -     Therapy Frequency (PT) daily  -       Row Name 03/18/24 1623          Positioning and Restraints    Pre-Treatment Position sitting in chair/recliner  -     Post Treatment Position chair  -     In Chair reclined;call light within reach;encouraged to call for assist;exit alarm on;with family/caregiver;notified [facility]               User Key  (r) = Recorded By, (t) = Taken By, (c) = Cosigned By      Initials Name Provider Type    Stefanie Pratt, PT Physical Therapist                   Outcome Measures       Row Name 03/18/24 1626 03/18/24 1315       How much help from another person do you currently need...    Turning from your back to your side while in flat bed without using bedrails? 4  -KH 3  -EE    Moving from lying on back to sitting on the side of a flat bed without bedrails? 3  -KH 3  -EE    Moving to and from a bed to a chair (including a wheelchair)? 3  -KH 3  -EE    Standing up from a chair using your arms (e.g., wheelchair, bedside chair)? 3  -KH 3  -EE    Climbing 3-5 steps with a railing? 3  -KH 2  -EE    To walk in hospital room? 3  -KH 3  -EE    AM-PAC 6 Clicks Score (PT) 19  -KH 17  -EE    Highest Level of Mobility Goal 6 --> Walk 10 steps or more  - 5 --> Static standing  -EE      Row Name 03/18/24 0737           Functional Assessment    Outcome Measure Options AM-PAC 6 Clicks Basic Mobility (PT)  -               User Key  (r) = Recorded By, (t) = Taken By, (c) = Cosigned By      Initials Name Provider Type    Stefanie Pratt, PT Physical Therapist    Lena Mayberry RN Registered Nurse                                 Physical Therapy Education       Title: PT OT SLP Therapies (Not Started)       Topic: Physical Therapy (Not Started)       Point: Mobility training (Not Started)       Learner Progress:  Not documented in this visit.              Point: Home exercise program (Not Started)       Learner Progress:  Not documented in this visit.              Point: Body mechanics (Not Started)       Learner Progress:  Not documented in this visit.              Point: Precautions (Not Started)       Learner Progress:  Not documented in this visit.                                  PT Recommendation and Plan  Planned Therapy Interventions (PT): balance training, bed mobility training, gait training, home exercise program, ROM (range of motion), patient/family education, stair training, strengthening, stretching, transfer training  Plan of Care Reviewed With: patient, sibling  Outcome Evaluation: Pt is s/p L TKA and is WBAT. Pt presents with pain, limited ROM and antalgic gait. Pt tolerated ROM exercises to LE. She stood and ambulated well with Rwx. Pt would benefit from PT to address these impairments. Pt was educated on HEP, use of AD and needs for staff assistance while admitted. Pt plans to d/c home with  PT.     Time Calculation:         PT Charges       Row Name 03/18/24 1628             Time Calculation    Start Time 1551  -      Stop Time 1619  -      Time Calculation (min) 28 min  -      PT Received On 03/18/24  -      PT - Next Appointment 03/19/24  -      PT Goal Re-Cert Due Date 03/21/24  -         Time Calculation- PT    Total Timed Code Minutes- PT 15 minute(s)  -         Timed Charges     02317 - PT Therapeutic Exercise Minutes 8  -KH      90364 - Gait Training Minutes  7  -KH         Untimed Charges    PT Eval/Re-eval Minutes 13  -KH         Total Minutes    Timed Charges Total Minutes 15  -KH      Untimed Charges Total Minutes 13  -KH       Total Minutes 28  -KH                User Key  (r) = Recorded By, (t) = Taken By, (c) = Cosigned By      Initials Name Provider Type     Stefanie Watters, PT Physical Therapist                  Therapy Charges for Today       Code Description Service Date Service Provider Modifiers Qty    17687637871 HC PT EVAL MOD COMPLEXITY 2 3/18/2024 Stefanie Watters, PT GP 1    88481429819 HC PT THER PROC EA 15 MIN 3/18/2024 Stefanie Watters, PT GP 1            PT G-Codes  Outcome Measure Options: AM-PAC 6 Clicks Basic Mobility (PT)  AM-PAC 6 Clicks Score (PT): 19  PT Discharge Summary  Anticipated Discharge Disposition (PT): home with assist, home with home health    Stefanie Watters, PT  3/18/2024

## 2024-03-18 NOTE — DISCHARGE PLACEMENT REQUEST
"Goran Pearson (63 y.o. Female)       Date of Birth   1960    Social Security Number       Address   [address] [address]    Home Phone   [phone]    MRN   [id number]       Hinduism   None    Marital Status                               Admission Date   3/18/24    Admission Type   Elective    Admitting Provider   James Moya MD    Attending Provider   James Moya MD    Department, Room/Bed   [address], P796/1       Discharge Date       Discharge Disposition   Home-Health Care [facility]    Discharge Destination                                 Attending Provider: James Moya MD    Allergies: Ampicillin, Oxycodone    Isolation: None   Infection: None   Code Status: Prior    Ht: 162.6 cm (64.02\")   Wt: 87.5 kg (192 lb 14.4 oz)    Admission Cmt: None   Principal Problem: DJD (degenerative joint disease) of knee [M17.9]                   Active Insurance as of 3/18/2024       Primary Coverage       Payor Plan Insurance Group Employer/Plan Group    [facility] BLUE [employer] [id number]       Payor Plan Address Payor Plan Phone Number Payor Plan Fax Number Effective Dates    [address] [phone]  1/1/2018 - None Entered    [facility] [id number]         Subscriber Name Subscriber Birth Date Member ID       GORAN PEARSON 1960 [id number]                     Emergency Contacts        (Rel.) Home Phone Work Phone Mobile Phone    SuadMabelDorothy (Sister) [phone] -- [phone]    dorothy khan (Sister) -- -- [phone]    Tyrone Pearson (Son) -- -- [phone]                "

## 2024-03-18 NOTE — PLAN OF CARE
Goal Outcome Evaluation:  Plan of Care Reviewed With: patient, sibling           Outcome Evaluation: Pt is s/p L TKA and is WBAT. Pt presents with pain, limited ROM and antalgic gait. Pt tolerated ROM exercises to LE. She stood and ambulated well with Rwx. Pt would benefit from PT to address these impairments. Pt was educated on HEP, use of AD and needs for staff assistance while admitted. Pt plans to d/c home with  PT.      Anticipated Discharge Disposition (PT): home with assist, home with home health

## 2024-03-18 NOTE — ANESTHESIA PROCEDURE NOTES
Airway  Urgency: elective    Date/Time: 3/18/2024 9:48 AM  Airway not difficult    General Information and Staff    Patient location during procedure: OR  Anesthesiologist: Fabrice Feldman MD  CRNA/CAA: Jakob Guerra CRNA    Indications and Patient Condition  Indications for airway management: airway protection    Preoxygenated: yes  MILS not maintained throughout  Mask difficulty assessment: 1 - vent by mask    Final Airway Details  Final airway type: endotracheal airway      Successful airway: ETT  Cuffed: yes   Successful intubation technique: video laryngoscopy  Facilitating devices/methods: intubating stylet  Endotracheal tube insertion site: oral  Blade: CMAC  Blade size: D  ETT size (mm): 7.0  Cormack-Lehane Classification: grade I - full view of glottis  Placement verified by: chest auscultation and capnometry   Cuff volume (mL): 10  Measured from: teeth  ETT/EBT  to teeth (cm): 22  Number of attempts at approach: 1  Assessment: lips, teeth, and gum same as pre-op and atraumatic intubation

## 2024-03-18 NOTE — CASE MANAGEMENT/SOCIAL WORK
Discharge Planning Assessment  [facility]     Patient Name: Nancy Robbins  MRN: [id number]  Today's Date: 3/18/2024    Admit Date: 3/18/2024       Discharge Needs Assessment       Row Name 03/18/24 1414       Living Environment    Potentially Unsafe Housing Conditions none    Primary Care Provided by self    Provides Primary Care For no one    Family Caregiver if Needed sibling(s)    Quality of Family Relationships helpful;involved;supportive       Resource/Environmental Concerns    Resource/Environmental Concerns home accessibility    Home Accessibility Concerns stairs to enter home       Transition Planning    Patient/Family Anticipates Transition to home with family;home with help/services    Patient/Family Anticipated Services at Transition ;home health care    Transportation Anticipated family or friend will provide       Discharge Needs Assessment    Readmission Within the Last 30 Days no previous admission in last 30 days    Equipment Currently Used at Home none                   Discharge Plan       Row Name 03/18/24 1410       Plan    Plan home with [facility]  ( pending)    Patient/Family in Agreement with Plan yes    Plan Comments Spoke with patient at bedside. Introduced self and explained role. Facesheet verified. Patient lives alone, but will be staying with her mother at [place]. The address she will be staying at is [facility]. At baseline, she is IADLS. She does have a walker if needed. Discussed HH and she would like a referral to [facility] . Referral [facility] in [facility]. CCP will follow.                  Continued Care and Services - Admitted Since 3/18/2024       Home Medical Care       Service Provider Request Status Selected Services Address Phone Fax Patient Preferred    [facility] Pending - Request Sent N/A [address] [address] [phone] --    [facility] Declined N/A [address],  [place] [address] [phone] --                  Expected Discharge Date and Time       Expected Discharge Date Expected Discharge Time    Mar 19, 2024            Demographic Summary       Row Name 03/18/24 1412       General Information    Admission Type observation    Arrived From PACU/recovery room    Referral Source admission list                   Functional Status    No documentation.                  Psychosocial    No documentation.                  Abuse/Neglect    No documentation.                  Legal    No documentation.                  Substance Abuse    No documentation.                  Patient Forms    No documentation.                     Elisa Wing RN

## 2024-03-18 NOTE — OP NOTE
Name: Nancy Robbins    YOB: 1960    DATE OF SURGERY: 3/18/2024    PREOPERATIVE DIAGNOSIS: Left knee end-stage osteoarthritis    POSTOPERATIVE DIAGNOSIS: Left knee end-stage osteoarthritis    PROCEDURE PERFORMED: Left total knee replacement     SURGEON: James Moya M.D.    ASSISTANT: SARKIS BURTON surgical assistant was integral in ensuring a successful outcome with this procedure.  The assistant was utilized to assist in positioning the patient, draping the patient, was used throughout the case to provide with retraction of tissues, suctioning of blood and body fluids for visualization, positioning of the extremity to allow for proper exposure so that I could perform the procedure.  Without the use of a surgical assistant during this procedure I feel that the outcome may have been compromised or would have been suboptimal or at risk for complications.    IMPLANTS: [employer]:     Implant Name Type Inv. Item Serial No.  Lot No. LRB No. Used Action   CMT BONE PALACOS R HI/VISC 1X40 - [id number] Implant CMT BONE PALACOS R HI/VISC 1X40  [facility] [id number] Left 1 Implanted   DEV CONTRL TISS STRATAFIX SYMM PDS PLUS WENDIE CT-1 60CM - [id number] Implant DEV CONTRL TISS STRATAFIX SYMM PDS PLUS WENDIE CT-1 60CM  [employer]  DIV OF J AND J [id number] Left 1 Implanted   DEV CONTRL TISS STRATAFIXSPIRALMNCRYL PLSPS2 REV3/0 45CM - [id number] Implant DEV CONTRL TISS STRATAFIXSPIRALMNCRYL PLSPS2 REV3/0 45CM  [employer]  DIV OF J AND J [id number] Left 1 Implanted   CMT BONE PALACOS R HI/VISC 1X40 - [id number] Implant CMT BONE PALACOS R HI/VISC 1X40  [facility] [id number] Left 1 Implanted   PAT GEN2 BICONVEX [id number] - [id number] Implant PAT GEN2 BICONVEX [id number]  [employer] [id number] Left 1 Implanted   COMP FEM LEGION OXINIUM CR NRW SZ5 LT - [id number] Implant COMP FEM LEGION OXINIUM CR NRW SZ5 LT  [employer] [id number] Left 1 Implanted   BASE TIB/KN GEN2 NONPOR TI SZ4 LT - [id number]  Implant BASE TIB/KN GEN2 NONPOR TI SZ4 LT  [employer] [id number] Left 1 Implanted   INSRT ART/KN LEGION CR HF XLPE SZ3TO4 9MM - [id number] Implant INSRT ART/KN LEGION CR HF XLPE SZ3TO4 9MM  [employer] [id number] Left 1 Implanted       Estimated Blood Loss: 200cc  Specimens : none  Complications: none    DESCRIPTION OF PROCEDURE: The patient was taken to the operating room and placed in the supine position. A sequential compression device was carefully placed on the non-operative leg. Preoperative antibiotics were administered. Surgical time out was performed. After adequate induction of anesthesia, the leg was prepped and draped in the usual sterile fashion, exsanguinated with an Esmarch bandage and the tourniquet inflated to 250 mmHg. A midline incision was performed followed by a medial parapatellar arthrotomy. The patella was subluxed laterally.  A portion of the fat pad, ACL, and anterior horns of the meniscus were excised.  A drill hole was then placed in the center of the femoral canal in line with the canal.  It was irrigated and suctioned.  The intramedullary kay was then placed and a 5 degree distal valgus cut was performed after the block pinned in place appropriately.  Cut surface was then removed.  The sizing and rotation guide was then placed and seated appropriately.  It was sized and then the drill holes for the 4-in-1 cutting guide were placed in 3 degrees of external rotation based off of the posterior condyles.  The 4-in-1 cutting block was then placed and the femoral cuts were performed.  The excess bone was removed and the cut surfaces looked good.  At this point we placed the retractors around the proximal tibia and a slight release of the PCL fibers off of the posterior proximal tibia was performed.  We used the extramedullary tibial alignment guide and it was aligned appropriately and then the depth was set and the block pinned in place.  The tibial cut was then performed and the  alignment guide was removed.  The tibial cut was removed and the cut surface looked good.  The posterior horns of the menisci were then removed as well as the posterior osteophytes.  Flexion extension blocks were then used to check the balance of the knee. The tibial cut surface was then sized with the sizing templates and the tibial and femoral trial were then placed. The knee was placed in full extension and then the tibial tray rotation was then matched to the femoral rotation and marked.    Attention was then placed to the patella. The patella was noted to track centrally through range of motion. The patella was then sized with the trials. The thickness of the patella was then measured. The patella was resurfaced and the surrounding osteophytes were removed. The preoperative thickness was reproduced. The patella tracked centrally through range of motion.  We then checked the balance with the trial implants in place and there was excellent medial lateral and flexion-extension balance.     At this point all trial components were removed, the knee was copiously irrigated with pulsed lavage, and the knee was injected with anesthetic cocktail solution. The cut surfaces were then dried with clean lap sponges, and the components were cemented tibia, followed by femur, then patella. The knee was held in full extension and all excess cement was removed. The knee was held still until the cement had completely hardened. We then placed the trial polyethylene spacer which resulted in full extension and excellent flexion-extension balance. We placed the final polyethylene spacer.   The knee was then copiously irrigated. The tourniquet was then released. There was excellent hemostasis. We placed a one-eighth inch Hemovac drain. We closed the knee in multiple layers in standard fashion. Sterile dressing were applied. At the end of the case, the sponge and needle counts were reported as being correct. There were no known  complications. The patient was then transported to the recovery room.      James Moya M.D.

## 2024-03-19 LAB
HCT VFR BLD AUTO: 31.3 % (ref 34–46.6)
HGB BLD-MCNC: 10.8 G/DL (ref 12–15.9)

## 2024-03-19 PROCEDURE — 25010000002 CEFAZOLIN PER 500 MG: Performed by: ORTHOPAEDIC SURGERY

## 2024-03-19 PROCEDURE — 99024 POSTOP FOLLOW-UP VISIT: CPT | Performed by: NURSE PRACTITIONER

## 2024-03-19 PROCEDURE — 97116 GAIT TRAINING THERAPY: CPT | Performed by: PHYSICAL THERAPIST

## 2024-03-19 PROCEDURE — 85018 HEMOGLOBIN: CPT | Performed by: ORTHOPAEDIC SURGERY

## 2024-03-19 PROCEDURE — G0378 HOSPITAL OBSERVATION PER HR: HCPCS

## 2024-03-19 PROCEDURE — 97110 THERAPEUTIC EXERCISES: CPT | Performed by: PHYSICAL THERAPIST

## 2024-03-19 PROCEDURE — 85014 HEMATOCRIT: CPT | Performed by: ORTHOPAEDIC SURGERY

## 2024-03-19 RX ORDER — LEVOTHYROXINE SODIUM 137 UG/1
137 TABLET ORAL
Status: DISCONTINUED | OUTPATIENT
Start: 2024-03-19 | End: 2024-03-20 | Stop reason: HOSPADM

## 2024-03-19 RX ORDER — PREGABALIN 75 MG/1
75 CAPSULE ORAL DAILY
Status: DISCONTINUED | OUTPATIENT
Start: 2024-03-19 | End: 2024-03-20

## 2024-03-19 RX ORDER — CETIRIZINE HYDROCHLORIDE 10 MG/1
10 TABLET ORAL DAILY
Status: DISCONTINUED | OUTPATIENT
Start: 2024-03-19 | End: 2024-03-20 | Stop reason: HOSPADM

## 2024-03-19 RX ORDER — AMLODIPINE BESYLATE 5 MG/1
5 TABLET ORAL
Status: DISCONTINUED | OUTPATIENT
Start: 2024-03-19 | End: 2024-03-20 | Stop reason: HOSPADM

## 2024-03-19 RX ADMIN — HYDROCODONE BITARTRATE AND ACETAMINOPHEN 1 TABLET: 7.5; 325 TABLET ORAL at 03:42

## 2024-03-19 RX ADMIN — HYDROCODONE BITARTRATE AND ACETAMINOPHEN 1 TABLET: 7.5; 325 TABLET ORAL at 08:04

## 2024-03-19 RX ADMIN — HYDROCODONE BITARTRATE AND ACETAMINOPHEN 1 TABLET: 7.5; 325 TABLET ORAL at 16:47

## 2024-03-19 RX ADMIN — HYDROCODONE BITARTRATE AND ACETAMINOPHEN 1 TABLET: 7.5; 325 TABLET ORAL at 21:13

## 2024-03-19 RX ADMIN — LEVOTHYROXINE SODIUM 137 MCG: 137 TABLET ORAL at 16:03

## 2024-03-19 RX ADMIN — ASPIRIN 81 MG: 81 TABLET, COATED ORAL at 21:13

## 2024-03-19 RX ADMIN — SODIUM CHLORIDE 2 G: 900 INJECTION INTRAVENOUS at 02:01

## 2024-03-19 RX ADMIN — HYDROCODONE BITARTRATE AND ACETAMINOPHEN 1 TABLET: 7.5; 325 TABLET ORAL at 12:16

## 2024-03-19 RX ADMIN — AMLODIPINE BESYLATE 5 MG: 5 TABLET ORAL at 16:02

## 2024-03-19 RX ADMIN — CETIRIZINE HYDROCHLORIDE 10 MG: 10 TABLET ORAL at 16:02

## 2024-03-19 RX ADMIN — ASPIRIN 81 MG: 81 TABLET, COATED ORAL at 08:02

## 2024-03-19 RX ADMIN — PREGABALIN 75 MG: 75 CAPSULE ORAL at 21:13

## 2024-03-19 NOTE — CASE MANAGEMENT/SOCIAL WORK
Discharge Planning Assessment  [facility]     Patient Name: Nancy Robbins  MRN: [id number]  Today's Date: 3/19/2024    Admit Date: 3/18/2024    Plan: home with [employer]    Discharge Needs Assessment    No documentation.                  Discharge Plan       Row Name 03/19/24 1613       Plan    Plan home with [employer]     Patient/Family in Agreement with Plan yes    Plan Comments Spoke with patient at bedside. She will be staying tonight and then going to her house in [place]. She will not be staying with her mother. Natasha/CRUZ received approval for PT to see since patient is [employer] enployee. CCP will follow.                  Continued Care and Services - Admitted Since 3/18/2024       Home Medical Care Coordination complete.      Service Provider Request Status Selected Services Address Phone Fax Patient Preferred     [facility]  Selected Home Health Services [address] [address] [address] [phone] --    [facility] Declined N/A [address] [address] [phone] --                  Expected Discharge Date and Time       Expected Discharge Date Expected Discharge Time    Mar 20, 2024            Demographic Summary    No documentation.                  Functional Status    No documentation.                  Psychosocial    No documentation.                  Abuse/Neglect    No documentation.                  Legal    No documentation.                  Substance Abuse    No documentation.                  Patient Forms    No documentation.                     Elisa Wing RN

## 2024-03-19 NOTE — PLAN OF CARE
Goal Outcome Evaluation:  Plan of Care Reviewed With: patient           Outcome Evaluation: Pt is progressing well. She toelrated increased ambulation distance and safely navigated 4 stairs. Pt demonstrated independence with HEP. Family was present and pt and family were educated on HEP, use of AD, gait and stair training. Pt plans to d/c home tomorrow.      Anticipated Discharge Disposition (PT): home with assist, home with home health

## 2024-03-19 NOTE — PLAN OF CARE
Problem: Adult Inpatient Plan of Care  Goal: Absence of Hospital-Acquired Illness or Injury  Intervention: Identify and Manage Fall Risk  Recent Flowsheet Documentation  Taken 3/19/2024 1600 by Petra Bautista RN  Safety Promotion/Fall Prevention:   safety round/check completed   activity supervised   assistive device/personal items within reach   clutter free environment maintained   elopement precautions   fall prevention program maintained   gait belt  Taken 3/19/2024 1216 by Petra Bautista RN  Safety Promotion/Fall Prevention:   safety round/check completed   activity supervised   assistive device/personal items within reach   elopement precautions   clutter free environment maintained   fall prevention program maintained   gait belt  Taken 3/19/2024 1000 by Petra Bautista RN  Safety Promotion/Fall Prevention:   safety round/check completed   activity supervised   assistive device/personal items within reach   clutter free environment maintained   elopement precautions   fall prevention program maintained  Taken 3/19/2024 0802 by Petra Bautista RN  Safety Promotion/Fall Prevention:   safety round/check completed   activity supervised   assistive device/personal items within reach   clutter free environment maintained   elopement precautions   fall prevention program maintained   gait belt     Problem: Adult Inpatient Plan of Care  Goal: Absence of Hospital-Acquired Illness or Injury  Intervention: Prevent Skin Injury  Recent Flowsheet Documentation  Taken 3/19/2024 1600 by Petra Bautista RN  Body Position: sitting up in bed     Problem: Adult Inpatient Plan of Care  Goal: Absence of Hospital-Acquired Illness or Injury  Intervention: Prevent Infection  Recent Flowsheet Documentation  Taken 3/19/2024 1600 by Petra Bautista RN  Infection Prevention: rest/sleep promoted  Taken 3/19/2024 1216 by Petra Bautista RN  Infection Prevention: rest/sleep promoted  Taken 3/19/2024 1000 by  Petra Bautista, RN  Infection Prevention: rest/sleep promoted  Taken 3/19/2024 0802 by Petra Bautista, RN  Infection Prevention: rest/sleep promoted   Goal Outcome Evaluation:  Plan of Care Reviewed With: patient

## 2024-03-19 NOTE — PLAN OF CARE
Goal Outcome Evaluation:  Plan of Care Reviewed With: patient           Outcome Evaluation: POD#1 of left total knee. Paul dressing in place. VSS. PO pain medication helping with pain. Ambulating with assistance. Voiding fine per BRP. Education provided on blood pressure monitoring and pain control. Patient verbaloized understanding. Possible d/c home today.

## 2024-03-19 NOTE — THERAPY TREATMENT NOTE
Patient Name: Nancy Robbins  : 1960    MRN: [id number]                              Today's Date: 3/19/2024       Admit Date: 3/18/2024    Visit Dx:     ICD-10-CM ICD-9-CM   1. Primary osteoarthritis of left knee  M17.12 715.16     Patient Active Problem List   Diagnosis    Hypothyroidism    Allergic rhinitis    Essential hypertension    DJD (degenerative joint disease) of knee    Peripheral polyneuropathy    Controlled substance agreement signed    Spinal stenosis of lumbar region with neurogenic claudication    Anxiety    Spondylolisthesis at L4-L5 level    Lumbar facet joint syndrome    DDD (degenerative disc disease), lumbar    Plantar fasciitis of right foot    Leg cramps    OA (osteoarthritis) of knee     Past Medical History:   Diagnosis Date    Anxiety     Chronic cough     COVID 10/31/2022    DDD (degenerative disc disease), lumbar     SPINAL STENOSIS    Graves disease     Hard to intubate     STATES DIFF TO INTUBATE D/T NOT BEING ABLE TO EXTEND NECK    Hypertension     Hypothyroidism     Left knee pain     Neuropathy     bilateral lower legs    Osteoarthritis     Plantar fasciitis     RIGHT FOOT    PONV (postoperative nausea and vomiting)     Restless leg syndrome     TAKES XANAX FOR..    Seasonal allergies     Vertigo      Past Surgical History:   Procedure Laterality Date    BACK SURGERY      lumbar discectomy    COLONOSCOPY N/A 2016    Procedure: COLONOSCOPY;  Surgeon: Jose G Toro MD;  Location: [facility] ENDOSCOPY;  Service:     EPIDURAL BLOCK      back last one 18    JOINT REPLACEMENT  2018    KNEE MENISCAL REPAIR Bilateral     NECK SURGERY      fusion w/hardware    SUBTOTAL HYSTERECTOMY      TONSILLECTOMY      TOTAL KNEE ARTHROPLASTY Right 12/10/2018    Procedure: RT TOTAL KNEE ARTHROPLASTY;  Surgeon: Geo Fu MD;  Location: [facility] MAIN OR;  Service: Orthopedics    TOTAL KNEE ARTHROPLASTY Left 3/18/2024    Procedure: TOTAL KNEE ARTHROPLASTY;  Surgeon: James Moya,  MD;  Location[facility];  Service: Orthopedics;  Laterality: Left;      General Information       Row Name 03/19/24 0902          Physical Therapy Time and Intention    Document Type therapy note (daily note)  -     Mode of Treatment individual therapy  -               User Key  (r) = Recorded By, (t) = Taken By, (c) = Cosigned By      Initials Name Provider Type    Stefanie Pratt, RYLEY Physical Therapist                   Mobility       Row Name 03/19/24 0902          Bed Mobility    Comment, (Bed Mobility) UIC  -       Row Name 03/19/24 0902          Bed-Chair Transfer    Bed-Chair [place] (Transfers) --  -       Row Name 03/19/24 0902          Sit-Stand Transfer    Sit-Stand [place] (Transfers) standby assist  -     Assistive Device (Sit-Stand Transfers) walker, front-wheeled  -KH       Row Name 03/19/24 0902          Gait/Stairs (Locomotion)    [place] Level (Gait) contact guard  -     Assistive Device (Gait) walker, front-wheeled  -     Distance in Feet (Gait) 200  -KH     Deviations/Abnormal Patterns (Gait) antalgic;gait speed decreased  -     [place] Level (Stairs) contact guard  -     Handrail Location (Stairs) both sides  -     Number of Steps (Stairs) 4  -KH     Ascending Technique (Stairs) step-to-step  -KH     Descending Technique (Stairs) step-to-step  -       Row Name 03/19/24 0902          Mobility    Extremity Weight-bearing Status left lower extremity  -     Left Lower Extremity (Weight-bearing Status) weight-bearing as tolerated (WBAT)  -               User Key  (r) = Recorded By, (t) = Taken By, (c) = Cosigned By      Initials Name Provider Type    Stefanie Pratt PT Physical Therapist                   Obj/Interventions       Row Name 03/19/24 1220          Range of Motion Comprehensive    General Range of Motion lower extremity range of motion deficits identified  -ZORAN     Comment, General Range of Motion Surgical knee  ROM:0-95  -[facility] Name 03/19/24 1220          Strength Comprehensive (MMT)    General Manual Muscle Testing (MMT) Assessment lower extremity strength deficits identified  -[facility] Name 03/19/24 1220          Motor Skills    Therapeutic Exercise hip;knee  L TKA protocol x 15 reps  -[facility] Name 03/19/24 1220          Hip (Therapeutic Exercise)    Hip (Therapeutic Exercise) isometric exercises;strengthening exercise  -     Hip Isometrics (Therapeutic Exercise) flexion;extension;gluteal sets  -[facility] Name 03/19/24 1220          Knee (Therapeutic Exercise)    Knee (Therapeutic Exercise) isometric exercises;strengthening exercise  -     Knee Isometrics (Therapeutic Exercise) quad sets  -     Knee Strengthening (Therapeutic Exercise) SLR (straight leg raise);SAQ (short arc quad);LAQ (long arc quad);heel slides  -[facility] Name 03/19/24 1220          Balance    Balance Interventions sitting;standing;sit to stand  -[facility] Name 03/19/24 1220          Sensory Assessment (Somatosensory)    Sensory Assessment (Somatosensory) LE sensation intact  -               User Key  (r) = Recorded By, (t) = Taken By, (c) = Cosigned By      Initials Name Provider Type     Stefanie Watters, PT Physical Therapist                   Goals/Plan    No documentation.                  Clinical Impression       [facility] Name 03/19/24 1221          Pain    Pretreatment Pain Rating 6/10  -     Posttreatment Pain Rating 8/10  -     Pain Location - Side/Orientation Left  -     Pain Location - knee  -     Pain Intervention(s) Cold applied;Rest  -[facility] Name 03/19/24 1221          Plan of Care Review    Plan of Care Reviewed With patient  -     Outcome Evaluation Pt is progressing well. She toelrated increased ambulation distance and safely navigated 4 stairs. Pt demonstrated independence with HEP. Family was present and pt and family were educated on HEP, use of AD, gait and stair training. Pt  plans to d/c home tomorrow.  -       Row Name 03/19/24 1221          Positioning and Restraints    Pre-Treatment Position sitting in chair/recliner  -     Post Treatment Position chair  -KH     In Chair reclined;call light within reach;encouraged to call for assist;exit alarm on;with family/caregiver;[place] [facility]  -               User Key  (r) = Recorded By, (t) = Taken By, (c) = Cosigned By      Initials Name Provider Type    Stefanie Pratt, PT Physical Therapist                   Outcome Measures       Row Name 03/19/24 1222 03/19/24 0802       How much help from another person do you currently need...    Turning from your back to your side while in flat bed without using bedrails? 4  -KH 3  -SH    Moving from lying on back to sitting on the side of a flat bed without bedrails? 4  -KH 3  -SH    Moving to and from a bed to a chair (including a wheelchair)? 4  -KH 3  -SH    Standing up from a chair using your arms (e.g., wheelchair, bedside chair)? 4  -KH 3  -SH    Climbing 3-5 steps with a railing? 3  -KH 3  -SH    To walk in hospital room? 3  -KH 3  -SH    AM-PAC 6 Clicks Score (PT) 22  -KH 18  -SH    Highest Level of Mobility Goal 7 --> Walk 25 feet or more  -KH 6 --> Walk 10 steps or more  -SH      Row Name 03/19/24 1222          Functional Assessment    Outcome Measure Options AM-PAC 6 Clicks Basic Mobility (PT)  -               User Key  (r) = Recorded By, (t) = Taken By, (c) = Cosigned By      Initials Name Provider Type    Stefanie Pratt, PT Physical Therapist    Petra Albarran RN Registered Nurse                                 Physical Therapy Education       Title: PT OT SLP Therapies (Not Started)       Topic: Physical Therapy (Not Started)       Point: Mobility training (Not Started)       Learner Progress:  Not documented in this visit.              Point: Home exercise program (Not Started)       Learner Progress:  Not documented in this visit.               Point: Body mechanics (Not Started)       Learner Progress:  Not documented in this visit.              Point: Precautions (Not Started)       Learner Progress:  Not documented in this visit.                                  PT Recommendation and Plan  Planned Therapy Interventions (PT): balance training, bed mobility training, gait training, home exercise program, ROM (range of motion), patient/family education, stair training, strengthening, stretching, transfer training  Plan of Care Reviewed With: patient  Outcome Evaluation: Pt is progressing well. She toelrated increased ambulation distance and safely navigated 4 stairs. Pt demonstrated independence with HEP. Family was present and pt and family were educated on HEP, use of AD, gait and stair training. Pt plans to d/c home tomorrow.     Time Calculation:         PT Charges       Row Name 03/19/24 1227             Time Calculation    Start Time 0855  -      Stop Time 0918  -      Time Calculation (min) 23 min  -KH      PT Received On 03/19/24  -      PT - Next Appointment 03/20/24  -         Time Calculation- PT    Total Timed Code Minutes- PT 23 minute(s)  -KH         Timed Charges    15728 - PT Therapeutic Exercise Minutes 10  -KH      12355 - Gait Training Minutes  13  -KH         Total Minutes    Timed Charges Total Minutes 23  -KH       Total Minutes 23  -KH                User Key  (r) = Recorded By, (t) = Taken By, (c) = Cosigned By      Initials Name Provider Type    Stefanie Pratt PT Physical Therapist                  Therapy Charges for Today       Code Description Service Date Service Provider Modifiers Qty    70285704056 HC PT EVAL MOD COMPLEXITY 2 3/18/2024 Stefanie Watters, PT GP 1    80065318774 HC PT THER PROC EA 15 MIN 3/18/2024 Stefanie Watters, PT GP 1    58494460611 HC PT THER PROC EA 15 MIN 3/19/2024 Stefanie Watters, PT GP 1    16057183766 HC GAIT TRAINING EA 15 MIN 3/19/2024 Stefanie Watters  Diana, PT GP 1            PT G-Codes  Outcome Measure Options: AM-PAC 6 Clicks Basic Mobility (PT)  AM-PAC 6 Clicks Score (PT): 22  PT Discharge Summary  Anticipated Discharge Disposition (PT): home with assist, home with home health    Stefanie Watters, PT  3/19/2024

## 2024-03-20 ENCOUNTER — HOME HEALTH ADMISSION (OUTPATIENT)
Dept: HOME HEALTH SERVICES | Facility: HOME HEALTHCARE | Age: 64
End: 2024-03-20
Payer: COMMERCIAL

## 2024-03-20 ENCOUNTER — READMISSION MANAGEMENT (OUTPATIENT)
Dept: CALL CENTER | Facility: HOSPITAL | Age: 64
End: 2024-03-20
Payer: COMMERCIAL

## 2024-03-20 ENCOUNTER — DOCUMENTATION (OUTPATIENT)
Dept: HOME HEALTH SERVICES | Facility: HOME HEALTHCARE | Age: 64
End: 2024-03-20
Payer: COMMERCIAL

## 2024-03-20 VITALS
RESPIRATION RATE: 16 BRPM | SYSTOLIC BLOOD PRESSURE: 143 MMHG | WEIGHT: 192.9 LBS | DIASTOLIC BLOOD PRESSURE: 84 MMHG | BODY MASS INDEX: 32.93 KG/M2 | HEIGHT: 64 IN | OXYGEN SATURATION: 96 % | HEART RATE: 90 BPM | TEMPERATURE: 97.4 F

## 2024-03-20 PROCEDURE — G0378 HOSPITAL OBSERVATION PER HR: HCPCS

## 2024-03-20 PROCEDURE — 97530 THERAPEUTIC ACTIVITIES: CPT

## 2024-03-20 RX ORDER — PREGABALIN 75 MG/1
75 CAPSULE ORAL DAILY
Status: DISCONTINUED | OUTPATIENT
Start: 2024-03-20 | End: 2024-03-20 | Stop reason: HOSPADM

## 2024-03-20 RX ADMIN — HYDROCODONE BITARTRATE AND ACETAMINOPHEN 1 TABLET: 7.5; 325 TABLET ORAL at 01:03

## 2024-03-20 RX ADMIN — PREGABALIN 75 MG: 75 CAPSULE ORAL at 11:25

## 2024-03-20 RX ADMIN — AMLODIPINE BESYLATE 5 MG: 5 TABLET ORAL at 08:34

## 2024-03-20 RX ADMIN — ASPIRIN 81 MG: 81 TABLET, COATED ORAL at 08:34

## 2024-03-20 RX ADMIN — HYDROCODONE BITARTRATE AND ACETAMINOPHEN 1 TABLET: 7.5; 325 TABLET ORAL at 05:11

## 2024-03-20 RX ADMIN — CETIRIZINE HYDROCHLORIDE 10 MG: 10 TABLET ORAL at 08:34

## 2024-03-20 RX ADMIN — HYDROCODONE BITARTRATE AND ACETAMINOPHEN 1 TABLET: 7.5; 325 TABLET ORAL at 09:20

## 2024-03-20 RX ADMIN — LEVOTHYROXINE SODIUM 137 MCG: 137 TABLET ORAL at 07:07

## 2024-03-20 NOTE — DISCHARGE SUMMARY
Patient Name: Nancy Robbins  Patient YOB: 1960    Date of Admission:  3/18/2024  Date of Discharge:  3/20/2024  Discharge Diagnosis: [place] ARTHRP KNE CONDYLE&PLATU MEDIAL&LAT COMPARTMENTS [73423] (TOTAL KNEE ARTHROPLASTY)  Presenting Problem/History of Present Illness: Primary osteoarthritis of left knee [M17.12]  OA (osteoarthritis) of knee [M17.9]  Admitting Physician: Dr James Moya  Consults:   Consults       No orders found from 2/18/2024 to 3/19/2024.            DETAILS OF HOSPITAL STAY:  Patient is a 63 y.o. female was admitted to the floor following the above procedure and underwent an uncomplicated hospital stay.  Patient did well with physical therapy and was ambulating well without problems.  On the day of discharge the wound was clean, dry and intact and calf was soft and nontender and Homans sign was negative.  Patient was tolerating  without problems.  Patient will be discharged home.    Condition on Discharge:  Stable    Vital Signs  Temp:  [97.4 °F (36.3 °C)-98.4 °F (36.9 °C)] 97.4 °F (36.3 °C)  Heart Rate:  [77-82] 82  Resp:  [16-18] 16  BP: ()/(56-79) 95/56    LABS:      Admission on 03/18/2024   Component Date Value Ref Range Status    Hemoglobin 03/19/2024 10.8 (L)  12.0 - 15.9 g/dL Final    Hematocrit 03/19/2024 31.3 (L)  34.0 - 46.6 % Final       No results found.    Discharge Medications     Discharge Medications        New Medications        Instructions Start Date   Aspirin Low Dose 81 MG EC tablet  Generic drug: aspirin   Take 1 tablet by mouth 2 (Two) Times a Day for 14 days, THEN 1 tablet Daily for 30 days.   Start Date: March 18, 2024     HYDROcodone-acetaminophen 7.5-325 MG per tablet  Commonly known as: NORCO   1 tablet, Oral, Every 4 to 6 Hours PRN, May take 2 ONLY for severe pain.      meloxicam 15 MG tablet  Commonly known as: MOBIC   15 mg, Oral, Daily      ondansetron 4 MG tablet  Commonly known as: Zofran   4 mg, Oral, Every 8 Hours PRN      pantoprazole 40 MG EC  tablet  Commonly known as: PROTONIX   40 mg, Oral, Daily      polyethylene glycol 17 GM/SCOOP powder  Commonly known as: MIRALAX   Take 17 g by mouth 2 (Two) Times a Day for 7 days             Continue These Medications        Instructions Start Date   Allegra-D Allergy & Congestion  MG per 12 hr tablet  Generic drug: fexofenadine-pseudoephedrine   TAKE 1 TABLET BY MOUTH TWICE DAILY      ALPRAZolam 1 MG tablet  Commonly known as: XANAX   Take 1 tablet by mouth At Night As Needed for Anxiety      amLODIPine 5 MG tablet  Commonly known as: NORVASC   5 mg, Oral, Daily      CALCIUM 600 PO   1 tablet, Oral, Daily, HOLDING FOR DOS      COLACE PO   2 tablets, Oral, Daily      estradiol 1 MG tablet  Commonly known as: ESTRACE   1 mg, Oral, Daily      fluticasone 50 MCG/ACT nasal spray  Commonly known as: FLONASE   Use 2 sprays by in each nostril Daily.      irbesartan-hydrochlorothiazide 300-12.5 MG tablet  Commonly known as: AVALIDE   1 tablet, Oral, Daily      levothyroxine 137 MCG tablet  Commonly known as: SYNTHROID, LEVOTHROID   137 mcg, Oral, Daily      meclizine 25 MG tablet  Commonly known as: ANTIVERT   25 mg, Oral, 3 Times Daily PRN      montelukast 10 MG tablet  Commonly known as: SINGULAIR   10 mg, Oral, Nightly      Potassium 99 MG tablet   1 tablet, Oral, Every Other Day, HOLDING FOR DOS      pregabalin 75 MG capsule  Commonly known as: LYRICA   75 mg, Oral, Every Morning      pregabalin 150 MG capsule  Commonly known as: LYRICA   150 mg, Oral, Daily      tiZANidine 4 MG tablet  Commonly known as: ZANAFLEX   4 mg, Oral, Every 8 Hours PRN      traMADol 50 MG tablet  Commonly known as: ULTRAM   50 mg, Oral, 2 Times Daily PRN      VITAMIN D PO   1 tablet, Oral, Daily, HOLDING FOR DOS             Stop These Medications      nabumetone 500 MG tablet  Commonly known as: RELAFEN     Unable to find              Discharge Instructions: Patient is to continue with physical therapy exercises daily and continue  working with the physical therapist as ordered. Patient may weight bear as tolerated. Apply ice regularly. Patient may ice for long periods of time as long as ice is not directly on the skin. Patient instructed on frequent calf pumping exercises.  Patient also instructed on incentive spirometer during hospitalization and encouraged to continue to use at home regularly.    Dressing: The dressing is designed to be left in place until you return to the office in 2 weeks.  The suction unit should stop functioning at 7 days and the green light will switch to yellow.  At that point the suction unit and tubing can be disconnected at the port closest to the dressing.  The suction unit and tubing may be discarded.  You may shower immediately upon return home, you will need to turn the pump off by depressing the orange button once and then you may disconnect the pump and tubing at the connection port.  After showering, shake off the excess water and reattach the tubing.  Restart the pump by depressing the orange button one time and you will notice the green light flashing again.  If the dressing becomes dislodged or saturated it should be changed. Please refer to the KB information sheet if you have any questions about the dressing.  If you have a home health nurse or therapist they can be contacted to assist with dressing change or repair. You may also call the [facility] dressing hotline for questions related to the dressing ([phone]). If there are still other problems or questions related to the dressing despite these measures then you can contact Eileen at our office [phone].  If for some reason the KB dressing is removed, after 7 days the wound can be gently cleaned with antibacterial soap then allowed to dry and covered with a dry sterile dressing. The wound should be covered at all times except while showering.  Patient may change dressings daily and prn using sterile 4x4 and paper tape, and should call if any  unusual drainage, redness or swelling.*  Follow up appointment in 2 weeks - patient to call the office at [phone] to schedule.  Patient will be discharged on aspirin 81mg BID x 2 weeks, then daily x 4 weeks    Discharge Diagnosis:    DJD (degenerative joint disease) of knee    OA (osteoarthritis) of knee      Follow-up Appointments  Future Appointments   Date Time Provider Department Center   3/25/2024  1:10 PM Jm Negron MD [facility] [address] [facility]   4/9/2024  8:20 AM James Moya MD [facility] [address] [facility]   4/12/2024  3:30 PM Radha Sy APRN [facility]          James PEARL. MD [facility]  03/20/24  07:07 EDT

## 2024-03-20 NOTE — PLAN OF CARE
Goal Outcome Evaluation:  Plan of Care Reviewed With: patient                     pT GOALS MET FOR DISCHARGE.

## 2024-03-20 NOTE — PLAN OF CARE
Goal Outcome Evaluation:  Plan of Care Reviewed With: patient        Progress: improving  Outcome Evaluation: Pt was seen by PT this AM for tx. Pt first seen earlier AM w/ pt amb to BR. Later follow up w/ pt amb 210' req SBA and use of fww. Pt was slow and antalgic w/ step to pattern although steady w/ no overt LOB. Pt req SBA for 2 sit to stands. Pt performed supine to sit w/ SBA and use of gt belt for self assist of L LE to EOB. Pt was in BR at end of session and verbally agreeable to pulling call light for assist when done. Pt plans to return home w/ HH PT.      Anticipated Discharge Disposition (PT): home with home health

## 2024-03-20 NOTE — PLAN OF CARE
Goal Outcome Evaluation:  Plan of Care Reviewed With: patient        Progress: improving  Outcome Evaluation: POD#2 of left total knee. Paul dressing in place. VSS. PO pain medication helping with pain. Ambulating fine with assistance. Voiding fine per BRP. Education provided on pain control and blood pressure monitoring. D/C home today.

## 2024-03-20 NOTE — OUTREACH NOTE
Prep Survey      Flowsheet Row Responses   [facility] patient discharged from? [place]   Is LACE score < 7 ? Yes   Eligibility [facility]   Date of Admission 03/18/24   Date of Discharge 03/20/24   Discharge Disposition Home-Health Care Sv   Discharge diagnosis [place] ARTHRP KNE CONDYLE&PLATU MEDIAL&LAT COMPARTMENTS (55658) (TOTAL KNEE ARTHROPLASTY)   Does the patient have one of the following disease processes/diagnoses(primary or secondary)? Total Joint Replacement   Does the patient have Home health ordered? Yes   What is the Home health agency?  [facility]    Is there a DME ordered? No   Prep survey completed? Yes            GUNNAR MANN - Registered Nurse

## 2024-03-20 NOTE — PROGRESS NOTES
Patient is discharging today. Patient is agreeable to home health and reports no current home health. Face sheet information is correct and orders for home health PT in [place]. Thank you !

## 2024-03-20 NOTE — THERAPY TREATMENT NOTE
Patient Name: Nancy Robbins  : 1960    MRN: [id number]                              Today's Date: 3/20/2024       Admit Date: 3/18/2024    Visit Dx:     ICD-10-CM ICD-9-CM   1. Primary osteoarthritis of left knee  M17.12 715.16     Patient Active Problem List   Diagnosis    Hypothyroidism    Allergic rhinitis    Essential hypertension    DJD (degenerative joint disease) of knee    Peripheral polyneuropathy    Controlled substance agreement signed    Spinal stenosis of lumbar region with neurogenic claudication    Anxiety    Spondylolisthesis at L4-L5 level    Lumbar facet joint syndrome    DDD (degenerative disc disease), lumbar    Plantar fasciitis of right foot    Leg cramps    OA (osteoarthritis) of knee     Past Medical History:   Diagnosis Date    Anxiety     Chronic cough     COVID 10/31/2022    DDD (degenerative disc disease), lumbar     SPINAL STENOSIS    Graves disease     Hard to intubate     STATES DIFF TO INTUBATE D/T NOT BEING ABLE TO EXTEND NECK    Hypertension     Hypothyroidism     Left knee pain     Neuropathy     bilateral lower legs    Osteoarthritis     Plantar fasciitis     RIGHT FOOT    PONV (postoperative nausea and vomiting)     Restless leg syndrome     TAKES XANAX FOR..    Seasonal allergies     Vertigo      Past Surgical History:   Procedure Laterality Date    BACK SURGERY      lumbar discectomy    COLONOSCOPY N/A 2016    Procedure: COLONOSCOPY;  Surgeon: Jose G Toro MD;  Location: [facility] END[facility]COPY;  Service:     EPIDURAL BLOCK      back last one 18    JOINT REPLACEMENT  2018    KNEE MENISCAL REPAIR Bilateral     NECK SURGERY      fusion w/hardware    SUBTOTAL HYSTERECTOMY      TONSILLECTOMY      TOTAL KNEE ARTHROPLASTY Right 12/10/2018    Procedure: RT TOTAL KNEE ARTHROPLASTY;  Surgeon: Geo Fu MD;  Location: [facility] MAIN OR;  Service: Orthopedics    TOTAL KNEE ARTHROPLASTY Left 3/18/2024    Procedure: TOTAL KNEE ARTHROPLASTY;  Surgeon: James Moya,  MD;  Location: [facility];  Service: Orthopedics;  Laterality: Left;      General Information       Row Name 03/20/24 1055          Physical Therapy Time and Intention    Document Type therapy note (daily note)  -PH     Mode of Treatment physical therapy  -PH       Row Name 03/20/24 1055          Cognition    Orientation Status (Cognition) oriented x 4  -PH       Row Name 03/20/24 1055          Safety Issues, Functional Mobility    Impairments Affecting Function (Mobility) pain;endurance/activity tolerance;range of motion (ROM)  -PH     Comment, Safety Issues/Impairments (Mobility) gt belt and non skid socks donned  -PH               User Key  (r) = Recorded By, (t) = Taken By, (c) = Cosigned By      Initials Name Provider Type    PH Kate Hastings PTA Physical Therapist Assistant                   Mobility       Row Name 03/20/24 1056          Bed Mobility    Bed Mobility supine-sit  -PH     Supine-Sit [place] (Bed Mobility) standby assist  -PH     Assistive Device (Bed Mobility) bed rails;head of bed elevated  -PH     Comment, (Bed Mobility) incr time to EOB w/ pt educ for use of gt belt for self assist  -PH       Row Name 03/20/24 1056          Sit-Stand Transfer    Sit-Stand [place] (Transfers) standby assist  -PH     Assistive Device (Sit-Stand Transfers) walker, front-wheeled  -PH     Comment, (Sit-Stand Transfer) 2x STS - from chair and from EOB  -PH       Row Name 03/20/24 1056          Gait/Stairs (Locomotion)    Gait/Stairs Locomotion gait/ambulation independence  -PH     [place] Level (Gait) contact guard;standby assist  -PH     Assistive Device (Gait) walker, front-wheeled  -PH     Distance in Feet (Gait) 230  15' to BR when 1st seen then later 210 in hallway followed by  to BR  -PH     Pattern (Gait) step-to  -PH     Deviations/Abnormal Patterns (Gait) antalgic;gait speed decreased;deb decreased;stride length decreased  -PH     Bilateral Gait Deviations forward flexed  posture;heel strike decreased  -PH     Left Sided Gait Deviations weight shift ability decreased  -PH     Gait Assessment/Intervention steady w/ no overt LOB although slow and antalgic; cues for incr step length and postural correction  -PH     [place] Level (Stairs) not tested  -PH       Row Name 03/20/24 1056          Mobility    Extremity Weight-bearing Status left lower extremity  -PH     Left Lower Extremity (Weight-bearing Status) weight-bearing as tolerated (WBAT)  -PH               User Key  (r) = Recorded By, (t) = Taken By, (c) = Cosigned By      Initials Name Provider Type     Kate Hastings PTA Physical Therapist Assistant                   Obj/Interventions       Row Name 03/20/24 1059          Balance    Balance Assessment sitting static balance;standing static balance  -PH     Static Sitting Balance modified independence  -PH     Static Standing Balance standby assist  -PH     Position/Device Used, Standing Balance walker, front-wheeled  -PH     Comment, Balance steady throughout w/ no overt LOB  -PH               User Key  (r) = Recorded By, (t) = Taken By, (c) = Cosigned By      Initials Name Provider Type     Kate Hastings PTA Physical Therapist Assistant                   Goals/Plan    No documentation.                  Clinical Impression       Row Name 03/20/24 1059          Pain    Pretreatment Pain Rating 7/10  -PH     Posttreatment Pain Rating 7/10  -PH     Pain Location - Side/Orientation Left  -PH     Pain Location incisional  -PH     Pain Location - knee  -PH     Pain Intervention(s) Other (Comment)  in BR at end of session  -PH     Additional Documentation Pain Scale: Numbers Pre/Post-Treatment (Group)  -PH       Row Name 03/20/24 1059          Plan of Care Review    Plan of Care Reviewed With patient  -PH     Progress improving  -PH     Outcome Evaluation Pt was seen by PT this AM for tx. Pt first seen earlier AM w/ pt amb to BR. Later follow up w/ pt amb 210'  req SBA and use of fww. Pt was slow and antalgic w/ step to pattern although steady w/ no overt LOB. Pt req SBA for 2 sit to stands. Pt performed supine to sit w/ SBA and use of gt belt for self assist of L LE to EOB. Pt was in BR at end of session and verbally agreeable to pulling call light for assist when done. Pt plans to return home w/ HH PT.  -PH       Row Name 03/20/24 1059          Vital Signs    O2 Delivery Pre Treatment room air  -PH     O2 Delivery Intra Treatment room air  -PH     O2 Delivery Post Treatment room air  -PH       Row Name 03/20/24 1059          Positioning and Restraints    Pre-Treatment Position other (comment)  bed initially then chair when checked back for amb  -PH     Post Treatment Position bathroom  -PH     Bathroom sitting;call light within reach;encouraged to call for assist;notified nsg  -PH               User Key  (r) = Recorded By, (t) = Taken By, (c) = Cosigned By      Initials Name Provider Type    Kate Avelar PTA Physical Therapist Assistant                   Outcome Measures       Row Name 03/20/24 1103 03/20/24 0825       How much help from another person do you currently need...    Turning from your back to your side while in flat bed without using bedrails? 4  -PH 4  -SH    Moving from lying on back to sitting on the side of a flat bed without bedrails? 4  -PH 4  -SH    Moving to and from a bed to a chair (including a wheelchair)? 3  -PH 4  -SH    Standing up from a chair using your arms (e.g., wheelchair, bedside chair)? 3  -PH 4  -SH    Climbing 3-5 steps with a railing? 3  -PH 3  -SH    To walk in hospital room? 3  -PH 3  -SH    AM-PAC 6 Clicks Score (PT) 20  -PH 22  -SH    Highest Level of Mobility Goal 6 --> Walk 10 steps or more  -PH 7 --> Walk 25 feet or more  -SH      Row Name 03/20/24 1103          Functional Assessment    Outcome Measure Options AM-PAC 6 Clicks Basic Mobility (PT)  -PH               User Key  (r) = Recorded By, (t) = Taken By, (c)  = Cosigned By      Initials Name Provider Type     Kate Hastings PTA Physical Therapist Assistant    Petra Albarran, RN Registered Nurse                                 Physical Therapy Education       Title: PT OT SLP Therapies (In Progress)       Topic: Physical Therapy (In Progress)       Point: Mobility training (Done)       Learning Progress Summary             Patient Acceptance, E,TB,D, VU,NR by  at 3/20/2024 1103                         Point: Home exercise program (Not Started)       Learner Progress:  Not documented in this visit.              Point: Body mechanics (Done)       Learning Progress Summary             Patient Acceptance, E,TB,D, VU,NR by  at 3/20/2024 1103                         Point: Precautions (Done)       Learning Progress Summary             Patient Acceptance, E,TB,D, VU,NR by  at 3/20/2024 1103                                         User Key       Initials Effective Dates Name Provider Type Discipline     06/16/21 -  Kate Hastings PTA Physical Therapist Assistant PT                  PT Recommendation and Plan     Plan of Care Reviewed With: patient  Progress: improving  Outcome Evaluation: Pt was seen by PT this AM for tx. Pt first seen earlier AM w/ pt amb to BR. Later follow up w/ pt amb 210' req SBA and use of fww. Pt was slow and antalgic w/ step to pattern although steady w/ no overt LOB. Pt req SBA for 2 sit to stands. Pt performed supine to sit w/ SBA and use of gt belt for self assist of L LE to EOB. Pt was in BR at end of session and verbally agreeable to pulling call light for assist when done. Pt plans to return home w/ HH PT.     Time Calculation:         PT Charges       Row Name 03/20/24 1104 03/20/24 1103          Time Calculation    Start Time 1025  -PH 0755  -PH     Stop Time 1038  -PH 0803  -PH     Time Calculation (min) 13 min  -PH 8 min  -PH     PT Received On 03/20/24  -PH --     PT - Next Appointment 03/21/24  -PH --         Timed Charges    96228 - Gait Training Minutes  10  -PH --     64492 - PT Therapeutic Activity Minutes 3  -PH 8  -PH        Total Minutes    Timed Charges Total Minutes 13  -PH 8  -PH      Total Minutes 13  -PH 8  -PH               User Key  (r) = Recorded By, (t) = Taken By, (c) = Cosigned By      Initials Name Provider Type    PH Kate Hastings PTA Physical Therapist Assistant                  Therapy Charges for Today       Code Description Service Date Service Provider Modifiers Qty    [id number]  PT THERAPEUTIC ACT EA 15 MIN 3/20/2024 Kate Hastings PTA GP 1            PT G-Codes  Outcome Measure Options: AM-PAC 6 Clicks Basic Mobility (PT)  AM-PAC 6 Clicks Score (PT): 20  PT Discharge Summary  Anticipated Discharge Disposition (PT): home with home health    Kate Hastings PTA  3/20/2024

## 2024-03-21 ENCOUNTER — HOME CARE VISIT (OUTPATIENT)
Dept: HOME HEALTH SERVICES | Facility: HOME HEALTHCARE | Age: 64
End: 2024-03-21
Payer: COMMERCIAL

## 2024-03-21 ENCOUNTER — TRANSITIONAL CARE MANAGEMENT TELEPHONE ENCOUNTER (OUTPATIENT)
Dept: CALL CENTER | Facility: HOSPITAL | Age: 64
End: 2024-03-21
Payer: COMMERCIAL

## 2024-03-21 VITALS
WEIGHT: 194 LBS | BODY MASS INDEX: 32.32 KG/M2 | OXYGEN SATURATION: 98 % | SYSTOLIC BLOOD PRESSURE: 130 MMHG | DIASTOLIC BLOOD PRESSURE: 82 MMHG | RESPIRATION RATE: 18 BRPM | TEMPERATURE: 96.9 F | HEIGHT: 65 IN | HEART RATE: 93 BPM

## 2024-03-21 PROCEDURE — G0151 HHCP-SERV OF PT,EA 15 MIN: HCPCS

## 2024-03-21 NOTE — HOME HEALTH
Patient is a 62yo female s/p LTKA on 3/18/24 by Dr. James Moya. Physical therapy [facility] is aftercare LTKA with comorbidities including arthritis, depression. Physical therapy to provide skilled care of therapeutic exercises, gait training, pain management, transfer training. Patient lives alone in 2 story home, steps to enter. Patient's friend is staying with patient for a few days to assist patient.     PLAN FOR NEXT VISIT:  --pain management  --gait training  --transfer training  --therapeutic exercises

## 2024-03-21 NOTE — OUTREACH NOTE
Call Center TCM Note      Flowsheet Row Responses   [facility] patient discharged from? [place]   Does the patient have one of the following disease processes/diagnoses(primary or secondary)? Total Joint Replacement   Joint surgery performed? Knee   TCM attempt successful? Yes   Call start time 1256   Call end time 1302   Has the patient been back in either the hospital or Emergency Department since discharge? No   List who call center can speak with pt   Medication alerts for this patient pain medication.   Does the patient have all medications related to this admission filled (includes all antibiotics, pain medications, etc.) Yes   Is the patient taking all medications as directed (includes completed medication regime)? Yes   Is the patient able to teach back alternate methods of pain control? Ice, Knee-elevation/no pillow under knee, Correct alignment   Comments Pt has previous f/u appointment on 4-. There are no earlier appointments available, pt will keep this appointment.   Does the patient have an appointment with their PCP within 7-14 days of discharge? No appointments available   Nursing Interventions Routed TCM call to PCP office   Has home health visited the patient within 72 hours of discharge? Yes   Psychosocial issues? No   Has the patient began therapy sessions (either in the home or as an out patient)? Yes   If the patient has started attending therapy, what post op day did they begin to attend (either in home or as an out patient)?   therapy began on this day.   Does the patient have a wound vac in place? No   Has the patient fallen since discharge? No   Did the patient receive a copy of their discharge instructions? Yes   Nursing interventions Reviewed instructions with patient   What is the patient's perception of their functional status since discharge? Improving   Is the patient able to teach back signs and symptoms of infection? Temp >100.4 for 24h or longer, Incisional  drainage, Blisters around incision, Increased swelling or redness around incision (not associated with surgical edema), Severe discomfort or pain, Changes in mobility, Shortness of breath or chest pain   Is the patient able to teach back how to prevent infection? Check incision daily, Keep incision covered if drainage, Keep incision covered if pets in house   Is the patient able to teach back signs and symptoms of DVT? Redness in calf, Area hot to touch, Shortness of breath or chest pain, Swelling in calf, Severe pain in calf   Is the patient/caregiver able to teach back the hierarchy of who to call/visit for symptoms/problems? PCP, Specialist, Home health nurse, Urgent Care, ED, 911 Yes   TCM call completed? Yes   Wrap up additional comments Pt reports recovering nicely. Pt is taking pain medication. Pt is elevating and icing. PT began on this day. Pt reports incision looks good.   Call end time 1302   Would this patient benefit from a Referral to [facility] Social Work? No   Is the patient interested in additional calls from an ambulatory ? No            Adina Rosenthal, RN    3/21/2024, 13:04 EDT

## 2024-03-22 ENCOUNTER — HOME CARE VISIT (OUTPATIENT)
Dept: HOME HEALTH SERVICES | Facility: HOME HEALTHCARE | Age: 64
End: 2024-03-22
Payer: COMMERCIAL

## 2024-03-22 ENCOUNTER — TELEPHONE (OUTPATIENT)
Dept: ORTHOPEDIC SURGERY | Facility: CLINIC | Age: 64
End: 2024-03-22
Payer: COMMERCIAL

## 2024-03-22 VITALS
SYSTOLIC BLOOD PRESSURE: 110 MMHG | HEART RATE: 80 BPM | TEMPERATURE: 98.3 F | OXYGEN SATURATION: 99 % | DIASTOLIC BLOOD PRESSURE: 68 MMHG

## 2024-03-22 DIAGNOSIS — M17.12 PRIMARY OSTEOARTHRITIS OF LEFT KNEE: ICD-10-CM

## 2024-03-22 PROCEDURE — G0157 HHC PT ASSISTANT EA 15: HCPCS

## 2024-03-22 RX ORDER — HYDROCODONE BITARTRATE AND ACETAMINOPHEN 7.5; 325 MG/1; MG/1
1 TABLET ORAL
Qty: 56 TABLET | Refills: 0 | Status: SHIPPED | OUTPATIENT
Start: 2024-03-22

## 2024-03-22 NOTE — PROGRESS NOTES
New Patient Complaint      Patient: Nancy Robbins  YOB: 1960 63 y.o. female  Medical Record Number: [id number]    Chief Complaints: My heel hurts    History of Present Illness:   Patient reports a several month history of pain mainly in the posterior lateral aspect of her right heel.  Is worse when she first gets up in the morning and improves a little bit after walking but then recurs after standing.  She reports he has burning type pain as well.  She initially denied any numbness or tingling but says she does have spinal stenosis and has had some episodes where she has symptoms of numbness and or pain down both legs with some numbness in the lateral foot.  She is using a copper fit sleeve and also doing some heel cord stretching exercises.  She also got a gel pad which helped initially but no longer is.  She has seen Dr. Reed for her back in the past but has not had any recent EMG nerve conduction studies.    Patient recently underwent left knee replacement by Dr. Moya on 3/18/2024.         HPI    Allergies:   Allergies   Allergen Reactions    Ampicillin Nausea Only    Oxycodone Nausea And Vomiting       Medications:   Current Outpatient Medications on File Prior to Visit   Medication Sig    Allegra-D Allergy & Congestion  MG per 12 hr tablet TAKE 1 TABLET BY MOUTH TWICE DAILY (Patient taking differently: Take 1 tablet by mouth 2 (Two) Times a Day.)    ALPRAZolam (XANAX) 1 MG tablet Take 1 tablet by mouth At Night As Needed for Anxiety    amLODIPine (NORVASC) 5 MG tablet Take 1 tablet by mouth Daily.    aspirin 81 MG EC tablet Take 1 tablet by mouth 2 (Two) Times a Day for 14 days, THEN 1 tablet Daily for 30 days.    Calcium Carbonate (CALCIUM 600 PO) Take 1 tablet by mouth Daily. HOLDING FOR DOS    Docusate Sodium (COLACE PO) Take 2 tablets by mouth Daily.    estradiol (ESTRACE) 1 MG tablet Take 1 tablet by mouth Daily.    fluticasone (FLONASE) 50 MCG/ACT nasal spray Use 2 sprays by  in each nostril Daily.    HYDROcodone-acetaminophen (NORCO) 7.5-325 MG per tablet Take 1 tablet by mouth Every 4 (Four) to 6 (Six) Hours As Needed for Moderate Pain. May take 2 ONLY for severe pain.    irbesartan-hydrochlorothiazide (AVALIDE) 300-12.5 MG tablet Take 1 tablet by mouth Daily.    levothyroxine (SYNTHROID, LEVOTHROID) 137 MCG tablet Take 1 tablet by mouth Daily.    meclizine (ANTIVERT) 25 MG tablet Take 1 tablet by mouth 3 (Three) Times a Day As Needed for Dizziness.    meloxicam (MOBIC) 15 MG tablet Take 1 tablet by mouth Daily.    montelukast (SINGULAIR) 10 MG tablet Take 1 tablet by mouth Every Night.    ondansetron (Zofran) 4 MG tablet Take 1 tablet by mouth Every 8 (Eight) Hours As Needed for Nausea or Vomiting for up to 10 doses.    pantoprazole (PROTONIX) 40 MG EC tablet Take 1 tablet by mouth Daily for 14 days.    polyethylene glycol (MIRALAX) 17 GM/SCOOP powder Take 17 g by mouth 2 (Two) Times a Day for 7 days    Potassium 99 MG tablet Take 1 tablet by mouth Every Other Day. HOLDING FOR DOS    pregabalin (LYRICA) 150 MG capsule Take 1 capsule by mouth Daily.    pregabalin (LYRICA) 75 MG capsule Take 1 capsule by mouth Every Morning.    tiZANidine (ZANAFLEX) 4 MG tablet Take 1 tablet by mouth Every 8 (Eight) Hours As Needed for Muscle Spasms.    traMADol (ULTRAM) 50 MG tablet Take 1 tablet by mouth 2 (Two) Times a Day As Needed for Moderate Pain .    VITAMIN D PO Take 1 tablet by mouth Daily. HOLDING FOR DOS     No current facility-administered medications on file prior to visit.       Past Medical History:   Diagnosis Date    Anxiety     Arthritis of back     Arthritis of neck     Several years ago    Cervical disc disorder 2002    Surgery 2003    Chronic cough     COVID 10/31/2022    DDD (degenerative disc disease), lumbar     SPINAL STENOSIS    Graves disease     Hard to intubate     STATES DIFF TO INTUBATE D/T NOT BEING ABLE TO EXTEND NECK    Hip arthrosis     Hypertension     Hypothyroidism      Knee swelling Unsure    Many years ago    Left knee pain     Neuropathy     bilateral lower legs    Osteoarthritis     Plantar fasciitis     RIGHT FOOT    PONV (postoperative nausea and vomiting)     Restless leg syndrome     TAKES XANAX FOR..    Seasonal allergies     Tear of meniscus of knee     Several years ago. Both knees    Vertigo      Past Surgical History:   Procedure Laterality Date    BACK SURGERY      lumbar discectomy    COLONOSCOPY N/A 2016    Procedure: COLONOSCOPY;  Surgeon: Jose G Toro MD;  Location: [facility] ENDOSCOPY;  Service:     EPIDURAL BLOCK      back last one 18    JOINT REPLACEMENT  2018    KNEE MENISCAL REPAIR Bilateral     NECK SURGERY      fusion w/hardware    SUBTOTAL HYSTERECTOMY      TONSILLECTOMY      TOTAL KNEE ARTHROPLASTY Right 12/10/2018    Procedure: RT TOTAL KNEE ARTHROPLASTY;  Surgeon: Geo Fu MD;  Location: [facility] MAIN OR;  Service: Orthopedics    TOTAL KNEE ARTHROPLASTY Left 2024    Procedure: TOTAL KNEE ARTHROPLASTY;  Surgeon: James Moya MD;  Location: [facility] OR OSC;  Service: Orthopedics;  Laterality: Left;     Social History     Occupational History    Not on file   Tobacco Use    Smoking status: Former     Current packs/day: 0.00     Average packs/day: 0.5 packs/day for 25.0 years (12.5 ttl pk-yrs)     Types: Cigarettes     Start date: 1981     Quit date: 2006     Years since quittin.2    Smokeless tobacco: Never   Vaping Use    Vaping status: Never Used   Substance and Sexual Activity    Alcohol use: Yes     Comment: Occasional    Drug use: No    Sexual activity: Not Currently     Partners: Male     Birth control/protection: None, Tubal ligation, Hysterectomy      Social History     Social History Narrative    Not on file     Family History   Problem Relation Age of Onset    Stroke Mother 83    Lung cancer Father     Cancer Father         Lung    Kidney disease Sister         kidney transplant x2    Crohn's disease  "Brother     Crohn's disease Son     Kat Hyperthermia Neg Hx        Review of Systems: 14 point review of systems performed, positive pertinent findings identified in HPI. All remaining systems negative     Review of Systems      Physical Exam:   Vitals:    03/25/24 1319   Temp: 97.3 °F (36.3 °C)   Weight: 86.8 kg (191 lb 6.4 oz)   Height: 165.1 cm (65\")   PainSc:   5     Physical Exam   Constitutional: pleasant, well developed she is with a family member  Eyes: sclera non icteric  Hearing : adequate for exam  Cardiovascular: palpable pulses in right foot, right calf/ thigh NT without sign of DVT  Respiratoy: breathing unlabored   Neurological: grossly sensate to LT throughout right LE without evidence of grossly altered sensation in the lateral foot and no clear Tinel's over the sural nerve  Psychiatric: oriented with normal mood and affect.   Lymphatic: No palpable popliteal lymphadenopathy right LE  Skin: intact throughout right leg/foot  Musculoskeletal: There is moderate tenderness to palpation on the inferolateral right hindfoot along the plantar and posterior aspect of the right heel.  And at the insertion of the Achilles.  She had good plantarflexion strength.  There was some pain with medial lateral calcaneal compression.  Physical Exam  Ortho Exam    Radiology: 3 views right foot ordered evaluate pain reviewed and no prior x-rays available for comparison these do not show any clear fracture.  There is prominent superior calcaneal tuberosity and some plantar calcaneal spurring.  Limited arthritis through the midfoot second and third TMT joints.  Only slight hallux valgus    Assessment/Plan: 1.  Right heel pain with Lisa deformity and possible plantar fasciitis with subjective neuritic symptoms in the right hindfoot/forefoot and possible insertional tendinopathy    We discussed treatment going forward and she was fitted with a night splint to help with start of pain.  She was instructed on heel cord " stretching exercises to do gently and prescribed neuritic compounding cream to apply.  Will go ahead and order an MRI of her right hindfoot to evaluate for any interstitial tear of the Achilles tendon and make sure there is no stress fracture etc.    We may need to get an EMG nerve conduction study on what we see here.

## 2024-03-22 NOTE — HOME HEALTH
Subjective: My bowels are moving, I was going to take a shower but decided to wait until tomorrow    Wound- left knee covered with KB dressing- bruising on posterior knee  Edema-Left knee moderate amount down to the ankle  Dr Moya 4-9-24 video visit  Outpatient-TBD  Falls- none  Medication Changes- none     Assessment: Patient is using standard walker with slow steady deb. She still lacks quad strength with HEP however doing good and slight improvement with ROM. Patient re-education on medication regimen, hydration and proper nutrition and used teach back for carryover and accuracy. Patient will benefit with continued PT for progression and reduce risk of decline.    Plan for next visit:  Gait training  Review and progress HEP  Increase ROM  Balance/transfers/safety  steps to exit the home

## 2024-03-22 NOTE — TELEPHONE ENCOUNTER
Caller: Nancy Robbins    Relationship to patient: Self    Best call back number: [phone] (home)     Patient is needing: PATIENT NEEDS HER ORDER FOR PT SENT TO [facility] IN [place], [place].   THIS IS FOR THE LEFT KNEE SX SHE HAD WITH DR CATALAN.     FAX # [phone]

## 2024-03-25 ENCOUNTER — OFFICE VISIT (OUTPATIENT)
Dept: ORTHOPEDIC SURGERY | Facility: CLINIC | Age: 64
End: 2024-03-25
Payer: COMMERCIAL

## 2024-03-25 ENCOUNTER — HOME CARE VISIT (OUTPATIENT)
Dept: HOME HEALTH SERVICES | Facility: HOME HEALTHCARE | Age: 64
End: 2024-03-25
Payer: COMMERCIAL

## 2024-03-25 VITALS — WEIGHT: 191.4 LBS | TEMPERATURE: 97.3 F | HEIGHT: 65 IN | BODY MASS INDEX: 31.89 KG/M2

## 2024-03-25 VITALS
TEMPERATURE: 97.3 F | OXYGEN SATURATION: 99 % | DIASTOLIC BLOOD PRESSURE: 78 MMHG | SYSTOLIC BLOOD PRESSURE: 116 MMHG | HEART RATE: 91 BPM

## 2024-03-25 DIAGNOSIS — M92.60 HAGLUND'S DEFORMITY: ICD-10-CM

## 2024-03-25 DIAGNOSIS — M72.2 PLANTAR FASCIITIS: ICD-10-CM

## 2024-03-25 DIAGNOSIS — G57.91 NEURITIS OF RIGHT FOOT: Primary | ICD-10-CM

## 2024-03-25 DIAGNOSIS — R52 PAIN: ICD-10-CM

## 2024-03-25 PROCEDURE — 73630 X-RAY EXAM OF FOOT: CPT | Performed by: ORTHOPAEDIC SURGERY

## 2024-03-25 PROCEDURE — 99214 OFFICE O/P EST MOD 30 MIN: CPT | Performed by: ORTHOPAEDIC SURGERY

## 2024-03-25 PROCEDURE — G0157 HHC PT ASSISTANT EA 15: HCPCS

## 2024-03-25 NOTE — HOME HEALTH
Subjective: I had a decent weekend    Wound- left knee covered with KB dressing- bruising on posterior knee   Edema-Left knee moderate amount down to the ankle   Dr Moya 4-9-24 video visit ) can take KB off at discharge if patient wants to if not Dr. Moya will tell them how on video visit  Outpatient-TBD Results- she is calling today to set up  Falls- none   Medication Changes- none     Assessment: Patient is used walker intially and then trialed SPC and did fine and trialed basement steps and car transfer with using cane and had no LOB. Patient able to progress to standing HEP and improvement with ROM. Patient re-education on medication regimen, hydration and proper nutrition and used teach back for carryover and accuracy. Patient will benefit with continued PT for progression and reduce risk of decline.     Plan for next visit:   Gait training   Review and progress HEP   Increase ROM   Balance  make sure OP is set up

## 2024-03-27 ENCOUNTER — HOME CARE VISIT (OUTPATIENT)
Dept: HOME HEALTH SERVICES | Facility: HOME HEALTHCARE | Age: 64
End: 2024-03-27
Payer: COMMERCIAL

## 2024-03-27 VITALS
TEMPERATURE: 95.8 F | DIASTOLIC BLOOD PRESSURE: 68 MMHG | RESPIRATION RATE: 18 BRPM | OXYGEN SATURATION: 98 % | HEART RATE: 106 BPM | SYSTOLIC BLOOD PRESSURE: 104 MMHG

## 2024-03-27 PROCEDURE — G0151 HHCP-SERV OF PT,EA 15 MIN: HCPCS

## 2024-03-27 NOTE — HOME HEALTH
"Subjective: \"I can't get comfortable.\" moderate nonpitting swelling left leg     Falls- none   Medication changes-none- Verbalized understanding   Reason for PT- LTKA     Assessment: Despite increased swelling noted in left knee, patient continues to make ROM improvements.        Plan for next visit/Communication:   Ensure independence with HEP, ambulation"

## 2024-03-29 ENCOUNTER — HOME CARE VISIT (OUTPATIENT)
Dept: HOME HEALTH SERVICES | Facility: HOME HEALTHCARE | Age: 64
End: 2024-03-29
Payer: COMMERCIAL

## 2024-03-29 PROCEDURE — G0151 HHCP-SERV OF PT,EA 15 MIN: HCPCS

## 2024-03-31 VITALS
OXYGEN SATURATION: 99 % | TEMPERATURE: 97.6 F | DIASTOLIC BLOOD PRESSURE: 90 MMHG | RESPIRATION RATE: 18 BRPM | HEART RATE: 86 BPM | SYSTOLIC BLOOD PRESSURE: 132 MMHG

## 2024-03-31 NOTE — HOME HEALTH
Patient sitting on couch reported having a bad day today. Discussed discharge and patient agreeable. Moderate nonpitting swelling left knee. until discharge

## 2024-04-02 ENCOUNTER — TELEPHONE (OUTPATIENT)
Dept: ORTHOPEDIC SURGERY | Facility: CLINIC | Age: 64
End: 2024-04-02
Payer: COMMERCIAL

## 2024-04-02 NOTE — TELEPHONE ENCOUNTER
I have called and spoke to  Robbins. And explained this is the MRI Dr. Negron has ordered and can not be changed as he is out of the office.

## 2024-04-02 NOTE — TELEPHONE ENCOUNTER
Dr. Negron    Re: MRI ankle     Per Scheduling HUB, patient requesting the following.....    4-2-2024 PT ALSO STATES SHE NEEDS MRI RT FOOT. MRI ANKLE WON'T GET FOOT SHE WANTS MRI RT FOOT AND MRI RT FOOT. PLEASE ENTER MRI RT FOOT

## 2024-04-09 ENCOUNTER — TELEMEDICINE (OUTPATIENT)
Dept: ORTHOPEDIC SURGERY | Facility: CLINIC | Age: 64
End: 2024-04-09
Payer: COMMERCIAL

## 2024-04-09 DIAGNOSIS — M17.12 PRIMARY OSTEOARTHRITIS OF LEFT KNEE: ICD-10-CM

## 2024-04-09 PROCEDURE — 99024 POSTOP FOLLOW-UP VISIT: CPT | Performed by: ORTHOPAEDIC SURGERY

## 2024-04-09 RX ORDER — HYDROCODONE BITARTRATE AND ACETAMINOPHEN 7.5; 325 MG/1; MG/1
1-2 TABLET ORAL
Qty: 30 TABLET | Refills: 0 | Status: SHIPPED | OUTPATIENT
Start: 2024-04-09

## 2024-04-09 NOTE — PROGRESS NOTES
Nancy Robbins : 1960 MRN: [id number] DATE: 2024  You have chosen to receive care through a telehealth visit.  Do you consent to use a video/audio connection for your medical care today? Yes -15 MINUTES   DIAGNOSIS: 3 week follow up left total knee      SUBJECTIVE:Patient returns today for 3 week follow up of left total knee replacement. Patient reports doing well with no unusual complaints. Appears to be progressing appropriately.    OBJECTIVE:   Exam:. The incision is healing appropriately. No sign of infection. Range of motion is progressing as expected. The calf is soft and nontender with a negative Homans sign.    ASSESSMENT: 3 week status post left knee replacement.    PLAN: 1) Staples removed and steri strips applied   2) Order given for PT   3) Discontinue SMILEY hose   4) Continue ice PRN   5) aspirin 81 mg orally every day for 1 month   6) Follow up in 6 weeks with repeat Xrays of left knee (3views)    James Moya MD  2024

## 2024-04-09 NOTE — TELEPHONE ENCOUNTER
Caller: Nancy Robbins    Relationship: Self    Best call back number: [phone] (home)     Requested Prescriptions:   Requested Prescriptions     Pending Prescriptions Disp Refills    HYDROcodone-acetaminophen (NORCO) 7.5-325 MG per tablet 56 tablet 0     Sig: Take 1 tablet by mouth Every 4 (Four) to 6 (Six) Hours As Needed for Moderate Pain. May take 2 ONLY for severe pain.        Pharmacy where request should be sent: [facility]     Last office visit with prescribing clinician: 2/14/2023   Last telemedicine visit with prescribing clinician: Visit date not found   Next office visit with prescribing clinician: Visit date not found     Additional details provided by patient: PATIENT HAS ENOUGH FOR FOUR MORE DAYS, BUT WILL NEED IT BEFORE THE WEEKEND. SHE IS STILL IN PT. PATIENT WAS SUPPOSED TO HAVE A VIDEO VISIT WITH DR CATALAN TODAY BUT  WAS HAVING TECHNICAL DIFFICULTIES.     Does the patient have less than a 3 day supply:  [] Yes  [x] No    Mindy Rincon Rep   04/09/24 10:42 EDT

## 2024-04-12 ENCOUNTER — HOSPITAL ENCOUNTER (OUTPATIENT)
Dept: MRI IMAGING | Facility: HOSPITAL | Age: 64
Discharge: HOME | End: 2024-04-12
Admitting: ORTHOPAEDIC SURGERY
Payer: COMMERCIAL

## 2024-04-12 ENCOUNTER — TELEPHONE (OUTPATIENT)
Dept: ORTHOPEDIC SURGERY | Facility: CLINIC | Age: 64
End: 2024-04-12
Payer: COMMERCIAL

## 2024-04-12 DIAGNOSIS — M92.60 HAGLUND'S DEFORMITY: ICD-10-CM

## 2024-04-12 DIAGNOSIS — J30.9 ALLERGIC RHINITIS, UNSPECIFIED SEASONALITY, UNSPECIFIED TRIGGER: Chronic | ICD-10-CM

## 2024-04-12 DIAGNOSIS — M72.2 PLANTAR FASCIITIS: ICD-10-CM

## 2024-04-12 PROCEDURE — 73721 MRI JNT OF LWR EXTRE W/O DYE: CPT

## 2024-04-12 RX ORDER — MONTELUKAST SODIUM 10 MG/1
10 TABLET ORAL NIGHTLY
Qty: 90 TABLET | Refills: 0 | Status: SHIPPED | OUTPATIENT
Start: 2024-04-12

## 2024-04-12 NOTE — TELEPHONE ENCOUNTER
Caller: Nancy Robbins    Relationship to patient: Self    Best call back number: [phone] (home)     Patient is needing: PATIENT NEEDS PA SENT TO THE PHARMACY FOR HER HYRDROCONE. PLEASE ADVISE

## 2024-04-15 NOTE — TELEPHONE ENCOUNTER
Spoke with patient, let her know her insurance returned the prior authorization for her norco as no PA needed. Patient will call her pharmacy to let them know.

## 2024-04-15 NOTE — TELEPHONE ENCOUNTER
Provider: PERLITA    Caller: PATIENT    Pharmacy: [facility]    Phone Number: [phone]    Reason for Call: PATIENT STATES SHE SPOKE WITH THE PHARMACY, AND THEY ARE SAYING THEY STILL HAVEN'T RECEIVED THE PRIOR AUTH. SHE WOULD LIKE AN UPDATE ON THIS PLEASE. OK TO LEAVE A VM IF SHE DOESN'T ANSWER.

## 2024-04-17 DIAGNOSIS — R11.0 NAUSEA: Primary | ICD-10-CM

## 2024-04-17 RX ORDER — ONDANSETRON 4 MG/1
4 TABLET, FILM COATED ORAL EVERY 8 HOURS PRN
Qty: 10 TABLET | Refills: 0 | Status: SHIPPED | OUTPATIENT
Start: 2024-04-17

## 2024-04-17 NOTE — TELEPHONE ENCOUNTER
Surgery: 3/18/24, TKA    Last refill: 3/18/24    Last visit: 3/25/24    Next visit: 4/22/24 (Dr. Negron), 5/23/24 (Arian)

## 2024-04-19 NOTE — PROGRESS NOTES
Ankle Follow Up      Patient: Nancy Robbins    YOB: 1960 63 y.o. female    Chief Complaints: Ankle pain    History of Present Illness: Patient was seen on 3/25/2024 reporting a several month history of pain mainly in the posterior lateral aspect of her right heel.  Pain was worse when she first got up in the morning and improved a little bit after walking but then recurred after standing.  She reported that she had a burning type pain as well.  She initially denied any numbness or tingling but reported that she did have spinal stenosis and had had some episodes where she has symptoms of numbness and or pain down both legs with some numbness in the lateral foot.  She was using a copper fit sleeve and also doing some heel cord stretching exercises.  She had also gotten a gel pad which helped initially but no longer was.  She had seen Dr. Reed for her back in the past but had not had any recent EMG nerve conduction studies.     Patient had recently had left knee replacement by Dr. Moya on 3/18/2024.    She has felt a right heel pain with Lisa deformity and possible plantar fasciitis with subjective neuritic symptoms in the right hindfoot/forefoot with possible insertional tendinopathy    She was fitted with a night splint to help with the start of pain.  She was instructed on heel cord stretching and prescribed neuritic compounding cream.  She was sent for MRI to evaluate for any interstitial tear of the Achilles and navicular with no stress fracture.  Reviewed that we may need to get EMG nerve conduction study depending on what we saw on the MRI.      Patient is seen today stating that she thinks her left knee is getting better but still having some trouble with it.  She still has some pain in her heel and the plantar aspect and feels some increased pain in the morning.  She been using a gel heel pad and she tried the night splint but could not use it for more than 2 and half hours without  "pain.  She is doing heel cord stretching \"when she remembers\".  She has less discomfort along the lateral aspect of the right hindfoot and along the peroneal tendons  HPI    ROS: Heel pain  Past Medical History:   Diagnosis Date    Anxiety     Arthritis of back     Arthritis of neck     Several years ago    Cervical disc disorder 2002    Surgery 2003    Chronic cough     COVID 10/31/2022    DDD (degenerative disc disease), lumbar     SPINAL STENOSIS    Graves disease     Hard to intubate     STATES DIFF TO INTUBATE D/T NOT BEING ABLE TO EXTEND NECK    Hip arthrosis     Hypertension     Hypothyroidism     Knee swelling Unsure    Many years ago    Left knee pain     Low back strain     Unsure    Neuropathy     bilateral lower legs    Osteoarthritis     Plantar fasciitis     RIGHT FOOT    PONV (postoperative nausea and vomiting)     Restless leg syndrome     TAKES XANAX FOR..    Seasonal allergies     Tear of meniscus of knee     Several years ago. Both knees    Vertigo        Physical Exam:   Vitals:    04/22/24 1312   Temp: 96.9 °F (36.1 °C)   Weight: 86.1 kg (189 lb 12.8 oz)   Height: 165.1 cm (65\")   PainSc:   5     Well developed with normal mood.  On exam she has moderate discomfort to palpation of the plantar right heel lateral somewhat more so than medially and less discomfort on the peroneal tendons and at the insertion of the Achilles.  Negative Tinel's over the medial hindfoot no numbness or tingling elicited in the plantar foot.      Radiology: MRI films and report of the right hindfoot dated 4/12/2024 reviewed which shows posterior tib FDL and FHL tendons are intact.  There are mild changes of tendinopathy involving the peroneus longus and brevis tendon and possible subtle partial-thickness longitudinal split tear of the longus tendon just distal to the lateral malleolus but no complete disruption    Distal Achilles tendon attachment was intact.  There is moderate changes of Planter fasciitis noted most " pronounced involving the medial bundle and thickening and abnormal signal in the medial bundle.  There is adjacent soft tissue edema    Ankle ligaments were intact.  There was no evidence of chondral injury of the talar dome.      Assessment/Plan: 1.  Right heel pain with Lisa deformity without significant Achilles pathology  2.  Right plantar fasciitis with thickening of the medial bundle  3.  Right peroneus longus and brevis tendinopathy with possible subtle partial-thickness longitudinal split tear of the peroneus longus  4.  Subjective neuritic symptoms of the right hindfoot/forefoot    We discussed treatment going forward and nothing I would recommend from a surgical standpoint.    I recommended that she increase her heel cord stretching and try to use a night splint is much as possible.  I prescribed compounding cream for her to apply to the heel several times daily and may also use this along the peroneal tendons    We discussed other treatment options and after verbal consent and sterile preparation with discussion of risks which can include infection damage to blood vessels nerves or tendons of fat pad atrophy and plantar fascia rupture the area of the right plantar fascia was injected at the heel with 2 cc of 1% Xylocaine ( [id number] 17251-034-70 lot # [id number] exp 3/1/27) and 1/2 cc of Depo-Medrol containing 80 mg/cc ( NDC [address]-8 lot # [id number] exp 6/1/25).  Postinjection instructions were reviewed with her.      We discussed physical therapy as well which she wants to hold off on as she is busy with her knee will let us know if she wishes to pursue that venue.    If she has persistent worsening symptoms she will let me know otherwise I will refill agreement I will see her back as needed

## 2024-04-22 ENCOUNTER — OFFICE VISIT (OUTPATIENT)
Dept: ORTHOPEDIC SURGERY | Facility: CLINIC | Age: 64
End: 2024-04-22
Payer: COMMERCIAL

## 2024-04-22 VITALS — HEIGHT: 65 IN | TEMPERATURE: 96.9 F | BODY MASS INDEX: 31.62 KG/M2 | WEIGHT: 189.8 LBS

## 2024-04-22 DIAGNOSIS — G57.91 NEURITIS OF RIGHT FOOT: ICD-10-CM

## 2024-04-22 DIAGNOSIS — M72.2 PLANTAR FASCIITIS: ICD-10-CM

## 2024-04-22 DIAGNOSIS — M92.61 HAGLUND'S DEFORMITY OF RIGHT HEEL: Primary | ICD-10-CM

## 2024-04-22 PROCEDURE — 99214 OFFICE O/P EST MOD 30 MIN: CPT | Performed by: ORTHOPAEDIC SURGERY

## 2024-04-22 PROCEDURE — 20550 NJX 1 TENDON SHEATH/LIGAMENT: CPT | Performed by: ORTHOPAEDIC SURGERY

## 2024-04-22 RX ORDER — LIDOCAINE HYDROCHLORIDE 10 MG/ML
INJECTION, SOLUTION EPIDURAL; INFILTRATION; INTRACAUDAL; PERINEURAL
Start: 2024-04-22

## 2024-04-22 RX ORDER — METHYLPREDNISOLONE ACETATE 80 MG/ML
INJECTION, SUSPENSION INTRA-ARTICULAR; INTRALESIONAL; INTRAMUSCULAR; SOFT TISSUE
Start: 2024-04-22

## 2024-05-01 ENCOUNTER — OFFICE VISIT (OUTPATIENT)
Dept: INTERNAL MEDICINE | Facility: CLINIC | Age: 64
End: 2024-05-01
Payer: COMMERCIAL

## 2024-05-01 VITALS
SYSTOLIC BLOOD PRESSURE: 130 MMHG | OXYGEN SATURATION: 97 % | HEIGHT: 65 IN | TEMPERATURE: 98.1 F | BODY MASS INDEX: 31.32 KG/M2 | DIASTOLIC BLOOD PRESSURE: 80 MMHG | HEART RATE: 77 BPM | WEIGHT: 188 LBS

## 2024-05-01 DIAGNOSIS — J04.0 ACUTE LARYNGITIS: Primary | ICD-10-CM

## 2024-05-01 DIAGNOSIS — J40 BRONCHITIS: ICD-10-CM

## 2024-05-01 DIAGNOSIS — J01.10 ACUTE NON-RECURRENT FRONTAL SINUSITIS: ICD-10-CM

## 2024-05-01 PROCEDURE — 99213 OFFICE O/P EST LOW 20 MIN: CPT

## 2024-05-01 RX ORDER — AZITHROMYCIN 250 MG/1
TABLET, FILM COATED ORAL
Qty: 6 TABLET | Refills: 0 | Status: SHIPPED | OUTPATIENT
Start: 2024-05-01

## 2024-05-01 RX ORDER — AZITHROMYCIN 250 MG/1
TABLET, FILM COATED ORAL
Qty: 6 TABLET | Refills: 0 | Status: SHIPPED | OUTPATIENT
Start: 2024-05-01 | End: 2024-05-01

## 2024-05-01 RX ORDER — METHYLPREDNISOLONE 4 MG/1
TABLET ORAL
Qty: 21 EACH | Refills: 0 | Status: SHIPPED | OUTPATIENT
Start: 2024-05-01

## 2024-05-01 RX ORDER — METHYLPREDNISOLONE 4 MG/1
TABLET ORAL
Qty: 21 EACH | Refills: 0 | Status: SHIPPED | OUTPATIENT
Start: 2024-05-01 | End: 2024-05-01

## 2024-05-06 DIAGNOSIS — G57.91 NEURITIS OF RIGHT FOOT: ICD-10-CM

## 2024-05-14 ENCOUNTER — TELEPHONE (OUTPATIENT)
Dept: ORTHOPEDIC SURGERY | Facility: CLINIC | Age: 64
End: 2024-05-14
Payer: COMMERCIAL

## 2024-05-14 RX ORDER — CEPHALEXIN 500 MG/1
2000 CAPSULE ORAL ONCE
Qty: 4 CAPSULE | Refills: 5 | Status: SHIPPED | OUTPATIENT
Start: 2024-05-14 | End: 2024-05-16

## 2024-05-14 NOTE — TELEPHONE ENCOUNTER
PATIENT HAD A LT TKA ON 03/18/2024. SHE WAS CALLING TO NOTIFY YOU OF A DENTAL APPOINTMENT THAT'S BEEN SCHEDULED FOR 05/21/24 IN ORDER TO RECEIVE THE ANTIBIOTICS THAT SHE'S REQUIRED TO TAKE PRIOR TO HAVING DENTAL WORK. SHE WOULD LIKE THE PRESCRIPTION SENT TO THE [facility].

## 2024-05-22 DIAGNOSIS — G62.9 PERIPHERAL POLYNEUROPATHY: ICD-10-CM

## 2024-05-22 NOTE — TELEPHONE ENCOUNTER
Last office visit with prescribing clinician: 12/1/2023     Next office visit with prescribing clinician: 7/12/2024

## 2024-05-23 ENCOUNTER — OFFICE VISIT (OUTPATIENT)
Dept: ORTHOPEDIC SURGERY | Facility: CLINIC | Age: 64
End: 2024-05-23
Payer: COMMERCIAL

## 2024-05-23 VITALS — BODY MASS INDEX: 31.32 KG/M2 | HEIGHT: 65 IN | WEIGHT: 188 LBS | TEMPERATURE: 97.5 F

## 2024-05-23 DIAGNOSIS — R52 PAIN: Primary | ICD-10-CM

## 2024-05-23 DIAGNOSIS — Z96.652 STATUS POST TOTAL KNEE REPLACEMENT, LEFT: ICD-10-CM

## 2024-05-23 NOTE — PROGRESS NOTES
Nancy Robbins : 1960 MRN: [id number] DATE: 2024    DIAGNOSIS: 8 week follow up left total knee      SUBJECTIVE:Patient returns today for 8 week follow up of left total knee replacement. Patient reports doing well with no unusual complaints. Appears to be progressing appropriately.  Patient reports that she still has mild amount of pain and rates it as a 2 or 3 out of 10.  She is done with outpatient physical therapy and is doing home exercises now.  Denies any instability or buckling issues.  She denies any signs or symptoms of infection, she is without any other significant complaints today.    OBJECTIVE:   Exam:. The incision is well healed. No sign of infection. Range of motion is measured at 0 to 130. The calf is soft and nontender with a negative Homans sign. Strength is progressing and the patient is ambulating appropriately.    DIAGNOSTIC STUDIES  Xrays: 3 views of the left knee (AP, lateral, and sunrise) were ordered and reviewed for evaluation of recent knee replacement. They demonstrate a well positioned, well aligned knee replacement without complicating factors noted. In comparison with previous films there has been no change.    ASSESSMENT: 8 week status post left knee replacement.    PLAN: 1) Continue with PT exercises as prescribed   2) we will recommend her to have the ability to get up and walk around every hour approximately 5 minutes to stretch so her knee does not become stiff when she returns to work.   3) follow back up with me in 10 months for her annual    AMBER Price  2024

## 2024-05-23 NOTE — LETTER
May 23, 2024     Patient: Nancy Robbins   YOB: 1960   Date of Visit: 5/23/2024       To Whom It May Concern:    It is my medical opinion that Nancy Robbins needs to be able to ambulate/get up from desk every hour for 5 minutes to help recover from her recent surgical procedure.           Sincerely,        AMBER Price

## 2024-05-24 RX ORDER — PREGABALIN 75 MG/1
75 CAPSULE ORAL EVERY MORNING
Qty: 30 CAPSULE | Refills: 1 | Status: SHIPPED | OUTPATIENT
Start: 2024-05-24

## 2024-06-03 ENCOUNTER — TELEPHONE (OUTPATIENT)
Dept: ORTHOPEDIC SURGERY | Facility: CLINIC | Age: 64
End: 2024-06-03

## 2024-06-03 NOTE — TELEPHONE ENCOUNTER
Caller: Nancy Robbins    Relationship: Self    Best call back number: [phone]    What form or medical record are you requesting: WORK RELEASE NOTE RETURNING ON 06/10/24    Who is requesting this form or medical record from you: EMPLOYER    How would you like to receive the form or medical records (pick-up, mail, fax):   If pick-up, provide patient with address and location details    Timeframe paperwork needed: ASAP

## 2024-06-03 NOTE — TELEPHONE ENCOUNTER
Left voicemail for patient as there is a return to work note in chart given on 5-23-24 with restriction and new request is for return to work 6-10-24? Need to get clarification from patient on return date.

## 2024-06-04 NOTE — TELEPHONE ENCOUNTER
Spoke with the patient and she will  her letter at the [facility] office.

## 2024-06-04 NOTE — TELEPHONE ENCOUNTER
Hub staff attempted to follow warm transfer process and was unsuccessful     Caller: Nancy Pearson    Relationship to patient: Self    Best call back number: [phone] (home)     Patient is needing: [facility]     PT STATED THE NOTE HE GAVE HER WAS JUST SO SHE COULD WALK AROUND FOR 5 MINUTE EVERY HOUR. SHE STATED SHE NEEDS AN ACTUAL RETURN TO WORK NOTE WITH THE RETURN DATE OF 6/10/24

## 2024-06-24 ENCOUNTER — OFFICE VISIT (OUTPATIENT)
Dept: INTERNAL MEDICINE | Facility: CLINIC | Age: 64
End: 2024-06-24
Payer: COMMERCIAL

## 2024-06-24 VITALS
OXYGEN SATURATION: 98 % | HEART RATE: 84 BPM | DIASTOLIC BLOOD PRESSURE: 80 MMHG | SYSTOLIC BLOOD PRESSURE: 130 MMHG | HEIGHT: 65 IN | WEIGHT: 188.4 LBS | BODY MASS INDEX: 31.39 KG/M2

## 2024-06-24 DIAGNOSIS — I87.2 VENOUS STASIS DERMATITIS: Primary | ICD-10-CM

## 2024-06-24 PROCEDURE — 99213 OFFICE O/P EST LOW 20 MIN: CPT | Performed by: NURSE PRACTITIONER

## 2024-06-24 RX ORDER — TRIAMCINOLONE ACETONIDE 1 MG/G
1 CREAM TOPICAL 2 TIMES DAILY
Qty: 30 G | Refills: 0 | Status: SHIPPED | OUTPATIENT
Start: 2024-06-24

## 2024-06-24 RX ORDER — PREDNISONE 10 MG/1
TABLET ORAL
Qty: 36 TABLET | Refills: 0 | Status: SHIPPED | OUTPATIENT
Start: 2024-06-24 | End: 2024-07-10

## 2024-06-24 NOTE — LETTER
June 24, 2024     Patient: Nancy Robbins   YOB: 1960   Date of Visit: 6/24/2024       To Whom It May Concern:    It is my medical opinion that Nancy Robbins may return to work Monday, June 24, 2024. However, please allow her to elevate her legs as much as possible throughout the day.         Sincerely,        Radha Sy APRN

## 2024-06-27 DIAGNOSIS — I10 ESSENTIAL HYPERTENSION: Chronic | ICD-10-CM

## 2024-06-27 DIAGNOSIS — F41.9 ANXIETY: ICD-10-CM

## 2024-06-27 DIAGNOSIS — N95.1 MENOPAUSAL SYMPTOMS: ICD-10-CM

## 2024-06-28 RX ORDER — ESTRADIOL 1 MG/1
1 TABLET ORAL DAILY
Qty: 90 TABLET | Refills: 1 | Status: SHIPPED | OUTPATIENT
Start: 2024-06-28

## 2024-06-28 RX ORDER — AMLODIPINE BESYLATE 5 MG/1
5 TABLET ORAL DAILY
Qty: 90 TABLET | Refills: 1 | Status: SHIPPED | OUTPATIENT
Start: 2024-06-28

## 2024-06-28 RX ORDER — ALPRAZOLAM 1 MG/1
1 TABLET ORAL NIGHTLY PRN
Qty: 30 TABLET | Refills: 1 | Status: SHIPPED | OUTPATIENT
Start: 2024-06-28

## 2024-06-30 NOTE — PROGRESS NOTES
"Chief Complaint  Rash (Started Saturday on both legs from ankles up to the knees., bright red, blistering. /Applied Cortisone in certain spots x1, only  where it was itching.)  Cary Robbins presents to [facility]  Rash      History of Present Illness  The patient presents for evaluation of a rash.    She spent the entire day outdoors last Saturday, during which she experienced joint pain and severe diarrhea that lasted several hours. She noticed a rash on her left leg initially which ultimately spread to her right leg. She states the pain is pruritic but not painful. She also noticed a few small blisters, which ruptured while showering last night. She took Benadryl to help with the itching.    She underwent a left knee replacement 3/18/24 by Dr. Moya and has done well, still with intermittent swelling in her left leg. She has been managing the swelling with ice, elevation, and elevation. Despite resting the entire day yesterday, the swelling remains unchanged. The swelling extends to her ankle and is tender to touch.    Objective   Vital Signs:  /80 (BP Location: Right arm, Patient Position: Sitting, Cuff Size: Adult)   Pulse 84   Ht 165.1 cm (65\")   Wt 85.5 kg (188 lb 6.4 oz)   SpO2 98%   BMI 31.35 kg/m²   Estimated body mass index is 31.35 kg/m² as calculated from the following:    Height as of this encounter: 165.1 cm (65\").    Weight as of this encounter: 85.5 kg (188 lb 6.4 oz).            Physical Exam  Constitutional:       Appearance: She is well-developed. She is not ill-appearing.   HENT:      Head: Normocephalic.      Right Ear: Hearing, tympanic membrane and external ear normal.      Left Ear: Hearing, tympanic membrane and external ear normal.      Nose: Nose normal. No nasal deformity, mucosal edema or rhinorrhea.      Right Sinus: No maxillary sinus tenderness or frontal sinus tenderness.      Left Sinus: No maxillary sinus tenderness or " frontal sinus tenderness.      Mouth/Throat:      Dentition: Normal dentition.   Eyes:      General: Lids are normal.         Right eye: No discharge.         Left eye: No discharge.      Conjunctiva/sclera: Conjunctivae normal.      Right eye: No exudate.     Left eye: No exudate.  Neck:      Thyroid: No thyroid mass or thyromegaly.      Vascular: No carotid bruit.      Trachea: Trachea normal.   Cardiovascular:      Rate and Rhythm: Regular rhythm.      Pulses: Normal pulses.      Heart sounds: Normal heart sounds. No murmur heard.  Pulmonary:      Effort: No respiratory distress.      Breath sounds: Normal breath sounds. No decreased breath sounds, wheezing, rhonchi or rales.   Abdominal:      General: Bowel sounds are normal.      Palpations: Abdomen is soft.      Tenderness: There is no abdominal tenderness.   Musculoskeletal:      Cervical back: Normal range of motion. No edema.      Left lower leg: Edema present.   Lymphadenopathy:      Head:      Right side of head: No submental, submandibular, tonsillar, preauricular, posterior auricular or occipital adenopathy.      Left side of head: No submental, submandibular, tonsillar, preauricular, posterior auricular or occipital adenopathy.   Skin:     General: Skin is warm and dry.      Findings: Rash present.      Nails: There is no clubbing.      Comments: Erythematous, macular rash without blanching on bilateral lower extremities   Neurological:      Mental Status: She is alert.   Psychiatric:         Behavior: Behavior is cooperative.        Physical Exam      Result Review :  The following data was reviewed by: AMBER Gallegos on 06/24/2024:  Common labs          7/28/2023    16:07 3/8/2024    10:01 3/19/2024    05:18   Common Labs   Glucose 84  89     BUN 9  16     Creatinine 0.74  0.86     Sodium 140  141     Potassium 4.1  3.9     Chloride 101  104     Calcium 9.5  9.5     Total Protein 6.6      Albumin 4.4      Total Bilirubin 0.3      Alkaline  Phosphatase 123      AST (SGOT) 31      ALT (SGPT) 51      WBC 9.38  6.69     Hemoglobin 14.3  13.2  10.8    Hematocrit 41.4  38.9  31.3    Platelets 273  257       Data reviewed : Consultant notes ortho 3/18/24      Results               Assessment and Plan   Diagnoses and all orders for this visit:    1. Venous stasis dermatitis (Primary)  -     triamcinolone (KENALOG) 0.1 % cream; Apply 1 Application topically to the appropriate area as directed 2 (Two) Times a Day.  Dispense: 30 g; Refill: 0  -     predniSONE (DELTASONE) 10 MG tablet; Take 1 tablet by mouth 4 times a day for 3 days, then 1 tablet 3 times a day for 3 days, then 1 tablet twice a day for 5 days, then 1 tablet daily for 5 days  Dispense: 36 tablet; Refill: 0      Assessment & Plan    Sx most likely due to increased swelling and weightbearing activities ove the weekend, will continue to elevate legs. Will treat acute inflammation with tapering prednisone and a topical steroid due to persistent itching. RTC if sx persist and/or worsen.         Follow Up   No follow-ups on file.  Patient was given instructions and counseling regarding her condition or for health maintenance advice. Please see specific information pulled into the AVS if appropriate.     Patient or patient representative verbalized consent for the use of Ambient Listening during the visit with  AMBER Gallegos for chart documentation. 6/30/2024  10:09 EDT

## 2024-07-02 ENCOUNTER — OFFICE VISIT (OUTPATIENT)
Dept: ORTHOPEDIC SURGERY | Facility: CLINIC | Age: 64
End: 2024-07-02
Payer: COMMERCIAL

## 2024-07-02 ENCOUNTER — LAB (OUTPATIENT)
Dept: LAB | Facility: HOSPITAL | Age: 64
End: 2024-07-02
Payer: COMMERCIAL

## 2024-07-02 VITALS — BODY MASS INDEX: 31.65 KG/M2 | TEMPERATURE: 96.8 F | HEIGHT: 65 IN | WEIGHT: 190 LBS

## 2024-07-02 DIAGNOSIS — M25.562 ACUTE PAIN OF LEFT KNEE: ICD-10-CM

## 2024-07-02 DIAGNOSIS — R52 PAIN: Primary | ICD-10-CM

## 2024-07-02 LAB
CRP SERPL-MCNC: 0.41 MG/DL (ref 0–0.5)
ERYTHROCYTE [SEDIMENTATION RATE] IN BLOOD: 6 MM/HR (ref 0–30)

## 2024-07-02 PROCEDURE — 86140 C-REACTIVE PROTEIN: CPT | Performed by: ORTHOPAEDIC SURGERY

## 2024-07-02 PROCEDURE — 36415 COLL VENOUS BLD VENIPUNCTURE: CPT | Performed by: ORTHOPAEDIC SURGERY

## 2024-07-02 PROCEDURE — 85652 RBC SED RATE AUTOMATED: CPT

## 2024-07-02 NOTE — PROGRESS NOTES
Patient: Nancy Robbins  YOB: 1960 63 y.o. female  Medical Record Number: [id number]    Chief Complaints:   Chief Complaint   Patient presents with    Left Knee - Follow-up       History of Present Illness:Nancy Robbins is a 63 y.o. female who presents with left lateral knee pain she is 3 and half months out from total knee over the last couple days she has developed acute onset of lateral knee and leg pain which is severe she states it started after mowing the grass.  Incidentally she did have a fairly severe heat rash a week or 2 ago in both legs and was given steroids by her medical doctor which she did not take.  She denies any current fever chills or constitutional symptoms.    Allergies:   Allergies   Allergen Reactions    Ampicillin Nausea Only    Oxycodone Nausea And Vomiting       Medications:   Current Outpatient Medications   Medication Sig Dispense Refill    Allegra-D Allergy & Congestion  MG per 12 hr tablet TAKE 1 TABLET BY MOUTH TWICE DAILY (Patient taking differently: Take 1 tablet by mouth 2 (Two) Times a Day.) 120 tablet 5    ALPRAZolam (XANAX) 1 MG tablet Take 1 tablet by mouth At Night As Needed for Anxiety 30 tablet 1    amLODIPine (NORVASC) 5 MG tablet Take 1 tablet by mouth Daily. 90 tablet 1    Calcium Carbonate (CALCIUM 600 PO) Take 1 tablet by mouth Daily. HOLDING FOR DOS      Docusate Sodium (COLACE PO) Take 2 tablets by mouth Daily.      estradiol (ESTRACE) 1 MG tablet Take 1 tablet by mouth Daily. 90 tablet 1    fluticasone (FLONASE) 50 MCG/ACT nasal spray Use 2 sprays by in each nostril Daily. 64 g 2    HYDROcodone-acetaminophen (NORCO) 7.5-325 MG per tablet Take 1-2 tablets by mouth Every 4 (Four) to 6 (Six) Hours As Needed for Moderate Pain. May take 2 ONLY for severe pain. 30 tablet 0    Ibuprofen 3 %, Gabapentin 10 %, Baclofen 2 %, lidocaine 4 %, Ketamine HCl 4 % Apply 1-2 g topically to the appropriate area as directed 3 (Three) to 4 (Four) times daily. 90 g 1     irbesartan-hydrochlorothiazide (AVALIDE) 300-12.5 MG tablet Take 1 tablet by mouth Daily. 90 tablet 1    levothyroxine (SYNTHROID, LEVOTHROID) 137 MCG tablet Take 1 tablet by mouth Daily. 90 tablet 1    meloxicam (MOBIC) 15 MG tablet Take 1 tablet by mouth Daily. 14 tablet 0    montelukast (SINGULAIR) 10 MG tablet Take 1 tablet by mouth Every Night. 90 tablet 0    ondansetron (Zofran) 4 MG tablet Take 1 tablet by mouth Every 8 (Eight) Hours As Needed for Nausea or Vomiting for up to 10 doses. 10 tablet 0    Potassium 99 MG tablet Take 1 tablet by mouth Every Other Day. HOLDING FOR DOS      pregabalin (LYRICA) 150 MG capsule Take 1 capsule by mouth Daily. 30 capsule 2    pregabalin (LYRICA) 75 MG capsule Take 1 capsule by mouth Every Morning. 30 capsule 1    tiZANidine (ZANAFLEX) 4 MG tablet Take 1 tablet by mouth Every 8 (Eight) Hours As Needed for Muscle Spasms. 90 tablet 2    VITAMIN D PO Take 1 tablet by mouth Daily. HOLDING FOR DOS      meclizine (ANTIVERT) 25 MG tablet Take 1 tablet by mouth 3 (Three) Times a Day As Needed for Dizziness. (Patient not taking: Reported on 7/2/2024) 25 tablet 0    predniSONE (DELTASONE) 10 MG tablet Take 1 tablet by mouth 4 times a day for 3 days, then 1 tablet 3 times a day for 3 days, then 1 tablet twice a day for 5 days, then 1 tablet daily for 5 days (Patient not taking: Reported on 7/2/2024) 36 tablet 0    traMADol (ULTRAM) 50 MG tablet Take 1 tablet by mouth 2 (Two) Times a Day As Needed for Moderate Pain . (Patient not taking: Reported on 7/2/2024) 30 tablet 0    triamcinolone (KENALOG) 0.1 % cream Apply 1 Application topically to the appropriate area as directed 2 (Two) Times a Day. (Patient not taking: Reported on 7/2/2024) 30 g 0     No current facility-administered medications for this visit.         The following portions of the patient's history were reviewed and updated as appropriate: allergies, current medications, past family history, past medical history, past  "social history, past surgical history and problem list.    Review of Systems:   Pertinent positives/negatives listed in HPI above    Physical Exam:   Vitals:    07/02/24 0822   Temp: 96.8 °F (36 °C)   Weight: 86.2 kg (190 lb)   Height: 165.1 cm (65\")   PainSc:   8   PainLoc: Knee       General: A and O x 3, ASA, NAD      Knee Exam List: Knee:  left    ALIGNMENT:     Neutral  ,   Patella tracks   midline    GAIT:     Small, moderately antalgic    SKIN:    No abnormality    RANGE OF MOTION:   0  -  105   DEG    STRENGTH:   4 fifths other/ 5    LIGAMENTS:    No varus / valgus instability.   Negative  Lachman.    MENISCUS:     Negative   Konstantin       DISTAL PULSES:    Paplable    DISTAL SENSATION :   Intact    LYMPHATICS:     No   lymphadenopathy    OTHER:          - No  effusion      - No crepitance with ROM      - No Swelling /  tenderness to palpation pes anserine bursa        Radiology:  Xrays 3views left knee (ap,lateral, sunrise) were ordered and reviewed for evaluation of knee pain demonstrating a well positioned knee replacement without evidence of wear, loosening or osteolysis.  In comparison to previous films are no changes    Assessment/Plan: Acute left knee and lateral leg pain 3 and half months out from total knee it was doing reasonably well until the sudden onset.  I have a low suspicion of infection but we will check a sed rate and C-reactive protein.  She does have a history of moderately severe lumbar spinal stenosis and has seen pain management in the past.  If the infection workup is negative we would consider further workup or treatment of her low back.      Diagnoses and all orders for this visit:    1. Pain (Primary)  -     XR Knee 3 View Left    2. Acute pain of left knee  -     C-reactive Protein  -     Sedimentation Rate; Future         James Moya MD  7/2/2024  "

## 2024-07-03 DIAGNOSIS — M79.605 LEG PAIN, ANTERIOR, LEFT: Primary | ICD-10-CM

## 2024-07-07 DIAGNOSIS — J30.9 ALLERGIC RHINITIS, UNSPECIFIED SEASONALITY, UNSPECIFIED TRIGGER: Chronic | ICD-10-CM

## 2024-07-08 RX ORDER — MONTELUKAST SODIUM 10 MG/1
10 TABLET ORAL NIGHTLY
Qty: 90 TABLET | Refills: 0 | Status: SHIPPED | OUTPATIENT
Start: 2024-07-08

## 2024-07-12 ENCOUNTER — OFFICE VISIT (OUTPATIENT)
Dept: INTERNAL MEDICINE | Facility: CLINIC | Age: 64
End: 2024-07-12
Payer: COMMERCIAL

## 2024-07-12 VITALS
WEIGHT: 189.8 LBS | OXYGEN SATURATION: 97 % | BODY MASS INDEX: 31.62 KG/M2 | TEMPERATURE: 98.2 F | HEIGHT: 65 IN | HEART RATE: 91 BPM | SYSTOLIC BLOOD PRESSURE: 124 MMHG | DIASTOLIC BLOOD PRESSURE: 82 MMHG

## 2024-07-12 DIAGNOSIS — E03.9 ACQUIRED HYPOTHYROIDISM: Chronic | ICD-10-CM

## 2024-07-12 DIAGNOSIS — J30.9 ALLERGIC RHINITIS: ICD-10-CM

## 2024-07-12 DIAGNOSIS — I10 ESSENTIAL HYPERTENSION: Chronic | ICD-10-CM

## 2024-07-12 DIAGNOSIS — F41.9 ANXIETY: Chronic | ICD-10-CM

## 2024-07-12 DIAGNOSIS — M17.12 PRIMARY OSTEOARTHRITIS OF LEFT KNEE: Chronic | ICD-10-CM

## 2024-07-12 DIAGNOSIS — Z00.00 PHYSICAL EXAM: Primary | ICD-10-CM

## 2024-07-12 DIAGNOSIS — G62.9 PERIPHERAL POLYNEUROPATHY: Chronic | ICD-10-CM

## 2024-07-12 PROCEDURE — 99396 PREV VISIT EST AGE 40-64: CPT | Performed by: NURSE PRACTITIONER

## 2024-07-12 RX ORDER — FEXOFENADINE HCL AND PSEUDOEPHEDRINE HCI 60; 120 MG/1; MG/1
1 TABLET, EXTENDED RELEASE ORAL 2 TIMES DAILY
Qty: 120 TABLET | Refills: 5 | Status: SHIPPED | OUTPATIENT
Start: 2024-07-12

## 2024-07-12 NOTE — PROGRESS NOTES
Subjective   Nancy Robbins is a 63 y.o. female who is here for a physical exam.    History of Present Illness   History of Present Illness    The patient experienced a severe pain in her leg while at work, which radiated around her knee. Despite the application of ice, the pain persisted, necessitating the use of a cane for mobility.  A Doppler ultrasound was performed, which yielded normal results. A follow-up appointment with Dr. Moya was scheduled for later today. Currently, she reports no recurrence of the pain, although a sensation remains in the same area. She has been prescribed steroids, which she continues to take. An MRI of her lumbar spine is scheduled for 07/23/2024. Her pain management regimen includes hydrocodone and tramadol, which she takes as needed.    During her last visit, the patient presented with a rash. The rash resolved within a week and she did not take the prescribed steroids.      Past Medical History:   Diagnosis Date    Anxiety     Arthritis of back     Arthritis of neck     Several years ago    Cervical disc disorder 2002    Surgery 2003    Chronic cough     COVID 10/31/2022    DDD (degenerative disc disease), lumbar     SPINAL STENOSIS    Graves disease     Hard to intubate     STATES DIFF TO INTUBATE D/T NOT BEING ABLE TO EXTEND NECK    Hip arthrosis     Hypertension     Hypothyroidism     Knee swelling Unsure    Many years ago    Left knee pain     Low back strain     Unsure    Neuropathy     bilateral lower legs    Osteoarthritis     Plantar fasciitis     RIGHT FOOT    PONV (postoperative nausea and vomiting)     Restless leg syndrome     TAKES XANAX FOR..    Seasonal allergies     Tear of meniscus of knee     Several years ago. Both knees    Vertigo          Current Outpatient Medications:     ALPRAZolam (XANAX) 1 MG tablet, Take 1 tablet by mouth At Night As Needed for Anxiety, Disp: 30 tablet, Rfl: 1    amLODIPine (NORVASC) 5 MG tablet, Take 1 tablet by mouth Daily., Disp: 90  tablet, Rfl: 1    Calcium Carbonate (CALCIUM 600 PO), Take 1 tablet by mouth Daily. HOLDING FOR DOS, Disp: , Rfl:     Docusate Sodium (COLACE PO), Take 2 tablets by mouth Daily., Disp: , Rfl:     estradiol (ESTRACE) 1 MG tablet, Take 1 tablet by mouth Daily., Disp: 90 tablet, Rfl: 1    fexofenadine-pseudoephedrine (Allegra-D Allergy & Congestion)  MG per 12 hr tablet, Take 1 tablet by mouth 2 (Two) Times a Day., Disp: 120 tablet, Rfl: 5    fluticasone (FLONASE) 50 MCG/ACT nasal spray, Use 2 sprays by in each nostril Daily., Disp: 64 g, Rfl: 2    HYDROcodone-acetaminophen (NORCO) 7.5-325 MG per tablet, Take 1-2 tablets by mouth Every 4 (Four) to 6 (Six) Hours As Needed for Moderate Pain. May take 2 ONLY for severe pain., Disp: 30 tablet, Rfl: 0    Ibuprofen 3 %, Gabapentin 10 %, Baclofen 2 %, lidocaine 4 %, Ketamine HCl 4 %, Apply 1-2 g topically to the appropriate area as directed 3 (Three) to 4 (Four) times daily., Disp: 90 g, Rfl: 1    levothyroxine (SYNTHROID, LEVOTHROID) 137 MCG tablet, Take 1 tablet by mouth Daily., Disp: 90 tablet, Rfl: 1    meclizine (ANTIVERT) 25 MG tablet, Take 1 tablet by mouth 3 (Three) Times a Day As Needed for Dizziness., Disp: 25 tablet, Rfl: 0    meloxicam (MOBIC) 15 MG tablet, Take 1 tablet by mouth Daily., Disp: 14 tablet, Rfl: 0    montelukast (SINGULAIR) 10 MG tablet, Take 1 tablet by mouth Every Night., Disp: 90 tablet, Rfl: 0    ondansetron (Zofran) 4 MG tablet, Take 1 tablet by mouth Every 8 (Eight) Hours As Needed for Nausea or Vomiting for up to 10 doses., Disp: 10 tablet, Rfl: 0    Potassium 99 MG tablet, Take 1 tablet by mouth Every Other Day. HOLDING FOR DOS, Disp: , Rfl:     pregabalin (LYRICA) 150 MG capsule, Take 1 capsule by mouth Daily., Disp: 30 capsule, Rfl: 2    pregabalin (LYRICA) 75 MG capsule, Take 1 capsule by mouth Every Morning., Disp: 30 capsule, Rfl: 1    tiZANidine (ZANAFLEX) 4 MG tablet, Take 1 tablet by mouth Every 8 (Eight) Hours As Needed for  "Muscle Spasms., Disp: 90 tablet, Rfl: 2    traMADol (ULTRAM) 50 MG tablet, Take 1 tablet by mouth 2 (Two) Times a Day As Needed for Moderate Pain ., Disp: 30 tablet, Rfl: 0    triamcinolone (KENALOG) 0.1 % cream, Apply 1 Application topically to the appropriate area as directed 2 (Two) Times a Day., Disp: 30 g, Rfl: 0    VITAMIN D PO, Take 1 tablet by mouth Daily. HOLDING FOR DOS, Disp: , Rfl:     irbesartan-hydrochlorothiazide (AVALIDE) 300-12.5 MG tablet, Take 1 tablet by mouth Daily., Disp: 90 tablet, Rfl: 0    Allergies   Allergen Reactions    Ampicillin Nausea Only    Oxycodone Nausea And Vomiting       Review of Systems    Objective   Vitals:    07/12/24 1411   BP: 124/82   BP Location: Left arm   Patient Position: Sitting   Cuff Size: Adult   Pulse: 91   Temp: 98.2 °F (36.8 °C)   SpO2: 97%   Weight: 86.1 kg (189 lb 12.8 oz)   Height: 165.1 cm (65\")     Physical Exam  Constitutional:       General: She is not in acute distress.     Appearance: Normal appearance. She is not diaphoretic.   HENT:      Head: Normocephalic and atraumatic.      Right Ear: Tympanic membrane, ear canal and external ear normal.      Left Ear: Tympanic membrane, ear canal and external ear normal.      Nose: Nose normal. No rhinorrhea.      Mouth/Throat:      Mouth: Mucous membranes are moist.      Pharynx: Posterior oropharyngeal erythema present.   Eyes:      General:         Right eye: No discharge.         Left eye: No discharge.      Conjunctiva/sclera: Conjunctivae normal.   Cardiovascular:      Rate and Rhythm: Normal rate and regular rhythm.      Pulses: Normal pulses.      Heart sounds: Normal heart sounds.   Pulmonary:      Effort: Pulmonary effort is normal.      Breath sounds: Normal breath sounds.   Abdominal:      General: Bowel sounds are normal.      Tenderness: There is no abdominal tenderness.   Musculoskeletal:         General: No swelling or tenderness.      Cervical back: Normal range of motion.      Left lower leg: " Edema present.   Skin:     General: Skin is warm and dry.   Neurological:      General: No focal deficit present.      Mental Status: She is alert and oriented to person, place, and time.   Psychiatric:         Mood and Affect: Mood normal.         Behavior: Behavior normal.         Judgment: Judgment normal.       Physical Exam      Results  Laboratory Studies  C-reactive protein and sed rate were normal.       Assessment & Plan   Diagnoses and all orders for this visit:    1. Physical exam (Primary)  -     CBC (No Diff)  -     Comprehensive Metabolic Panel  -     Lipid Panel  -     TSH    2. Allergic rhinitis  Assessment & Plan:  She notes increased congestion and drainage, improved with Allegra-D as needed. She also takes Flonase NS daily.    Orders:  -     fexofenadine-pseudoephedrine (Allegra-D Allergy & Congestion)  MG per 12 hr tablet; Take 1 tablet by mouth 2 (Two) Times a Day.  Dispense: 120 tablet; Refill: 5    3. Acquired hypothyroidism  Assessment & Plan:  She is currently managed on Synthroid 137 mcg daily (lowered from 150 mcg 1/2023) for replacement, recheck TSH.    Orders:  -     T4, Free    4. Essential hypertension  Assessment & Plan:  Hypertension is stable and controlled  Continue current treatment regimen.  Blood pressure will be reassessed in 6 months.  Continue amlodipine and irbesartan-HCTZ along with a low sodium diet.      5. Anxiety  Assessment & Plan:  She is currently managed on Xanax which she uses sparingly for panic attacks and increased anxiety, has been able to cut back over the past year which she is encouraged to continue      6. Peripheral polyneuropathy  Assessment & Plan:  She is currently managed on Lyrica which she was started by pain mgmt, has been helpful and with minimal side effects.      7. Primary osteoarthritis of left knee  Assessment & Plan:  She notes a gradual improvement in left knee pain and stiffness, followed by ortho.        Assessment & Plan      Risk  Assessment:  Family History   Problem Relation Age of Onset    Stroke Mother 83    Lung cancer Father     Cancer Father         Lung    Kidney disease Sister         kidney transplant x2    Crohn's disease Brother     Crohn's disease Son     Kat Hyperthermia Neg Hx      Her Body mass index is 31.58 kg/m². She is trying to make lifestyle modifications to help with weight loss.    Prevention:  Health Maintenance   Topic Date Due    PAP SMEAR  Never done    COVID-19 Vaccine (4 - 2023-24 season) 09/01/2023    ANNUAL PHYSICAL  01/18/2024    MAMMOGRAM  01/18/2025 (Originally 5/24/2019)    INFLUENZA VACCINE  08/01/2024    BMI FOLLOWUP  03/18/2025    COLORECTAL CANCER SCREENING  05/24/2026    TDAP/TD VACCINES (2 - Td or Tdap) 12/10/2029    HEPATITIS C SCREENING  Completed    ZOSTER VACCINE  Completed    Pneumococcal Vaccine 0-64  Aged Out       Discussed healthy lifestyle choices such as maintaining a balanced diet low in carbohydrates and limiting caffeine and alcohol intake.  Recommended routine exercise for bone strength and cardiovascular health.         Patient or patient representative verbalized consent for the use of Ambient Listening during the visit with  AMBER Gallegos for chart documentation. 7/28/2024  07:10 EDT

## 2024-07-21 DIAGNOSIS — I10 ESSENTIAL HYPERTENSION: Chronic | ICD-10-CM

## 2024-07-22 RX ORDER — IRBESARTAN AND HYDROCHLOROTHIAZIDE 300; 12.5 MG/1; MG/1
1 TABLET, FILM COATED ORAL DAILY
Qty: 90 TABLET | Refills: 0 | Status: SHIPPED | OUTPATIENT
Start: 2024-07-22

## 2024-07-23 ENCOUNTER — PATIENT MESSAGE (OUTPATIENT)
Dept: INTERNAL MEDICINE | Facility: CLINIC | Age: 64
End: 2024-07-23
Payer: COMMERCIAL

## 2024-07-23 ENCOUNTER — HOSPITAL ENCOUNTER (OUTPATIENT)
Dept: MRI IMAGING | Facility: HOSPITAL | Age: 64
Discharge: HOME | End: 2024-07-23
Admitting: ORTHOPAEDIC SURGERY
Payer: COMMERCIAL

## 2024-07-23 DIAGNOSIS — M79.605 LEG PAIN, ANTERIOR, LEFT: ICD-10-CM

## 2024-07-23 PROCEDURE — 72148 MRI LUMBAR SPINE W/O DYE: CPT

## 2024-07-24 RX ORDER — MELOXICAM 15 MG/1
15 TABLET ORAL DAILY
Qty: 30 TABLET | Refills: 0 | Status: CANCELLED | OUTPATIENT
Start: 2024-07-24

## 2024-07-24 NOTE — TELEPHONE ENCOUNTER
From: Nancy Robbins  To: Radha Sy  Sent: 7/23/2024 8:06 PM EDT  Subject: Anti inflammatory     Can you send a prescription for Mobic or Voltaren [facility]?    Thanks

## 2024-07-24 NOTE — PROGRESS NOTES
I called and spoke to this patient in regards to her MRI results.  Patient does have severe spinal stenosis at L3-L4 interval with moderate foraminal narrowing.  These results do lined up with the patient's symptoms.  Patient states that she is already established with a neurosurgeon Dr. Reed.  We have instructed the patient that based off of her results she should follow back up with him for further evaluation.

## 2024-07-27 DIAGNOSIS — E03.9 ACQUIRED HYPOTHYROIDISM: ICD-10-CM

## 2024-07-28 NOTE — ASSESSMENT & PLAN NOTE
Home Eye Care Instructions    ACTIVITY:  • Rest quietly at home for the remainder of the day.  • You may return to all regular activity tomorrow.  • Avoid swimming and hot tubs for one week.  • Resume your usual medications and diet as tolerated.    Activity for Today: You have had sedation.    · DO NOT DRIVE until TOMORROW morning.  Do not operate any other heavy machinery or equipment.  · Avoid activities that require coordination (climbing ladders, using a knife/cooking equipment, etc.)  · Do not make important financial or legal decisions.  · Do not return to work.  · Do not drink any alcohol.  · You may resume your activity tomorrow.  · Do not stay alone today, have someone stay with you.    EYE CARE:  • Remember to always wash hands thoroughly before doing eye care.  • Never touch or rub the operative eye.  • USE THE PREDNISONE EYE DROPS IN THE OPERATIVE EYE: ONE drop FOUR times per day for TWO weeks, then ONE drop TWO times per day for TWO weeks.  • For five days, apply the eye shield securely with the tape provided before napping or going to bed. Avoid sleeping on the operative eye if possible.    WHAT TO EXPECT:  • The vision in your operative eye may be poor until it heals completely. Normally this takes six to eight weeks.   • Use caution on stairs or when walking.   • Do NOT drive until you are comfortable doing so.  • A scratchy feeling in the corner of the eye is not uncommon for the first week or two. You may use acetaminophen or ibuprofen for eye discomfort.    CALL YOUR DOCTOR:  • Increased pain (if greater than that experience on day of surgery).  • Abnormal discharge for the eye.  • Change in vision (worse than the vision the day after surgery).  • Increased redness (worse than the vision the day after surgery).     Hypertension is stable and controlled  Continue current treatment regimen.  Blood pressure will be reassessed in 6 months.  Continue amlodipine and irbesartan-HCTZ along with a low sodium diet.

## 2024-07-28 NOTE — ASSESSMENT & PLAN NOTE
She is currently managed on Xanax which she uses sparingly for panic attacks and increased anxiety, has been able to cut back over the past year which she is encouraged to continue

## 2024-07-28 NOTE — ASSESSMENT & PLAN NOTE
She is currently managed on Lyrica which she was started by pain mgmt, has been helpful and with minimal side effects.

## 2024-07-28 NOTE — ASSESSMENT & PLAN NOTE
She notes increased congestion and drainage, improved with Allegra-D as needed. She also takes Flonase NS daily.

## 2024-07-29 NOTE — TELEPHONE ENCOUNTER
Attempted to call patient, LVM requesting call back    HUB TO READ  We received the request for Levothyroxine but it looks like we need to get your follow up appointment scheduled. Radha would like to see you back around 11/12/2024.    HUB  Please schedule OV with Radha Sy ONLY around 11/12/24. Thanks!

## 2024-08-01 DIAGNOSIS — G62.9 PERIPHERAL POLYNEUROPATHY: ICD-10-CM

## 2024-08-01 RX ORDER — LEVOTHYROXINE SODIUM 137 UG/1
137 TABLET ORAL DAILY
Qty: 90 TABLET | Refills: 1 | Status: SHIPPED | OUTPATIENT
Start: 2024-08-01

## 2024-08-01 NOTE — TELEPHONE ENCOUNTER
[facility] PROVIDED THE RELAY MESSAGE FROM THE OFFICE   PATIENT SCHEDULED AS REQUESTED    ADDITIONAL INFORMATION: 11/26/24

## 2024-08-01 NOTE — TELEPHONE ENCOUNTER
Attempted to call patient, LVM requesting call back     HUB TO READ  We received the requests for Levothyroxine and Lyrica but it looks like we need to get your follow up appointment scheduled. Radha would like to see you back around 11/12/2024.     HUB  Please schedule OV with Radha Sy ONLY around 11/12/24. Thanks

## 2024-08-01 NOTE — TELEPHONE ENCOUNTER
Last office visit with prescribing clinician: 7/12/2024     Next office visit with prescribing clinician: 11/26/2024

## 2024-08-02 RX ORDER — PREGABALIN 150 MG/1
150 CAPSULE ORAL DAILY
Qty: 30 CAPSULE | Refills: 2 | OUTPATIENT
Start: 2024-08-02

## 2024-08-04 ENCOUNTER — PATIENT MESSAGE (OUTPATIENT)
Dept: INTERNAL MEDICINE | Facility: CLINIC | Age: 64
End: 2024-08-04
Payer: COMMERCIAL

## 2024-08-05 NOTE — TELEPHONE ENCOUNTER
From: Nancy Robbins  To: Radha Sy  Sent: 8/4/2024 8:03 PM EDT  Subject: Anti inflammatory medicine    Hello,     Can you send a prescription for diclofenac 75 mg ec 2x’s a day over to [facility] to be mailed to home address for me? Last one I had was filled on 11/14/23. I know I had taken a different one in past (Mobic) I believe & that one would be fine also.    I think that one had been removed from my chart that you had prescribed in the past.     Thank you :)

## 2024-08-06 RX ORDER — MELOXICAM 15 MG/1
15 TABLET ORAL DAILY
Qty: 30 TABLET | Refills: 0 | Status: SHIPPED | OUTPATIENT
Start: 2024-08-06

## 2024-08-08 DIAGNOSIS — G62.9 PERIPHERAL POLYNEUROPATHY: ICD-10-CM

## 2024-08-08 RX ORDER — PREGABALIN 150 MG/1
150 CAPSULE ORAL DAILY
Qty: 30 CAPSULE | Refills: 2 | OUTPATIENT
Start: 2024-08-08

## 2024-08-12 ENCOUNTER — PATIENT MESSAGE (OUTPATIENT)
Dept: INTERNAL MEDICINE | Facility: CLINIC | Age: 64
End: 2024-08-12
Payer: COMMERCIAL

## 2024-08-23 DIAGNOSIS — G62.9 PERIPHERAL POLYNEUROPATHY: ICD-10-CM

## 2024-08-26 RX ORDER — PREGABALIN 150 MG/1
150 CAPSULE ORAL DAILY
Qty: 30 CAPSULE | Refills: 2 | Status: SHIPPED | OUTPATIENT
Start: 2024-08-26

## 2024-09-04 DIAGNOSIS — G62.9 PERIPHERAL POLYNEUROPATHY: ICD-10-CM

## 2024-09-06 RX ORDER — PREGABALIN 75 MG/1
75 CAPSULE ORAL EVERY MORNING
Qty: 30 CAPSULE | Refills: 2 | Status: SHIPPED | OUTPATIENT
Start: 2024-09-06

## 2024-09-17 DIAGNOSIS — F41.9 ANXIETY: ICD-10-CM

## 2024-09-19 RX ORDER — ALPRAZOLAM 1 MG
1 TABLET ORAL NIGHTLY PRN
Qty: 30 TABLET | Refills: 1 | Status: SHIPPED | OUTPATIENT
Start: 2024-09-19

## 2024-09-19 RX ORDER — MELOXICAM 15 MG/1
15 TABLET ORAL DAILY
Qty: 30 TABLET | Refills: 1 | Status: SHIPPED | OUTPATIENT
Start: 2024-09-19

## 2024-10-04 DIAGNOSIS — J30.9 ALLERGIC RHINITIS, UNSPECIFIED SEASONALITY, UNSPECIFIED TRIGGER: Chronic | ICD-10-CM

## 2024-10-04 RX ORDER — MONTELUKAST SODIUM 10 MG/1
10 TABLET ORAL NIGHTLY
Qty: 90 TABLET | Refills: 0 | Status: SHIPPED | OUTPATIENT
Start: 2024-10-04

## 2024-10-19 DIAGNOSIS — I10 ESSENTIAL HYPERTENSION: Chronic | ICD-10-CM

## 2024-10-21 RX ORDER — IRBESARTAN AND HYDROCHLOROTHIAZIDE 300; 12.5 MG/1; MG/1
1 TABLET, FILM COATED ORAL DAILY
Qty: 90 TABLET | Refills: 0 | Status: SHIPPED | OUTPATIENT
Start: 2024-10-21

## 2024-10-24 DIAGNOSIS — I10 ESSENTIAL HYPERTENSION: Chronic | ICD-10-CM

## 2024-10-24 DIAGNOSIS — N95.1 MENOPAUSAL SYMPTOMS: ICD-10-CM

## 2024-10-24 DIAGNOSIS — J30.9 ALLERGIC RHINITIS, UNSPECIFIED SEASONALITY, UNSPECIFIED TRIGGER: Chronic | ICD-10-CM

## 2024-10-24 RX ORDER — AMLODIPINE BESYLATE 5 MG/1
5 TABLET ORAL DAILY
Qty: 90 TABLET | Refills: 1 | Status: SHIPPED | OUTPATIENT
Start: 2024-10-24

## 2024-10-24 NOTE — TELEPHONE ENCOUNTER
Rx Refill Note  Requested Prescriptions     Pending Prescriptions Disp Refills    estradiol (ESTRACE) 1 MG tablet 90 tablet 1     Sig: Take 1 tablet by mouth Daily.     Signed Prescriptions Disp Refills    amLODIPine (NORVASC) 5 MG tablet 90 tablet 1     Sig: Take 1 tablet by mouth Daily.     Authorizing Provider: JAY HELMS     Ordering User: ELISA VEGA      Last office visit with prescribing clinician: Visit date not found   Last telemedicine visit with prescribing clinician: Visit date not found   Next office visit with prescribing clinician: Visit date not found       Elisa Vega MA  10/24/24, 15:45 EDT

## 2024-10-25 RX ORDER — MONTELUKAST SODIUM 10 MG/1
10 TABLET ORAL NIGHTLY
Qty: 90 TABLET | Refills: 1 | Status: SHIPPED | OUTPATIENT
Start: 2024-10-25

## 2024-10-25 RX ORDER — FLUTICASONE PROPIONATE 50 UG/1
2 SPRAY, METERED NASAL DAILY
Qty: 64 G | Refills: 2 | Status: SHIPPED | OUTPATIENT
Start: 2024-10-25

## 2024-10-25 RX ORDER — ESTRADIOL 1 MG/1
1 TABLET ORAL DAILY
Qty: 90 TABLET | Refills: 1 | Status: SHIPPED | OUTPATIENT
Start: 2024-10-25

## 2024-10-25 RX ORDER — MELOXICAM 15 MG/1
15 TABLET ORAL DAILY
Qty: 90 TABLET | Refills: 1 | Status: SHIPPED | OUTPATIENT
Start: 2024-10-25

## 2024-11-14 NOTE — DISCHARGE INSTRUCTIONS
Lumbar Epidural Steroid Injection Instructions  Plan includes:  1.  Lumbar epidural steroid injections, up to 3, spaced 4 weeks apart.  If pain control is acceptable after 1 or 2 injections, it was discussed with the patient that they may return for the subsequent injections if and when their pain returns.  The risks were discussed with the patient including failure of relief, worsening pain, Headache (post dural puncture headache), bleeding (epidural hematoma) and infection (epidural abscess or skin infection).  2.  Physical therapy exercises at home as prescribed by physical therapy or from the pain clinic handout (given to the patient).  Continuation of these exercises every day, or multiple times per week, even when the patient has good pain relief, was stressed to the patient as a preventative measure to decrease the frequency and severity of future pain episodes.  3.  Continue pain medicines as already prescribed.  If patient not currently taking any, it is recommended to begin Acetaminophen 1000 mg po q 8 hours.  If other medicines containing Acetaminophen are currently prescribed, maintain daily dose at 3000 mg.    4.  If they can tolerate NSAIDS, it is recommended to take Ibuprofen 600 mg po q 6 hours for 7 days during pain exacerbations.  Alternatively, they may substitute an NSAID of their choice (e.g. Aleve).  This may be taken at the same time as Acetaminophen.  5.  Heat and ice to the affected area as tolerated for pain control.  It was discussed that heating pads can cause burns.  6.  Daily low impact exercise such as walking or water exercise was recommended to maintain overall health and aid in weight control.   7.  Follow up as needed for subsequent injections.  8.  Patient was counseled to abstain from tobacco products.     - - -

## 2024-11-26 ENCOUNTER — OFFICE VISIT (OUTPATIENT)
Dept: INTERNAL MEDICINE | Facility: CLINIC | Age: 64
End: 2024-11-26
Payer: COMMERCIAL

## 2024-11-26 VITALS
BODY MASS INDEX: 31.99 KG/M2 | WEIGHT: 192 LBS | HEART RATE: 89 BPM | DIASTOLIC BLOOD PRESSURE: 84 MMHG | SYSTOLIC BLOOD PRESSURE: 120 MMHG | HEIGHT: 65 IN | OXYGEN SATURATION: 96 %

## 2024-11-26 DIAGNOSIS — J30.9 ALLERGIC RHINITIS, UNSPECIFIED SEASONALITY, UNSPECIFIED TRIGGER: Chronic | ICD-10-CM

## 2024-11-26 DIAGNOSIS — M43.16 SPONDYLOLISTHESIS AT L4-L5 LEVEL: Chronic | ICD-10-CM

## 2024-11-26 DIAGNOSIS — M15.0 PRIMARY OSTEOARTHRITIS INVOLVING MULTIPLE JOINTS: ICD-10-CM

## 2024-11-26 DIAGNOSIS — E03.9 ACQUIRED HYPOTHYROIDISM: Chronic | ICD-10-CM

## 2024-11-26 DIAGNOSIS — I10 ESSENTIAL HYPERTENSION: Primary | Chronic | ICD-10-CM

## 2024-11-26 DIAGNOSIS — F41.9 ANXIETY: Chronic | ICD-10-CM

## 2024-11-26 LAB
ALBUMIN SERPL-MCNC: 4.3 G/DL (ref 3.5–5.2)
ALBUMIN/GLOB SERPL: 1.7 G/DL
ALP SERPL-CCNC: 98 U/L (ref 39–117)
ALT SERPL-CCNC: 31 U/L (ref 1–33)
AST SERPL-CCNC: 27 U/L (ref 1–32)
BILIRUB SERPL-MCNC: 0.2 MG/DL (ref 0–1.2)
BUN SERPL-MCNC: 20 MG/DL (ref 8–23)
BUN/CREAT SERPL: 27 (ref 7–25)
CALCIUM SERPL-MCNC: 9.7 MG/DL (ref 8.6–10.5)
CHLORIDE SERPL-SCNC: 99 MMOL/L (ref 98–107)
CHOLEST SERPL-MCNC: 259 MG/DL (ref 0–200)
CO2 SERPL-SCNC: 30.5 MMOL/L (ref 22–29)
CREAT SERPL-MCNC: 0.74 MG/DL (ref 0.57–1)
EGFRCR SERPLBLD CKD-EPI 2021: 90.5 ML/MIN/1.73
ERYTHROCYTE [DISTWIDTH] IN BLOOD BY AUTOMATED COUNT: 11.6 % (ref 12.3–15.4)
GLOBULIN SER CALC-MCNC: 2.6 GM/DL
GLUCOSE SERPL-MCNC: 89 MG/DL (ref 65–99)
HCT VFR BLD AUTO: 39.9 % (ref 34–46.6)
HDLC SERPL-MCNC: 65 MG/DL (ref 40–60)
HGB BLD-MCNC: 14 G/DL (ref 12–15.9)
LDLC SERPL CALC-MCNC: 140 MG/DL (ref 0–100)
MCH RBC QN AUTO: 32.3 PG (ref 26.6–33)
MCHC RBC AUTO-ENTMCNC: 35.1 G/DL (ref 31.5–35.7)
MCV RBC AUTO: 92.1 FL (ref 79–97)
PLATELET # BLD AUTO: 292 10*3/MM3 (ref 140–450)
POTASSIUM SERPL-SCNC: 4 MMOL/L (ref 3.5–5.2)
PROT SERPL-MCNC: 6.9 G/DL (ref 6–8.5)
RBC # BLD AUTO: 4.33 10*6/MM3 (ref 3.77–5.28)
SODIUM SERPL-SCNC: 139 MMOL/L (ref 136–145)
T4 FREE SERPL-MCNC: 1.16 NG/DL (ref 0.92–1.68)
TRIGL SERPL-MCNC: 303 MG/DL (ref 0–150)
TSH SERPL DL<=0.005 MIU/L-ACNC: 5.13 UIU/ML (ref 0.27–4.2)
VLDLC SERPL CALC-MCNC: 54 MG/DL (ref 5–40)
WBC # BLD AUTO: 7 10*3/MM3 (ref 3.4–10.8)

## 2024-11-26 PROCEDURE — 99214 OFFICE O/P EST MOD 30 MIN: CPT | Performed by: NURSE PRACTITIONER

## 2024-12-01 DIAGNOSIS — F41.9 ANXIETY: ICD-10-CM

## 2024-12-01 PROBLEM — M15.0 PRIMARY OSTEOARTHRITIS INVOLVING MULTIPLE JOINTS: Status: ACTIVE | Noted: 2024-12-01

## 2024-12-01 NOTE — ASSESSMENT & PLAN NOTE
Symptoms suggest possible eye strain due to prolonged computer use. The eyes are dry and water frequently. She uses eye drops which provide relief at night. However, she also c/o increased drainage which may be contributing to sx-continue Flonase along with Allegra-D. F/u with opth if sx do not improve.

## 2024-12-01 NOTE — ASSESSMENT & PLAN NOTE
Symptoms are consistent with arthritis, exacerbated by barometric pressure changes. She was advised to discontinue Meloxicam for a week to assess its impact on her symptoms. If her pain intensifies, she should resume the medication. She was informed about the potential side effects of Meloxicam, including its impact on kidneys and stomach and is advised to minimize use.

## 2024-12-01 NOTE — ASSESSMENT & PLAN NOTE
BP is well-controlled on amlodipine and irbesartan-HCTZ which she will continue along with a low sodium diet.

## 2024-12-01 NOTE — ASSESSMENT & PLAN NOTE
She reports that previous epidurals provided temporary relief but the medial branch block was only effective after the first session. She notes an increase in pain and feels she may need pain medication in the future, discussed possible referral to pain mgmt which she will delay for now. She is considering surgery as recommended by neurosurgery.

## 2024-12-01 NOTE — PROGRESS NOTES
"Chief Complaint  No chief complaint on file.  Subjective        Nancy Robbins presents to [facility]  History of Present Illness  History of Present Illness  The patient presents for evaluation of multiple medical concerns.    She reports experiencing eye irritation, characterized by redness and discomfort but no itching. She attributes this to prolonged computer use. Her eyes tend to dry out but she finds relief with the use of lubricating eye drops. Denies visual changes.    She reports right wrist pain, which she attributes to a small cyst.     She has previously received injections for plantar fasciitis from a podiatrist which did not provide relief. She was also given a boot to stretch her foot. She notes a recent exacerbation of pain.    She continues to c/o low back pain and stiffness, has tried epidural injections which provided temporary relief, and a medial branch block which was initially effective but did not provide lasting relief. She is managed on Lyrica for lower extremity neuralgia, denies change in bowel or bladder function. She notes that the medication provides some relief but the tightness returns. She has seen neurosurgery who recommended surgery which she is considering.    She notes that changes in weather, particularly a 30-degree shift exacerbate her pain. She is interested in exploring alternative treatments for her arthritis. She did not notice any difference when she stopped taking Meloxicam for a week prior to her surgery. She is currently taking Meloxicam every other day.    She also reports persistent allergies without chest tightness and/or dyspnea.    Objective   Vital Signs:  /84 (BP Location: Left arm, Patient Position: Sitting, Cuff Size: Adult)   Pulse 89   Ht 165.1 cm (65\")   Wt 87.1 kg (192 lb)   SpO2 96%   BMI 31.95 kg/m²   Estimated body mass index is 31.95 kg/m² as calculated from the following:    Height as of this encounter: 165.1 " "cm (65\").    Weight as of this encounter: 87.1 kg (192 lb).            Physical Exam  Constitutional:       Appearance: She is well-developed. She is not ill-appearing.   HENT:      Head: Normocephalic.      Right Ear: Hearing, tympanic membrane and external ear normal.      Left Ear: Hearing, tympanic membrane and external ear normal.      Nose: Nose normal. No nasal deformity, mucosal edema or rhinorrhea.      Right Sinus: No maxillary sinus tenderness or frontal sinus tenderness.      Left Sinus: No maxillary sinus tenderness or frontal sinus tenderness.      Mouth/Throat:      Dentition: Normal dentition.      Pharynx: Postnasal drip present.   Eyes:      General: Lids are normal.         Right eye: No discharge.         Left eye: No discharge.      Conjunctiva/sclera: Conjunctivae normal.      Right eye: No exudate.     Left eye: No exudate.  Neck:      Thyroid: No thyroid mass or thyromegaly.      Vascular: No carotid bruit.      Trachea: Trachea normal.   Cardiovascular:      Rate and Rhythm: Regular rhythm.      Pulses: Normal pulses.      Heart sounds: Normal heart sounds. No murmur heard.  Pulmonary:      Effort: No respiratory distress.      Breath sounds: Normal breath sounds. No decreased breath sounds, wheezing, rhonchi or rales.   Abdominal:      General: Bowel sounds are normal.      Palpations: Abdomen is soft.      Tenderness: There is no abdominal tenderness.   Musculoskeletal:      Cervical back: Normal range of motion. No edema.   Lymphadenopathy:      Head:      Right side of head: No submental, submandibular, tonsillar, preauricular, posterior auricular or occipital adenopathy.      Left side of head: No submental, submandibular, tonsillar, preauricular, posterior auricular or occipital adenopathy.   Skin:     General: Skin is warm and dry.      Nails: There is no clubbing.   Neurological:      Mental Status: She is alert.   Psychiatric:         Behavior: Behavior is cooperative.      "   Physical Exam      Result Review :  The following data was reviewed by: AMBER Gallegos on 11/26/2024:  Common labs          3/8/2024    10:01 3/19/2024    05:18 11/26/2024    16:11   Common Labs   Glucose 89   89    BUN 16   20    Creatinine 0.86   0.74    Sodium 141   139    Potassium 3.9   4.0    Chloride 104   99    Calcium 9.5   9.7    Total Protein   6.9    Albumin   4.3    Total Bilirubin   0.2    Alkaline Phosphatase   98    AST (SGOT)   27    ALT (SGPT)   31    WBC 6.69   7.00    Hemoglobin 13.2  10.8  14.0    Hematocrit 38.9  31.3  39.9    Platelets 257   292    Total Cholesterol   259    Triglycerides   303    HDL Cholesterol   65    LDL Cholesterol    140      Data reviewed : Radiologic studies MRI lumbar spine 7/23/24      Results               Assessment and Plan   Diagnoses and all orders for this visit:    1. Essential hypertension (Primary)  Assessment & Plan:  BP is well-controlled on amlodipine and irbesartan-HCTZ which she will continue along with a low sodium diet.      2. Acquired hypothyroidism    3. Allergic rhinitis, unspecified seasonality, unspecified trigger  Assessment & Plan:  Symptoms suggest possible eye strain due to prolonged computer use. The eyes are dry and water frequently. She uses eye drops which provide relief at night. However, she also c/o increased drainage which may be contributing to sx-continue Flonase along with Allegra-D. F/u with opth if sx do not improve.      4. Anxiety  Assessment & Plan:  She is currently managed on Xanax which she uses sparingly for panic attacks and increased anxiety, has been able to cut back over the past year which she is encouraged to continue      5. Spondylolisthesis at L4-L5 level  Assessment & Plan:  She reports that previous epidurals provided temporary relief but the medial branch block was only effective after the first session. She notes an increase in pain and feels she may need pain medication in the future, discussed  possible referral to pain mgmt which she will delay for now. She is considering surgery as recommended by neurosurgery.        6. Primary osteoarthritis involving multiple joints  Assessment & Plan:  Symptoms are consistent with arthritis, exacerbated by barometric pressure changes. She was advised to discontinue Meloxicam for a week to assess its impact on her symptoms. If her pain intensifies, she should resume the medication. She was informed about the potential side effects of Meloxicam, including its impact on kidneys and stomach and is advised to minimize use.        Assessment & Plan           Follow Up   Return in about 4 months (around 3/26/2025).  Patient was given instructions and counseling regarding her condition or for health maintenance advice. Please see specific information pulled into the AVS if appropriate.     Patient or patient representative verbalized consent for the use of Ambient Listening during the visit with  AMBER Gallegos for chart documentation. 12/1/2024  15:18 EST  Answers submitted by the patient for this visit:  Primary Reason for Visit (Submitted on 11/25/2024)  What is the primary reason for your visit?: Problem Not Listed

## 2024-12-02 NOTE — TELEPHONE ENCOUNTER
Last office visit with prescribing clinician: 11/26/2024     Next office visit with prescribing clinician: 3/28/2025

## 2024-12-03 RX ORDER — ALPRAZOLAM 1 MG/1
1 TABLET ORAL NIGHTLY PRN
Qty: 30 TABLET | Refills: 1 | Status: SHIPPED | OUTPATIENT
Start: 2024-12-03

## 2024-12-09 ENCOUNTER — TELEPHONE (OUTPATIENT)
Dept: INTERNAL MEDICINE | Facility: CLINIC | Age: 64
End: 2024-12-09
Payer: COMMERCIAL

## 2024-12-09 DIAGNOSIS — E03.9 ACQUIRED HYPOTHYROIDISM: ICD-10-CM

## 2024-12-09 RX ORDER — LEVOTHYROXINE SODIUM 150 UG/1
150 TABLET ORAL
Qty: 30 TABLET | Refills: 1 | Status: SHIPPED | OUTPATIENT
Start: 2024-12-09

## 2024-12-09 NOTE — TELEPHONE ENCOUNTER
Best Callback Number: [phone]    HUB PROVIDED THE RELAY MESSAGE FROM THE OFFICE   PATIENT VOICED UNDERSTANDING AND HAS NO FURTHER QUESTIONS AT THIS TIME    ADDITIONAL INFORMATION: PATIENT STATES THAT SHE WILL CALL TO HAVE A FOLLOW UP TSH SCHEDULED.

## 2024-12-09 NOTE — TELEPHONE ENCOUNTER
----- Message from Radha Sy sent at 12/9/2024  7:19 AM EST -----  Please call patient with results: Your TSH results show your thyroid is low. I'd like to increase levothyroxine to 150 mcg and repeat the TSH in 2 months.

## 2024-12-09 NOTE — TELEPHONE ENCOUNTER
Attempted to call patient, LVM requesting call back. Will post pone for reattempt    HUB TO READ  Radha states your recent TSH results show your thyroid is low. Radha would like to increase levothyroxine to 150 mcg and repeat the TSH in 2 months.

## 2024-12-23 DIAGNOSIS — G62.9 PERIPHERAL POLYNEUROPATHY: ICD-10-CM

## 2024-12-23 RX ORDER — PREGABALIN 150 MG/1
150 CAPSULE ORAL DAILY
Qty: 30 CAPSULE | Refills: 2 | Status: SHIPPED | OUTPATIENT
Start: 2024-12-23

## 2024-12-23 NOTE — TELEPHONE ENCOUNTER
Rx Refill Note  Requested Prescriptions     Pending Prescriptions Disp Refills    pregabalin (LYRICA) 150 MG capsule 30 capsule 2     Sig: Take 1 capsule by mouth Daily.      Last office visit with prescribing clinician: 11/26/2024   Last telemedicine visit with prescribing clinician: Visit date not found   Next office visit with prescribing clinician: 3/28/2025                         Would you like a call back once the refill request has been completed: [] Yes [] No    If the office needs to give you a call back, can they leave a voicemail: [] Yes [] No    Renetta Langley  12/23/24, 10:55 EST

## 2025-01-13 DIAGNOSIS — G62.9 PERIPHERAL POLYNEUROPATHY: ICD-10-CM

## 2025-01-13 NOTE — TELEPHONE ENCOUNTER
Rx Refill Note  Requested Prescriptions     Pending Prescriptions Disp Refills    pregabalin (LYRICA) 75 MG capsule 30 capsule 2     Sig: Take 1 capsule by mouth Every Morning.      Last office visit with prescribing clinician: 11/26/2024   Last telemedicine visit with prescribing clinician: Visit date not found   Next office visit with prescribing clinician: 3/28/2025                         Would you like a call back once the refill request has been completed: [] Yes [] No    If the office needs to give you a call back, can they leave a voicemail: [] Yes [] No    Renetta Langley  01/13/25, 11:36 EST

## 2025-01-15 RX ORDER — PREGABALIN 75 MG/1
75 CAPSULE ORAL EVERY MORNING
Qty: 30 CAPSULE | Refills: 2 | Status: SHIPPED | OUTPATIENT
Start: 2025-01-15

## 2025-01-20 DIAGNOSIS — I10 ESSENTIAL HYPERTENSION: Chronic | ICD-10-CM

## 2025-01-20 RX ORDER — IRBESARTAN AND HYDROCHLOROTHIAZIDE 300; 12.5 MG/1; MG/1
1 TABLET, FILM COATED ORAL DAILY
Qty: 90 TABLET | Refills: 0 | Status: SHIPPED | OUTPATIENT
Start: 2025-01-20

## 2025-01-21 ENCOUNTER — TELEPHONE (OUTPATIENT)
Dept: NEUROSURGERY | Facility: CLINIC | Age: 65
End: 2025-01-21
Payer: COMMERCIAL

## 2025-01-21 NOTE — TELEPHONE ENCOUNTER
Left message to patient's voicemail informing her that her upcoming appointment w/ Mariposa CLARK is canceled and to give us a call back to get rescheduled. ^HUB CAN READ AND SCHEDULE.

## 2025-01-29 LAB — TSH SERPL DL<=0.005 MIU/L-ACNC: 0.48 UIU/ML (ref 0.27–4.2)

## 2025-02-03 DIAGNOSIS — E03.9 ACQUIRED HYPOTHYROIDISM: ICD-10-CM

## 2025-02-03 RX ORDER — LEVOTHYROXINE SODIUM 150 UG/1
150 TABLET ORAL
Qty: 90 TABLET | Refills: 1 | Status: SHIPPED | OUTPATIENT
Start: 2025-02-03

## 2025-02-07 ENCOUNTER — DOCUMENTATION (OUTPATIENT)
Dept: PHYSICAL THERAPY | Facility: CLINIC | Age: 65
End: 2025-02-07
Payer: COMMERCIAL

## 2025-02-11 DIAGNOSIS — F41.9 ANXIETY: ICD-10-CM

## 2025-02-12 NOTE — TELEPHONE ENCOUNTER
Rx Refill Note  Requested Prescriptions     Pending Prescriptions Disp Refills    ALPRAZolam (XANAX) 1 MG tablet 30 tablet 1     Sig: Take 1 tablet by mouth At Night As Needed for Anxiety      Last office visit with prescribing clinician: 11/26/2024   Last telemedicine visit with prescribing clinician: Visit date not found   Next office visit with prescribing clinician: 3/28/2025                         Would you like a call back once the refill request has been completed: [] Yes [] No    If the office needs to give you a call back, can they leave a voicemail: [] Yes [] No    Renetta Langley  02/12/25, 08:56 EST

## 2025-02-13 RX ORDER — ALPRAZOLAM 1 MG/1
1 TABLET ORAL NIGHTLY PRN
Qty: 30 TABLET | Refills: 1 | Status: SHIPPED | OUTPATIENT
Start: 2025-02-13

## 2025-03-06 NOTE — PROGRESS NOTES
Subjective   Patient ID: Nancy Robbins is a 64 y.o. female is here today for follow-up for   -Spinal stenosis of lumbar region with neurogenic claudication +2   Follow up, last seen in 05/23/2022 Dr. Reed   MRI lumbar spine w/o contrast completed on 07/23/2024      History of Present Illness    She was last seen in our office by Dr. BETTENCOURT in May 2022 for further evaluation of low back and bilateral leg pain, left greater than right.  She has known spinal stenosis that is severe at L3-4 and a significant degree at L4-5 with a superimposed spondylolisthesis.  She has been followed closely and has been treated with conservative management.  She remains on Lyrica and she follows up with new lumbar MRI imaging obtained in July 2024.    Today, the patient reports that the pain in the bilateral low back has worsened to the point that it is almost always severe.  She reports it is a 10 out of 10 on a daily basis.  She describes a severe burning sensation in the low back that radiates to the sides.  She also gets pain that radiates down the left leg.  The back pain worsens with any prolonged standing or walking.  She lives alone on [place] acres [place] land and states that she is constantly having to do things around her home.  Now she can barely take care of her house without being in such constant discomfort.  She is considering moving.  She states with the pain being as bad as it is now, she is ready to move forward with a surgery discussion.  Most recent lumbar MRI was in July 2024.  She has not had any recent lumbar flexion and extension x-rays.  She is requesting a refill on tramadol to help with the worsening discomfort.  She also takes daily meloxicam, which does help.    The pain in the low back and left leg is starting to impact her quality of life.  She has difficulty walking at times.  She denies any overt weakness in her legs, and denies any balance or gait issues.  at times, she does feel as if the left leg could give  "out when standing and walking.  Underwent left-sided total knee replacement surgery almost 1 year ago.  She did very well from that procedure and is hopeful to get good results after lumbar surgery.    She presents unaccompanied.      The following portions of the patient's history were reviewed and updated as appropriate: allergies, current medications, past family history, past medical history, past social history, past surgical history, and problem list.    Review of Systems   Constitutional:  Negative for fatigue and fever.   Gastrointestinal:  Negative for nausea and vomiting.   Genitourinary:  Negative for difficulty urinating.   Musculoskeletal:  Positive for back pain, gait problem, neck pain (due to a fusion) and neck stiffness.   Neurological:  Positive for numbness. Negative for dizziness, weakness, light-headedness and headaches.           Objective     Vitals:    03/11/25 1307   BP: 132/82   BP Location: Right arm   Patient Position: Sitting   Cuff Size: Adult   Pulse: 92   Resp: 16   Temp: 98.2 °F (36.8 °C)   TempSrc: Temporal   SpO2: 96%   Weight: 88.2 kg (194 lb 6.4 oz)   Height: 165.1 cm (65\")   PainSc: 5    PainLoc: Back     Body mass index is 32.35 kg/m².      Physical Exam  Vitals reviewed.   Constitutional:       Appearance: Normal appearance.   HENT:      Head: Atraumatic.   Musculoskeletal:         General: Tenderness (Very minimally tender to firm palpation in the bilateral low back) present. Normal range of motion.      Right lower leg: No edema.      Left lower leg: No edema.      Comments: Decreased lumbar range of motion secondary to pain, worse with flexion  Strength equal and intact bilateral lower extremities with normal muscle bulk and tone   Skin:     General: Skin is warm and dry.   Neurological:      Mental Status: She is alert and oriented to person, place, and time.      GCS: GCS eye subscore is 4. GCS verbal subscore is 5. GCS motor subscore is 6.      Sensory: Sensory deficit " (Decree sensation to soft touch diffusely throughout the left lower extremity) present.      Motor: No weakness or tremor.      Gait: Gait normal.      Deep Tendon Reflexes:      Reflex Scores:       Patellar reflexes are 2+ on the right side and 1+ on the left side.       Achilles reflexes are 2+ on the right side and 2+ on the left side.     Comments: Stable and upright, but antalgic on the left   able to stand on heels and toes  Positive straight leg raise on the left   Psychiatric:         Mood and Affect: Mood normal.       Neurological Exam  Mental Status  Alert. Oriented to person, place, and time.    Reflexes                                            Right                      Left  Patellar                                2+                         1+  Achilles                                2+                         2+    Coordination  No tremor    Gait   Normal gait.          Assessment & Plan   Independent Review of Radiographic Studies:      I personally reviewed the images from the following studies.    Lumbar MRI from [facility] dated July 23, 2024 reveals disc osteophyte complex with facet disease and ligamentum flavum thickening at L3-4 resulting in severe canal stenosis and moderate foraminal narrowing.  Otherwise, no significant canal or foraminal stenosis.    Medical Decision Making:    She returns office today for ongoing follow-up with history of acute on chronic low back and left leg pain.  Exam as noted above,  No red flags.  At this point, she is ready to move out of the surgical discussion.  She has been followed by Dr. BETTENCOURT for a couple of years with known severe canal stenosis at L3-4 as well as spondylolisthesis at L4-5.  She is very apprehensive about surgery, but is also ready to feel better.  She states that she is tired of being in severe, 10 out of 10 pain on a daily basis.  We will have her undergo new lumbar MRI imaging as well as lumbar flexion and extension x-rays and follow-up with  Dr. BETTENCOURT.  As a courtesy, I have given her some tramadol for days when the pain is exceptionally difficult to deal with.  She works at home as a  with [employer].  We will arrange for her to follow-up with Dr. BETTENCOURT for a surgical discussion.  She is agreeable with this plan.    Plan: Return to office for surgical discussion with Dr. BETTENCOURT, obtain new lumbar MRI and x-rays with flexion and extension take tramadol as directed    Diagnoses and all orders for this visit:    1. Spondylolisthesis at L4-L5 level (Primary)  -     XR Spine Lumbar Complete With Flex & Ext; Future  -     traMADol (ULTRAM) 50 MG tablet; Take 1 tablet by mouth 2 (Two) Times a Day As Needed for Moderate Pain.  Dispense: 30 tablet; Refill: 0  -     MRI Lumbar Spine With & Without Contrast; Future    2. Spinal stenosis of lumbar region with neurogenic claudication  -     XR Spine Lumbar Complete With Flex & Ext; Future  -     traMADol (ULTRAM) 50 MG tablet; Take 1 tablet by mouth 2 (Two) Times a Day As Needed for Moderate Pain.  Dispense: 30 tablet; Refill: 0  -     MRI Lumbar Spine With & Without Contrast; Future    3. Degeneration of intervertebral disc of lumbar region with discogenic back pain and lower extremity pain  -     XR Spine Lumbar Complete With Flex & Ext; Future  -     MRI Lumbar Spine With & Without Contrast; Future    4. DDD (degenerative disc disease), lumbar  -     traMADol (ULTRAM) 50 MG tablet; Take 1 tablet by mouth 2 (Two) Times a Day As Needed for Moderate Pain.  Dispense: 30 tablet; Refill: 0  -     MRI Lumbar Spine With & Without Contrast; Future      Return in about 3 months (around 6/11/2025).

## 2025-03-11 ENCOUNTER — OFFICE VISIT (OUTPATIENT)
Dept: NEUROSURGERY | Facility: CLINIC | Age: 65
End: 2025-03-11
Payer: COMMERCIAL

## 2025-03-11 VITALS
OXYGEN SATURATION: 96 % | TEMPERATURE: 98.2 F | WEIGHT: 194.4 LBS | HEIGHT: 65 IN | BODY MASS INDEX: 32.39 KG/M2 | HEART RATE: 92 BPM | RESPIRATION RATE: 16 BRPM | SYSTOLIC BLOOD PRESSURE: 132 MMHG | DIASTOLIC BLOOD PRESSURE: 82 MMHG

## 2025-03-11 DIAGNOSIS — M48.062 SPINAL STENOSIS OF LUMBAR REGION WITH NEUROGENIC CLAUDICATION: ICD-10-CM

## 2025-03-11 DIAGNOSIS — M51.362 DEGENERATION OF INTERVERTEBRAL DISC OF LUMBAR REGION WITH DISCOGENIC BACK PAIN AND LOWER EXTREMITY PAIN: ICD-10-CM

## 2025-03-11 DIAGNOSIS — M51.369 DDD (DEGENERATIVE DISC DISEASE), LUMBAR: ICD-10-CM

## 2025-03-11 DIAGNOSIS — M43.16 SPONDYLOLISTHESIS AT L4-L5 LEVEL: Primary | ICD-10-CM

## 2025-03-11 RX ORDER — TRAMADOL HYDROCHLORIDE 50 MG/1
50 TABLET ORAL 2 TIMES DAILY PRN
Qty: 30 TABLET | Refills: 0 | Status: SHIPPED | OUTPATIENT
Start: 2025-03-11

## 2025-03-18 ENCOUNTER — OFFICE VISIT (OUTPATIENT)
Dept: ORTHOPEDIC SURGERY | Facility: CLINIC | Age: 65
End: 2025-03-18
Payer: COMMERCIAL

## 2025-03-18 VITALS — BODY MASS INDEX: 32.32 KG/M2 | HEIGHT: 65 IN | TEMPERATURE: 97.8 F | WEIGHT: 194 LBS

## 2025-03-18 DIAGNOSIS — R52 PAIN: Primary | ICD-10-CM

## 2025-03-18 DIAGNOSIS — Z96.652 STATUS POST TOTAL KNEE REPLACEMENT, LEFT: ICD-10-CM

## 2025-03-18 PROCEDURE — 99212 OFFICE O/P EST SF 10 MIN: CPT | Performed by: NURSE PRACTITIONER

## 2025-03-18 PROCEDURE — 73562 X-RAY EXAM OF KNEE 3: CPT | Performed by: NURSE PRACTITIONER

## 2025-03-18 NOTE — PROGRESS NOTES
"Nancy Robbins : 1960 MRN: [id number] DATE: 3/18/2025    DIAGNOSIS: Annual follow up left total knee      SUBJECTIVE:Patient returns today for a 1 year follow up of left total knee replacement. Patient reports doing well with no unusual complaints.  Patient reports that she has no significant pain, swelling, stiffness.  Denies any limitations or instability issues due to the knee.  States that she is very happy with her surgical outcome and is needed a lot better than her other knee done previously.  She denies any sign or symptoms of infection, and is without any other significant complaints today.    OBJECTIVE:    Temp 97.8 °F (36.6 °C)   Ht 165.1 cm (65\")   Wt 88 kg (194 lb)   BMI 32.28 kg/m²   Family History   Problem Relation Age of Onset    Stroke Mother 83    Lung cancer Father     Cancer Father         Lung    Kidney disease Sister         Half sister    Crohn's disease Brother     Crohn's disease Son     Kidney disease Sister         2 kidney transplants    Malig Hyperthermia Neg Hx      Past Medical History:   Diagnosis Date    Allergic     Anxiety     Arthritis of back     Arthritis of neck     Several years ago    Cervical disc disorder 2002    Surgery 2003    Chronic cough     COVID 10/31/2022    DDD (degenerative disc disease), lumbar     SPINAL STENOSIS    Graves disease     Hard to intubate     STATES DIFF TO INTUBATE D/T NOT BEING ABLE TO EXTEND NECK    Hip arthrosis     Hypertension     Hyperthyroidism     Hypothyroidism     Injury of neck 2003    Knee swelling Unsure    Many years ago    Left knee pain     Low back pain     Low back strain     Unsure    Lumbosacral disc disease     Neuropathy     bilateral lower legs    Osteoarthritis     Plantar fasciitis     RIGHT FOOT    PONV (postoperative nausea and vomiting)     Restless leg syndrome     TAKES XANAX FOR..    Seasonal allergies     Tear of meniscus of knee     Several years ago. Both knees    Vertigo      Past Surgical History: "   Procedure Laterality Date    BACK SURGERY      lumbar discectomy    COLONOSCOPY N/A 2016    Procedure: COLONOSCOPY;  Surgeon: Jose G Toro MD;  Location: [facility] ENDOSCOPY;  Service:     EPIDURAL BLOCK      back last one 18    JOINT REPLACEMENT  2018    KNEE MENISCAL REPAIR Bilateral     NECK SURGERY      fusion w/hardware    SPINE SURGERY      SUBTOTAL HYSTERECTOMY      TONSILLECTOMY      TOTAL KNEE ARTHROPLASTY Right 12/10/2018    Procedure: RT TOTAL KNEE ARTHROPLASTY;  Surgeon: Geo Fu MD;  Location: [facility] MAIN OR;  Service: Orthopedics    TOTAL KNEE ARTHROPLASTY Left 2024    Procedure: TOTAL KNEE ARTHROPLASTY;  Surgeon: James Moya MD;  Location:  [facility];  Service: Orthopedics;  Laterality: Left;     Social History     Socioeconomic History    Marital status:    Tobacco Use    Smoking status: Former     Current packs/day: 0.00     Average packs/day: 0.5 packs/day for 25.0 years (12.5 ttl pk-yrs)     Types: Cigarettes     Start date: 1981     Quit date: 2006     Years since quittin.2    Smokeless tobacco: Never   Vaping Use    Vaping status: Never Used   Substance and Sexual Activity    Alcohol use: Yes     Comment: Occasional    Drug use: No    Sexual activity: Not Currently     Partners: Male     Birth control/protection: None, Tubal ligation, Hysterectomy     Review of Systems - a 14 point review of systems was performed. All systems were negative.    Exam:. The incision is well healed. Range of motion is measured at 0 to 125. The calf is soft and nontender with a negative Homans sign. Alignment is neutral. Good quad strength. There is no evidence of varus/valgus or flexion instability. No effusion. Intact to light touch with palpable distal pulses.     DIAGNOSTIC STUDIES  Xrays: 3 views of the left knee (AP, lateral, and sunrise) were ordered and reviewed for evaluation of recent knee replacement. They demonstrate a well positioned, well  aligned knee replacement without complicating factors noted. In comparison with previous films there has been no change.    ASSESSMENT: Annual follow up left knee replacement.    PLAN:  Continue activities as tolerated    Follow up as needed    Arian Awad, APRN  3/18/2025

## 2025-03-28 ENCOUNTER — OFFICE VISIT (OUTPATIENT)
Dept: INTERNAL MEDICINE | Facility: CLINIC | Age: 65
End: 2025-03-28
Payer: COMMERCIAL

## 2025-03-28 VITALS
DIASTOLIC BLOOD PRESSURE: 80 MMHG | OXYGEN SATURATION: 97 % | HEART RATE: 71 BPM | SYSTOLIC BLOOD PRESSURE: 122 MMHG | BODY MASS INDEX: 32.32 KG/M2 | HEIGHT: 65 IN | WEIGHT: 194 LBS | RESPIRATION RATE: 18 BRPM | TEMPERATURE: 98 F

## 2025-03-28 DIAGNOSIS — M43.16 SPONDYLOLISTHESIS AT L4-L5 LEVEL: ICD-10-CM

## 2025-03-28 DIAGNOSIS — I10 ESSENTIAL HYPERTENSION: Primary | Chronic | ICD-10-CM

## 2025-03-28 DIAGNOSIS — R49.0 HOARSENESS: ICD-10-CM

## 2025-03-28 DIAGNOSIS — M17.12 ARTHRITIS OF LEFT KNEE: ICD-10-CM

## 2025-03-28 DIAGNOSIS — E03.9 ACQUIRED HYPOTHYROIDISM: Chronic | ICD-10-CM

## 2025-03-28 PROBLEM — Z53.20 MAMMOGRAM DECLINED: Status: ACTIVE | Noted: 2025-03-28

## 2025-03-28 PROCEDURE — 99214 OFFICE O/P EST MOD 30 MIN: CPT | Performed by: NURSE PRACTITIONER

## 2025-03-28 RX ORDER — TRAMADOL HYDROCHLORIDE 50 MG/1
50 TABLET ORAL 2 TIMES DAILY PRN
Start: 2025-03-28

## 2025-04-06 PROBLEM — R49.0 HOARSENESS: Status: ACTIVE | Noted: 2025-04-06

## 2025-04-06 PROBLEM — M17.12 ARTHRITIS OF LEFT KNEE: Status: ACTIVE | Noted: 2025-04-06

## 2025-04-06 PROBLEM — M17.9 OA (OSTEOARTHRITIS) OF KNEE: Status: RESOLVED | Noted: 2024-03-18 | Resolved: 2025-04-06

## 2025-04-06 NOTE — PROGRESS NOTES
Chief Complaint  Hypertension and Hypothyroidism  Subjective        Nancy Robbins presents to [facility]    Follow up  Symptoms include: nasal congestion and cough.   Pertinent negative symptoms include no abdominal pain, no anorexia, no joint pain, no change in stool, no chest pain, no chills, no diaphoresis, no fatigue, no fever, no headaches, no joint swelling, no myalgias, no nausea, no neck pain, no numbness, no rash, no sore throat, no swollen glands, no dysuria, no vertigo, no visual change, no vomiting and no weakness.     History of Present Illness  The patient presents for evaluation of back pain, knee pain, and voice changes.    She is doing well recovering from her left knee replacement, attending physical therapy regularly which has been helpful. She continues to experience arthritis symptoms including inflammation, swelling, and tightness, particularly during weather changes.     She has not yet undergone the repeat MRI of lumbar spine ordered by her neurosurgery, has f/u appt in June.. She is considering scheduling her surgery for 09/2025 but is uncertain if she can tolerate the pain until then. She has been unable to maintain an active lifestyle due to her back pain, which has resulted in weight gain. She attempts to manage this by riding a bicycle in her basement. She has been managing her back pain with tramadol, which was prescribed by neurosurgery during their consultation in 03/2025.     She is curious about the post-operative recovery process and whether she will be able to care for herself independently. She has been informed that she will be hospitalized for at least 2 days post-surgery and that she will need assistance at home for a few days thereafter. She has also been advised against lifting anything over 5 pounds and will be required to wear a brace for 2 to 3 months. She is interested in knowing if physical therapy will be necessary post-surgery.    She has  "declined to undergo a mammogram despite the known risks..    She has noticed a change in her voice since her knee surgery, which she believes may be due to intubation during the procedure. She was advised to consult an ENT specialist who might be able to administer an injection to relax her vocal cords. She is considering whether to proceed with this consultation now or wait until after her back surgery. She reports that her voice becomes progressively raspier as the day goes on.    Objective   Vital Signs:  /80 (BP Location: Right arm)   Pulse 71   Temp 98 °F (36.7 °C) (Oral)   Resp 18   Ht 165.1 cm (65\")   Wt 88 kg (194 lb)   SpO2 97%   BMI 32.28 kg/m²   Estimated body mass index is 32.28 kg/m² as calculated from the following:    Height as of this encounter: 165.1 cm (65\").    Weight as of this encounter: 88 kg (194 lb).    BMI is >= 30 and <35. (Class 1 Obesity). The following options were offered after discussion;: nutrition counseling/recommendations      Physical Exam  Constitutional:       Appearance: She is well-developed. She is not ill-appearing.   HENT:      Head: Normocephalic.      Right Ear: Hearing, tympanic membrane and external ear normal.      Left Ear: Hearing, tympanic membrane and external ear normal.      Nose: Nose normal. No nasal deformity, mucosal edema or rhinorrhea.      Right Sinus: No maxillary sinus tenderness or frontal sinus tenderness.      Left Sinus: No maxillary sinus tenderness or frontal sinus tenderness.      Mouth/Throat:      Dentition: Normal dentition.   Eyes:      General: Lids are normal.         Right eye: No discharge.         Left eye: No discharge.      Conjunctiva/sclera: Conjunctivae normal.      Right eye: No exudate.     Left eye: No exudate.  Neck:      Thyroid: No thyroid mass or thyromegaly.      Vascular: No carotid bruit.      Trachea: Trachea normal.   Cardiovascular:      Rate and Rhythm: Regular rhythm.      Pulses: Normal pulses.      Heart " sounds: Normal heart sounds. No murmur heard.  Pulmonary:      Effort: No respiratory distress.      Breath sounds: Normal breath sounds. No decreased breath sounds, wheezing, rhonchi or rales.   Abdominal:      General: Bowel sounds are normal.      Palpations: Abdomen is soft.      Tenderness: There is no abdominal tenderness.   Musculoskeletal:      Cervical back: Normal range of motion. No edema.   Lymphadenopathy:      Head:      Right side of head: No submental, submandibular, tonsillar, preauricular, posterior auricular or occipital adenopathy.      Left side of head: No submental, submandibular, tonsillar, preauricular, posterior auricular or occipital adenopathy.   Skin:     General: Skin is warm and dry.      Nails: There is no clubbing.   Neurological:      Mental Status: She is alert.   Psychiatric:         Behavior: Behavior is cooperative.            Result Review :  The following data was reviewed by: AMBER Gallegos on 03/28/2025:  Common labs          11/26/2024    16:11   Common Labs   Glucose 89    BUN 20    Creatinine 0.74    Sodium 139    Potassium 4.0    Chloride 99    Calcium 9.7    Albumin 4.3    Total Bilirubin 0.2    Alkaline Phosphatase 98    AST (SGOT) 27    ALT (SGPT) 31    WBC 7.00    Hemoglobin 14.0    Hematocrit 39.9    Platelets 292    Total Cholesterol 259    Triglycerides 303    HDL Cholesterol 65    LDL Cholesterol  140      Data reviewed : Consultant notes ortho 3/18/25      Results               Assessment and Plan   Diagnoses and all orders for this visit:    1. Essential hypertension (Primary)  Assessment & Plan:  BP is well-controlled on amlodipine and irbesartan-HCTZ which she will continue along with a low sodium diet.      2. Spondylolisthesis at L4-L5 level  Assessment & Plan:  Her back pain has significantly worsened, and she is considering surgery this year. She will see Dr. Reed in June 2025 to discuss surgical options. It was recommended that she have  someone assist her at home for a few days post-surgery for safety reasons. Physical therapy will likely be initiated in the hospital and continued at home for a short period, followed by outpatient therapy. She was advised against driving while on pain medication.    Orders:  -     traMADol (ULTRAM) 50 MG tablet; Take 1 tablet by mouth 2 (Two) Times a Day As Needed for Moderate Pain.    3. Acquired hypothyroidism  Assessment & Plan:  She is currently managed on Synthroid 150 mcg daily for replacement, recheck TSH.      4. Hoarseness  Assessment & Plan:  She reports persistent voice changes since her knee surgery, potentially due to intubation. A referral to an ENT specialist was discussed, but she prefers to wait until after her back surgery to address this issue.      5. Arthritis of left knee  Assessment & Plan:  Her knee pain has improved significantly following surgery and physical therapy. She continues to experience arthritis symptoms, including inflammation and swelling, particularly during weather changes.        Assessment & Plan    Health maintenance.  She has declined a mammogram despite known risks.           Follow Up   Return in about 4 months (around 7/28/2025).  Patient was given instructions and counseling regarding her condition or for health maintenance advice. Please see specific information pulled into the AVS if appropriate.     Patient or patient representative verbalized consent for the use of Ambient Listening during the visit with  AMBER Gallegos for chart documentation. 4/6/2025  16:49 EDT

## 2025-04-06 NOTE — ASSESSMENT & PLAN NOTE
Her knee pain has improved significantly following surgery and physical therapy. She continues to experience arthritis symptoms, including inflammation and swelling, particularly during weather changes.

## 2025-04-06 NOTE — ASSESSMENT & PLAN NOTE
She reports persistent voice changes since her knee surgery, potentially due to intubation. A referral to an ENT specialist was discussed, but she prefers to wait until after her back surgery to address this issue.

## 2025-04-06 NOTE — ASSESSMENT & PLAN NOTE
Her back pain has significantly worsened, and she is considering surgery this year. She will see Dr. Reed in June 2025 to discuss surgical options. It was recommended that she have someone assist her at home for a few days post-surgery for safety reasons. Physical therapy will likely be initiated in the hospital and continued at home for a short period, followed by outpatient therapy. She was advised against driving while on pain medication.

## 2025-04-08 DIAGNOSIS — G62.9 PERIPHERAL POLYNEUROPATHY: ICD-10-CM

## 2025-04-08 RX ORDER — PREGABALIN 150 MG/1
150 CAPSULE ORAL DAILY
Qty: 30 CAPSULE | Refills: 2 | Status: CANCELLED | OUTPATIENT
Start: 2025-04-08

## 2025-04-08 NOTE — TELEPHONE ENCOUNTER
Last office visit with prescribing clinician: 3/28/2025     Next office visit with prescribing clinician: 8/1/2025

## 2025-04-14 DIAGNOSIS — G62.9 PERIPHERAL POLYNEUROPATHY: ICD-10-CM

## 2025-04-16 RX ORDER — PREGABALIN 150 MG/1
150 CAPSULE ORAL DAILY
Qty: 30 CAPSULE | Refills: 2 | Status: SHIPPED | OUTPATIENT
Start: 2025-04-16

## 2025-04-17 DIAGNOSIS — I10 ESSENTIAL HYPERTENSION: Chronic | ICD-10-CM

## 2025-04-17 RX ORDER — IRBESARTAN AND HYDROCHLOROTHIAZIDE 300; 12.5 MG/1; MG/1
1 TABLET, FILM COATED ORAL DAILY
Qty: 90 TABLET | Refills: 0 | Status: SHIPPED | OUTPATIENT
Start: 2025-04-17

## 2025-04-25 DIAGNOSIS — F41.9 ANXIETY: ICD-10-CM

## 2025-04-29 RX ORDER — ALPRAZOLAM 1 MG/1
1 TABLET ORAL NIGHTLY PRN
Qty: 30 TABLET | Refills: 1 | Status: SHIPPED | OUTPATIENT
Start: 2025-04-29

## 2025-05-05 RX ORDER — MELOXICAM 15 MG/1
15 TABLET ORAL DAILY
Qty: 90 TABLET | Refills: 1 | Status: SHIPPED | OUTPATIENT
Start: 2025-05-05

## 2025-05-16 DIAGNOSIS — G62.9 PERIPHERAL POLYNEUROPATHY: ICD-10-CM

## 2025-05-19 RX ORDER — PREGABALIN 75 MG/1
75 CAPSULE ORAL EVERY MORNING
Qty: 30 CAPSULE | Refills: 2 | Status: SHIPPED | OUTPATIENT
Start: 2025-05-19

## 2025-06-04 ENCOUNTER — HOSPITAL ENCOUNTER (OUTPATIENT)
Dept: MRI IMAGING | Facility: HOSPITAL | Age: 65
Discharge: HOME | End: 2025-06-04
Admitting: NURSE PRACTITIONER
Payer: COMMERCIAL

## 2025-06-04 DIAGNOSIS — M48.062 SPINAL STENOSIS OF LUMBAR REGION WITH NEUROGENIC CLAUDICATION: ICD-10-CM

## 2025-06-04 DIAGNOSIS — M43.16 SPONDYLOLISTHESIS AT L4-L5 LEVEL: ICD-10-CM

## 2025-06-04 DIAGNOSIS — M51.362 DEGENERATION OF INTERVERTEBRAL DISC OF LUMBAR REGION WITH DISCOGENIC BACK PAIN AND LOWER EXTREMITY PAIN: ICD-10-CM

## 2025-06-04 DIAGNOSIS — M51.369 DDD (DEGENERATIVE DISC DISEASE), LUMBAR: ICD-10-CM

## 2025-06-04 LAB
CREAT BLDA-MCNC: 0.8 MG/DL (ref 0.6–1.3)
EGFRCR SERPLBLD CKD-EPI 2021: 82.4 ML/MIN/1.73

## 2025-06-04 PROCEDURE — 82565 ASSAY OF CREATININE: CPT

## 2025-06-04 PROCEDURE — A9577 INJ MULTIHANCE: HCPCS | Performed by: NURSE PRACTITIONER

## 2025-06-04 PROCEDURE — 25510000002 GADOBENATE DIMEGLUMINE 529 MG/ML SOLUTION: Performed by: NURSE PRACTITIONER

## 2025-06-04 PROCEDURE — 72158 MRI LUMBAR SPINE W/O & W/DYE: CPT

## 2025-06-04 RX ADMIN — GADOBENATE DIMEGLUMINE 15 ML: 529 INJECTION, SOLUTION INTRAVENOUS at 16:53

## 2025-06-06 NOTE — PROGRESS NOTES
Subjective   Patient ID: Nancy Robbins is a 64 y.o. female is here today for follow-up after MRI w/ a possible surgical discussion.    History of Present Illness    I have not seen her in several years.  She has a previous cervical fusion by Dr. Whitley over 20 years ago.  She had a left L5-S1 microdiscectomy in 2014 by Dr. Avery.  Has had known spinal stenosis at L3-L4 with a spondylolisthesis and to a lesser extent at L4-L5.  She has had progressive bilateral buttock and leg pain from both levels over the last several years.  Blocks never really helped her.  She is gotten to the point where she cannot really stand or walk very far and has classic neurogenic claudication.  She gets some relief with sitting and lying down.  She takes tramadol.  Gabapentin really never helped her.  She is at a point where she wants to proceed with surgery which in her case would consist of an L3-L4 decompression and fusion with cages and a interlaminar bilateral decompression at L4-L5.  She does not need to be fused at L4-L5 as she does not have a slip at that level.  She lives alone in which she might need inpatient rehabilitation.  Will get the surgery done.  I given her some tramadol for breakthrough pain.    The following portions of the patient's history were reviewed and updated as appropriate: allergies, current medications, past family history, past medical history, past social history, past surgical history, and problem list.    Review of Systems   All other systems reviewed and are negative.      Objective   Physical Exam  Constitutional:       General: She is awake.      Appearance: She is well-developed.   HENT:      Head: Normocephalic and atraumatic.   Eyes:      General: Lids are normal.      Extraocular Movements: Extraocular movements intact.      Conjunctiva/sclera: Conjunctivae normal.      Pupils: Pupils are equal, round, and reactive to light.   Neck:      Vascular: No carotid bruit.   Neurological:      Mental  Status: She is alert.      Coordination: Coordination is intact.      Deep Tendon Reflexes:      Reflex Scores:       Tricep reflexes are 2+ on the right side and 2+ on the left side.       Bicep reflexes are 2+ on the right side and 2+ on the left side.       Brachioradialis reflexes are 2+ on the right side and 2+ on the left side.       Patellar reflexes are 2+ on the right side and 2+ on the left side.       Achilles reflexes are 2+ on the right side and 2+ on the left side.  Psychiatric:         Speech: Speech normal.       Neurological Exam  Mental Status  Awake and alert. Oriented only to person, place, time and situation. Recent and remote memory are intact. Speech is normal. Language is fluent with no aphasia. Attention and concentration are normal. Fund of knowledge is appropriate for level of education.    Cranial Nerves  CN II: Visual acuity is normal. Visual fields full to confrontation.  CN III, IV, VI: Extraocular movements intact bilaterally. Normal lids and orbits bilaterally. Pupils equal round and reactive to light bilaterally.  CN V: Facial sensation is normal.  CN VII: Full and symmetric facial movement.  CN IX, X: Palate elevates symmetrically. Normal gag reflex.  CN XI: Shoulder shrug strength is normal.  CN XII: Tongue midline without atrophy or fasciculations.    Motor  Normal muscle bulk throughout. Normal muscle tone.                                               Right                     Left  Rhomboids                            5                          5  Infraspinatus                          5                          5  Supraspinatus                       5                          5  Deltoid                                   5                          5   Biceps                                   5                          5  Brachioradialis                      5                          5   Triceps                                  5                          5   Pronator                                 5                          5   Supinator                              5                           5   Wrist flexor                            5                          5   Wrist extensor                       5                          5   Finger flexor                          5                          5   Finger extensor                     5                          5   Interossei                              5                          5   Abductor pollicis brevis         5                          5   Flexor pollicis brevis             5                          5   Opponens pollicis                 5                          5  Extensor digitorum               5                          5  Abductor digiti minimi           5                          5   Abdominal                            5                          5  Glutei                                    5                          5  Hip abductor                         5                          5  Hip adductor                         5                          5   Iliopsoas                               5                          5   Quadriceps                           5                          5   Hamstring                             5                          5   Gastrocnemius                     5                           5   Anterior tibialis                      5                          5   Posterior tibialis                    5                          5   Peroneal                               5                          5  Ankle dorsiflexor                   5                          5  Ankle plantar flexor              5                           5  Extensor hallucis longus      5                           5    Sensory  Light touch is normal in upper and lower extremities. Proprioception is normal in upper and lower extremities.     Reflexes                                            Right                       Left  Brachioradialis                    2+                         2+  Biceps                                 2+                         2+  Triceps                                2+                         2+  Finger flex                           2+                         2+  Hamstring                            2+                         2+  Patellar                                2+                         2+  Achilles                                2+                         2+    Coordination    Finger-to-nose, rapid alternating movements and heel-to-shin normal bilaterally without dysmetria.    Gait  Casual gait is normal including stance, stride, and arm swing.Normal toe walking. Normal heel walking. Normal tandem gait.       Assessment & Plan   Independent Review of Radiographic Studies:      X-rays done on 3/11/2025 show a spondylolisthesis at L3-L4 with no translational movement with flexion-extension.  The lumbar MRI done on 6//25 shows severe spinal stenosis at L3-L4 with a spondylolisthesis and early to severe stenosis without spondylolisthesis at L4-L5    Medical Decision Making:      I described and recommended an open lumbar decompression and fusion with posterior lumbar interbody fusion (PLIF) with cages and open pedicle screw fixation with [employer] robotic navigation at L3-L4 and an interlaminar bilateral L4-L5 decompression without fusion.. The goal of surgery is relief of radiating leg pain, improvement of numbness, tingling, and weakness, and reduction in overall low back pain. The risks include, but are not limited to, infection, hemorrhage requiring transfusion or reoperation, CSF leak requiring reoperation, incomplete relief of symptoms, psuedoarthrosis resulting in chronic low back pain, hardward problems requiring revision or removal, potential need for additional surgery in the future, stroke, paralysis, coma, and death. The patient agrees to proceed.     She knows that she will be  followed up by an APC after surgery but I like to see her at about the 4-month rigo with x-rays.  She lives alone and it is possible she might need to have some inpatient rehab after the surgery but will decide this at that time.     Diagnoses and all orders for this visit:    1. Spondylolisthesis at L3-L4 level (Primary)  -     CT Lumbar Spine Without Contrast; Future  -     Case Request; Standing  -     CBC (No Diff); Future  -     Basic metabolic panel; Future  -     Protime-INR; Future  -     ceFAZolin (ANCEF) 2 g in sodium chloride 0.9 % 100 mL IVPB  -     Case Request    2. Spinal stenosis of lumbar region with neurogenic claudication  -     CT Lumbar Spine Without Contrast; Future  -     Case Request; Standing  -     CBC (No Diff); Future  -     Basic metabolic panel; Future  -     Protime-INR; Future  -     ceFAZolin (ANCEF) 2 g in sodium chloride 0.9 % 100 mL IVPB  -     Case Request    3. Degeneration of intervertebral disc of lumbar region with discogenic back pain and lower extremity pain  -     CT Lumbar Spine Without Contrast; Future  -     Case Request; Standing  -     CBC (No Diff); Future  -     Basic metabolic panel; Future  -     Protime-INR; Future  -     ceFAZolin (ANCEF) 2 g in sodium chloride 0.9 % 100 mL IVPB  -     Case Request    4. Spondylolisthesis at L4-L5 level  -     CT Lumbar Spine Without Contrast; Future  -     traMADol (ULTRAM) 50 MG tablet; Take 1 tablet by mouth 2 (Two) Times a Day As Needed for Moderate Pain.  Dispense: 30 tablet; Refill: 0  -     Case Request; Standing  -     CBC (No Diff); Future  -     Basic metabolic panel; Future  -     Protime-INR; Future  -     ceFAZolin (ANCEF) 2 g in sodium chloride 0.9 % 100 mL IVPB  -     Case Request    Other orders  -     Follow Anesthesia Guidelines / Protocol; Future  -     Follow Anesthesia Guidelines / Protocol; Standing  -     SCD (Sequential Compression Device) - To Be Placed on Patient in Pre-Op; Standing  -     Verify /  Perform Chlorhexidine Skin Prep; Standing  -     Provide Patient With Instructions on NPO Status; Future  -     Provide Chlorhexidine Skin Prep Wipes and Instructions; Future      Return for 2 weeks after surgery with an APC.

## 2025-06-08 DIAGNOSIS — M43.16 SPONDYLOLISTHESIS AT L4-L5 LEVEL: ICD-10-CM

## 2025-06-10 RX ORDER — TRAMADOL HYDROCHLORIDE 50 MG/1
50 TABLET ORAL 2 TIMES DAILY PRN
Start: 2025-06-10

## 2025-06-11 ENCOUNTER — OFFICE VISIT (OUTPATIENT)
Dept: NEUROSURGERY | Facility: CLINIC | Age: 65
End: 2025-06-11
Payer: COMMERCIAL

## 2025-06-11 VITALS
WEIGHT: 191 LBS | OXYGEN SATURATION: 97 % | SYSTOLIC BLOOD PRESSURE: 138 MMHG | HEART RATE: 83 BPM | HEIGHT: 65 IN | DIASTOLIC BLOOD PRESSURE: 78 MMHG | BODY MASS INDEX: 31.82 KG/M2

## 2025-06-11 DIAGNOSIS — M48.062 SPINAL STENOSIS OF LUMBAR REGION WITH NEUROGENIC CLAUDICATION: ICD-10-CM

## 2025-06-11 DIAGNOSIS — M43.16 SPONDYLOLISTHESIS AT L4-L5 LEVEL: ICD-10-CM

## 2025-06-11 DIAGNOSIS — M51.362 DEGENERATION OF INTERVERTEBRAL DISC OF LUMBAR REGION WITH DISCOGENIC BACK PAIN AND LOWER EXTREMITY PAIN: ICD-10-CM

## 2025-06-11 DIAGNOSIS — M43.16 SPONDYLOLISTHESIS AT L3-L4 LEVEL: Primary | ICD-10-CM

## 2025-06-11 PROCEDURE — 99214 OFFICE O/P EST MOD 30 MIN: CPT | Performed by: NEUROLOGICAL SURGERY

## 2025-06-11 RX ORDER — TRAMADOL HYDROCHLORIDE 50 MG/1
50 TABLET ORAL 2 TIMES DAILY PRN
Qty: 30 TABLET | Refills: 0 | Status: SHIPPED | OUTPATIENT
Start: 2025-06-11

## 2025-06-11 NOTE — LETTER
June 11, 2025     AMBER Hobbs  [address]  [address]  [facility] [address]    Patient: Nancy Robbins   YOB: 1960   Date of Visit: 6/11/2025     Dear AMBER Hobbs:       Thank you for referring Nancy Robbins to me for evaluation. Below are the relevant portions of my assessment and plan of care.    If you have questions, please do not hesitate to call me. I look forward to following Nancy along with you.         Sincerely,        Gene Reed MD        CC: No Gene Ray MD  06/11/25 1651  Sign when Signing Visit  Subjective  Patient ID: Nancy Robbins is a 64 y.o. female is here today for follow-up after MRI w/ a possible surgical discussion.    History of Present Illness    I have not seen her in several years.  She has a previous cervical fusion by Dr. Whitley over 20 years ago.  She had a left L5-S1 microdiscectomy in 2014 by Dr. Avery.  Has had known spinal stenosis at L3-L4 with a spondylolisthesis and to a lesser extent at L4-L5.  She has had progressive bilateral buttock and leg pain from both levels over the last several years.  Blocks never really helped her.  She is gotten to the point where she cannot really stand or walk very far and has classic neurogenic claudication.  She gets some relief with sitting and lying down.  She takes tramadol.  Gabapentin really never helped her.  She is at a point where she wants to proceed with surgery which in her case would consist of an L3-L4 decompression and fusion with cages and a interlaminar bilateral decompression at L4-L5.  She does not need to be fused at L4-L5 as she does not have a slip at that level.  She lives alone in which she might need inpatient rehabilitation.  Will get the surgery done.  I given her some tramadol for breakthrough pain.    The following portions of the patient's history were reviewed and updated as appropriate: allergies, current medications, past family history,  past medical history, past social history, past surgical history, and problem list.    Review of Systems   All other systems reviewed and are negative.      Objective  Physical Exam  Constitutional:       General: She is awake.      Appearance: She is well-developed.   HENT:      Head: Normocephalic and atraumatic.   Eyes:      General: Lids are normal.      Extraocular Movements: Extraocular movements intact.      Conjunctiva/sclera: Conjunctivae normal.      Pupils: Pupils are equal, round, and reactive to light.   Neck:      Vascular: No carotid bruit.   Neurological:      Mental Status: She is alert.      Coordination: Coordination is intact.      Deep Tendon Reflexes:      Reflex Scores:       Tricep reflexes are 2+ on the right side and 2+ on the left side.       Bicep reflexes are 2+ on the right side and 2+ on the left side.       Brachioradialis reflexes are 2+ on the right side and 2+ on the left side.       Patellar reflexes are 2+ on the right side and 2+ on the left side.       Achilles reflexes are 2+ on the right side and 2+ on the left side.  Psychiatric:         Speech: Speech normal.       Neurological Exam  Mental Status  Awake and alert. Oriented only to person, place, time and situation. Recent and remote memory are intact. Speech is normal. Language is fluent with no aphasia. Attention and concentration are normal. Fund of knowledge is appropriate for level of education.    Cranial Nerves  CN II: Visual acuity is normal. Visual fields full to confrontation.  CN III, IV, VI: Extraocular movements intact bilaterally. Normal lids and orbits bilaterally. Pupils equal round and reactive to light bilaterally.  CN V: Facial sensation is normal.  CN VII: Full and symmetric facial movement.  CN IX, X: Palate elevates symmetrically. Normal gag reflex.  CN XI: Shoulder shrug strength is normal.  CN XII: Tongue midline without atrophy or fasciculations.    Motor  Normal muscle bulk throughout. Normal  muscle tone.                                               Right                     Left  Rhomboids                            5                          5  Infraspinatus                          5                          5  Supraspinatus                       5                          5  Deltoid                                   5                          5   Biceps                                   5                          5  Brachioradialis                      5                          5   Triceps                                  5                          5   Pronator                                5                          5   Supinator                              5                           5   Wrist flexor                            5                          5   Wrist extensor                       5                          5   Finger flexor                          5                          5   Finger extensor                     5                          5   Interossei                              5                          5   Abductor pollicis brevis         5                          5   Flexor pollicis brevis             5                          5   Opponens pollicis                 5                          5  Extensor digitorum               5                          5  Abductor digiti minimi           5                          5   Abdominal                            5                          5  Glutei                                    5                          5  Hip abductor                         5                          5  Hip adductor                         5                          5   Iliopsoas                               5                          5   Quadriceps                           5                          5   Hamstring                             5                          5   Gastrocnemius                     5                           5   Anterior  tibialis                      5                          5   Posterior tibialis                    5                          5   Peroneal                               5                          5  Ankle dorsiflexor                   5                          5  Ankle plantar flexor              5                           5  Extensor hallucis longus      5                           5    Sensory  Light touch is normal in upper and lower extremities. Proprioception is normal in upper and lower extremities.     Reflexes                                            Right                      Left  Brachioradialis                    2+                         2+  Biceps                                 2+                         2+  Triceps                                2+                         2+  Finger flex                           2+                         2+  Hamstring                            2+                         2+  Patellar                                2+                         2+  Achilles                                2+                         2+    Coordination    Finger-to-nose, rapid alternating movements and heel-to-shin normal bilaterally without dysmetria.    Gait  Casual gait is normal including stance, stride, and arm swing.Normal toe walking. Normal heel walking. Normal tandem gait.       Assessment & Plan  Independent Review of Radiographic Studies:      X-rays done on 3/11/2025 show a spondylolisthesis at L3-L4 with no translational movement with flexion-extension.  The lumbar MRI done on 6//25 shows severe spinal stenosis at L3-L4 with a spondylolisthesis and early to severe stenosis without spondylolisthesis at L4-L5    Medical Decision Making:      I described and recommended an open lumbar decompression and fusion with posterior lumbar interbody fusion (PLIF) with cages and open pedicle screw fixation with Splinter.meor robotic navigation at L3-L4 and an interlaminar bilateral L4-L5  decompression without fusion.. The goal of surgery is relief of radiating leg pain, improvement of numbness, tingling, and weakness, and reduction in overall low back pain. The risks include, but are not limited to, infection, hemorrhage requiring transfusion or reoperation, CSF leak requiring reoperation, incomplete relief of symptoms, psuedoarthrosis resulting in chronic low back pain, hardward problems requiring revision or removal, potential need for additional surgery in the future, stroke, paralysis, coma, and death. The patient agrees to proceed.     She knows that she will be followed up by an APC after surgery but I like to see her at about the 4-month rigo with x-rays.  She lives alone and it is possible she might need to have some inpatient rehab after the surgery but will decide this at that time.     Diagnoses and all orders for this visit:    1. Spondylolisthesis at L3-L4 level (Primary)  -     CT Lumbar Spine Without Contrast; Future  -     Case Request; Standing  -     CBC (No Diff); Future  -     Basic metabolic panel; Future  -     Protime-INR; Future  -     ceFAZolin (ANCEF) 2 g in sodium chloride 0.9 % 100 mL IVPB  -     Case Request    2. Spinal stenosis of lumbar region with neurogenic claudication  -     CT Lumbar Spine Without Contrast; Future  -     Case Request; Standing  -     CBC (No Diff); Future  -     Basic metabolic panel; Future  -     Protime-INR; Future  -     ceFAZolin (ANCEF) 2 g in sodium chloride 0.9 % 100 mL IVPB  -     Case Request    3. Degeneration of intervertebral disc of lumbar region with discogenic back pain and lower extremity pain  -     CT Lumbar Spine Without Contrast; Future  -     Case Request; Standing  -     CBC (No Diff); Future  -     Basic metabolic panel; Future  -     Protime-INR; Future  -     ceFAZolin (ANCEF) 2 g in sodium chloride 0.9 % 100 mL IVPB  -     Case Request    4. Spondylolisthesis at L4-L5 level  -     CT Lumbar Spine Without Contrast;  Future  -     traMADol (ULTRAM) 50 MG tablet; Take 1 tablet by mouth 2 (Two) Times a Day As Needed for Moderate Pain.  Dispense: 30 tablet; Refill: 0  -     Case Request; Standing  -     CBC (No Diff); Future  -     Basic metabolic panel; Future  -     Protime-INR; Future  -     ceFAZolin (ANCEF) 2 g in sodium chloride 0.9 % 100 mL IVPB  -     Case Request    Other orders  -     Follow Anesthesia Guidelines / Protocol; Future  -     Follow Anesthesia Guidelines / Protocol; Standing  -     SCD (Sequential Compression Device) - To Be Placed on Patient in Pre-Op; Standing  -     Verify / Perform Chlorhexidine Skin Prep; Standing  -     Provide Patient With Instructions on NPO Status; Future  -     Provide Chlorhexidine Skin Prep Wipes and Instructions; Future      Return for 2 weeks after surgery with an APC.

## 2025-06-18 DIAGNOSIS — N95.1 MENOPAUSAL SYMPTOMS: ICD-10-CM

## 2025-06-18 RX ORDER — ESTRADIOL 1 MG/1
1 TABLET ORAL DAILY
Qty: 90 TABLET | Refills: 1 | Status: SHIPPED | OUTPATIENT
Start: 2025-06-18

## 2025-06-18 NOTE — TELEPHONE ENCOUNTER
Rx Refill Note  Requested Prescriptions     Pending Prescriptions Disp Refills    estradiol (ESTRACE) 1 MG tablet 90 tablet 1     Sig: Take 1 tablet by mouth Daily.      Last office visit with prescribing clinician: 3/28/2025   Last telemedicine visit with prescribing clinician: Visit date not found   Next office visit with prescribing clinician: 8/1/2025

## 2025-06-30 DIAGNOSIS — J30.9 ALLERGIC RHINITIS, UNSPECIFIED SEASONALITY, UNSPECIFIED TRIGGER: Chronic | ICD-10-CM

## 2025-06-30 RX ORDER — MONTELUKAST SODIUM 10 MG/1
10 TABLET ORAL NIGHTLY
Qty: 90 TABLET | Refills: 0 | Status: SHIPPED | OUTPATIENT
Start: 2025-06-30

## 2025-07-02 ENCOUNTER — TELEPHONE (OUTPATIENT)
Dept: NEUROSURGERY | Facility: CLINIC | Age: 65
End: 2025-07-02
Payer: COMMERCIAL

## 2025-07-02 NOTE — TELEPHONE ENCOUNTER
07/02/25 patient called to schedule her surgery with Dr BETTENCOURT.  Please call patient at [phone] leave a voicemail.   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  Patient takes her lunch at 1:00 p.m.

## 2025-07-03 DIAGNOSIS — L23.9 ALLERGIC CONTACT DERMATITIS, UNSPECIFIED TRIGGER: Primary | ICD-10-CM

## 2025-07-03 RX ORDER — METHYLPREDNISOLONE 4 MG/1
TABLET ORAL
Qty: 21 TABLET | Refills: 0 | Status: SHIPPED | OUTPATIENT
Start: 2025-07-03 | End: 2025-07-09

## 2025-07-07 DIAGNOSIS — F41.9 ANXIETY: ICD-10-CM

## 2025-07-08 RX ORDER — ALPRAZOLAM 1 MG/1
1 TABLET ORAL NIGHTLY PRN
Qty: 30 TABLET | Refills: 1 | Status: SHIPPED | OUTPATIENT
Start: 2025-07-08

## 2025-07-08 NOTE — TELEPHONE ENCOUNTER
Rx Refill Note  Requested Prescriptions     Pending Prescriptions Disp Refills    ALPRAZolam (XANAX) 1 MG tablet 30 tablet 1     Sig: Take 1 tablet by mouth At Night As Needed for Anxiety      Last office visit with prescribing clinician: 3/28/2025   Last telemedicine visit with prescribing clinician: Visit date not found   Next office visit with prescribing clinician: 8/1/2025                         Would you like a call back once the refill request has been completed: [] Yes [] No    If the office needs to give you a call back, can they leave a voicemail: [] Yes [] No    Renetta Langley  07/08/25, 08:17 EDT

## 2025-07-16 DIAGNOSIS — I10 ESSENTIAL HYPERTENSION: Chronic | ICD-10-CM

## 2025-07-16 RX ORDER — IRBESARTAN AND HYDROCHLOROTHIAZIDE 300; 12.5 MG/1; MG/1
1 TABLET, FILM COATED ORAL DAILY
Qty: 90 TABLET | Refills: 0 | Status: SHIPPED | OUTPATIENT
Start: 2025-07-16

## 2025-07-31 DIAGNOSIS — E03.9 ACQUIRED HYPOTHYROIDISM: ICD-10-CM

## 2025-08-01 ENCOUNTER — TELEPHONE (OUTPATIENT)
Dept: NEUROSURGERY | Facility: CLINIC | Age: 65
End: 2025-08-01
Payer: COMMERCIAL

## 2025-08-01 ENCOUNTER — OFFICE VISIT (OUTPATIENT)
Dept: INTERNAL MEDICINE | Facility: CLINIC | Age: 65
End: 2025-08-01
Payer: COMMERCIAL

## 2025-08-01 VITALS
OXYGEN SATURATION: 98 % | BODY MASS INDEX: 32.15 KG/M2 | HEART RATE: 96 BPM | SYSTOLIC BLOOD PRESSURE: 146 MMHG | DIASTOLIC BLOOD PRESSURE: 82 MMHG | WEIGHT: 193.2 LBS

## 2025-08-01 DIAGNOSIS — E03.9 ACQUIRED HYPOTHYROIDISM: Chronic | ICD-10-CM

## 2025-08-01 DIAGNOSIS — Z00.00 PE (PHYSICAL EXAM), ANNUAL: Primary | ICD-10-CM

## 2025-08-01 DIAGNOSIS — M43.16 SPONDYLOLISTHESIS AT L4-L5 LEVEL: Chronic | ICD-10-CM

## 2025-08-01 DIAGNOSIS — M43.16 SPONDYLOLISTHESIS AT L3-L4 LEVEL: ICD-10-CM

## 2025-08-01 DIAGNOSIS — I10 ESSENTIAL HYPERTENSION: Chronic | ICD-10-CM

## 2025-08-01 DIAGNOSIS — L60.9 LOOSE TOENAIL: ICD-10-CM

## 2025-08-01 PROCEDURE — 99396 PREV VISIT EST AGE 40-64: CPT | Performed by: NURSE PRACTITIONER

## 2025-08-03 PROBLEM — L60.9 LOOSE TOENAIL: Status: ACTIVE | Noted: 2025-08-03

## 2025-08-03 PROBLEM — M43.16 SPONDYLOLISTHESIS AT L3-L4 LEVEL: Chronic | Status: ACTIVE | Noted: 2025-06-11

## 2025-08-03 NOTE — PROGRESS NOTES
Subjective   Nancy Robbins is a 64 y.o. female who is here for a physical exam.    Hypertension  Associated symptoms: no chest pain, no headaches, no palpitations, no shortness of breath and no dizziness         The patient presents for back pain, toenail issues, and foot swelling.    She has been experiencing severe back pain for the past month and a half, with episodes lasting 2-7 days. The pain forces her to sit in a chair upon waking. Tramadol provides some relief, and she takes pregabalin daily. She is scheduled for fusion and fixation at L3-L4 and L4-L5 decompression without fusion 8/19/25. She is anxious about the surgery and its impact on her independence.     She has issues with her big toes, where the nails grow with a curvature and dig into the skin. Her right big toe recently bled after trauma. She suspects nail fungus and has reached out to her podiatrist. She is concerned about the looseness of her left great toenail.    She has noticed bilateral foot and ankle swelling which improves overnight but returns with activity or hot weather. She recalls a previous episode of cellulitis with similar symptoms. Denies dyspnea.    She has had increased difficulty sleeping which she attributes to worsening back pain but also increased anxiety related to upcoming surgery.    The following portions of the patient's history were reviewed and updated as appropriate: allergies, current medications, past social history and problem list.    Past Medical History:   Diagnosis Date    Allergic     Anxiety     Arthritis of back     Arthritis of neck     Several years ago    Cervical disc disorder 2002    Surgery 2003    Chronic cough     COVID 10/31/2022    DDD (degenerative disc disease), lumbar     SPINAL STENOSIS    Graves disease     Hard to intubate     STATES DIFF TO INTUBATE D/T NOT BEING ABLE TO EXTEND NECK    Hip arthrosis     Hypertension     Hyperthyroidism     Hypothyroidism     Injury of neck 2003    Knee  swelling Unsure    Many years ago    Left knee pain     Low back pain     Low back strain     Unsure    Lumbosacral disc disease     Neuropathy     bilateral lower legs    Osteoarthritis     Plantar fasciitis     RIGHT FOOT    PONV (postoperative nausea and vomiting)     Restless leg syndrome     TAKES XANAX FOR..    Seasonal allergies     Tear of meniscus of knee     Several years ago. Both knees    Vertigo          Current Outpatient Medications:     ALPRAZolam (XANAX) 1 MG tablet, Take 1 tablet by mouth At Night As Needed for Anxiety, Disp: 30 tablet, Rfl: 1    amLODIPine (NORVASC) 5 MG tablet, Take 1 tablet by mouth Daily., Disp: 90 tablet, Rfl: 1    Calcium Carbonate (CALCIUM 600 PO), Take 1 tablet by mouth Daily. HOLDING FOR DOS, Disp: , Rfl:     Docusate Sodium (COLACE PO), Take 2 tablets by mouth Daily., Disp: , Rfl:     estradiol (ESTRACE) 1 MG tablet, Take 1 tablet by mouth Daily., Disp: 90 tablet, Rfl: 1    fexofenadine-pseudoephedrine (Allegra-D Allergy & Congestion)  MG per 12 hr tablet, Take 1 tablet by mouth 2 (Two) Times a Day., Disp: 120 tablet, Rfl: 5    fluticasone (FLONASE) 50 MCG/ACT nasal spray, Use 2 sprays by in each nostril Daily., Disp: 64 g, Rfl: 2    irbesartan-hydrochlorothiazide (AVALIDE) 300-12.5 MG tablet, Take 1 tablet by mouth Daily., Disp: 90 tablet, Rfl: 0    levothyroxine (SYNTHROID, LEVOTHROID) 150 MCG tablet, Take 1 tablet by mouth Every Morning., Disp: 90 tablet, Rfl: 1    meclizine (ANTIVERT) 25 MG tablet, Take 1 tablet by mouth 3 (Three) Times a Day As Needed for Dizziness., Disp: 25 tablet, Rfl: 0    meloxicam (MOBIC) 15 MG tablet, Take 1 tablet by mouth Daily. Take with food, Disp: 90 tablet, Rfl: 1    montelukast (SINGULAIR) 10 MG tablet, Take 1 tablet by mouth Every Night., Disp: 90 tablet, Rfl: 0    Potassium 99 MG tablet, Take 1 tablet by mouth Every Other Day. HOLDING FOR DOS, Disp: , Rfl:     pregabalin (LYRICA) 150 MG capsule, Take 1 capsule by mouth Daily.,  Disp: 30 capsule, Rfl: 2    pregabalin (LYRICA) 75 MG capsule, Take 1 capsule by mouth Every Morning., Disp: 30 capsule, Rfl: 2    tiZANidine (ZANAFLEX) 4 MG tablet, Take 1 tablet by mouth Every 8 (Eight) Hours As Needed for Muscle Spasms., Disp: 90 tablet, Rfl: 2    traMADol (ULTRAM) 50 MG tablet, Take 1 tablet by mouth 2 (Two) Times a Day As Needed for Moderate Pain., Disp: 30 tablet, Rfl: 0    Allergies   Allergen Reactions    Ampicillin Nausea Only    Oxycodone Nausea And Vomiting    Penicillin G Nausea Only       Review of Systems   Constitutional:  Negative for fatigue.   HENT:  Negative for trouble swallowing.    Respiratory:  Negative for shortness of breath.    Cardiovascular:  Negative for chest pain and palpitations.   Gastrointestinal:  Negative for constipation and nausea.   Endocrine: Positive for heat intolerance. Negative for cold intolerance.   Genitourinary:  Negative for menstrual problem.   Neurological:  Negative for dizziness and headaches.   Psychiatric/Behavioral:  Negative for confusion.        Objective   Vitals:    08/01/25 1516   BP: 146/82   BP Location: Right arm   Patient Position: Sitting   Cuff Size: Adult   Pulse: 96   SpO2: 98%   Weight: 87.6 kg (193 lb 3.2 oz)     Physical Exam  Constitutional:       General: She is not in acute distress.     Appearance: Normal appearance. She is not diaphoretic.   HENT:      Head: Normocephalic and atraumatic.      Right Ear: Tympanic membrane, ear canal and external ear normal.      Left Ear: Tympanic membrane, ear canal and external ear normal.      Nose: Nose normal. No rhinorrhea.      Mouth/Throat:      Mouth: Mucous membranes are moist.      Pharynx: Oropharynx is clear.   Eyes:      General:         Right eye: No discharge.         Left eye: No discharge.      Conjunctiva/sclera: Conjunctivae normal.   Cardiovascular:      Rate and Rhythm: Normal rate and regular rhythm.      Pulses: Normal pulses.      Heart sounds: Normal heart sounds.    Pulmonary:      Effort: Pulmonary effort is normal.      Breath sounds: Normal breath sounds.   Abdominal:      General: Bowel sounds are normal.      Tenderness: There is no abdominal tenderness.   Musculoskeletal:         General: No swelling or tenderness.      Cervical back: Normal range of motion.   Skin:     General: Skin is warm and dry.   Neurological:      General: No focal deficit present.      Mental Status: She is alert and oriented to person, place, and time.   Psychiatric:         Mood and Affect: Mood normal.         Behavior: Behavior normal.         Judgment: Judgment normal.         Assessment & Plan   Diagnoses and all orders for this visit:    1. PE (physical exam), annual (Primary)    2. Essential hypertension  Assessment & Plan:  BP is elevated in the office today which she attributes to increased pain, continue amlodipine and irbesartan-HCTZ along with home monitoring. Call office if BP remains >135/85.  She has had intermittent swelling of lower extremities which improves overnight, may be related to amlodipine. Will continue current medications for now with close monitoring, follow low salt diet and elevate legs during long periods of time sitting. Consider titrating medication if swelling persists after back surgery.      3. Acquired hypothyroidism  Assessment & Plan:  TSH 0.485 with 1/28/25 labs, continue Synthroid 150 mcg daily for replacement.      4. Spondylolisthesis at L3-L4 level  Assessment & Plan:  She is scheduled for fusion and fixation at L3-L4 and L4-L5 decopression without fusion 8/19/25.       5. Spondylolisthesis at L4-L5 level  Assessment & Plan:  Severe back pain causing incapacitation for several days to a week. Prescribed tramadol. Advised to discuss post-operative rehab options with surgeon, including a week at a rehab facility.      6. Loose toenail  Assessment & Plan:  Curved toenail causing pain and bleeding. No infection. Referred to podiatrist for evaluation and  potential nail removal before surgery. She has a podiatrist, will notify office if she has issues scheduling an appointment.          Risk assessment:  Family History   Problem Relation Age of Onset    Stroke Mother 83    Hearing loss Mother         Tone deafness    Lung cancer Father     Cancer Father         Lung    Kidney disease Sister         Half sister    Crohn's disease Brother     Crohn's disease Son     Kidney disease Sister         2 kidney transplants    Malig Hyperthermia Neg Hx      Her Body mass index is 32.15 kg/m². - She tries to follow a low-fat, low-cholesterol diet for improved health.    Prevention:  Health Maintenance   Topic Date Due    Pneumococcal Vaccine 50+ (1 of 1 - PCV) Never done    ANNUAL PHYSICAL  07/12/2025    MAMMOGRAM  03/28/2026 (Originally 5/24/2019)    INFLUENZA VACCINE  10/01/2025    COLORECTAL CANCER SCREENING  05/24/2026    TDAP/TD VACCINES (2 - Td or Tdap) 12/10/2029    HEPATITIS C SCREENING  Completed    COVID-19 Vaccine  Completed    ZOSTER VACCINE  Completed       Discussed healthy lifestyle choices such as maintaining a balanced diet low in carbohydrates and limiting caffeine and alcohol intake.  Recommended routine exercise for bone strength and cardiovascular health.

## 2025-08-03 NOTE — ASSESSMENT & PLAN NOTE
Curved toenail causing pain and bleeding. No infection. Referred to podiatrist for evaluation and potential nail removal before surgery. She has a podiatrist, will notify office if she has issues scheduling an appointment.

## 2025-08-03 NOTE — ASSESSMENT & PLAN NOTE
Severe back pain causing incapacitation for several days to a week. Prescribed tramadol. Advised to discuss post-operative rehab options with surgeon, including a week at a rehab facility.

## 2025-08-03 NOTE — ASSESSMENT & PLAN NOTE
BP is elevated in the office today which she attributes to increased pain, continue amlodipine and irbesartan-HCTZ along with home monitoring. Call office if BP remains >135/85.  She has had intermittent swelling of lower extremities which improves overnight, may be related to amlodipine. Will continue current medications for now with close monitoring, follow low salt diet and elevate legs during long periods of time sitting. Consider titrating medication if swelling persists after back surgery.

## 2025-08-04 DIAGNOSIS — G62.9 PERIPHERAL POLYNEUROPATHY: ICD-10-CM

## 2025-08-04 RX ORDER — PREGABALIN 150 MG/1
150 CAPSULE ORAL DAILY
Qty: 30 CAPSULE | Refills: 2 | OUTPATIENT
Start: 2025-08-04

## 2025-08-04 RX ORDER — LEVOTHYROXINE SODIUM 150 UG/1
150 TABLET ORAL
Qty: 90 TABLET | Refills: 1 | Status: SHIPPED | OUTPATIENT
Start: 2025-08-04

## 2025-08-05 ENCOUNTER — OFFICE VISIT (OUTPATIENT)
Dept: PODIATRY | Facility: CLINIC | Age: 65
End: 2025-08-05
Payer: COMMERCIAL

## 2025-08-05 VITALS
DIASTOLIC BLOOD PRESSURE: 82 MMHG | WEIGHT: 191 LBS | HEART RATE: 80 BPM | TEMPERATURE: 97.1 F | SYSTOLIC BLOOD PRESSURE: 124 MMHG | OXYGEN SATURATION: 97 % | BODY MASS INDEX: 31.78 KG/M2

## 2025-08-05 DIAGNOSIS — B07.0 PLANTAR WART: Primary | ICD-10-CM

## 2025-08-05 DIAGNOSIS — M79.672 FOOT PAIN, LEFT: ICD-10-CM

## 2025-08-05 DIAGNOSIS — M79.671 FOOT PAIN, RIGHT: ICD-10-CM

## 2025-08-05 DIAGNOSIS — L60.0 ONYCHOCRYPTOSIS: ICD-10-CM

## 2025-08-06 DIAGNOSIS — I10 ESSENTIAL HYPERTENSION: Chronic | ICD-10-CM

## 2025-08-07 RX ORDER — AMLODIPINE BESYLATE 5 MG/1
5 TABLET ORAL DAILY
Qty: 90 TABLET | Refills: 1 | Status: SHIPPED | OUTPATIENT
Start: 2025-08-07

## 2025-08-11 ENCOUNTER — TELEPHONE (OUTPATIENT)
Dept: NEUROSURGERY | Facility: CLINIC | Age: 65
End: 2025-08-11
Payer: COMMERCIAL

## 2025-08-12 ENCOUNTER — PRE-ADMISSION TESTING (OUTPATIENT)
Dept: PREADMISSION TESTING | Facility: HOSPITAL | Age: 65
End: 2025-08-12
Payer: COMMERCIAL

## 2025-08-12 VITALS
HEART RATE: 82 BPM | SYSTOLIC BLOOD PRESSURE: 160 MMHG | HEIGHT: 65 IN | BODY MASS INDEX: 32.39 KG/M2 | TEMPERATURE: 97.5 F | RESPIRATION RATE: 16 BRPM | OXYGEN SATURATION: 96 % | DIASTOLIC BLOOD PRESSURE: 86 MMHG | WEIGHT: 194.4 LBS

## 2025-08-12 DIAGNOSIS — M48.062 SPINAL STENOSIS OF LUMBAR REGION WITH NEUROGENIC CLAUDICATION: ICD-10-CM

## 2025-08-12 DIAGNOSIS — M43.16 SPONDYLOLISTHESIS AT L4-L5 LEVEL: ICD-10-CM

## 2025-08-12 DIAGNOSIS — M51.362 DEGENERATION OF INTERVERTEBRAL DISC OF LUMBAR REGION WITH DISCOGENIC BACK PAIN AND LOWER EXTREMITY PAIN: ICD-10-CM

## 2025-08-12 DIAGNOSIS — M43.16 SPONDYLOLISTHESIS AT L3-L4 LEVEL: ICD-10-CM

## 2025-08-12 LAB
ANION GAP SERPL CALCULATED.3IONS-SCNC: 10 MMOL/L (ref 5–15)
BUN SERPL-MCNC: 17 MG/DL (ref 8–23)
BUN/CREAT SERPL: 22.4 (ref 7–25)
CALCIUM SPEC-SCNC: 9.3 MG/DL (ref 8.6–10.5)
CHLORIDE SERPL-SCNC: 103 MMOL/L (ref 98–107)
CO2 SERPL-SCNC: 25 MMOL/L (ref 22–29)
CREAT SERPL-MCNC: 0.76 MG/DL (ref 0.57–1)
DEPRECATED RDW RBC AUTO: 43.5 FL (ref 37–54)
EGFRCR SERPLBLD CKD-EPI 2021: 87.6 ML/MIN/1.73
ERYTHROCYTE [DISTWIDTH] IN BLOOD BY AUTOMATED COUNT: 12.7 % (ref 12.3–15.4)
GLUCOSE SERPL-MCNC: 111 MG/DL (ref 65–99)
HCT VFR BLD AUTO: 38.3 % (ref 34–46.6)
HGB BLD-MCNC: 13.1 G/DL (ref 12–15.9)
INR PPP: 1.04 (ref 0.9–1.1)
MCH RBC QN AUTO: 31.9 PG (ref 26.6–33)
MCHC RBC AUTO-ENTMCNC: 34.2 G/DL (ref 31.5–35.7)
MCV RBC AUTO: 93.2 FL (ref 79–97)
PLATELET # BLD AUTO: 246 10*3/MM3 (ref 140–450)
PMV BLD AUTO: 10.2 FL (ref 6–12)
POTASSIUM SERPL-SCNC: 3.6 MMOL/L (ref 3.5–5.2)
PROTHROMBIN TIME: 13.6 SECONDS (ref 11.7–14.2)
QT INTERVAL: 388 MS
QTC INTERVAL: 437 MS
RBC # BLD AUTO: 4.11 10*6/MM3 (ref 3.77–5.28)
SODIUM SERPL-SCNC: 138 MMOL/L (ref 136–145)
WBC NRBC COR # BLD AUTO: 6.28 10*3/MM3 (ref 3.4–10.8)

## 2025-08-12 PROCEDURE — 85610 PROTHROMBIN TIME: CPT

## 2025-08-12 PROCEDURE — 85027 COMPLETE CBC AUTOMATED: CPT

## 2025-08-12 PROCEDURE — 36415 COLL VENOUS BLD VENIPUNCTURE: CPT

## 2025-08-12 PROCEDURE — 93005 ELECTROCARDIOGRAM TRACING: CPT

## 2025-08-12 PROCEDURE — 80048 BASIC METABOLIC PNL TOTAL CA: CPT

## 2025-08-14 ENCOUNTER — HOSPITAL ENCOUNTER (OUTPATIENT)
Dept: CT IMAGING | Facility: HOSPITAL | Age: 65
Discharge: HOME | End: 2025-08-14
Admitting: NEUROLOGICAL SURGERY
Payer: COMMERCIAL

## 2025-08-14 DIAGNOSIS — M43.16 SPONDYLOLISTHESIS AT L4-L5 LEVEL: ICD-10-CM

## 2025-08-14 DIAGNOSIS — M51.362 DEGENERATION OF INTERVERTEBRAL DISC OF LUMBAR REGION WITH DISCOGENIC BACK PAIN AND LOWER EXTREMITY PAIN: ICD-10-CM

## 2025-08-14 DIAGNOSIS — M48.062 SPINAL STENOSIS OF LUMBAR REGION WITH NEUROGENIC CLAUDICATION: ICD-10-CM

## 2025-08-14 DIAGNOSIS — M43.16 SPONDYLOLISTHESIS AT L3-L4 LEVEL: ICD-10-CM

## 2025-08-14 PROCEDURE — 72131 CT LUMBAR SPINE W/O DYE: CPT

## 2025-08-18 ENCOUNTER — TELEPHONE (OUTPATIENT)
Dept: NEUROSURGERY | Facility: CLINIC | Age: 65
End: 2025-08-18
Payer: COMMERCIAL

## 2025-08-19 ENCOUNTER — ANESTHESIA EVENT (OUTPATIENT)
Dept: PERIOP | Facility: HOSPITAL | Age: 65
End: 2025-08-19
Payer: COMMERCIAL

## 2025-08-19 ENCOUNTER — HOSPITAL ENCOUNTER (OUTPATIENT)
Facility: HOSPITAL | Age: 65
Setting detail: OBSERVATION
End: 2025-08-19
Attending: NEUROLOGICAL SURGERY | Admitting: NEUROLOGICAL SURGERY
Payer: COMMERCIAL

## 2025-08-19 ENCOUNTER — ANESTHESIA (OUTPATIENT)
Dept: PERIOP | Facility: HOSPITAL | Age: 65
End: 2025-08-19
Payer: COMMERCIAL

## 2025-08-19 ENCOUNTER — APPOINTMENT (OUTPATIENT)
Dept: GENERAL RADIOLOGY | Facility: HOSPITAL | Age: 65
End: 2025-08-19
Payer: COMMERCIAL

## 2025-08-19 PROCEDURE — 25810000003 SODIUM CHLORIDE 0.9 % SOLUTION 250 ML FLEX CONT: Performed by: NURSE ANESTHETIST, CERTIFIED REGISTERED

## 2025-08-19 PROCEDURE — 25010000002 HYDROMORPHONE 1 MG/ML SOLUTION: Performed by: NURSE ANESTHETIST, CERTIFIED REGISTERED

## 2025-08-19 PROCEDURE — 25010000002 SUGAMMADEX 200 MG/2ML SOLUTION: Performed by: NURSE ANESTHETIST, CERTIFIED REGISTERED

## 2025-08-19 PROCEDURE — 25010000002 MAGNESIUM SULFATE PER 500 MG OF MAGNESIUM: Performed by: NURSE ANESTHETIST, CERTIFIED REGISTERED

## 2025-08-19 PROCEDURE — 25010000002 PHENYLEPHRINE 10 MG/ML SOLUTION 5 ML VIAL: Performed by: NURSE ANESTHETIST, CERTIFIED REGISTERED

## 2025-08-19 PROCEDURE — 25010000002 PHENYLEPHRINE 10 MG/ML SOLUTION: Performed by: NURSE ANESTHETIST, CERTIFIED REGISTERED

## 2025-08-19 PROCEDURE — 25010000002 METOPROLOL TARTRATE: Performed by: NURSE ANESTHETIST, CERTIFIED REGISTERED

## 2025-08-19 PROCEDURE — 25010000002 DEXAMETHASONE SODIUM PHOSPHATE 20 MG/5ML SOLUTION: Performed by: NURSE ANESTHETIST, CERTIFIED REGISTERED

## 2025-08-19 PROCEDURE — 25010000002 PROPOFOL 10 MG/ML EMULSION: Performed by: NURSE ANESTHETIST, CERTIFIED REGISTERED

## 2025-08-19 PROCEDURE — 25010000002 ONDANSETRON PER 1 MG: Performed by: NURSE ANESTHETIST, CERTIFIED REGISTERED

## 2025-08-19 PROCEDURE — 25010000002 LIDOCAINE 2% SOLUTION: Performed by: NURSE ANESTHETIST, CERTIFIED REGISTERED

## 2025-08-19 PROCEDURE — 25010000002 CEFAZOLIN PER 500 MG: Performed by: NURSE ANESTHETIST, CERTIFIED REGISTERED

## 2025-08-19 RX ORDER — KETAMINE HYDROCHLORIDE 10 MG/ML
INJECTION, SOLUTION INTRAMUSCULAR; INTRAVENOUS AS NEEDED
Status: DISCONTINUED | OUTPATIENT
Start: 2025-08-19 | End: 2025-08-19 | Stop reason: SURG

## 2025-08-19 RX ORDER — MAGNESIUM SULFATE HEPTAHYDRATE 500 MG/ML
INJECTION, SOLUTION INTRAMUSCULAR; INTRAVENOUS AS NEEDED
Status: DISCONTINUED | OUTPATIENT
Start: 2025-08-19 | End: 2025-08-19 | Stop reason: SURG

## 2025-08-19 RX ORDER — PROPOFOL 10 MG/ML
VIAL (ML) INTRAVENOUS AS NEEDED
Status: DISCONTINUED | OUTPATIENT
Start: 2025-08-19 | End: 2025-08-19 | Stop reason: SURG

## 2025-08-19 RX ORDER — DEXAMETHASONE SODIUM PHOSPHATE 4 MG/ML
INJECTION, SOLUTION INTRA-ARTICULAR; INTRALESIONAL; INTRAMUSCULAR; INTRAVENOUS; SOFT TISSUE AS NEEDED
Status: DISCONTINUED | OUTPATIENT
Start: 2025-08-19 | End: 2025-08-19 | Stop reason: SURG

## 2025-08-19 RX ORDER — ONDANSETRON 2 MG/ML
INJECTION INTRAMUSCULAR; INTRAVENOUS AS NEEDED
Status: DISCONTINUED | OUTPATIENT
Start: 2025-08-19 | End: 2025-08-19 | Stop reason: SURG

## 2025-08-19 RX ORDER — PHENYLEPHRINE HYDROCHLORIDE 10 MG/ML
INJECTION INTRAVENOUS AS NEEDED
Status: DISCONTINUED | OUTPATIENT
Start: 2025-08-19 | End: 2025-08-19 | Stop reason: SURG

## 2025-08-19 RX ORDER — ROCURONIUM BROMIDE 10 MG/ML
INJECTION, SOLUTION INTRAVENOUS AS NEEDED
Status: DISCONTINUED | OUTPATIENT
Start: 2025-08-19 | End: 2025-08-19 | Stop reason: SURG

## 2025-08-19 RX ORDER — LIDOCAINE HYDROCHLORIDE 20 MG/ML
INJECTION, SOLUTION INFILTRATION; PERINEURAL AS NEEDED
Status: DISCONTINUED | OUTPATIENT
Start: 2025-08-19 | End: 2025-08-19 | Stop reason: SURG

## 2025-08-19 RX ADMIN — HYDROMORPHONE HYDROCHLORIDE 0.5 MG: 1 INJECTION, SOLUTION INTRAMUSCULAR; INTRAVENOUS; SUBCUTANEOUS at 09:37

## 2025-08-19 RX ADMIN — PHENYLEPHRINE HYDROCHLORIDE 50 MCG: 10 INJECTION INTRAVENOUS at 08:39

## 2025-08-19 RX ADMIN — CEFAZOLIN 2 G: 2 INJECTION, POWDER, LYOPHILIZED, FOR SOLUTION INTRAVENOUS at 12:07

## 2025-08-19 RX ADMIN — PHENYLEPHRINE HYDROCHLORIDE 100 MCG: 10 INJECTION INTRAVENOUS at 08:31

## 2025-08-19 RX ADMIN — ONDANSETRON 4 MG: 2 INJECTION, SOLUTION INTRAMUSCULAR; INTRAVENOUS at 12:23

## 2025-08-19 RX ADMIN — PROPOFOL 50 MG: 10 INJECTION, EMULSION INTRAVENOUS at 09:06

## 2025-08-19 RX ADMIN — PHENYLEPHRINE HYDROCHLORIDE 0.5 MCG/KG/MIN: 10 INJECTION, SOLUTION INTRAVENOUS at 08:44

## 2025-08-19 RX ADMIN — ROCURONIUM BROMIDE 20 MG: 10 INJECTION INTRAVENOUS at 10:42

## 2025-08-19 RX ADMIN — HYDROMORPHONE HYDROCHLORIDE 0.5 MG: 1 INJECTION, SOLUTION INTRAMUSCULAR; INTRAVENOUS; SUBCUTANEOUS at 12:30

## 2025-08-19 RX ADMIN — DEXAMETHASONE SODIUM PHOSPHATE 8 MG: 4 INJECTION, SOLUTION INTRAMUSCULAR; INTRAVENOUS at 08:25

## 2025-08-19 RX ADMIN — ROCURONIUM BROMIDE 50 MG: 10 INJECTION INTRAVENOUS at 08:18

## 2025-08-19 RX ADMIN — MAGNESIUM SULFATE HEPTAHYDRATE 2 G: 500 INJECTION, SOLUTION INTRAMUSCULAR; INTRAVENOUS at 09:40

## 2025-08-19 RX ADMIN — KETAMINE HYDROCHLORIDE 10 MG: 10 INJECTION INTRAMUSCULAR; INTRAVENOUS at 10:06

## 2025-08-19 RX ADMIN — LIDOCAINE HYDROCHLORIDE 80 MG: 20 INJECTION, SOLUTION INFILTRATION; PERINEURAL at 08:18

## 2025-08-19 RX ADMIN — METOPROLOL TARTRATE 2.5 MG: 5 INJECTION, SOLUTION INTRAVENOUS at 12:50

## 2025-08-19 RX ADMIN — KETAMINE HYDROCHLORIDE 30 MG: 10 INJECTION INTRAMUSCULAR; INTRAVENOUS at 09:09

## 2025-08-19 RX ADMIN — KETAMINE HYDROCHLORIDE 10 MG: 10 INJECTION INTRAMUSCULAR; INTRAVENOUS at 11:18

## 2025-08-19 RX ADMIN — PROPOFOL 150 MG: 10 INJECTION, EMULSION INTRAVENOUS at 08:18

## 2025-08-19 RX ADMIN — HYDROMORPHONE HYDROCHLORIDE 0.5 MG: 1 INJECTION, SOLUTION INTRAMUSCULAR; INTRAVENOUS; SUBCUTANEOUS at 08:12

## 2025-08-19 RX ADMIN — ROCURONIUM BROMIDE 30 MG: 10 INJECTION INTRAVENOUS at 09:21

## 2025-08-19 RX ADMIN — SUGAMMADEX 200 MG: 100 INJECTION, SOLUTION INTRAVENOUS at 12:25

## 2025-08-20 ENCOUNTER — APPOINTMENT (OUTPATIENT)
Dept: GENERAL RADIOLOGY | Facility: HOSPITAL | Age: 65
End: 2025-08-20
Payer: COMMERCIAL

## 2025-08-21 ENCOUNTER — APPOINTMENT (OUTPATIENT)
Dept: CARDIOLOGY | Facility: HOSPITAL | Age: 65
End: 2025-08-21
Payer: COMMERCIAL

## 2025-08-24 ENCOUNTER — APPOINTMENT (OUTPATIENT)
Dept: GENERAL RADIOLOGY | Facility: HOSPITAL | Age: 65
End: 2025-08-24
Payer: COMMERCIAL

## 2025-08-25 ENCOUNTER — APPOINTMENT (OUTPATIENT)
Dept: GENERAL RADIOLOGY | Facility: HOSPITAL | Age: 65
End: 2025-08-25
Payer: COMMERCIAL

## 2025-08-25 PROCEDURE — 74018 RADEX ABDOMEN 1 VIEW: CPT

## (undated) DEVICE — UNDERCAST PADDING: Brand: DEROYAL

## (undated) DEVICE — GLV SURG SENSICARE W/ALOE PF LF 7.5 STRL

## (undated) DEVICE — SOL IRR NACL 0.9PCT 3000ML

## (undated) DEVICE — BNDG,ELSTC,MATRIX,STRL,6"X5YD,LF,HOOK&LP: Brand: MEDLINE

## (undated) DEVICE — PK KN TOTL 40

## (undated) DEVICE — ANTIBACTERIAL UNDYED BRAIDED (POLYGLACTIN 910), SYNTHETIC ABSORBABLE SUTURE: Brand: COATED VICRYL

## (undated) DEVICE — STERILE PATIENT PROTECTIVE PAD FOR IMP® KNEE POSITIONERS & COHESIVE WRAP (10 / CASE): Brand: DE MAYO KNEE POSITIONER®

## (undated) DEVICE — THE STERILE LIGHT HANDLE COVER IS USED WITH STERIS SURGICAL LIGHTING AND VISUALIZATION SYSTEMS.

## (undated) DEVICE — KT DRN EVAC WND PVC PCH WTROC RND 10F400

## (undated) DEVICE — DUAL CUT SAGITTAL BLADE

## (undated) DEVICE — APPL DURAPREP IODOPHOR APL 26ML

## (undated) DEVICE — BANDAGE,GAUZE,BULKEE II,4.5"X4.1YD,STRL: Brand: MEDLINE

## (undated) DEVICE — TRAP FLD MINIVAC MEGADYNE 100ML

## (undated) DEVICE — GAUZE,SPONGE,FLUFF,6"X6.75",STRL,10/TRAY: Brand: MEDLINE

## (undated) DEVICE — RIMMED SPEED PIN 45MM STERILE

## (undated) DEVICE — GLV SURG TRIUMPH CLASSIC PF LTX 8.5 STRL

## (undated) DEVICE — SYS CLS SKIN PREMIERPRO EXOFINFUSION 22CM

## (undated) DEVICE — DRSNG WND GZ CURAD OIL EMULSION 3X8IN LF STRL 1PK

## (undated) DEVICE — NEEDLE, QUINCKE 22GX3.5": Brand: MEDLINE INDUSTRIES, INC.

## (undated) DEVICE — SUT VIC 1 CT1 36IN J947H

## (undated) DEVICE — GLV SURG SENSICARE PI PF LF 7 GRN STRL

## (undated) DEVICE — PREMIUM WET SKIN PREP TRAY: Brand: MEDLINE INDUSTRIES, INC.

## (undated) DEVICE — MEDI-VAC YANKAUER SUCTION HANDLE W/BULBOUS TIP: Brand: CARDINAL HEALTH

## (undated) DEVICE — PREP SOL POVIDONE/IODINE BT 4OZ

## (undated) DEVICE — GLV SURG SENSICARE PI MIC PF SZ7 LF STRL

## (undated) DEVICE — 2108 SERIES SAGITTAL BLADE, NO OFFSET  (12.4 X 1.19 X 82.1MM)

## (undated) DEVICE — 450 ML BOTTLE OF 0.05% CHLORHEXIDINE GLUCONATE IN 99.95% STERILE WATER FOR IRRIGATION, USP AND APPLICATOR.: Brand: IRRISEPT ANTIMICROBIAL WOUND LAVAGE

## (undated) DEVICE — GLV SURG BIOGEL M LTX PF 7 1/2

## (undated) DEVICE — STPLR SKIN VISISTAT WD 35CT

## (undated) DEVICE — ENCORE® LATEX ORTHO SIZE 9, STERILE LATEX POWDER-FREE SURGICAL GLOVE: Brand: ENCORE

## (undated) DEVICE — 3M™ IOBAN™ 2 ANTIMICROBIAL INCISE DRAPE 6640EZ: Brand: IOBAN™ 2

## (undated) DEVICE — SYR LUERLOK 20CC

## (undated) DEVICE — UNDERGLV SURG BIOGEL INDICATOR LF PF 7.5

## (undated) DEVICE — NDL SPINE 20G 3 1/2 YEL STRL 1P/U

## (undated) DEVICE — PATIENT RETURN ELECTRODE, SINGLE-USE, CONTACT QUALITY MONITORING, ADULT, WITH 9FT CORD, FOR PATIENTS WEIGING OVER 33LBS. (15KG): Brand: MEGADYNE

## (undated) DEVICE — ENCORE® LATEX ORTHO SIZE 8.5, STERILE LATEX POWDER-FREE SURGICAL GLOVE: Brand: ENCORE

## (undated) DEVICE — ZIP 24 SURGICAL SKIN CLOSURE DEVICE, PSA: Brand: ZIP 24 SURGICAL SKIN CLOSURE DEVICE

## (undated) DEVICE — BNDG ELAS ELITE V/CLOSE 6IN 5YD LF STRL

## (undated) DEVICE — GLV SURG SENSICARE W/ALOE PF LF 9 STRL

## (undated) DEVICE — DRSNG SURESITE WNDW 2.38X2.75

## (undated) DEVICE — GLV SURG SENSICARE W/ALOE PF LF 8 STRL

## (undated) DEVICE — SOL NACL 0.9PCT 1000ML